# Patient Record
Sex: FEMALE | Race: WHITE | NOT HISPANIC OR LATINO | Employment: OTHER | URBAN - METROPOLITAN AREA
[De-identification: names, ages, dates, MRNs, and addresses within clinical notes are randomized per-mention and may not be internally consistent; named-entity substitution may affect disease eponyms.]

---

## 2017-01-30 ENCOUNTER — ALLSCRIPTS OFFICE VISIT (OUTPATIENT)
Dept: OTHER | Facility: OTHER | Age: 77
End: 2017-01-30

## 2017-01-30 DIAGNOSIS — E78.5 HYPERLIPIDEMIA: ICD-10-CM

## 2017-01-30 DIAGNOSIS — E03.9 HYPOTHYROIDISM: ICD-10-CM

## 2017-01-31 ENCOUNTER — GENERIC CONVERSION - ENCOUNTER (OUTPATIENT)
Dept: OTHER | Facility: OTHER | Age: 77
End: 2017-01-31

## 2017-05-04 ENCOUNTER — APPOINTMENT (OUTPATIENT)
Dept: LAB | Facility: OTHER | Age: 77
End: 2017-05-04
Payer: MEDICARE

## 2017-05-04 DIAGNOSIS — E03.9 HYPOTHYROIDISM: ICD-10-CM

## 2017-05-04 DIAGNOSIS — E78.5 HYPERLIPIDEMIA: ICD-10-CM

## 2017-05-04 LAB
ALBUMIN SERPL BCP-MCNC: 3.3 G/DL (ref 3.5–5)
ALP SERPL-CCNC: 85 U/L (ref 46–116)
ALT SERPL W P-5'-P-CCNC: 23 U/L (ref 12–78)
ANION GAP SERPL CALCULATED.3IONS-SCNC: 9 MMOL/L (ref 4–13)
AST SERPL W P-5'-P-CCNC: 16 U/L (ref 5–45)
BILIRUB SERPL-MCNC: 0.52 MG/DL (ref 0.2–1)
BUN SERPL-MCNC: 19 MG/DL (ref 5–25)
CALCIUM SERPL-MCNC: 9.3 MG/DL (ref 8.3–10.1)
CHLORIDE SERPL-SCNC: 103 MMOL/L (ref 100–108)
CHOLEST SERPL-MCNC: 233 MG/DL (ref 50–200)
CO2 SERPL-SCNC: 28 MMOL/L (ref 21–32)
CREAT SERPL-MCNC: 0.96 MG/DL (ref 0.6–1.3)
GFR SERPL CREATININE-BSD FRML MDRD: 56.8 ML/MIN/1.73SQ M
GLUCOSE P FAST SERPL-MCNC: 77 MG/DL (ref 65–99)
HDLC SERPL-MCNC: 52 MG/DL (ref 40–60)
LDLC SERPL CALC-MCNC: 157 MG/DL (ref 0–100)
POTASSIUM SERPL-SCNC: 4 MMOL/L (ref 3.5–5.3)
PROT SERPL-MCNC: 8.1 G/DL (ref 6.4–8.2)
SODIUM SERPL-SCNC: 140 MMOL/L (ref 136–145)
TRIGL SERPL-MCNC: 119 MG/DL
TSH SERPL DL<=0.05 MIU/L-ACNC: 2.15 UIU/ML (ref 0.36–3.74)

## 2017-05-04 PROCEDURE — 36415 COLL VENOUS BLD VENIPUNCTURE: CPT

## 2017-05-04 PROCEDURE — 84443 ASSAY THYROID STIM HORMONE: CPT

## 2017-05-04 PROCEDURE — 80061 LIPID PANEL: CPT

## 2017-05-04 PROCEDURE — 80053 COMPREHEN METABOLIC PANEL: CPT

## 2017-07-18 ENCOUNTER — ALLSCRIPTS OFFICE VISIT (OUTPATIENT)
Dept: OTHER | Facility: OTHER | Age: 77
End: 2017-07-18

## 2017-07-18 DIAGNOSIS — E78.5 HYPERLIPIDEMIA: ICD-10-CM

## 2017-09-14 ENCOUNTER — TRANSCRIBE ORDERS (OUTPATIENT)
Dept: LAB | Facility: OTHER | Age: 77
End: 2017-09-14

## 2017-09-14 ENCOUNTER — APPOINTMENT (OUTPATIENT)
Dept: LAB | Facility: OTHER | Age: 77
End: 2017-09-14
Payer: MEDICARE

## 2017-09-14 DIAGNOSIS — E78.5 HYPERLIPIDEMIA: ICD-10-CM

## 2017-09-14 LAB
ALBUMIN SERPL BCP-MCNC: 3.3 G/DL (ref 3.5–5)
ALP SERPL-CCNC: 247 U/L (ref 46–116)
ALT SERPL W P-5'-P-CCNC: 91 U/L (ref 12–78)
ANION GAP SERPL CALCULATED.3IONS-SCNC: 6 MMOL/L (ref 4–13)
AST SERPL W P-5'-P-CCNC: 65 U/L (ref 5–45)
BILIRUB SERPL-MCNC: 0.5 MG/DL (ref 0.2–1)
BUN SERPL-MCNC: 16 MG/DL (ref 5–25)
CALCIUM SERPL-MCNC: 9.2 MG/DL (ref 8.3–10.1)
CHLORIDE SERPL-SCNC: 102 MMOL/L (ref 100–108)
CHOLEST SERPL-MCNC: 203 MG/DL (ref 50–200)
CO2 SERPL-SCNC: 28 MMOL/L (ref 21–32)
CREAT SERPL-MCNC: 1.03 MG/DL (ref 0.6–1.3)
GFR SERPL CREATININE-BSD FRML MDRD: 53 ML/MIN/1.73SQ M
GLUCOSE P FAST SERPL-MCNC: 87 MG/DL (ref 65–99)
HDLC SERPL-MCNC: 63 MG/DL (ref 40–60)
LDLC SERPL CALC-MCNC: 119 MG/DL (ref 0–100)
POTASSIUM SERPL-SCNC: 4.2 MMOL/L (ref 3.5–5.3)
PROT SERPL-MCNC: 8.2 G/DL (ref 6.4–8.2)
SODIUM SERPL-SCNC: 136 MMOL/L (ref 136–145)
TRIGL SERPL-MCNC: 107 MG/DL

## 2017-09-14 PROCEDURE — 80061 LIPID PANEL: CPT

## 2017-09-14 PROCEDURE — 80053 COMPREHEN METABOLIC PANEL: CPT

## 2017-09-14 PROCEDURE — 36415 COLL VENOUS BLD VENIPUNCTURE: CPT

## 2017-09-15 ENCOUNTER — GENERIC CONVERSION - ENCOUNTER (OUTPATIENT)
Dept: OTHER | Facility: OTHER | Age: 77
End: 2017-09-15

## 2017-09-15 ENCOUNTER — ALLSCRIPTS OFFICE VISIT (OUTPATIENT)
Dept: OTHER | Facility: OTHER | Age: 77
End: 2017-09-15

## 2017-09-15 DIAGNOSIS — R79.89 OTHER SPECIFIED ABNORMAL FINDINGS OF BLOOD CHEMISTRY: ICD-10-CM

## 2017-09-15 DIAGNOSIS — N39.0 URINARY TRACT INFECTION: ICD-10-CM

## 2017-09-15 DIAGNOSIS — K62.5 HEMORRHAGE OF ANUS AND RECTUM: ICD-10-CM

## 2017-09-19 ENCOUNTER — HOSPITAL ENCOUNTER (OUTPATIENT)
Dept: RADIOLOGY | Age: 77
Discharge: HOME | End: 2017-09-19
Payer: MEDICARE

## 2017-09-19 DIAGNOSIS — R79.89 OTHER SPECIFIED ABNORMAL FINDINGS OF BLOOD CHEMISTRY: ICD-10-CM

## 2017-09-19 PROCEDURE — 76705 ECHO EXAM OF ABDOMEN: CPT

## 2017-09-22 ENCOUNTER — GENERIC CONVERSION - ENCOUNTER (OUTPATIENT)
Dept: OTHER | Facility: OTHER | Age: 77
End: 2017-09-22

## 2017-09-25 ENCOUNTER — APPOINTMENT (OUTPATIENT)
Dept: LAB | Facility: OTHER | Age: 77
End: 2017-09-25
Payer: MEDICARE

## 2017-09-25 ENCOUNTER — TRANSCRIBE ORDERS (OUTPATIENT)
Dept: LAB | Facility: OTHER | Age: 77
End: 2017-09-25

## 2017-09-25 DIAGNOSIS — R79.89 OTHER SPECIFIED ABNORMAL FINDINGS OF BLOOD CHEMISTRY: ICD-10-CM

## 2017-09-25 LAB
ALP SERPL-CCNC: 184 U/L (ref 46–116)
ALT SERPL W P-5'-P-CCNC: 47 U/L (ref 12–78)
AST SERPL W P-5'-P-CCNC: 36 U/L (ref 5–45)
GGT SERPL-CCNC: 391 U/L (ref 5–85)

## 2017-09-25 PROCEDURE — 87340 HEPATITIS B SURFACE AG IA: CPT

## 2017-09-25 PROCEDURE — 82977 ASSAY OF GGT: CPT

## 2017-09-25 PROCEDURE — 84075 ASSAY ALKALINE PHOSPHATASE: CPT

## 2017-09-25 PROCEDURE — 86803 HEPATITIS C AB TEST: CPT

## 2017-09-25 PROCEDURE — 84460 ALANINE AMINO (ALT) (SGPT): CPT

## 2017-09-25 PROCEDURE — 84450 TRANSFERASE (AST) (SGOT): CPT

## 2017-09-25 PROCEDURE — 86705 HEP B CORE ANTIBODY IGM: CPT

## 2017-09-25 PROCEDURE — 86704 HEP B CORE ANTIBODY TOTAL: CPT

## 2017-09-25 PROCEDURE — 36415 COLL VENOUS BLD VENIPUNCTURE: CPT

## 2017-09-25 PROCEDURE — 84080 ASSAY ALKALINE PHOSPHATASES: CPT

## 2017-09-26 ENCOUNTER — GENERIC CONVERSION - ENCOUNTER (OUTPATIENT)
Dept: OTHER | Facility: OTHER | Age: 77
End: 2017-09-26

## 2017-09-26 LAB
HBV CORE AB SER QL: NORMAL
HBV CORE IGM SER QL: NORMAL
HBV SURFACE AG SER QL: NORMAL
HCV AB SER QL: NORMAL

## 2017-09-28 LAB — ALP BONE SERPL-MCNC: 22.5 UG/L

## 2017-10-06 ENCOUNTER — APPOINTMENT (OUTPATIENT)
Dept: LAB | Facility: MEDICAL CENTER | Age: 77
End: 2017-10-06
Payer: MEDICARE

## 2017-10-06 ENCOUNTER — OFFICE VISIT (OUTPATIENT)
Dept: URGENT CARE | Facility: MEDICAL CENTER | Age: 77
End: 2017-10-06
Payer: MEDICARE

## 2017-10-06 DIAGNOSIS — N39.0 URINARY TRACT INFECTION: ICD-10-CM

## 2017-10-06 PROCEDURE — 99203 OFFICE O/P NEW LOW 30 MIN: CPT

## 2017-10-06 PROCEDURE — 87086 URINE CULTURE/COLONY COUNT: CPT

## 2017-10-06 PROCEDURE — G0463 HOSPITAL OUTPT CLINIC VISIT: HCPCS

## 2017-10-06 PROCEDURE — 81002 URINALYSIS NONAUTO W/O SCOPE: CPT

## 2017-10-07 LAB — BACTERIA UR CULT: NORMAL

## 2017-10-13 ENCOUNTER — ALLSCRIPTS OFFICE VISIT (OUTPATIENT)
Dept: OTHER | Facility: OTHER | Age: 77
End: 2017-10-13

## 2017-10-13 NOTE — PROGRESS NOTES
Assessment  1  Urinary tract infection (599 0) (N39 0)   2  Low back pain (724 2) (M54 5)    Plan  Urinary tract infection    · Ciprofloxacin HCl - 500 MG Oral Tablet   · Sulfamethoxazole-Trimethoprim 800-160 MG Oral Tablet (Bactrim DS); TAKE 1  TABLET TWICE DAILY UNTIL FINISHED   · (1) URINE CULTURE; Source:Urine, Clean Catch; Status:Resulted - Requires  Verification;   Done: 79VLB7130 03:07PM   · Urine Dip Non-Automated- POC; Status:Resulted - Requires Verification,Retrospective  By Protocol Authorization;   Done: 49HQT4109 02:58PM    Discussion/Summary  Discussion Summary:   Today you were found to have signs of a UTI today  Urine culture is pending  If I need to change your antibiotic, I will call you  Your back pain appears to be muscular in etiology  the antibiotic as directedfluidsheat to your backwith PCP within 2-3 days  to the ER with worsening symptoms, high fever, worsening pain, vomiting or any new concerning symptoms  Medication Side Effects Reviewed: Possible side effects of new medications were reviewed with the patient/guardian today  Understands and agrees with treatment plan: The treatment plan was reviewed with the patient/guardian  The patient/guardian understands and agrees with the treatment plan      Chief Complaint  1  Pain  Chief Complaint Free Text Note Form: House cleaning earlier in the week  ANd does have hx of shingles without Rash  has left pain in buttocks  had low grade fevers  100/100 9 Tylenol   having yellow vaginal drainage  Just not feeling right  History of Present Illness  HPI: The patient presents today for pain to her left buttock that started on Monday and has been worse the past 2 days  The patient states that she has been doing housework and moving furniture all week  The patient rates pain as a 4/10  She states that it is worse with movement and with touching her left lower back  She has taken any medications all week   The patient states that she has been having chills  The patient's daughter states that she had a low grade fever today  The patient took 650 mg of tylenol PTA  The patient's daughter called her PCP who recommended she go to urgent care  The patient also admits to having some vaginal discharge  Hospital Based Practices Required Assessment:   Pain Assessment   the patient states they have pain  (on a scale of 0 to 10, the patient rates the pain at 4 )   Abuse And Domestic Violence Screen   Domestic violence screen not done today  Reason DV Screen not done: not alone     Depression And Suicide Screen  Suicide screen not done today  -Reason suicide screen not done: not alone   Primary Language is  English  Readiness To Learn: Receptive-and-no interest    Barriers To Learning: none  Preferred Learning: verbal   Education Completed: disease/condition-and-treatment/procedure   Teaching Method: verbal   Person Taught: patient   Evaluation Of Learning: verbalized/demonstrated understanding      Review of Systems  Focused-Female:   Constitutional: fever-and-chills  ENT: no earache,-no sore throat-and-no nasal discharge  Cardiovascular: no chest pain  Respiratory: no shortness of breath  Gastrointestinal: no abdominal pain,-no nausea,-no vomiting-and-no diarrhea  Genitourinary: vaginal discharge, but-no dysuria  Musculoskeletal: arthralgias  Integumentary: no rashes  Neurological: no headache,-no numbness-and-no tingling  ROS Reviewed:   ROS reviewed  Active Problems  1  Edema (782 3) (R60 9)   2  Elevated LFTs (790 6) (R79 89)   3  Encounter for screening mammogram for malignant neoplasm of breast (V76 12)   (Z12 31)   4  Fatigue (780 79) (R53 83)   5  Ganglion cyst of flexor tendon sheath of finger, left (727 42) (M67 442)   6  Hyperlipidemia (272 4) (E78 5)   7  Hypothyroidism (244 9) (E03 9)   8  Need for influenza vaccination (V04 81) (Z23)   9  Need for pneumococcal vaccination (V03 82) (Z23)   10   Need for shingles vaccine (V04 89) (Z23)   11  Right ear impacted cerumen (380 4) (H61 21)   12  Screening for osteoporosis (V82 81) (Z13 820)   13  Venous stasis of lower extremity (459 81) (I87 8)    Past Medical History  1  History of sinusitis (V12 69) (Z87 09)   2  Need for Tdap vaccination (V06 1) (Z23)   3  History of Right middle ear infection (382 9) (H66 91)   4  Screening for osteoporosis (V82 81) (Z13 820)  Active Problems And Past Medical History Reviewed: The active problems and past medical history were reviewed and updated today  Family History  Sister    1  Family history of Heart disease  Family History    2  Family history of Hyperlipidemia  Family History Reviewed: The family history was reviewed and updated today  Social History   · Never a smoker  Social History Reviewed: The social history was reviewed and updated today  The social history was reviewed and is unchanged  Surgical History  1  History of Gastric Surgery  Surgical History Reviewed: The surgical history was reviewed and updated today  Current Meds   1  Atorvastatin Calcium 40 MG Oral Tablet; take 1 tablet every day; Therapy: 98Zsb9601 to (Evaluate:38Cst5994)  Requested for: 57Jzs9207; Last   Rx:00Prq6770 Ordered   2  Boostrix 5-2 5-18 5 Intramuscular Suspension; INJECT 0 5  ML Intramuscular; Therapy: 99BCD0635 to (Last Rx:18Mar2016) Ordered   3  Levothyroxine Sodium 88 MCG Oral Tablet; take 1 tablet by mouth once daily; Therapy: 84PLT6129 to (Ml Arreola)  Requested for: 94XXE1728; Last   Rx:81Yxl1047 Ordered   4  Omega-3-6-9 Oral Capsule Recorded   5  Zostavax 49899 UNT/0 65ML Subcutaneous Solution Reconstituted; as directed; Therapy: 55BUV5453 to (Last Rx:61Str3464) Ordered  Medication List Reviewed: The medication list was reviewed and updated today  Allergies  1  Percocet TABS   2   Vicodin TABS    Vitals  Signs   Recorded: 82UHW8289 01:56PM   Temperature: 98 7 F  Heart Rate: 92  Respiration: 20  Systolic: 253  Diastolic: 70  Height: 5 ft 2 in  Weight: 166 lb   BMI Calculated: 30 36  BSA Calculated: 1 77  Pain Scale: 5    Physical Exam    Constitutional   General appearance: No acute distress, well appearing and well nourished  Ears, Nose, Mouth, and Throat   Oropharynx: Normal with no erythema, edema, exudate or lesions  Pulmonary   Respiratory effort: No increased work of breathing or signs of respiratory distress  Auscultation of lungs: Clear to auscultation  Cardiovascular   Auscultation of heart: Normal rate and rhythm, normal S1 and S2, without murmurs  Abdomen   Abdomen: Non-tender, no masses  -no CVA tenderness B/L  Musculoskeletal   Gait and station: Normal     Inspection/palpation of joints, bones, and muscles: Abnormal  -No midline tenderness of the lumbar spine  There is pinpoint tenderness upon palpation of the left side paraspinal muscle/SI joint  Skin   Skin and subcutaneous tissue: Normal without rashes or lesions  -No rash  Neurologic   Reflexes: 2+ and symmetric  Sensation: No sensory loss      Psychiatric   Orientation to person, place, and time: Normal        Results/Data  (1) URINE CULTURE 06Oct2017 03:07PM [facility] Order Number: [id number]     Test Name Result Flag Reference   CLINICAL REPORT (Report)     Test:        Urine culture  Specimen Type:   Urine  Specimen Date:   10/6/2017 3:07 PM  Result Date:    10/7/2017 1:36 PM  Result Status:   Final result  Resulting Lab:   [address]            Tel: [phone]      CULTURE                                       ------------------                                   No Growth <1000 cfu/mL     Urine Dip Non-Automated- POC 05XJJ2624 02:58PM Otilio Abreu     Test Name Result Flag Reference   Color Yellow     Clarity 0 5M     Leukocytes small     Nitrite neg     Blood small     Bilirubin neg     Urobilinogen 0 02 Protein neg     Ph 5 0     Specific Gravity 1 010     Ketone neg     Glucose neg     Color Yellow     Clarity 0 5M     Leukocytes small     Nitrite neg     Blood small     Bilirubin neg     Urobilinogen 0 02     Protein neg     Ph 5 0     Specific Gravity 1 010     Ketone neg     Glucose neg               Future Appointments    Date/Time Provider Specialty Site   10/13/2017 10:15 AM HUGO Nobles   Gastroenterology Adult [facility] GASTROENTEROLOGY [facility]     Signatures   Electronically signed by : Fadi Ramos, [address]; Oct  9 2017  7:43AM EST                       (Author)    Electronically signed by : HUGO Valdez ; Oct 12 2017 11:57AM EST                       (Co-author)

## 2017-11-07 ENCOUNTER — TRANSCRIBE ORDERS (OUTPATIENT)
Dept: LAB | Facility: OTHER | Age: 77
End: 2017-11-07

## 2017-11-07 ENCOUNTER — APPOINTMENT (OUTPATIENT)
Dept: LAB | Facility: OTHER | Age: 77
End: 2017-11-07
Payer: MEDICARE

## 2017-11-07 DIAGNOSIS — K62.5 HEMORRHAGE OF ANUS AND RECTUM: ICD-10-CM

## 2017-11-07 LAB
ALBUMIN SERPL BCP-MCNC: 3.4 G/DL (ref 3.5–5)
ALP SERPL-CCNC: 204 U/L (ref 46–116)
ALT SERPL W P-5'-P-CCNC: 104 U/L (ref 12–78)
AST SERPL W P-5'-P-CCNC: 69 U/L (ref 5–45)
BILIRUB DIRECT SERPL-MCNC: 0.12 MG/DL (ref 0–0.2)
BILIRUB SERPL-MCNC: 0.48 MG/DL (ref 0.2–1)
PROT SERPL-MCNC: 8.8 G/DL (ref 6.4–8.2)

## 2017-11-07 PROCEDURE — 86256 FLUORESCENT ANTIBODY TITER: CPT

## 2017-11-07 PROCEDURE — 80076 HEPATIC FUNCTION PANEL: CPT

## 2017-11-07 PROCEDURE — 36415 COLL VENOUS BLD VENIPUNCTURE: CPT

## 2017-11-08 LAB — MITOCHONDRIA M2 IGG SER-ACNC: 3.1 UNITS (ref 0–20)

## 2017-11-27 ENCOUNTER — GENERIC CONVERSION - ENCOUNTER (OUTPATIENT)
Dept: OTHER | Facility: OTHER | Age: 77
End: 2017-11-27

## 2017-12-04 ENCOUNTER — TRANSCRIBE ORDERS (OUTPATIENT)
Dept: LAB | Facility: OTHER | Age: 77
End: 2017-12-04

## 2017-12-04 ENCOUNTER — APPOINTMENT (OUTPATIENT)
Dept: LAB | Facility: OTHER | Age: 77
End: 2017-12-04
Payer: MEDICARE

## 2017-12-04 DIAGNOSIS — R79.89 OTHER SPECIFIED ABNORMAL FINDINGS OF BLOOD CHEMISTRY: ICD-10-CM

## 2017-12-04 LAB
ALBUMIN SERPL BCP-MCNC: 3.3 G/DL (ref 3.5–5)
ALP SERPL-CCNC: 93 U/L (ref 46–116)
ALT SERPL W P-5'-P-CCNC: 33 U/L (ref 12–78)
AST SERPL W P-5'-P-CCNC: 24 U/L (ref 5–45)
BILIRUB DIRECT SERPL-MCNC: 0.07 MG/DL (ref 0–0.2)
BILIRUB SERPL-MCNC: 0.32 MG/DL (ref 0.2–1)
PROT SERPL-MCNC: 8.1 G/DL (ref 6.4–8.2)

## 2017-12-04 PROCEDURE — 80076 HEPATIC FUNCTION PANEL: CPT

## 2017-12-04 PROCEDURE — 36415 COLL VENOUS BLD VENIPUNCTURE: CPT

## 2017-12-12 ENCOUNTER — GENERIC CONVERSION - ENCOUNTER (OUTPATIENT)
Dept: OTHER | Facility: OTHER | Age: 77
End: 2017-12-12

## 2017-12-15 ENCOUNTER — TRANSCRIBE ORDERS (OUTPATIENT)
Dept: LAB | Facility: OTHER | Age: 77
End: 2017-12-15

## 2017-12-15 ENCOUNTER — APPOINTMENT (OUTPATIENT)
Dept: LAB | Facility: OTHER | Age: 77
End: 2017-12-15
Payer: MEDICARE

## 2017-12-15 DIAGNOSIS — R79.89 OTHER SPECIFIED ABNORMAL FINDINGS OF BLOOD CHEMISTRY: ICD-10-CM

## 2017-12-15 DIAGNOSIS — E78.5 HYPERLIPIDEMIA: ICD-10-CM

## 2017-12-15 LAB
ALBUMIN SERPL BCP-MCNC: 3.3 G/DL (ref 3.5–5)
ALP SERPL-CCNC: 88 U/L (ref 46–116)
ALT SERPL W P-5'-P-CCNC: 29 U/L (ref 12–78)
ANION GAP SERPL CALCULATED.3IONS-SCNC: 4 MMOL/L (ref 4–13)
AST SERPL W P-5'-P-CCNC: 22 U/L (ref 5–45)
BILIRUB SERPL-MCNC: 0.59 MG/DL (ref 0.2–1)
BUN SERPL-MCNC: 18 MG/DL (ref 5–25)
CALCIUM SERPL-MCNC: 9 MG/DL (ref 8.3–10.1)
CHLORIDE SERPL-SCNC: 102 MMOL/L (ref 100–108)
CHOLEST SERPL-MCNC: 366 MG/DL (ref 50–200)
CO2 SERPL-SCNC: 30 MMOL/L (ref 21–32)
CREAT SERPL-MCNC: 0.94 MG/DL (ref 0.6–1.3)
GFR SERPL CREATININE-BSD FRML MDRD: 60 ML/MIN/1.73SQ M
GLUCOSE P FAST SERPL-MCNC: 85 MG/DL (ref 65–99)
HDLC SERPL-MCNC: 52 MG/DL (ref 40–60)
LDLC SERPL CALC-MCNC: 289 MG/DL (ref 0–100)
POTASSIUM SERPL-SCNC: 4.5 MMOL/L (ref 3.5–5.3)
PROT SERPL-MCNC: 7.9 G/DL (ref 6.4–8.2)
SODIUM SERPL-SCNC: 136 MMOL/L (ref 136–145)
TRIGL SERPL-MCNC: 124 MG/DL

## 2017-12-15 PROCEDURE — 80053 COMPREHEN METABOLIC PANEL: CPT

## 2017-12-15 PROCEDURE — 36415 COLL VENOUS BLD VENIPUNCTURE: CPT

## 2017-12-15 PROCEDURE — 80061 LIPID PANEL: CPT

## 2017-12-18 ENCOUNTER — GENERIC CONVERSION - ENCOUNTER (OUTPATIENT)
Dept: OTHER | Facility: OTHER | Age: 77
End: 2017-12-18

## 2017-12-29 ENCOUNTER — GENERIC CONVERSION - ENCOUNTER (OUTPATIENT)
Dept: OTHER | Facility: OTHER | Age: 77
End: 2017-12-29

## 2018-01-11 NOTE — RESULT NOTES
Verified Results  (1) AST (SGOT) 25Sep2017 09:15AM [facility] Order Number: [id number]     Test Name Result Flag Reference   AST(SGOT) 36 U/L  5-45   Specimen collection should occur prior to Sulfasalazine and/or Sulfapyridine administration due to the potential for falsely depressed results       (1) ALT (SGPT) 25Sep2017 09:15AM [facility] Order Number: [id number]     Test Name Result Flag Reference   ALT (SGPT) 47 U/L  12-78     (1) GGT 25Sep2017 09:15AM [facility] Order Number: [id number]     Test Name Result Flag Reference    U/L H 5-85     (1) ALKALINE PHOSPHATASE 16TUQ9808 09:15AM [facility] Order Number: [id number]     Test Name Result Flag Reference   ALK PHOSPHATAS 184 U/L H      (1) CHRONIC HEPATITIS PANEL 50QSQ7777 09:15AM [facility] Order Number: [id number]     Test Name Result Flag Reference   HEPATITIS B SURFACE ANTIGEN Non-reactive  Non-reactive, NonReactive - Confirmed   HEPATITIS C ANTIBODY Non-reactive  Non-reactive   HEPATITIS B CORE IGM ANTIBODY Non-reactive  Non-reactive   HEPATITIS B CORE TOTAL ANTIBODY Non-reactive  Non-reactive

## 2018-01-11 NOTE — CONSULTS
I had the pleasure of evaluating your patient, Fatmata Holcomb  My full evaluation follows:      Chief Complaint  Abnormal liver functions, rectal bleeding      History of Present Illness  43-year-old female with several GI issues  The first is abnormal liver functions  According to the patient she had no history of any liver issues  Sometime ago her cholesterol was elevated  She had been on atorvastatin 40 mg  This was doubled to 80 mg  Following this she had elevation of the alkaline phosphatase  It was 247 on September 14  The dose was then cut back to the original dose  Repeat alkaline phosphatase on September 25 was 184  GGT was 391  AST and ALT were normal  Chronic hepatitis panel was sent which was normal  Hepatic ultrasound and ultrasound of the gallbladder were unremarkable  Patient denies any symptomatology referable to the liver  No change in the color of urine or stool  No jaundice  No abdominal pain  No itching  No other associated GI symptomatology  Second problem is intermittent bright red blood per rectum  Her last colonoscopy was more than 10 years ago  She understands she is overdue and the rectal bleeding makes the issue even more urgent  She has no abdominal pain, change in bowel habits or stool caliber  No rectal pain  No tenesmus  No recent changes in diet or medication other than as mentioned before  We discussed the necessity for colonoscopy and she and her daughter agree  Third issue is a past history of GI bleeding secondary to gastric ulcer  This is many years ago  Was treated surgically  She has no melanotic stool  No significant upper GI symptomatology  Of note is that the patient does not take nonsteroidals  However she takes high-dose Tylenol  We spoke about stopping this because of the current liver issues  No weight loss  No alcohol use  No supplement use  Review of Systems    Constitutional: No fever, no chills, feels well, no tiredness, no recent weight gain or weight loss  Eyes: No complaints of eye pain, no red eyes, no eyesight problems, no discharge, no dry eyes, no itching of eyes  ENT: hearing loss  Cardiovascular: No complaints of slow heart rate, no fast heart rate, no chest pain, no palpitations, no leg claudication, no lower extremity edema  Respiratory: No complaints of shortness of breath, no wheezing, no cough, no SOB on exertion, no orthopnea, no PND  Gastrointestinal: as noted in HPI  Genitourinary: No complaints of dysuria, no incontinence, no pelvic pain, no dysmenorrhea, no vaginal discharge or bleeding  Musculoskeletal: arthralgias  Integumentary: No complaints of skin rash or lesions, no itching, no skin wounds, no breast pain or lump  Neurological: No complaints of headache, no confusion, no convulsions, no numbness, no dizziness or fainting, no tingling, no limb weakness, no difficulty walking  Psychiatric: Not suicidal, no sleep disturbance, no anxiety or depression, no change in personality, no emotional problems  Endocrine: No complaints of proptosis, no hot flashes, no muscle weakness, no deepening of the voice, no feelings of weakness  Hematologic/Lymphatic: No complaints of swollen glands, no swollen glands in the neck, does not bleed easily, does not bruise easily  Active Problems    1  Edema (782 3) (R60 9)   2  Elevated LFTs (790 6) (R79 89)   3  Encounter for screening mammogram for malignant neoplasm of breast (V76 12)   (Z12 31)   4  Fatigue (780 79) (R53 83)   5  Ganglion cyst of flexor tendon sheath of finger, left (727 42) (M67 442)   6  Hyperlipidemia (272 4) (E78 5)   7  Hypothyroidism (244 9) (E03 9)   8  Low back pain (724 2) (M54 5)   9  Need for influenza vaccination (V04 81) (Z23)   10  Need for pneumococcal vaccination (V03 82) (Z23)   11  Need for shingles vaccine (V04 89) (Z23)   12  Right ear impacted cerumen (380 4) (H61 21)   13  Screening for osteoporosis (V82 81) (Z13 820)   14   Urinary tract infection (599 0) (N39 0)   15  Venous stasis of lower extremity (459 81) (I87 8)    Past Medical History    · History of sinusitis (V12 69) (Z87 09)   · Need for Tdap vaccination (V06 1) (Z23)   · History of Right middle ear infection (382 9) (H66 91)   · Screening for osteoporosis (V82 81) (K52 207)    The active problems and past medical history were reviewed and updated today  Surgical History    · History of Gastric Surgery    The surgical history was reviewed and updated today  Family History    · Family history of Heart disease    · Family history of Hyperlipidemia    The family history was reviewed and updated today  Social History    · Never a smoker  The social history was reviewed and updated today  The social history was reviewed and is unchanged  Current Meds   1  Atorvastatin Calcium 40 MG Oral Tablet; take 1 tablet every day; Therapy: 93Bgv5516 to (Evaluate:85Lsz3103)  Requested for: 27Ojp0213; Last   Rx:32Hlr8050 Ordered   2  Boostrix 5-2 5-18 5 Intramuscular Suspension; INJECT 0 5  ML Intramuscular; Therapy: 06YBO4743 to (Last Rx:18Mar2016) Ordered   3  Levothyroxine Sodium 88 MCG Oral Tablet; take 1 tablet by mouth once daily; Therapy: 41NZC4863 to (Kymberly Dailey)  Requested for: 96LQA7310; Last   Rx:05Jun2017 Ordered   4  Omega-3-6-9 Oral Capsule Recorded   5  Zostavax 84179 UNT/0 65ML Subcutaneous Solution Reconstituted; as directed; Therapy: 77USS3001 to (Last Rx:75Idd0857) Ordered    The medication list was reviewed and updated today  Allergies    1  Percocet TABS   2  Vicodin TABS    Vitals   Recorded: 45MFW8850 38:24VJ   Systolic 358   Diastolic 82   Height 5 ft 2 in   Weight 165 lb 6 oz   BMI Calculated 30 25   BSA Calculated 1 76     Physical Exam    Constitutional   General appearance: No acute distress, well appearing and well nourished  Eyes   Conjunctiva and lids: No swelling, erythema or discharge      Pupils and irises: Equal, round and reactive to light     Ears, Nose, Mouth, and Throat   External inspection of ears and nose: Abnormal   Hearing aids  Nasal mucosa, septum, and turbinates: Normal without edema or erythema  Oropharynx: Normal with no erythema, edema, exudate or lesions  Pulmonary   Respiratory effort: No increased work of breathing or signs of respiratory distress  Auscultation of lungs: Clear to auscultation  Cardiovascular   Auscultation of heart: Normal rate and rhythm, normal S1 and S2, without murmurs  Examination of extremities for edema and/or varicosities: Normal     Carotid pulses: Normal     Abdomen   Abdomen: Abnormal   Large midline scar  Liver and spleen: No hepatomegaly or splenomegaly  Lymphatic   Palpation of lymph nodes in neck: No lymphadenopathy  Musculoskeletal   Digits and nails: Normal without clubbing or cyanosis  Inspection/palpation of joints, bones, and muscles: Normal     Skin   Skin and subcutaneous tissue: Normal without rashes or lesions  Neurologic No nystagmus or asterixis  Psychiatric   Orientation to person, place, and time: Normal     Mood and affect: Normal          Results/Data    Lab Review  Liver functions as outlined in history of present illness  Radiology Review  Ultrasound as outlined in history of present illness  Assessment    1  Elevated LFTs (790 6) (R79 89)   2  Rectal bleeding (569 3) (K62 5)    Plan  Rectal bleeding    · (1) HEPATIC FUNCTION PANEL; Status:Active; Requested MPE:[id number];    Perform:[facility]; Due:2018; Ordered; For:Rectal bleeding; Ordered By:Jeff Mendez;   · (1) MITOCHONDRIAL ANTIBODY; Status:Active; Requested [id number];    Perform:[facility]; Due:2018; Ordered; For:Rectal bleeding; Ordered By:Sunitha Mendez;   · COLONOSCOPY; Status:Active; Requested WEU:[id number];    Perform:[facility]; Due:2018; Ordered;   For:Rectal bleeding; Ordered By:Sunitha Mendez;   · ENDOSCOPY - PROCEDURE, RISKS, AND BENEFITS; Status:Complete;   Done:  42LHE2529   Ordered; For:Rectal bleeding; Ordered By:Jeff Mendez;    Discussion/Summary    Problem #1, elevated alkaline phosphatase and GGT  This began following doubling of her cholesterol medication  It is since been decreased  Plan, repeat LFTs in 2 weeks  Mitochondrial antibody at that time  Problem #2, rectal bleeding  Plan, colonoscopy  Last colonoscopy more than 10 years ago  Problem #3, history of bleeding gastric ulcer status post surgery  No evidence of upper GI bleed at this time  Plan, continue current medical management  Thank you very much for allowing me to participate in the care of this patient  If you have any questions, please do not hesitate to contact me        Signatures   Electronically signed by : HUGO Antonio ; Oct 13 2017 10:34AM EST                       (Author)

## 2018-01-12 VITALS
DIASTOLIC BLOOD PRESSURE: 76 MMHG | WEIGHT: 167.5 LBS | BODY MASS INDEX: 30.82 KG/M2 | HEIGHT: 62 IN | HEART RATE: 78 BPM | SYSTOLIC BLOOD PRESSURE: 118 MMHG | TEMPERATURE: 98.3 F | OXYGEN SATURATION: 97 %

## 2018-01-12 NOTE — RESULT NOTES
Verified Results  (1) LIPID PANEL FASTING W DIRECT LDL REFLEX 38Wwn3687 08:45AM [facility] Order Number: [id number]     Test Name Result Flag Reference   CHOLESTEROL 203 mg/dL H    LDL CHOLESTEROL CALCULATED 119 mg/dL H 0-100   Triglyceride:        Normal <150 mg/dl   Borderline High 150-199 mg/dl   High 200-499 mg/dl   Very High >499 mg/dl      Cholesterol:       Desirable <200 mg/dl    Borderline High 200-239 mg/dl    High >239 mg/dl      HDL Cholesterol:       High>59 mg/dL    Low <41 mg/dL      HDL Cholesterol:       High>59 mg/dL    Low <41 mg/dL      This screening LDL is a calculated result  It does not have the accuracy of the Direct Measured LDL in the monitoring of patients with hyperlipidemia and/or statin therapy  Direct Measure LDL (WHU007) must be ordered separately in these patients  TRIGLYCERIDES 107 mg/dL  <=150   Specimen collection should occur prior to N-Acetylcysteine or Metamizole administration due to the potential for falsely depressed results  HDL,DIRECT 63 mg/dL H 40-60   Specimen collection should occur prior to Metamizole administration due to the potential for falsley depressed results  (1) COMPREHENSIVE METABOLIC PANEL 92JIT5710 75:47BI [facility] Order Number: [id number]     Test Name Result Flag Reference   SODIUM 136 mmol/L  136-145   POTASSIUM 4 2 mmol/L  3 5-5 3   CHLORIDE 102 mmol/L  100-108   CARBON DIOXIDE 28 mmol/L  21-32   ANION GAP (CALC) 6 mmol/L  4-13   BLOOD UREA NITROGEN 16 mg/dL  5-25   CREATININE 1 03 mg/dL  0 60-1 30   Standardized to IDMS reference method   CALCIUM 9 2 mg/dL  8 3-10 1   BILI, TOTAL 0 50 mg/dL  0 20-1 00   ALK PHOSPHATAS 247 U/L H    ALT (SGPT) 91 U/L H 12-78   Specimen collection should occur prior to Sulfasalazine and/or Sulfapyridine administration due to the potential for falsely depressed results     AST(SGOT) 65 U/L H 5-45   Specimen collection should occur prior to Sulfasalazine administration due to the potential for falsely depressed results  ALBUMIN 3 3 g/dL L 3 5-5 0   TOTAL PROTEIN 8 2 g/dL  6 4-8 2   eGFR 53 ml/min/1 73sq m     [facility] Disease Education Program recommendations are as follows:  GFR calculation is accurate only with a steady state creatinine  Chronic Kidney disease less than 60 ml/min/1 73 sq  meters  Kidney failure less than 15 ml/min/1 73 sq  meters  GLUCOSE FASTING 87 mg/dL  65-99   Specimen collection should occur prior to Sulfasalazine administration due to the potential for falsely depressed results  Specimen collection should occur prior to Sulfapyridine administration due to the potential for falsely elevated results

## 2018-01-13 VITALS
SYSTOLIC BLOOD PRESSURE: 130 MMHG | HEIGHT: 62 IN | DIASTOLIC BLOOD PRESSURE: 80 MMHG | OXYGEN SATURATION: 95 % | HEART RATE: 73 BPM | WEIGHT: 171 LBS | BODY MASS INDEX: 31.47 KG/M2 | TEMPERATURE: 97.5 F

## 2018-01-13 VITALS
BODY MASS INDEX: 30.82 KG/M2 | TEMPERATURE: 98.8 F | OXYGEN SATURATION: 96 % | DIASTOLIC BLOOD PRESSURE: 82 MMHG | HEIGHT: 62 IN | WEIGHT: 167.5 LBS | SYSTOLIC BLOOD PRESSURE: 126 MMHG | HEART RATE: 76 BPM

## 2018-01-13 VITALS
DIASTOLIC BLOOD PRESSURE: 82 MMHG | WEIGHT: 165.38 LBS | BODY MASS INDEX: 30.44 KG/M2 | SYSTOLIC BLOOD PRESSURE: 132 MMHG | HEIGHT: 62 IN

## 2018-01-14 NOTE — RESULT NOTES
Verified Results  (1) CBC/PLT/DIFF [id number] 07:43AM Carry Busing     Test Name Result Flag Reference   WBC COUNT 6 67 Thousand/uL  4 31-10 16   RBC COUNT 4 17 Million/uL  3 81-5 12   HEMOGLOBIN 13 0 g/dL  11 5-15 4   HEMATOCRIT 41 1 %  34 8-46  1   MCV 99 fL H 82-98   MCH 31 2 pg  26 8-34 3   MCHC 31 6 g/dL  31 4-37 4   RDW 14 1 %  11 6-15 1   MPV 11 9 fL  8 9-12 7   PLATELET COUNT 615 Thousands/uL  149-390   nRBC AUTOMATED 0 /100 WBCs     NEUTROPHILS RELATIVE PERCENT 52 %  43-75   LYMPHOCYTES RELATIVE PERCENT 37 %  14-44   MONOCYTES RELATIVE PERCENT 8 %  4-12   EOSINOPHILS RELATIVE PERCENT 3 %  0-6   BASOPHILS RELATIVE PERCENT 0 %  0-1   NEUTROPHILS ABSOLUTE COUNT 3 46 Thousands/µL  1 85-7 62   LYMPHOCYTES ABSOLUTE COUNT 2 44 Thousands/µL  0 60-4 47   MONOCYTES ABSOLUTE COUNT 0 54 Thousand/µL  0 17-1 22   EOSINOPHILS ABSOLUTE COUNT 0 19 Thousand/µL  0 00-0 61   BASOPHILS ABSOLUTE COUNT 0 03 Thousands/µL  0 00-0 10     (1) LIPID PANEL FASTING W DIRECT LDL REFLEX [id number] 07:43AM Kika Sheridan   Triglyceride:         Normal              <150 mg/dl       Borderline High    150-199 mg/dl       High               200-499 mg/dl       Very High          >499 mg/dl  Cholesterol:         Desirable        <200 mg/dl      Borderline High  200-239 mg/dl      High             >239 mg/dl  HDL Cholesterol:        High    >59 mg/dL      Low     <41 mg/dL  LDL Cholesterol:        Optimal          <100 mg/dl         Near Optimal     100-129 mg/dl        Above Optimal          Borderline High   130-159 mg/dl          High              160-189 mg/dl          Very High        >189 mg/dl  LDL CALCULATED:    This screening LDL is a calculated result  It does not have the accuracy of the Direct Measured LDL in the monitoring of patients with hyperlipidemia and/or statin therapy  Direct Measure LDL (MWJ897) must be ordered separately in these patients       Test Name Result Flag Reference   CHOLESTEROL 393 mg/dL H    LDL CHOLESTEROL CALCULATED 303 mg/dL H 0-100   TRIGLYCERIDES 148 mg/dL  <=150   HDL,DIRECT 60 mg/dL  40-60     (1) COMPREHENSIVE METABOLIC PANEL [id number] 26:11XB Gardenia Sheridan Kidney Disease Education Program recommendations are as follows:  GFR calculation is accurate only with a steady state creatinine  Chronic Kidney disease less than 60 ml/min/1 73 sq  meters  Kidney failure less than 15 ml/min/1 73 sq  meters  Test Name Result Flag Reference   GLUCOSE,RANDM 92 mg/dL     If the patient is fasting, the ADA then defines impaired fasting glucose as > 100 mg/dL and diabetes as > or equal to 123 mg/dL  SODIUM 141 mmol/L  136-145   POTASSIUM 4 2 mmol/L  3 5-5 3   CHLORIDE 106 mmol/L  100-108   CARBON DIOXIDE 28 mmol/L  21-32   ANION GAP (CALC) 7 mmol/L  4-13   BLOOD UREA NITROGEN 17 mg/dL  5-25   CREATININE 0 92 mg/dL  0 60-1 30   Standardized to IDMS reference method   CALCIUM 8 2 mg/dL L 8 3-10 1   BILI, TOTAL 0 59 mg/dL  0 20-1 00   ALK PHOSPHATAS 86 U/L     ALT (SGPT) 28 U/L  12-78   AST(SGOT) 15 U/L  5-45   ALBUMIN 3 3 g/dL L 3 5-5 0   TOTAL PROTEIN 7 5 g/dL  6 4-8 2   eGFR Non-African American 59 8 ml/min/1 73sq m       (1) TSH [id number] 07:43AM Kika Sheridan   Patients undergoing fluorescein dye angiography may retain small amounts of fluorescein in the body for 48-72 hours post procedure  Samples containing fluorescein can produce falsely depressed TSH values  If the patient had this procedure,a specimen should be resubmitted post fluorescein clearance          The recommended reference ranges for TSH during pregnancy are as follows:  First trimester 0 1 to 2 5 uIU/mL  Second trimester  0 2 to 3 0 uIU/mL  Third trimester 0 3 to 3 0 uIU/m     Test Name Result Flag Reference   TSH 3 290 uIU/mL  0 358-3 740

## 2018-01-16 NOTE — RESULT NOTES
Verified Results  (1) COMPREHENSIVE METABOLIC PANEL [id number] 76:70AJ Guanaco Anderson Order Number: [id number]     Test Name Result Flag Reference   SODIUM 140 mmol/L  136-145   POTASSIUM 4 0 mmol/L  3 5-5 3   CHLORIDE 103 mmol/L  100-108   CARBON DIOXIDE 28 mmol/L  21-32   ANION GAP (CALC) 9 mmol/L  4-13   BLOOD UREA NITROGEN 19 mg/dL  5-25   CREATININE 0 96 mg/dL  0 60-1 30   Standardized to IDMS reference method   CALCIUM 9 3 mg/dL  8 3-10 1   BILI, TOTAL 0 52 mg/dL  0 20-1 00   ALK PHOSPHATAS 85 U/L     ALT (SGPT) 23 U/L  12-78   AST(SGOT) 16 U/L  5-45   ALBUMIN 3 3 g/dL L 3 5-5 0   TOTAL PROTEIN 8 1 g/dL  6 4-8 2   eGFR Non-African American 56 8 ml/min/1 73sq [facility] Disease Education Program recommendations are as follows:  GFR calculation is accurate only with a steady state creatinine  Chronic Kidney disease less than 60 ml/min/1 73 sq  meters  Kidney failure less than 15 ml/min/1 73 sq  meters  GLUCOSE FASTING 77 mg/dL  65-99     (1) LIPID PANEL FASTING W DIRECT LDL REFLEX [id number] 07:28AM Guanaco Anderson Order Number: [id number]     Test Name Result Flag Reference   CHOLESTEROL 233 mg/dL H    LDL CHOLESTEROL CALCULATED 157 mg/dL H 0-100   - Patient Instructions:  This is a fasting blood test  Water, black tea or black coffee only after 9:00pm the night before test   Drink 2 glasses of water the morning of test       Triglyceride:         Normal              <150 mg/dl       Borderline High    150-199 mg/dl       High               200-499 mg/dl       Very High          >499 mg/dl  Cholesterol:         Desirable        <200 mg/dl      Borderline High  200-239 mg/dl      High             >239 mg/dl  HDL Cholesterol:        High    >59 mg/dL      Low     <41 mg/dL  LDL Cholesterol:        Optimal          <100 mg/dl        Near Optimal     100-129 mg/dl        Above Optimal          Borderline High   130-159 mg/dl          High              160-189 mg/dl          Very High        >189 mg/dl  LDL CALCULATED:    This screening LDL is a calculated result  It does not have the accuracy of the Direct Measured LDL in the monitoring of patients with hyperlipidemia and/or statin therapy  Direct Measure LDL (SCN573) must be ordered separately in these patients  TRIGLYCERIDES 119 mg/dL  <=150   Specimen collection should occur prior to N-Acetylcysteine or Metamizole administration due to the potential for falsely depressed results  HDL,DIRECT 52 mg/dL  40-60   Specimen collection should occur prior to Metamizole administration due to the potential for falsely depressed results  (1) TSH [id number] 07:28AM Ellie Quiet Order Number: [id number]     Test Name Result Flag Reference   TSH 2 150 uIU/mL  0 358-3 740   - Patient Instructions: This bloodwork is non-fasting  Please drink two glasses of water morning of bloodwork  Patients undergoing fluorescein dye angiography may retain small amounts of fluorescein in the body for 48-72 hours post procedure  Samples containing fluorescein can produce falsely depressed TSH values  If the patient had this procedure,a specimen should be resubmitted post fluorescein clearance            The recommended reference ranges for TSH during pregnancy are as follows:  First trimester 0 1 to 2 5 uIU/mL  Second trimester  0 2 to 3 0 uIU/mL  Third trimester 0 3 to 3 0 uIU/m

## 2018-01-17 NOTE — RESULT NOTES
Verified Results  US ABDOMEN LIMITED [id number] 08:49AM [facility] Order Number: [id number]    - Patient Instructions: To schedule this appointment, please contact Central Scheduling at [phone]  Test Name Result Flag Reference   US ABDOMEN LIMITED (Report)     RIGHT UPPER QUADRANT ULTRASOUND     INDICATION: Elevated LFTs     COMPARISON: CT 5/2/2015  TECHNIQUE:  Real-time ultrasound of the right upper quadrant was performed with a curvilinear transducer with both volumetric sweeps and still imaging techniques  FINDINGS:     PANCREAS: Visualized portions of the pancreas are within normal limits  AORTA AND IVC: Visualized portions are normal for patient age  LIVER:   Size: Within normal range  The liver measures 15 6 cm in the midclavicular line  Contour: Surface contour is smooth  Parenchyma: Echogenicity and echotexture are within normal limits  No evidence of suspicious mass  Limited imaging of the main portal vein shows it to be patent and hepatopetal       BILIARY:   The gallbladder is normal in caliber  No wall thickening or pericholecystic fluid  No stones or sludge identified  No sonographic Galeas's sign  No intrahepatic biliary dilatation  CBD measures 5 mm  No choledocholithiasis  KIDNEY:    Right kidney measures 10 x 5 4 cm     5 mm nonobstructing calculus is again seen in the lower pole  1 4 cm upper pole parapelvic cyst is present  There is also a 0 8 x 0 6 x 0 8 cm cyst midpole  ASCITES:  None  IMPRESSION:       1  5 mm nonobstructing calculus lower pole the right kidney again seen     2  Upper pole parapelvic and midpole cortical cyst        Workstation performed: [id number]     Signed by:   Elsie Molina MD   9/22/17

## 2018-01-23 NOTE — RESULT NOTES
Verified Results  (1) COMPREHENSIVE METABOLIC PANEL [id number] 59:97NE Elli Watson Order Number: [id number]     Test Name Result Flag Reference   SODIUM 136 mmol/L  136-145   POTASSIUM 4 5 mmol/L  3 5-5 3   CHLORIDE 102 mmol/L  100-108   CARBON DIOXIDE 30 mmol/L  21-32   ANION GAP (CALC) 4 mmol/L  4-13   BLOOD UREA NITROGEN 18 mg/dL  5-25   CREATININE 0 94 mg/dL  0 60-1 30   Standardized to IDMS reference method   CALCIUM 9 0 mg/dL  8 3-10 1   BILI, TOTAL 0 59 mg/dL  0 20-1 00   ALK PHOSPHATAS 88 U/L     ALT (SGPT) 29 U/L  12-78   Specimen collection should occur prior to Sulfasalazine and/or Sulfapyridine administration due to the potential for falsely depressed results  AST(SGOT) 22 U/L  5-45   Specimen collection should occur prior to Sulfasalazine administration due to the potential for falsely depressed results  ALBUMIN 3 3 g/dL L 3 5-5 0   TOTAL PROTEIN 7 9 g/dL  6 4-8 2   eGFR 60 ml/min/1 73sq [facility] Disease Education Program recommendations are as follows:  GFR calculation is accurate only with a steady state creatinine  Chronic Kidney disease less than 60 ml/min/1 73 sq  meters  Kidney failure less than 15 ml/min/1 73 sq  meters  GLUCOSE FASTING 85 mg/dL  65-99   Specimen collection should occur prior to Sulfasalazine administration due to the potential for falsely depressed results  Specimen collection should occur prior to Sulfapyridine administration due to the potential for falsely elevated results       (1) LIPID PANEL FASTING W DIRECT LDL REFLEX [id number] 07:19AM Elli Watson Order Number: [id number]     Test Name Result Flag Reference   CHOLESTEROL 366 mg/dL H    LDL CHOLESTEROL CALCULATED 289 mg/dL H 0-100   Triglyceride:        Normal <150 mg/dl   Borderline High 150-199 mg/dl   High 200-499 mg/dl   Very High >499 mg/dl      Cholesterol:       Desirable <200 mg/dl    Borderline High 200-239 mg/dl    High >239 mg/dl      HDL Cholesterol:       High>59 mg/dL    Low <41 mg/dL      HDL Cholesterol:       High>59 mg/dL    Low <41 mg/dL      This screening LDL is a calculated result  It does not have the accuracy of the Direct Measured LDL in the monitoring of patients with hyperlipidemia and/or statin therapy  Direct Measure LDL ([id number]) must be ordered separately in these patients  TRIGLYCERIDES 124 mg/dL  <=150   Specimen collection should occur prior to N-Acetylcysteine or Metamizole administration due to the potential for falsely depressed results  HDL,DIRECT 52 mg/dL  40-60   Specimen collection should occur prior to Metamizole administration due to the potential for falsley depressed results

## 2018-01-24 VITALS
OXYGEN SATURATION: 98 % | DIASTOLIC BLOOD PRESSURE: 68 MMHG | TEMPERATURE: 97.5 F | SYSTOLIC BLOOD PRESSURE: 116 MMHG | HEIGHT: 62 IN | WEIGHT: 164 LBS | BODY MASS INDEX: 30.18 KG/M2 | HEART RATE: 60 BPM

## 2018-01-24 VITALS
HEIGHT: 62 IN | WEIGHT: 163.38 LBS | DIASTOLIC BLOOD PRESSURE: 80 MMHG | SYSTOLIC BLOOD PRESSURE: 115 MMHG | HEART RATE: 68 BPM | BODY MASS INDEX: 30.07 KG/M2

## 2018-01-26 DIAGNOSIS — E78.5 HYPERLIPIDEMIA: ICD-10-CM

## 2018-01-26 DIAGNOSIS — E03.9 HYPOTHYROIDISM: ICD-10-CM

## 2018-01-27 ENCOUNTER — APPOINTMENT (OUTPATIENT)
Dept: LAB | Facility: OTHER | Age: 78
End: 2018-01-27
Payer: MEDICARE

## 2018-01-27 ENCOUNTER — TRANSCRIBE ORDERS (OUTPATIENT)
Dept: LAB | Facility: OTHER | Age: 78
End: 2018-01-27

## 2018-01-27 DIAGNOSIS — E78.5 HYPERLIPIDEMIA: ICD-10-CM

## 2018-01-27 DIAGNOSIS — E03.9 HYPOTHYROIDISM: ICD-10-CM

## 2018-01-27 LAB
ALT SERPL W P-5'-P-CCNC: 24 U/L (ref 12–78)
AST SERPL W P-5'-P-CCNC: 19 U/L (ref 5–45)
CHOLEST SERPL-MCNC: 329 MG/DL (ref 50–200)
HDLC SERPL-MCNC: 51 MG/DL (ref 40–60)
LDLC SERPL CALC-MCNC: 248 MG/DL (ref 0–100)
TRIGL SERPL-MCNC: 152 MG/DL
TSH SERPL DL<=0.05 MIU/L-ACNC: 1.91 UIU/ML (ref 0.36–3.74)

## 2018-01-27 PROCEDURE — 80061 LIPID PANEL: CPT

## 2018-01-27 PROCEDURE — 84450 TRANSFERASE (AST) (SGOT): CPT

## 2018-01-27 PROCEDURE — 84443 ASSAY THYROID STIM HORMONE: CPT

## 2018-01-27 PROCEDURE — 84460 ALANINE AMINO (ALT) (SGPT): CPT

## 2018-01-29 ENCOUNTER — TELEPHONE (OUTPATIENT)
Dept: FAMILY MEDICINE CLINIC | Facility: CLINIC | Age: 78
End: 2018-01-29

## 2018-01-29 DIAGNOSIS — E78.5 HYPERLIPIDEMIA, UNSPECIFIED HYPERLIPIDEMIA TYPE: ICD-10-CM

## 2018-01-29 DIAGNOSIS — R79.89 ELEVATED LFTS: Primary | ICD-10-CM

## 2018-01-29 DIAGNOSIS — E78.49 OTHER HYPERLIPIDEMIA: Primary | ICD-10-CM

## 2018-01-29 RX ORDER — PRAVASTATIN SODIUM 20 MG
20 TABLET ORAL DAILY
Qty: 30 TABLET | Refills: 5 | Status: SHIPPED | OUTPATIENT
Start: 2018-01-29 | End: 2018-05-29 | Stop reason: SDUPTHER

## 2018-01-29 NOTE — TELEPHONE ENCOUNTER
Patient notified - she is ok with increasing to 20 mg daily, also we can mail the lab slip to patient  I will send you the order for medication renewal      ----- Message from Freya Rowe MD sent at 1/29/2018  8:41 AM EST -----  Liver tests and thyroid normal   Cholesterol better but still elevated  Is she currently taking 10 mg Pravastatin daily? If so, would recommend trial of increasing to 20 mg per day and recheck LFTs, Lipids in 1 month  Make appt if wanting to discuss in person

## 2018-02-27 ENCOUNTER — APPOINTMENT (OUTPATIENT)
Dept: LAB | Facility: CLINIC | Age: 78
End: 2018-02-27
Payer: MEDICARE

## 2018-02-27 DIAGNOSIS — E78.5 HYPERLIPIDEMIA, UNSPECIFIED HYPERLIPIDEMIA TYPE: ICD-10-CM

## 2018-02-27 DIAGNOSIS — R79.89 ELEVATED LFTS: ICD-10-CM

## 2018-02-27 LAB
ALBUMIN SERPL BCP-MCNC: 3.5 G/DL (ref 3.5–5)
ALP SERPL-CCNC: 80 U/L (ref 46–116)
ALT SERPL W P-5'-P-CCNC: 23 U/L (ref 12–78)
AST SERPL W P-5'-P-CCNC: 20 U/L (ref 5–45)
BILIRUB DIRECT SERPL-MCNC: 0.13 MG/DL (ref 0–0.2)
BILIRUB SERPL-MCNC: 0.65 MG/DL (ref 0.2–1)
CHOLEST SERPL-MCNC: 294 MG/DL (ref 50–200)
HDLC SERPL-MCNC: 61 MG/DL (ref 40–60)
LDLC SERPL CALC-MCNC: 208 MG/DL (ref 0–100)
PROT SERPL-MCNC: 7.9 G/DL (ref 6.4–8.2)
TRIGL SERPL-MCNC: 123 MG/DL

## 2018-02-27 PROCEDURE — 80061 LIPID PANEL: CPT

## 2018-02-27 PROCEDURE — 36415 COLL VENOUS BLD VENIPUNCTURE: CPT

## 2018-02-27 PROCEDURE — 80076 HEPATIC FUNCTION PANEL: CPT

## 2018-03-09 ENCOUNTER — OFFICE VISIT (OUTPATIENT)
Dept: FAMILY MEDICINE CLINIC | Facility: CLINIC | Age: 78
End: 2018-03-09
Payer: MEDICARE

## 2018-03-09 VITALS
OXYGEN SATURATION: 97 % | HEIGHT: 62 IN | SYSTOLIC BLOOD PRESSURE: 122 MMHG | TEMPERATURE: 98.5 F | HEART RATE: 64 BPM | BODY MASS INDEX: 29.81 KG/M2 | DIASTOLIC BLOOD PRESSURE: 74 MMHG | WEIGHT: 162 LBS

## 2018-03-09 DIAGNOSIS — E78.2 MIXED HYPERLIPIDEMIA: ICD-10-CM

## 2018-03-09 DIAGNOSIS — Z01.818 PREOPERATIVE CLEARANCE: Primary | ICD-10-CM

## 2018-03-09 DIAGNOSIS — H25.9 AGE-RELATED CATARACT OF BOTH EYES, UNSPECIFIED AGE-RELATED CATARACT TYPE: ICD-10-CM

## 2018-03-09 PROBLEM — I87.8 VENOUS STASIS OF LOWER EXTREMITY: Status: ACTIVE | Noted: 2017-07-18

## 2018-03-09 PROCEDURE — 99214 OFFICE O/P EST MOD 30 MIN: CPT | Performed by: FAMILY MEDICINE

## 2018-03-09 RX ORDER — PRAVASTATIN SODIUM 10 MG
TABLET ORAL
Refills: 0 | COMMUNITY
Start: 2018-01-23 | End: 2018-11-08 | Stop reason: ALTCHOICE

## 2018-03-09 RX ORDER — LEVOTHYROXINE SODIUM 88 UG/1
88 TABLET ORAL DAILY
Refills: 0 | COMMUNITY
Start: 2018-03-02 | End: 2018-08-29 | Stop reason: SDUPTHER

## 2018-03-09 NOTE — PROGRESS NOTES
Subjective:     Dani Ruelas is a 78 y o  female who presents to the office today for a preoperative consultation at the request of surgeon Dr Ailin Pineda who plans on performing cataract surgery on March 21  Planned anesthesia: local  The patient has the following known anesthesia issues: past general anesthesia with complications (nausea)  Patients bleeding risk: no recent abnormal bleeding  Patient does not have objections to receiving blood products if needed  She had labs done here to review these  Her cholesterol is still elevated but improved on Pravastatin 20 mg per day currently  She had had elevated LFTs on statins so we are being cautious with increasing her dose  Current LFTs are normal  She had seen GI regarding her elevated LFTs in the past  She is hesitant to change any of her medications at this time because she is having cataract surgery  The following portions of the patient's history were reviewed and updated as appropriate:   She  has no past medical history on file  She   Patient Active Problem List    Diagnosis Date Noted    Age-related cataract of both eyes 03/09/2018    Venous stasis of lower extremity 07/18/2017    Hypothyroidism 10/01/2012    Hyperlipidemia 08/03/2012     She  has a past surgical history that includes Stomach surgery  Her family history includes Heart disease in her sister; Hyperlipidemia in her family  She  reports that she has never smoked  She has never used smokeless tobacco  Her alcohol and drug histories are not on file  Current Outpatient Prescriptions   Medication Sig Dispense Refill    levothyroxine 88 mcg tablet Take 88 mcg by mouth daily  0    pravastatin (PRAVACHOL) 20 mg tablet Take 1 tablet (20 mg total) by mouth daily 30 tablet 5    pravastatin (PRAVACHOL) 10 mg tablet   0     No current facility-administered medications for this visit        Current Outpatient Prescriptions on File Prior to Visit   Medication Sig    pravastatin (PRAVACHOL) 20 mg tablet Take 1 tablet (20 mg total) by mouth daily     No current facility-administered medications on file prior to visit  She is allergic to percocet  [oxycodone-acetaminophen]; statins; and vicodin  [hydrocodone-acetaminophen]       Review of Systems  Constitutional: negative  Eyes: positive for cataracts  Ears, nose, mouth, throat, and face: negative  Respiratory: negative for cough, dyspnea on exertion and wheezing  Cardiovascular: negative  Gastrointestinal: negative  Hematologic/lymphatic: negative  Musculoskeletal:negative  Neurological: negative  Behavioral/Psych: negative  Endocrine: negative     Objective:     /74   Pulse 64   Temp 98 5 °F (36 9 °C)   Ht 5' 2" (1 575 m)   Wt 73 5 kg (162 lb)   SpO2 97%   BMI 29 63 kg/m²   General appearance: alert and oriented, in no acute distress and alert  Eyes: negative findings: conjunctivae and sclerae normal, corneas clear and pupils equal, round, reactive to light and accomodation  Throat: lips, mucosa, and tongue normal; teeth and gums normal  Lungs: clear to auscultation bilaterally  Heart: regular rate and rhythm, S1, S2 normal, no murmur, click, rub or gallop  Extremities: extremities normal, warm and well-perfused; no cyanosis, clubbing, or edema  Skin: Skin color, texture, turgor normal  No rashes or lesions  Neurologic: Grossly normal      Lab Review   Appointment on 02/27/2018   Component Date Value    Cholesterol 02/27/2018 294*    Triglycerides 02/27/2018 123     HDL, Direct 02/27/2018 61*    LDL Calculated 02/27/2018 208*    Total Bilirubin 02/27/2018 0 65     Bilirubin, Direct 02/27/2018 0 13     Alkaline Phosphatase 02/27/2018 80     AST 02/27/2018 20     ALT 02/27/2018 23     Total Protein 02/27/2018 7 9     Albumin 02/27/2018 3 5    Transcribe Orders on 01/27/2018   Component Date Value    AST 01/27/2018 19     ALT 01/27/2018 24     Cholesterol 01/27/2018 329*    Triglycerides 01/27/2018 152*    HDL, Direct 01/27/2018 51     LDL Calculated 01/27/2018 248*    [facility] 1 910         Assessment:    Vaishnavi Jones was seen today for pre-op exam and hyperlipidemia  Diagnoses and all orders for this visit:    Preoperative clearance    Age-related cataract of both eyes, unspecified age-related cataract type    Mixed hyperlipidemia  -     AST; Future  -     ALT; Future  -     Lipid Panel with Direct LDL reflex; Future       78 y o  female with planned surgery as above  Known risk factors for perioperative complications: None      Cardiac Risk Estimation: Low CV risk  Plan:     1  Preoperative workup as follows none  2  Change in medication regimen before surgery: none, continue medication regimen including morning of surgery, with sip of water  3  Prophylaxis for cardiac events with perioperative beta-blockers: not indicated  4  Discussed her cholesterol - she is going to stay on Pravastatin 20 mg per day for now, after surgery she will increase it to 40 mg per day  Repeat labs in 1 month after increasing her dose

## 2018-05-29 DIAGNOSIS — E78.49 OTHER HYPERLIPIDEMIA: ICD-10-CM

## 2018-05-29 RX ORDER — PRAVASTATIN SODIUM 20 MG
TABLET ORAL
Qty: 90 TABLET | Refills: 1 | Status: SHIPPED | OUTPATIENT
Start: 2018-05-29 | End: 2019-02-27 | Stop reason: SDUPTHER

## 2018-08-18 ENCOUNTER — OFFICE VISIT (OUTPATIENT)
Dept: URGENT CARE | Facility: CLINIC | Age: 78
End: 2018-08-18
Payer: MEDICARE

## 2018-08-18 VITALS
RESPIRATION RATE: 18 BRPM | BODY MASS INDEX: 29.23 KG/M2 | HEIGHT: 63 IN | HEART RATE: 76 BPM | DIASTOLIC BLOOD PRESSURE: 72 MMHG | WEIGHT: 165 LBS | SYSTOLIC BLOOD PRESSURE: 118 MMHG | TEMPERATURE: 98.7 F | OXYGEN SATURATION: 98 %

## 2018-08-18 DIAGNOSIS — H65.01 RIGHT ACUTE SEROUS OTITIS MEDIA, RECURRENCE NOT SPECIFIED: ICD-10-CM

## 2018-08-18 DIAGNOSIS — H61.23 BILATERAL IMPACTED CERUMEN: ICD-10-CM

## 2018-08-18 DIAGNOSIS — H91.93 BILATERAL HEARING LOSS, UNSPECIFIED HEARING LOSS TYPE: Primary | ICD-10-CM

## 2018-08-18 PROCEDURE — G0463 HOSPITAL OUTPT CLINIC VISIT: HCPCS | Performed by: PHYSICIAN ASSISTANT

## 2018-08-18 PROCEDURE — 69209 REMOVE IMPACTED EAR WAX UNI: CPT | Performed by: PHYSICIAN ASSISTANT

## 2018-08-18 PROCEDURE — 99213 OFFICE O/P EST LOW 20 MIN: CPT | Performed by: PHYSICIAN ASSISTANT

## 2018-08-18 RX ORDER — PSEUDOEPHEDRINE HCL 60 MG/1
60 TABLET ORAL EVERY 6 HOURS PRN
Qty: 30 TABLET | Refills: 0 | Status: SHIPPED | OUTPATIENT
Start: 2018-08-18 | End: 2019-07-01

## 2018-08-18 NOTE — PATIENT INSTRUCTIONS
Bilateral ear irrigation performed in office  Prescription sent to pharmacy for pseudoephedrine for ear effusion-use as directed  Saline nasal spray  Follow up with PCP in 3-5 days  Proceed to  ER if symptoms worsen  Serous Otitis Media   WHAT YOU NEED TO KNOW:   Serous otitis media is fluid trapped behind your tympanic membrane (eardrum), without an ear infection  Your eardrum is in your middle ear  Serous otitis media is also called otitis media with effusion  You may have fluid in your ear for months, but it usually goes away on its own  The fluid may be in one or both ears  The fluid may cause muffled sounds, and you may feel like your ears are full  Serous otitis media may be caused by an upper respiratory infection or allergies  It is most common in the fall and early spring  DISCHARGE INSTRUCTIONS:   Return to the emergency department if:   · You have a fever  · You have a sudden loss of hearing in your affected ear  · You develop a severe headache and stiff neck  · You have a seizure  Contact your healthcare provider if:   · You have fluid draining from your ear  · You have new symptoms  · You have questions or concerns about your condition or care  Follow up with your healthcare provider as directed: Your ears will need to be checked regularly  You may need to see a specialist  Write down your questions so you remember to ask them during your visits  © 2017 [employer]  Information is for End User's use only and may not be sold, redistributed or otherwise used for commercial purposes  All illustrations and images included in CareNotes® are the copyrighted property of [employer]  or [employer]  The above information is an  only  It is not intended as medical advice for individual conditions or treatments  Talk to your doctor, nurse or pharmacist before following any medical regimen to see if it is safe and effective for you

## 2018-08-18 NOTE — PROGRESS NOTES
[address] [facility]        NAME: Nitish Rothman is a 70 y o  female  : 1940    MRN: [id number]  DATE: 2018  TIME: 1:41 PM    Assessment and Plan   Bilateral hearing loss, unspecified hearing loss type [H91 [id number]]  1  Bilateral hearing loss, unspecified hearing loss type     2  Bilateral impacted cerumen  Ear cerumen removal   3  Right acute serous otitis media, recurrence not specified  pseudoephedrine (SUDAFED) 60 mg tablet         Patient Instructions   Bilateral ear irrigation performed in office  Prescription sent to pharmacy for pseudoephedrine for ear effusion-use as directed  Saline nasal spray  Follow up with PCP in 3-5 days  Proceed to  ER if symptoms worsen  Chief Complaint     Chief Complaint   Patient presents with    Ear Problem     x24 hrs, bilat Pt staes she can not hear, ears are clogged  Pt states she wears her hearing aides and believes this is from wearing them constantly  History of Present Illness   The patient is a 80-year-old female who presents with bilateral hearing loss over the past few days  She states that she normally has very poor hearing and wears hearing aids, however, the past few days even with the hearing aids she cannot hear from either ear  Negative ear pain or pressure  She does admit to nasal and sinus congestion  Negative sinus point tenderness  Negative tinnitus  Negative dizziness  Negative facial pain or swelling  Negative facial drooping  Negative weakness  HPI    Review of Systems   Review of Systems   Constitutional: Negative for activity change, chills, fatigue and fever  HENT: Positive for congestion, hearing loss, postnasal drip and rhinorrhea  Negative for ear discharge, ear pain, facial swelling, mouth sores, sinus pain, sinus pressure, sneezing, sore throat, tinnitus and trouble swallowing  Eyes: Negative for pain, discharge, redness and itching     Respiratory: Negative for apnea, cough, chest tightness, shortness of breath, wheezing and stridor  Cardiovascular: Negative for chest pain  Gastrointestinal: Negative for abdominal distention, abdominal pain, diarrhea, nausea and vomiting  Genitourinary: Negative for difficulty urinating  Musculoskeletal: Negative for arthralgias and myalgias  Skin: Negative for pallor and rash  Allergic/Immunologic: Negative  Neurological: Negative for dizziness, light-headedness and headaches  Hematological: Negative  Psychiatric/Behavioral: Negative  All other systems reviewed and are negative  Current Medications       Current Outpatient Prescriptions:     levothyroxine 88 mcg tablet, Take 88 mcg by mouth daily, Disp: , Rfl: 0    pravastatin (PRAVACHOL) 10 mg tablet, , Disp: , Rfl: 0    pravastatin (PRAVACHOL) 20 mg tablet, take 1 tablet by mouth once daily, Disp: 90 tablet, Rfl: 1    pseudoephedrine (SUDAFED) 60 mg tablet, Take 1 tablet (60 mg total) by mouth every 6 (six) hours as needed for congestion, Disp: 30 tablet, Rfl: 0    Current Allergies     Allergies as of 08/18/2018 - Reviewed 08/18/2018   Allergen Reaction Noted    Percocet  [oxycodone-acetaminophen]  07/11/2012    Statins  12/18/2017    Vicodin  [hydrocodone-acetaminophen]  07/11/2012            The following portions of the patient's history were reviewed and updated as appropriate: allergies, current medications, past family history, past medical history, past social history, past surgical history and problem list      No past medical history on file  Past Surgical History:   Procedure Laterality Date    STOMACH SURGERY         Family History   Problem Relation Age of Onset    Heart disease Sister     Hyperlipidemia Family          Medications have been verified          Objective   /72   Pulse 76   Temp 98 7 °F (37 1 °C) (Temporal)   Resp 18   Ht 5' 3" (1 6 m)   Wt 74 8 kg (165 lb)   SpO2 98%   BMI 29 23 kg/m²          Physical Exam     Physical Exam   Constitutional: She is oriented to person, place, and time  She appears well-developed and well-nourished  No distress  HENT:   Head: Normocephalic and atraumatic  Right Ear: External ear normal  No lacerations  No drainage, swelling or tenderness  No foreign bodies  No mastoid tenderness  Tympanic membrane is not injected, not scarred, not perforated, not erythematous, not retracted and not bulging  A middle ear effusion is present  No hemotympanum  Decreased hearing is noted  Left Ear: External ear normal  No lacerations  No drainage, swelling or tenderness  No foreign bodies  No mastoid tenderness  Tympanic membrane is not injected, not scarred, not perforated, not erythematous, not retracted and not bulging  No middle ear effusion  No hemotympanum  Decreased hearing is noted  Nose: Nose normal  Right sinus exhibits no maxillary sinus tenderness and no frontal sinus tenderness  Left sinus exhibits no maxillary sinus tenderness and no frontal sinus tenderness  Mouth/Throat: Uvula is midline, oropharynx is clear and moist and mucous membranes are normal  No oropharyngeal exudate, posterior oropharyngeal edema, posterior oropharyngeal erythema or tonsillar abscesses  Positive cerumen bilateral ear canals left greater than right  There appears to be a small effusion of the right ear  The patient is unable to hear me talking at all  Eyes: Conjunctivae and EOM are normal  Pupils are equal, round, and reactive to light  Right eye exhibits no discharge  Left eye exhibits no discharge  No scleral icterus  Neck: Normal range of motion  Neck supple  Pulmonary/Chest: Effort normal  No respiratory distress  Neurological: She is alert and oriented to person, place, and time  Skin: She is not diaphoretic         Ear cerumen removal  Date/Time: 8/18/2018 11:13 AM  Performed by: Aston Camarena by: Vini Darling     Patient location:  Clinic  Consent:     Consent obtained:  Verbal    Consent given by: Patient    Risks discussed:  Bleeding, infection, pain, TM perforation, incomplete removal and dizziness  Procedure details:     Local anesthetic:  None    Location:  L ear and R ear    Procedure type: irrigation      Approach:  Natural orifice  Post-procedure details:     Complication:  None    Hearing quality:  Diminished    Patient tolerance of procedure: Tolerated well, no immediate complications  Comments:      Cerumen in ear canal bilaterally left>right  Bilateral ear lavage  Curette used on left ear to remove cerumen

## 2018-08-29 ENCOUNTER — OFFICE VISIT (OUTPATIENT)
Dept: FAMILY MEDICINE CLINIC | Facility: CLINIC | Age: 78
End: 2018-08-29
Payer: MEDICARE

## 2018-08-29 VITALS
BODY MASS INDEX: 29.77 KG/M2 | SYSTOLIC BLOOD PRESSURE: 128 MMHG | WEIGHT: 168 LBS | HEIGHT: 63 IN | HEART RATE: 76 BPM | OXYGEN SATURATION: 99 % | RESPIRATION RATE: 16 BRPM | TEMPERATURE: 97.9 F | DIASTOLIC BLOOD PRESSURE: 68 MMHG

## 2018-08-29 DIAGNOSIS — H93.8X1 EAR SWELLING, RIGHT: ICD-10-CM

## 2018-08-29 DIAGNOSIS — E03.9 HYPOTHYROIDISM, UNSPECIFIED TYPE: Primary | ICD-10-CM

## 2018-08-29 DIAGNOSIS — E03.9 HYPOTHYROIDISM, UNSPECIFIED TYPE: ICD-10-CM

## 2018-08-29 DIAGNOSIS — R09.82 POST-NASAL DRIP: ICD-10-CM

## 2018-08-29 DIAGNOSIS — H91.93 BILATERAL HEARING LOSS, UNSPECIFIED HEARING LOSS TYPE: Primary | ICD-10-CM

## 2018-08-29 PROCEDURE — 99214 OFFICE O/P EST MOD 30 MIN: CPT | Performed by: FAMILY MEDICINE

## 2018-08-29 RX ORDER — LEVOTHYROXINE SODIUM 88 UG/1
TABLET ORAL
Qty: 90 TABLET | Refills: 3 | Status: SHIPPED | OUTPATIENT
Start: 2018-08-29 | End: 2018-08-29 | Stop reason: SDUPTHER

## 2018-08-29 RX ORDER — LEVOTHYROXINE SODIUM 88 UG/1
88 TABLET ORAL DAILY
Qty: 90 TABLET | Refills: 0 | Status: SHIPPED | OUTPATIENT
Start: 2018-08-29 | End: 2019-08-23 | Stop reason: SDUPTHER

## 2018-08-29 RX ORDER — FLUTICASONE PROPIONATE 50 MCG
1 SPRAY, SUSPENSION (ML) NASAL DAILY
Qty: 16 G | Refills: 0 | Status: SHIPPED | OUTPATIENT
Start: 2018-08-29 | End: 2019-07-01

## 2018-08-29 NOTE — PROGRESS NOTES
Assessment/Plan:     Diagnoses and all orders for this visit:    Bilateral hearing loss, unspecified hearing loss type  -     fluticasone (FLONASE) 50 mcg/act nasal spray; 1 spray into each nostril daily    Post-nasal drip  -     fluticasone (FLONASE) 50 mcg/act nasal spray; 1 spray into each nostril daily    Ear swelling, right  -     neomycin-polymyxin-hydrocortisone (CORTISPORIN) otic solution; Administer 4 drops to the right ear every 6 (six) hours  -     fluticasone (FLONASE) 50 mcg/act nasal spray; 1 spray into each nostril daily        Start Flonase  She previously use Cortisporin drops with some relief so she can try this again  I advised her to follow up with her ENT as soon as possible  Subjective:      Patient ID: Franca Theodore is a 70 y o  female  She is here for recheck of her ears  She was at the urgent care on 8/18 and had her ears irrigated  She was prescribed sudafed  She was taking this but it made her HR go up  She does have some nasal congestion and PND  She complains of not being able to hear  She does wear hearing aids  She will be seeing her ENT in 2 weeks, Dr Lon Duvall  She was previously prescribed Cortisporin drops which she states helped when she had similar symptoms in the past  She feels that her R ear canal is swollen  She went to the hearing aid place yesterday and had to have her R hearing aid repaired  The following portions of the patient's history were reviewed and updated as appropriate:   She  has no past medical history on file  She   Patient Active Problem List    Diagnosis Date Noted    Bilateral hearing loss 08/29/2018    Age-related cataract of both eyes 03/09/2018    Venous stasis of lower extremity 07/18/2017    Hypothyroidism 10/01/2012    Hyperlipidemia 08/03/2012     She  has a past surgical history that includes Stomach surgery  Her family history includes Heart disease in her sister; Hyperlipidemia in her family    She  reports that she has never smoked  She has never used smokeless tobacco  Her alcohol and drug histories are not on file  Current Outpatient Prescriptions   Medication Sig Dispense Refill    levothyroxine 88 mcg tablet Take 88 mcg by mouth daily  0    pravastatin (PRAVACHOL) 10 mg tablet   0    pravastatin (PRAVACHOL) 20 mg tablet take 1 tablet by mouth once daily 90 tablet 1    pseudoephedrine (SUDAFED) 60 mg tablet Take 1 tablet (60 mg total) by mouth every 6 (six) hours as needed for congestion 30 tablet 0    fluticasone (FLONASE) 50 mcg/act nasal spray 1 spray into each nostril daily 16 g 0    neomycin-polymyxin-hydrocortisone (CORTISPORIN) otic solution Administer 4 drops to the right ear every 6 (six) hours 10 mL 0     No current facility-administered medications for this visit  Current Outpatient Prescriptions on File Prior to Visit   Medication Sig    levothyroxine 88 mcg tablet Take 88 mcg by mouth daily    pravastatin (PRAVACHOL) 10 mg tablet     pravastatin (PRAVACHOL) 20 mg tablet take 1 tablet by mouth once daily    pseudoephedrine (SUDAFED) 60 mg tablet Take 1 tablet (60 mg total) by mouth every 6 (six) hours as needed for congestion     No current facility-administered medications on file prior to visit  She is allergic to percocet  [oxycodone-acetaminophen]; statins; and vicodin  [hydrocodone-acetaminophen]       Review of Systems   Constitutional: Negative for activity change  HENT: Positive for congestion, hearing loss and postnasal drip  Negative for sore throat, tinnitus and trouble swallowing  Respiratory: Negative for chest tightness and shortness of breath  Cardiovascular: Negative for chest pain and leg swelling  Neurological: Negative for headaches  Psychiatric/Behavioral: Negative for dysphoric mood  The patient is not nervous/anxious            Objective:      /68   Pulse 76   Temp 97 9 °F (36 6 °C) (Tympanic)   Resp 16   Ht 5' 3" (1 6 m)   Wt 76 2 kg (168 lb)   SpO2 99%   BMI 29 76 kg/m²          Physical Exam   Constitutional: She appears well-developed and well-nourished  No distress  HENT:   Head: Normocephalic and atraumatic  Right Ear: There is swelling  No tenderness  Tympanic membrane is not injected, not scarred, not perforated, not erythematous, not retracted and not bulging  No middle ear effusion  Decreased hearing is noted  Left Ear: External ear normal  No swelling or tenderness  Tympanic membrane is not injected, not scarred, not perforated, not erythematous, not retracted and not bulging  No middle ear effusion  Decreased hearing is noted  Nose: Mucosal edema present  Mouth/Throat: Oropharynx is clear and moist    Eyes: Conjunctivae are normal  Pupils are equal, round, and reactive to light  Cardiovascular: Normal rate  Pulmonary/Chest: Effort normal and breath sounds normal    Lymphadenopathy:     She has no cervical adenopathy  Skin: She is not diaphoretic

## 2018-10-15 ENCOUNTER — IMMUNIZATION (OUTPATIENT)
Dept: FAMILY MEDICINE CLINIC | Facility: CLINIC | Age: 78
End: 2018-10-15
Payer: MEDICARE

## 2018-10-15 DIAGNOSIS — Z23 NEED FOR IMMUNIZATION AGAINST INFLUENZA: Primary | ICD-10-CM

## 2018-10-15 PROCEDURE — 90662 IIV NO PRSV INCREASED AG IM: CPT

## 2018-10-15 PROCEDURE — G0008 ADMIN INFLUENZA VIRUS VAC: HCPCS

## 2018-11-06 ENCOUNTER — APPOINTMENT (OUTPATIENT)
Dept: LAB | Facility: CLINIC | Age: 78
End: 2018-11-06
Payer: MEDICARE

## 2018-11-06 DIAGNOSIS — E78.2 MIXED HYPERLIPIDEMIA: ICD-10-CM

## 2018-11-06 LAB
ALT SERPL W P-5'-P-CCNC: 19 U/L (ref 12–78)
AST SERPL W P-5'-P-CCNC: 16 U/L (ref 5–45)
CHOLEST SERPL-MCNC: 332 MG/DL (ref 50–200)
HDLC SERPL-MCNC: 53 MG/DL (ref 40–60)
LDLC SERPL CALC-MCNC: 253 MG/DL (ref 0–100)
TRIGL SERPL-MCNC: 131 MG/DL

## 2018-11-06 PROCEDURE — 84450 TRANSFERASE (AST) (SGOT): CPT

## 2018-11-06 PROCEDURE — 84460 ALANINE AMINO (ALT) (SGPT): CPT

## 2018-11-06 PROCEDURE — 80061 LIPID PANEL: CPT

## 2018-11-06 PROCEDURE — 36415 COLL VENOUS BLD VENIPUNCTURE: CPT

## 2018-11-08 ENCOUNTER — OFFICE VISIT (OUTPATIENT)
Dept: FAMILY MEDICINE CLINIC | Facility: CLINIC | Age: 78
End: 2018-11-08
Payer: MEDICARE

## 2018-11-08 VITALS
OXYGEN SATURATION: 98 % | TEMPERATURE: 98.6 F | BODY MASS INDEX: 29.77 KG/M2 | HEART RATE: 76 BPM | SYSTOLIC BLOOD PRESSURE: 110 MMHG | RESPIRATION RATE: 18 BRPM | HEIGHT: 63 IN | WEIGHT: 168 LBS | DIASTOLIC BLOOD PRESSURE: 78 MMHG

## 2018-11-08 DIAGNOSIS — H91.93 BILATERAL HEARING LOSS, UNSPECIFIED HEARING LOSS TYPE: ICD-10-CM

## 2018-11-08 DIAGNOSIS — M17.12 ARTHRITIS OF LEFT KNEE: ICD-10-CM

## 2018-11-08 DIAGNOSIS — E78.2 MIXED HYPERLIPIDEMIA: Primary | ICD-10-CM

## 2018-11-08 DIAGNOSIS — E03.9 HYPOTHYROIDISM, UNSPECIFIED TYPE: ICD-10-CM

## 2018-11-08 DIAGNOSIS — E66.3 OVERWEIGHT (BMI 25.0-29.9): ICD-10-CM

## 2018-11-08 PROCEDURE — 99214 OFFICE O/P EST MOD 30 MIN: CPT | Performed by: FAMILY MEDICINE

## 2018-11-08 NOTE — PATIENT INSTRUCTIONS

## 2018-11-08 NOTE — PROGRESS NOTES
Assessment/Plan:     Diagnoses and all orders for this visit:    Mixed hyperlipidemia  -     Comprehensive metabolic panel; Future  -     Lipid Panel with Direct LDL reflex; Future    Bilateral hearing loss, unspecified hearing loss type  -     CBC and differential; Future    Arthritis of left knee    Hypothyroidism, unspecified type  -     TSH, 3rd generation; Future    Overweight (BMI 25 0-29 9)        1  Hyperlipidemia-patient is on pravastatin for 20 mg daily  I advised to increase to 40 mg daily  She is hesitant to do so because of her history of elevated LFTs  She would like to work on her diet for the next 3 months and recheck her labs  I ordered blood work for 3 months  Printed low cholesterol diet information  2  Hearing loss-per ENT  3  Arthritis of the knee-I advised she could consider a cortisone injection which I can do here  She could also follow up with Orthopedics  I also told her she can take Tylenol up to 1500 mg daily  Her liver tests are normal   At this point she does not want to do anything and will let me know if she changes her mind  4  Overweight-I discussed with her weight loss diet and exercise  Follow-up in 3 months with labs    Subjective:      Patient ID: Chelsea Salinas is a 70 y o  female  She is here with her daughter to review labs  Lipids are elevated  She is on pravastatin 20 mg per day  She had elevated LFTs in the past while she was on atorvastatin  She was taken off atorvastatin  Her LFTs normalized  She has been on pravastatin for about a year now and her LFTs have remained stable  She admits to not following a low cholesterol diet  At home her BP runs in 130s/80s  Today her blood pressure slightly on the low side  She does get dizzy sometimes when she stands up quickly  She notes that she lost her hearing in her right ear  This was her [employer]  She has a hearing aid  She has been seeing ENT    She had an MRI of her brain which showed chronic microvascular changes  She has pain in her left knee  She has a history of arthritis  She used to see Orthopedics at [facility] and had Synvisc injections  This was over 10 years ago  The injections did help  She states the pain has gotten worse lately which she attributes to a gaining weight having more pressure on her joint  She is afraid to take Tylenol because of her history of transaminitis  She also has a history of GI bleed and does not take NSAIDs  The following portions of the patient's history were reviewed and updated as appropriate:   She  has no past medical history on file  She   Patient Active Problem List    Diagnosis Date Noted    Arthritis of left knee 11/08/2018    Overweight (BMI 25 0-29 9) 11/08/2018    Bilateral hearing loss 08/29/2018    Age-related cataract of both eyes 03/09/2018    Venous stasis of lower extremity 07/18/2017    Hypothyroidism 10/01/2012    Hyperlipidemia 08/03/2012     She  has a past surgical history that includes Stomach surgery  Her family history includes Heart disease in her sister; Hyperlipidemia in her family  She  reports that she has never smoked  She has never used smokeless tobacco  Her alcohol and drug histories are not on file    Current Outpatient Prescriptions   Medication Sig Dispense Refill    levothyroxine 88 mcg tablet Take 1 tablet (88 mcg total) by mouth daily 90 tablet 0    pravastatin (PRAVACHOL) 20 mg tablet take 1 tablet by mouth once daily 90 tablet 1    fluticasone (FLONASE) 50 mcg/act nasal spray 1 spray into each nostril daily (Patient not taking: Reported on 11/8/2018 ) 16 g 0    neomycin-polymyxin-hydrocortisone (CORTISPORIN) otic solution Administer 4 drops to the right ear every 6 (six) hours (Patient not taking: Reported on 11/8/2018 ) 10 mL 0    pseudoephedrine (SUDAFED) 60 mg tablet Take 1 tablet (60 mg total) by mouth every 6 (six) hours as needed for congestion (Patient not taking: Reported on 11/8/2018 ) 30 tablet 0     No current facility-administered medications for this visit       Review of Systems   Constitutional: Negative for activity change  HENT: Positive for hearing loss  Respiratory: Negative for chest tightness and shortness of breath  Cardiovascular: Negative for chest pain and leg swelling  Musculoskeletal: Positive for arthralgias  Neurological: Negative for headaches  Psychiatric/Behavioral: Negative for dysphoric mood  The patient is not nervous/anxious  Objective:      /78   Pulse 76   Temp 98 6 °F (37 °C) (Tympanic)   Resp 18   Ht 5' 3" (1 6 m)   Wt 76 2 kg (168 lb)   SpO2 98%   BMI 29 76 kg/m²          Physical Exam   Constitutional: She is oriented to person, place, and time  She appears well-developed and well-nourished  No distress  HENT:   Head: Normocephalic and atraumatic  Right Ear: Decreased hearing is noted  Left Ear: Decreased hearing is noted  Eyes: Pupils are equal, round, and reactive to light  Conjunctivae are normal    Neck: Neck supple  Cardiovascular: Normal rate, regular rhythm and normal heart sounds  Exam reveals no gallop and no friction rub  No murmur heard  Pulmonary/Chest: Effort normal and breath sounds normal  No respiratory distress  She has no wheezes  She has no rales  She exhibits no tenderness  Musculoskeletal: Normal range of motion  She exhibits edema  Neurological: She is alert and oriented to person, place, and time  Skin: Skin is warm and dry  No rash noted  She is not diaphoretic  Psychiatric: She has a normal mood and affect  Her behavior is normal  Judgment and thought content normal    Nursing note and vitals reviewed

## 2019-01-08 ENCOUNTER — OFFICE VISIT (OUTPATIENT)
Dept: FAMILY MEDICINE CLINIC | Facility: CLINIC | Age: 79
End: 2019-01-08
Payer: MEDICARE

## 2019-01-08 VITALS
HEART RATE: 68 BPM | TEMPERATURE: 97.1 F | HEIGHT: 63 IN | RESPIRATION RATE: 16 BRPM | WEIGHT: 168.2 LBS | BODY MASS INDEX: 29.8 KG/M2 | DIASTOLIC BLOOD PRESSURE: 76 MMHG | SYSTOLIC BLOOD PRESSURE: 130 MMHG | OXYGEN SATURATION: 99 %

## 2019-01-08 DIAGNOSIS — Z13.820 SCREENING FOR OSTEOPOROSIS: ICD-10-CM

## 2019-01-08 DIAGNOSIS — Z12.11 SCREEN FOR COLON CANCER: ICD-10-CM

## 2019-01-08 DIAGNOSIS — N63.0 BREAST LUMP IN FEMALE: Primary | ICD-10-CM

## 2019-01-08 DIAGNOSIS — Z00.00 MEDICARE ANNUAL WELLNESS VISIT, SUBSEQUENT: ICD-10-CM

## 2019-01-08 DIAGNOSIS — Z23 NEED FOR TDAP VACCINATION: ICD-10-CM

## 2019-01-08 PROCEDURE — 99213 OFFICE O/P EST LOW 20 MIN: CPT | Performed by: NURSE PRACTITIONER

## 2019-01-08 PROCEDURE — G0439 PPPS, SUBSEQ VISIT: HCPCS | Performed by: NURSE PRACTITIONER

## 2019-01-08 NOTE — PROGRESS NOTES
Assessment and Plan:    Problem List Items Addressed This Visit     None        Health Maintenance Due   Topic Date Due    Medicare Annual Wellness Visit (AWV)  1940    CRC Screening: Colonoscopy  1940    DTaP,Tdap,and Td Vaccines (1 - Tdap) 07/03/1963    Pneumococcal PPSV23/PCV13 65+ Years / Low and Medium Risk (2 of 2 - PPSV23) 10/21/2016         HPI:  Ten Gomez is a 70 y o  female here for her Subsequent Wellness Visit  Patient Active Problem List   Diagnosis    Hyperlipidemia    Hypothyroidism    Venous stasis of lower extremity    Age-related cataract of both eyes    Bilateral hearing loss    Arthritis of left knee    Overweight (BMI 25 0-29  9)     No past medical history on file  Past Surgical History:   Procedure Laterality Date    STOMACH SURGERY       Family History   Problem Relation Age of Onset    Heart disease Sister     Hyperlipidemia Family      History   Smoking Status    Never Smoker   Smokeless Tobacco    Never Used     History   Alcohol use Not on file      History   Drug use: Unknown       Current Outpatient Prescriptions   Medication Sig Dispense Refill    levothyroxine 88 mcg tablet Take 1 tablet (88 mcg total) by mouth daily 90 tablet 0    pravastatin (PRAVACHOL) 20 mg tablet take 1 tablet by mouth once daily 90 tablet 1    fluticasone (FLONASE) 50 mcg/act nasal spray 1 spray into each nostril daily (Patient not taking: Reported on 11/8/2018 ) 16 g 0    neomycin-polymyxin-hydrocortisone (CORTISPORIN) otic solution Administer 4 drops to the right ear every 6 (six) hours (Patient not taking: Reported on 11/8/2018 ) 10 mL 0    pseudoephedrine (SUDAFED) 60 mg tablet Take 1 tablet (60 mg total) by mouth every 6 (six) hours as needed for congestion (Patient not taking: Reported on 11/8/2018 ) 30 tablet 0     No current facility-administered medications for this visit        Allergies   Allergen Reactions    Percocet  [Oxycodone-Acetaminophen]     Statins Other reaction(s): ELEVATED LFTS  Category: Adverse Reaction;     Vicodin  [Hydrocodone-Acetaminophen]      Immunization History   Administered Date(s) Administered    Influenza 10/01/2016    Influenza Quadrivalent Preservative Free 3 years and older IM 09/15/2017    Influenza Split High Dose Preservative Free IM 09/12/2013, 10/21/2015, 10/01/2016    Influenza, high dose seasonal 0 5 mL 10/15/2018    Pneumococcal Conjugate 13-Valent 10/21/2015       Patient Care Team:  Lana Echeverria MD as PCP - General    Medicare Screening Tests and Risk Assessments:  [place] is here for her Subsequent Wellness visit  Last Medicare Wellness visit information reviewed, patient interviewed and updates made to the record today  Health Risk Assessment:  Patient rates overall health as good  Patient feels that their physical health rating is Same  Eyesight was rated as Same  Hearing was rated as Slightly worse  Patient feels that their emotional and mental health rating is Same  Pain experienced by patient in the last 7 days has been Some  Patient's pain rating has been 1/10  Patient states that she has experienced no weight loss or gain in last 6 months  Emotional/Mental Health:  Patient has been feeling nervous/anxious  PHQ-9 Depression Screening:    Frequency of the following problems over the past two weeks:      1  Little interest or pleasure in doing things: 0 - not at all      2  Feeling down, depressed, or hopeless: 0 - not at all  PHQ-2 Score: 0          Broken Bones/Falls: Fall Risk Assessment:    In the past year, patient has experienced: No history of falling in past year          Bladder/Bowel:  Patient has leaked urine accidently in the last six months  Patient reports no loss of bowel control  Immunizations:  Patient has had a flu vaccination within the last year  Patient has received a pneumonia shot  Patient has not received a shingles shot  Patient has not received tetanus/diphtheria shot  Home Safety:  Patient has trouble with stairs inside or outside of their home  Patient currently reports that there are no safety hazards present in home, working smoke alarms, working carbon monoxide detectors  Preventative Screenings:   No breast cancer screening performed, no colon cancer screen completed, cholesterol screen completed, glaucoma eye exam completed,     Nutrition:  Current diet: Low Cholesterol with servings of the following:    Medications:  Patient is not currently taking any over-the-counter supplements  Patient is able to manage medications  Lifestyle Choices:  Patient reports no tobacco use  Patient has not smoked or used tobacco in the past   Patient reports no alcohol use  Patient drives a vehicle  Patient wears seat belt  Current level of exercise of physical activity described by patient as: clean the house   Activities of Daily Living:  Can get out of bed by his or her self, able to dress self, able to make own meals, able to do own shopping, able to bathe self, can do own laundry/housekeeping, can manage own money, pay bills and track expenses    Previous Hospitalizations:  No hospitalization or ED visit in past 12 months        Advanced Directives:  Patient has decided on a power of   Patient has spoken to designated power of   Patient has completed advanced directive          Preventative Screening/Counseling:      Cardiovascular:      General: Risks and Benefits Discussed and Screening Current          Diabetes:      General: Risks and Benefits Discussed and Screening Current          Colorectal Cancer:      General: Risks and Benefits Discussed      Due for studies: Fecal Occult Blood          Breast Cancer:      General: Risks and Benefits Discussed          Cervical Cancer:      General: Risks and Benefits Discussed and Screening Not Indicated          Osteoporosis:      General: Risks and Benefits Discussed      Due for studies: Bone Density Ultrasound          AAA:      General: Risks and Benefits Discussed and Screening Not Indicated          Glaucoma:      General: Risks and Benefits Discussed and Screening Current          HIV:      General: Risks and Benefits Discussed and Screening Not Indicated          Hepatitis C:      General: Risks and Benefits Discussed and Screening Not Indicated        Advanced Directives:   Patient has living will for healthcare, has durable POA for healthcare, patient has an advanced directive  Information on ACP and/or AD provided  5 wishes given  End of life assessment reviewed with patient  Provider agrees with end of life decisions        Immunizations:      Influenza: Risks & Benefits Discussed and Influenza UTD This Year      TDAP: Risks & Benefits Discussed and Tdap Vaccine Needed Today

## 2019-01-08 NOTE — PROGRESS NOTES
Assessment/Plan:    No problem-specific Assessment & Plan notes found for this encounter  Diagnoses and all orders for this visit:    Breast lump in female  Comments:  will order the US and mammogram   Orders:  -     Mammo diagnostic bilateral w cad; Future  -     US breast left limited (diagnostic); Future    Need for Tdap vaccination  -     tetanus-diphtheria-acellular pertussis (BOOSTRIX) injection; Inject 0 5 mL into a muscle once for 1 dose    Medicare annual wellness visit, subsequent    Screen for colon cancer  -     Cologuard; Future    Screening for osteoporosis  -     DXA bone density spine hip and pelvis; Future          Subjective:      Patient ID: Mariah Ritchie is a 70 y o  female  Patient here and reports that the week of Christmas was carrying a box and felt a sharp pain in the breast on the left and feeling like she may have a lump in the left breast  Patient has a history of breast cysts on her left breast in the past  Last mammogram was about 15 years ago  No breast cancer history no cancer history at all  Patient sister had breast cancer and another sister as well ovarian cancer  Patient is feeling well otherwise         The following portions of the patient's history were reviewed and updated as appropriate:   She  has no past medical history on file  She   Patient Active Problem List    Diagnosis Date Noted    Need for Tdap vaccination 01/08/2019    Screening for osteoporosis 01/08/2019    Screen for colon cancer 01/08/2019    Breast lump in female 01/08/2019    Arthritis of left knee 11/08/2018    Overweight (BMI 25 0-29 9) 11/08/2018    Bilateral hearing loss 08/29/2018    Age-related cataract of both eyes 03/09/2018    Venous stasis of lower extremity 07/18/2017    Hypothyroidism 10/01/2012    Hyperlipidemia 08/03/2012     She  has a past surgical history that includes Stomach surgery  Her family history includes Heart disease in her sister;  Hyperlipidemia in her family  She  reports that she has never smoked  She has never used smokeless tobacco  Her alcohol and drug histories are not on file  She is allergic to percocet  [oxycodone-acetaminophen]; statins; and vicodin  [hydrocodone-acetaminophen]       Review of Systems   Constitutional: Negative for activity change, appetite change, chills, diaphoresis, fatigue, fever and unexpected weight change  HENT: Negative for congestion, ear pain, hearing loss, postnasal drip, sinus pain, sinus pressure, sneezing and sore throat  Eyes: Negative for pain, redness and visual disturbance  Respiratory: Negative for cough and shortness of breath  Cardiovascular: Negative for chest pain and leg swelling  Gastrointestinal: Negative for abdominal pain, diarrhea, nausea and vomiting  Musculoskeletal: Negative for arthralgias  Neurological: Negative for dizziness and light-headedness  Psychiatric/Behavioral: Negative for behavioral problems and dysphoric mood  Objective:      /76 (BP Location: Left arm, Patient Position: Sitting, Cuff Size: Adult)   Pulse 68   Temp (!) 97 1 °F (36 2 °C) (Temporal)   Resp 16   Ht 5' 3" (1 6 m)   Wt 76 3 kg (168 lb 3 2 oz)   SpO2 99%   BMI 29 80 kg/m²          Physical Exam   Constitutional: She is oriented to person, place, and time  Vital signs are normal  She appears well-developed and well-nourished  No distress  HENT:   Head: Normocephalic and atraumatic  Eyes: Pupils are equal, round, and reactive to light  Neck: Normal range of motion  No thyromegaly present  Cardiovascular: Normal rate, regular rhythm, normal heart sounds and intact distal pulses  No murmur heard  Pulmonary/Chest: Effort normal and breath sounds normal  No respiratory distress  She has no wheezes  Right breast exhibits no inverted nipple, no mass, no nipple discharge, no skin change and no tenderness  Left breast exhibits mass and tenderness   Left breast exhibits no inverted nipple, no nipple discharge and no skin change  Abdominal: Soft  Bowel sounds are normal    Musculoskeletal: Normal range of motion  Neurological: She is alert and oriented to person, place, and time  Skin: Skin is warm and dry  Psychiatric: She has a normal mood and affect  Nursing note and vitals reviewed

## 2019-01-14 ENCOUNTER — HOSPITAL ENCOUNTER (OUTPATIENT)
Dept: ULTRASOUND IMAGING | Facility: CLINIC | Age: 79
Discharge: HOME | End: 2019-01-14
Payer: MEDICARE

## 2019-01-14 ENCOUNTER — HOSPITAL ENCOUNTER (OUTPATIENT)
Dept: MAMMOGRAPHY | Facility: CLINIC | Age: 79
Discharge: HOME | End: 2019-01-14
Payer: MEDICARE

## 2019-01-14 VITALS — BODY MASS INDEX: 29.77 KG/M2 | WEIGHT: 168 LBS | HEIGHT: 63 IN

## 2019-01-14 DIAGNOSIS — N63.0 BREAST LUMP IN FEMALE: ICD-10-CM

## 2019-01-14 PROCEDURE — G0279 TOMOSYNTHESIS, MAMMO: HCPCS

## 2019-01-14 PROCEDURE — 77066 DX MAMMO INCL CAD BI: CPT

## 2019-01-14 PROCEDURE — 76642 ULTRASOUND BREAST LIMITED: CPT

## 2019-01-17 ENCOUNTER — HOSPITAL ENCOUNTER (OUTPATIENT)
Dept: MAMMOGRAPHY | Facility: CLINIC | Age: 79
Discharge: HOME | End: 2019-01-17
Payer: MEDICARE

## 2019-01-17 DIAGNOSIS — Z13.820 SCREENING FOR OSTEOPOROSIS: ICD-10-CM

## 2019-01-17 PROCEDURE — 77080 DXA BONE DENSITY AXIAL: CPT

## 2019-02-27 DIAGNOSIS — E78.49 OTHER HYPERLIPIDEMIA: ICD-10-CM

## 2019-02-27 RX ORDER — PRAVASTATIN SODIUM 20 MG
TABLET ORAL
Qty: 90 TABLET | Refills: 1 | Status: SHIPPED | OUTPATIENT
Start: 2019-02-27 | End: 2019-08-19 | Stop reason: SDUPTHER

## 2019-03-01 ENCOUNTER — APPOINTMENT (OUTPATIENT)
Dept: LAB | Facility: CLINIC | Age: 79
End: 2019-03-01
Payer: MEDICARE

## 2019-03-01 ENCOUNTER — OFFICE VISIT (OUTPATIENT)
Dept: FAMILY MEDICINE CLINIC | Facility: CLINIC | Age: 79
End: 2019-03-01
Payer: MEDICARE

## 2019-03-01 VITALS
TEMPERATURE: 98.5 F | SYSTOLIC BLOOD PRESSURE: 130 MMHG | WEIGHT: 170 LBS | HEART RATE: 76 BPM | HEIGHT: 63 IN | DIASTOLIC BLOOD PRESSURE: 70 MMHG | OXYGEN SATURATION: 95 % | BODY MASS INDEX: 30.12 KG/M2

## 2019-03-01 DIAGNOSIS — E78.2 MIXED HYPERLIPIDEMIA: ICD-10-CM

## 2019-03-01 DIAGNOSIS — E03.9 HYPOTHYROIDISM, UNSPECIFIED TYPE: ICD-10-CM

## 2019-03-01 DIAGNOSIS — M17.12 ARTHRITIS OF LEFT KNEE: ICD-10-CM

## 2019-03-01 DIAGNOSIS — M25.562 BILATERAL CHRONIC KNEE PAIN: Primary | ICD-10-CM

## 2019-03-01 DIAGNOSIS — H91.93 BILATERAL HEARING LOSS, UNSPECIFIED HEARING LOSS TYPE: ICD-10-CM

## 2019-03-01 DIAGNOSIS — M25.561 BILATERAL CHRONIC KNEE PAIN: Primary | ICD-10-CM

## 2019-03-01 DIAGNOSIS — G89.29 BILATERAL CHRONIC KNEE PAIN: Primary | ICD-10-CM

## 2019-03-01 LAB
ALBUMIN SERPL BCP-MCNC: 3.4 G/DL (ref 3.5–5)
ALP SERPL-CCNC: 80 U/L (ref 46–116)
ALT SERPL W P-5'-P-CCNC: 24 U/L (ref 12–78)
ANION GAP SERPL CALCULATED.3IONS-SCNC: 5 MMOL/L (ref 4–13)
AST SERPL W P-5'-P-CCNC: 20 U/L (ref 5–45)
BASOPHILS # BLD AUTO: 0.04 THOUSANDS/ΜL (ref 0–0.1)
BASOPHILS NFR BLD AUTO: 1 % (ref 0–1)
BILIRUB SERPL-MCNC: 0.38 MG/DL (ref 0.2–1)
BUN SERPL-MCNC: 19 MG/DL (ref 5–25)
CALCIUM SERPL-MCNC: 8.6 MG/DL (ref 8.3–10.1)
CHLORIDE SERPL-SCNC: 106 MMOL/L (ref 100–108)
CHOLEST SERPL-MCNC: 377 MG/DL (ref 50–200)
CO2 SERPL-SCNC: 28 MMOL/L (ref 21–32)
CREAT SERPL-MCNC: 0.97 MG/DL (ref 0.6–1.3)
EOSINOPHIL # BLD AUTO: 0.11 THOUSAND/ΜL (ref 0–0.61)
EOSINOPHIL NFR BLD AUTO: 2 % (ref 0–6)
ERYTHROCYTE [DISTWIDTH] IN BLOOD BY AUTOMATED COUNT: 13.5 % (ref 11.6–15.1)
GFR SERPL CREATININE-BSD FRML MDRD: 57 ML/MIN/1.73SQ M
GLUCOSE P FAST SERPL-MCNC: 91 MG/DL (ref 65–99)
HCT VFR BLD AUTO: 41.6 % (ref 34.8–46.1)
HDLC SERPL-MCNC: 54 MG/DL (ref 40–60)
HGB BLD-MCNC: 13 G/DL (ref 11.5–15.4)
IMM GRANULOCYTES # BLD AUTO: 0.02 THOUSAND/UL (ref 0–0.2)
IMM GRANULOCYTES NFR BLD AUTO: 0 % (ref 0–2)
LDLC SERPL CALC-MCNC: 300 MG/DL (ref 0–100)
LYMPHOCYTES # BLD AUTO: 1.93 THOUSANDS/ΜL (ref 0.6–4.47)
LYMPHOCYTES NFR BLD AUTO: 31 % (ref 14–44)
MCH RBC QN AUTO: 31.5 PG (ref 26.8–34.3)
MCHC RBC AUTO-ENTMCNC: 31.3 G/DL (ref 31.4–37.4)
MCV RBC AUTO: 101 FL (ref 82–98)
MONOCYTES # BLD AUTO: 0.42 THOUSAND/ΜL (ref 0.17–1.22)
MONOCYTES NFR BLD AUTO: 7 % (ref 4–12)
NEUTROPHILS # BLD AUTO: 3.68 THOUSANDS/ΜL (ref 1.85–7.62)
NEUTS SEG NFR BLD AUTO: 59 % (ref 43–75)
NRBC BLD AUTO-RTO: 0 /100 WBCS
PLATELET # BLD AUTO: 279 THOUSANDS/UL (ref 149–390)
PMV BLD AUTO: 11.8 FL (ref 8.9–12.7)
POTASSIUM SERPL-SCNC: 4.2 MMOL/L (ref 3.5–5.3)
PROT SERPL-MCNC: 7.5 G/DL (ref 6.4–8.2)
RBC # BLD AUTO: 4.13 MILLION/UL (ref 3.81–5.12)
SODIUM SERPL-SCNC: 139 MMOL/L (ref 136–145)
TRIGL SERPL-MCNC: 114 MG/DL
TSH SERPL DL<=0.05 MIU/L-ACNC: 3.74 UIU/ML (ref 0.36–3.74)
WBC # BLD AUTO: 6.2 THOUSAND/UL (ref 4.31–10.16)

## 2019-03-01 PROCEDURE — 99214 OFFICE O/P EST MOD 30 MIN: CPT | Performed by: FAMILY MEDICINE

## 2019-03-01 PROCEDURE — 36415 COLL VENOUS BLD VENIPUNCTURE: CPT

## 2019-03-01 PROCEDURE — 20610 DRAIN/INJ JOINT/BURSA W/O US: CPT | Performed by: FAMILY MEDICINE

## 2019-03-01 PROCEDURE — 84443 ASSAY THYROID STIM HORMONE: CPT

## 2019-03-01 PROCEDURE — 85025 COMPLETE CBC W/AUTO DIFF WBC: CPT

## 2019-03-01 PROCEDURE — 80061 LIPID PANEL: CPT

## 2019-03-01 PROCEDURE — 80053 COMPREHEN METABOLIC PANEL: CPT

## 2019-03-01 RX ORDER — METHYLPREDNISOLONE ACETATE 40 MG/ML
2 INJECTION, SUSPENSION INTRA-ARTICULAR; INTRALESIONAL; INTRAMUSCULAR; SOFT TISSUE
Status: COMPLETED | OUTPATIENT
Start: 2019-03-01 | End: 2019-03-01

## 2019-03-01 RX ORDER — LIDOCAINE HYDROCHLORIDE 10 MG/ML
2 INJECTION, SOLUTION INFILTRATION; PERINEURAL
Status: COMPLETED | OUTPATIENT
Start: 2019-03-01 | End: 2019-03-01

## 2019-03-01 RX ADMIN — METHYLPREDNISOLONE ACETATE 2 ML: 40 INJECTION, SUSPENSION INTRA-ARTICULAR; INTRALESIONAL; INTRAMUSCULAR; SOFT TISSUE at 12:27

## 2019-03-01 RX ADMIN — LIDOCAINE HYDROCHLORIDE 2 ML: 10 INJECTION, SOLUTION INFILTRATION; PERINEURAL at 12:27

## 2019-03-01 NOTE — PROGRESS NOTES
Assessment/Plan:    No problem-specific Assessment & Plan notes found for this encounter  Diagnoses and all orders for this visit:    Bilateral chronic knee pain  -     Large joint arthrocentesis: R knee  -     Large joint arthrocentesis: L knee    Arthritis of left knee  -     Large joint arthrocentesis: L knee    Mixed hyperlipidemia        BMI Counseling: Body mass index is 30 6 kg/m²  Discussed the patient's BMI with her  The BMI is above average  BMI counseling and education was provided to the patient  Nutrition recommendations include moderation in carbohydrate intake  Exercise recommendations include exercising 3-5 times per week  Subjective:      Patient ID: Margi Prieto is a 70 y o  female  She is here with her daughter  She had labs done this morning so the results are pending  She has been forgetting to take her cholesterol medication  Out of 7 days she is forgetting it 4 days  She falls asleep before she remembers to take her medication  She has pain in her knees  Bilateral  Has a history of arthritis  Saw ortho in the past and states she had the gel injections before  She occasionally takes pain relievers but is "afraid" to take it  History of GI bleed - concern about NSAIDs  History of LFTs elevated - wants to avoid Tylenol  The following portions of the patient's history were reviewed and updated as appropriate:   She  has no past medical history on file    She   Patient Active Problem List    Diagnosis Date Noted    Need for Tdap vaccination 01/08/2019    Screening for osteoporosis 01/08/2019    Screen for colon cancer 01/08/2019    Breast lump in female 01/08/2019    Arthritis of left knee 11/08/2018    Overweight (BMI 25 0-29 9) 11/08/2018    Bilateral hearing loss 08/29/2018    Age-related cataract of both eyes 03/09/2018    Venous stasis of lower extremity 07/18/2017    Hypothyroidism 10/01/2012    Hyperlipidemia 08/03/2012     She  has a past surgical history that includes Stomach surgery; Hysterectomy; Oophorectomy (Bilateral); and Breast cyst excision  Her family history includes Breast cancer in her cousin and cousin; Breast cancer (age of onset: 68) in her sister; Cervical cancer in her paternal aunt; Heart disease in her sister; Hyperlipidemia in her family; Ovarian cancer (age of onset: 77) in her sister  She  reports that she has never smoked  She has never used smokeless tobacco  Her alcohol and drug histories are not on file  Current Outpatient Medications   Medication Sig Dispense Refill    fluticasone (FLONASE) 50 mcg/act nasal spray 1 spray into each nostril daily (Patient not taking: Reported on 11/8/2018 ) 16 g 0    levothyroxine 88 mcg tablet Take 1 tablet (88 mcg total) by mouth daily 90 tablet 0    neomycin-polymyxin-hydrocortisone (CORTISPORIN) otic solution Administer 4 drops to the right ear every 6 (six) hours (Patient not taking: Reported on 11/8/2018 ) 10 mL 0    pravastatin (PRAVACHOL) 20 mg tablet take 1 tablet by mouth once daily 90 tablet 1    pseudoephedrine (SUDAFED) 60 mg tablet Take 1 tablet (60 mg total) by mouth every 6 (six) hours as needed for congestion (Patient not taking: Reported on 11/8/2018 ) 30 tablet 0     No current facility-administered medications for this visit        Current Outpatient Medications on File Prior to Visit   Medication Sig    fluticasone (FLONASE) 50 mcg/act nasal spray 1 spray into each nostril daily (Patient not taking: Reported on 11/8/2018 )    levothyroxine 88 mcg tablet Take 1 tablet (88 mcg total) by mouth daily    neomycin-polymyxin-hydrocortisone (CORTISPORIN) otic solution Administer 4 drops to the right ear every 6 (six) hours (Patient not taking: Reported on 11/8/2018 )    pravastatin (PRAVACHOL) 20 mg tablet take 1 tablet by mouth once daily    pseudoephedrine (SUDAFED) 60 mg tablet Take 1 tablet (60 mg total) by mouth every 6 (six) hours as needed for congestion (Patient not taking: Reported on 11/8/2018 )     No current facility-administered medications on file prior to visit  She is allergic to percocet  [oxycodone-acetaminophen]; statins; and vicodin  [hydrocodone-acetaminophen]       Review of Systems   Constitutional: Negative  HENT: Negative  Eyes: Negative  Respiratory: Negative  Cardiovascular: Negative  Gastrointestinal: Negative  Musculoskeletal: Positive for arthralgias  Objective:      /70   Pulse 76   Temp 98 5 °F (36 9 °C)   Ht 5' 2 5" (1 588 m)   Wt 77 1 kg (170 lb)   SpO2 95%   BMI 30 60 kg/m²            Physical Exam   Constitutional: She is oriented to person, place, and time  She appears well-developed and well-nourished  No distress  HENT:   Head: Normocephalic and atraumatic  Cardiovascular: Exam reveals no gallop and no friction rub  No murmur heard  Pulmonary/Chest: Effort normal and breath sounds normal  No respiratory distress  She has no wheezes  She has no rales  She exhibits no tenderness  Musculoskeletal: Normal range of motion  She exhibits no edema  Right knee: She exhibits swelling  Tenderness found  Lateral joint line tenderness noted  Left knee: She exhibits swelling  Tenderness found  Lateral joint line tenderness noted  Neurological: She is alert and oriented to person, place, and time  Skin: Skin is warm and dry  No rash noted  She is not diaphoretic  Psychiatric: She has a normal mood and affect  Her behavior is normal  Judgment and thought content normal    Nursing note and vitals reviewed        Large joint arthrocentesis: R knee  Date/Time: 3/1/2019 12:27 PM  Consent given by: patient  Supporting Documentation  Indications: pain   Procedure Details  Location: knee - R knee  Needle size: 25 G  Ultrasound guidance: no  Approach: medial  Medications administered: 2 mL lidocaine 1 %; 2 mL methylPREDNISolone acetate 40 mg/mL    Patient tolerance: patient tolerated the procedure well with no immediate complications  Dressing:  Sterile dressing applied    Large joint arthrocentesis: L knee  Date/Time: 3/1/2019 12:27 PM  Consent given by: patient  Supporting Documentation  Indications: pain   Procedure Details  Location: knee - L knee  Needle size: 25 G  Ultrasound guidance: no  Approach: medial  Medications administered: 2 mL lidocaine 1 %; 2 mL methylPREDNISolone acetate 40 mg/mL    Patient tolerance: patient tolerated the procedure well with no immediate complications  Dressing:  Sterile dressing applied

## 2019-07-01 ENCOUNTER — OFFICE VISIT (OUTPATIENT)
Dept: FAMILY MEDICINE CLINIC | Facility: CLINIC | Age: 79
End: 2019-07-01
Payer: MEDICARE

## 2019-07-01 VITALS
BODY MASS INDEX: 30.83 KG/M2 | TEMPERATURE: 99 F | OXYGEN SATURATION: 95 % | WEIGHT: 174 LBS | SYSTOLIC BLOOD PRESSURE: 132 MMHG | DIASTOLIC BLOOD PRESSURE: 86 MMHG | HEART RATE: 80 BPM | HEIGHT: 63 IN

## 2019-07-01 DIAGNOSIS — M17.12 ARTHRITIS OF LEFT KNEE: Primary | ICD-10-CM

## 2019-07-01 DIAGNOSIS — M17.11 ARTHRITIS OF RIGHT KNEE: ICD-10-CM

## 2019-07-01 PROCEDURE — 20610 DRAIN/INJ JOINT/BURSA W/O US: CPT | Performed by: FAMILY MEDICINE

## 2019-07-01 PROCEDURE — 99213 OFFICE O/P EST LOW 20 MIN: CPT | Performed by: FAMILY MEDICINE

## 2019-07-01 RX ORDER — METHYLPREDNISOLONE ACETATE 40 MG/ML
2 INJECTION, SUSPENSION INTRA-ARTICULAR; INTRALESIONAL; INTRAMUSCULAR; SOFT TISSUE
Status: COMPLETED | OUTPATIENT
Start: 2019-07-01 | End: 2019-07-01

## 2019-07-01 RX ORDER — LIDOCAINE HYDROCHLORIDE 10 MG/ML
3 INJECTION, SOLUTION INFILTRATION; PERINEURAL
Status: COMPLETED | OUTPATIENT
Start: 2019-07-01 | End: 2019-07-01

## 2019-07-01 RX ADMIN — LIDOCAINE HYDROCHLORIDE 3 ML: 10 INJECTION, SOLUTION INFILTRATION; PERINEURAL at 11:33

## 2019-07-01 RX ADMIN — LIDOCAINE HYDROCHLORIDE 3 ML: 10 INJECTION, SOLUTION INFILTRATION; PERINEURAL at 11:34

## 2019-07-01 RX ADMIN — METHYLPREDNISOLONE ACETATE 2 ML: 40 INJECTION, SUSPENSION INTRA-ARTICULAR; INTRALESIONAL; INTRAMUSCULAR; SOFT TISSUE at 11:34

## 2019-07-01 RX ADMIN — METHYLPREDNISOLONE ACETATE 2 ML: 40 INJECTION, SUSPENSION INTRA-ARTICULAR; INTRALESIONAL; INTRAMUSCULAR; SOFT TISSUE at 11:33

## 2019-07-01 NOTE — PROGRESS NOTES
Assessment/Plan:    No problem-specific Assessment & Plan notes found for this encounter  Diagnoses and all orders for this visit:    Arthritis of left knee  -     Large joint arthrocentesis: L knee    Arthritis of right knee  -     Large joint arthrocentesis: R knee          Subjective:      Patient ID: Jessica Vargas is a 70 y o  female  She has b/l OA in the knees  She had an injection in March which did help  Her pain returned about a month ago  She had Xrays "a long time ago"  She states she is considering possible knee surgery, however she wants to get through some health issues at her  is currently having  She takes aspirin on occasion    Knee Pain          The following portions of the patient's history were reviewed and updated as appropriate:   She  has no past medical history on file  She   Patient Active Problem List    Diagnosis Date Noted    Arthritis of right knee 07/01/2019    Need for Tdap vaccination 01/08/2019    Screening for osteoporosis 01/08/2019    Screen for colon cancer 01/08/2019    Breast lump in female 01/08/2019    Arthritis of left knee 11/08/2018    Overweight (BMI 25 0-29 9) 11/08/2018    Bilateral hearing loss 08/29/2018    Age-related cataract of both eyes 03/09/2018    Venous stasis of lower extremity 07/18/2017    Hypothyroidism 10/01/2012    Hyperlipidemia 08/03/2012     She  has a past surgical history that includes Stomach surgery; Hysterectomy; Oophorectomy (Bilateral); and Breast cyst excision  Her family history includes Breast cancer in her cousin and cousin; Breast cancer (age of onset: 68) in her sister; Cervical cancer in her paternal aunt; Heart disease in her sister; Hyperlipidemia in her family; Ovarian cancer (age of onset: 77) in her sister  She  reports that she has never smoked  She has never used smokeless tobacco  Her alcohol and drug histories are not on file    Current Outpatient Medications   Medication Sig Dispense Refill    levothyroxine 88 mcg tablet Take 1 tablet (88 mcg total) by mouth daily 90 tablet 0    pravastatin (PRAVACHOL) 20 mg tablet take 1 tablet by mouth once daily 90 tablet 1     No current facility-administered medications for this visit  Current Outpatient Medications on File Prior to Visit   Medication Sig    levothyroxine 88 mcg tablet Take 1 tablet (88 mcg total) by mouth daily    pravastatin (PRAVACHOL) 20 mg tablet take 1 tablet by mouth once daily    [DISCONTINUED] fluticasone (FLONASE) 50 mcg/act nasal spray 1 spray into each nostril daily (Patient not taking: Reported on 11/8/2018 )    [DISCONTINUED] neomycin-polymyxin-hydrocortisone (CORTISPORIN) otic solution Administer 4 drops to the right ear every 6 (six) hours (Patient not taking: Reported on 11/8/2018 )    [DISCONTINUED] pseudoephedrine (SUDAFED) 60 mg tablet Take 1 tablet (60 mg total) by mouth every 6 (six) hours as needed for congestion (Patient not taking: Reported on 11/8/2018 )     No current facility-administered medications on file prior to visit  She is allergic to percocet  [oxycodone-acetaminophen]; statins; and vicodin  [hydrocodone-acetaminophen]       Review of Systems   Musculoskeletal: Positive for arthralgias  Objective:      /86   Pulse 80   Temp 99 °F (37 2 °C)   Ht 5' 2 5" (1 588 m)   Wt 78 9 kg (174 lb)   SpO2 95%   BMI 31 32 kg/m²          Physical Exam   Constitutional: She appears well-developed and well-nourished  Musculoskeletal:        Right knee: She exhibits normal range of motion and no swelling  Tenderness found  Lateral joint line tenderness noted  Left knee: She exhibits normal range of motion and no swelling  Tenderness found  Lateral joint line tenderness noted     Bilateral knee crepitus       Large joint arthrocentesis: R knee  Date/Time: 7/1/2019 11:33 AM  Consent given by: patient  Supporting Documentation  Indications: pain   Procedure Details  Location: knee - R knee  Needle size: 25 G  Ultrasound guidance: no  Approach: medial  Medications administered: 3 mL lidocaine 1 %; 2 mL methylPREDNISolone acetate 40 mg/mL    Patient tolerance: patient tolerated the procedure well with no immediate complications  Dressing:  Sterile dressing applied    Large joint arthrocentesis: L knee  Date/Time: 7/1/2019 11:34 AM  Consent given by: patient  Supporting Documentation  Indications: pain   Procedure Details  Location: knee - L knee  Needle size: 25 G  Ultrasound guidance: no  Approach: medial  Medications administered: 3 mL lidocaine 1 %; 2 mL methylPREDNISolone acetate 40 mg/mL    Patient tolerance: patient tolerated the procedure well with no immediate complications  Dressing:  Sterile dressing applied

## 2019-08-19 DIAGNOSIS — E78.49 OTHER HYPERLIPIDEMIA: ICD-10-CM

## 2019-08-19 RX ORDER — PRAVASTATIN SODIUM 20 MG
TABLET ORAL
Qty: 90 TABLET | Refills: 1 | Status: SHIPPED | OUTPATIENT
Start: 2019-08-19 | End: 2019-10-25 | Stop reason: DRUGHIGH

## 2019-08-23 DIAGNOSIS — E03.9 HYPOTHYROIDISM, UNSPECIFIED TYPE: ICD-10-CM

## 2019-08-24 RX ORDER — LEVOTHYROXINE SODIUM 88 UG/1
88 TABLET ORAL DAILY
Qty: 90 TABLET | Refills: 0 | Status: SHIPPED | OUTPATIENT
Start: 2019-08-24 | End: 2019-11-12 | Stop reason: SDUPTHER

## 2019-10-10 ENCOUNTER — OFFICE VISIT (OUTPATIENT)
Dept: FAMILY MEDICINE CLINIC | Facility: CLINIC | Age: 79
End: 2019-10-10
Payer: MEDICARE

## 2019-10-10 VITALS
TEMPERATURE: 99.6 F | DIASTOLIC BLOOD PRESSURE: 76 MMHG | WEIGHT: 177 LBS | HEART RATE: 69 BPM | HEIGHT: 63 IN | SYSTOLIC BLOOD PRESSURE: 124 MMHG | OXYGEN SATURATION: 97 % | BODY MASS INDEX: 31.36 KG/M2

## 2019-10-10 DIAGNOSIS — E78.2 MIXED HYPERLIPIDEMIA: ICD-10-CM

## 2019-10-10 DIAGNOSIS — E66.09 CLASS 1 OBESITY DUE TO EXCESS CALORIES WITH SERIOUS COMORBIDITY AND BODY MASS INDEX (BMI) OF 31.0 TO 31.9 IN ADULT: ICD-10-CM

## 2019-10-10 DIAGNOSIS — M67.431 GANGLION CYST OF WRIST, RIGHT: ICD-10-CM

## 2019-10-10 DIAGNOSIS — Z13.1 SCREENING FOR DIABETES MELLITUS: ICD-10-CM

## 2019-10-10 DIAGNOSIS — E03.9 HYPOTHYROIDISM, UNSPECIFIED TYPE: Primary | ICD-10-CM

## 2019-10-10 PROBLEM — E66.811 CLASS 1 OBESITY DUE TO EXCESS CALORIES WITH SERIOUS COMORBIDITY AND BODY MASS INDEX (BMI) OF 31.0 TO 31.9 IN ADULT: Status: ACTIVE | Noted: 2019-10-10

## 2019-10-10 PROBLEM — E66.3 OVERWEIGHT (BMI 25.0-29.9): Status: RESOLVED | Noted: 2018-11-08 | Resolved: 2019-10-10

## 2019-10-10 PROBLEM — Z23 NEED FOR TDAP VACCINATION: Status: RESOLVED | Noted: 2019-01-08 | Resolved: 2019-10-10

## 2019-10-10 PROCEDURE — 99214 OFFICE O/P EST MOD 30 MIN: CPT | Performed by: FAMILY MEDICINE

## 2019-10-10 NOTE — PROGRESS NOTES
Assessment/Plan:       Problem List Items Addressed This Visit        Endocrine    Hypothyroidism - Primary     Update labs         Relevant Orders    TSH, 3rd generation       Other    Hyperlipidemia     Update labs         Relevant Orders    Lipid Panel with Direct LDL reflex    Class 1 obesity due to excess calories with serious comorbidity and body mass index (BMI) of 31 0 to 31 9 in adult     BMI Counseling: Body mass index is 31 86 kg/m²  The BMI is above normal  Nutrition recommendations include decreasing overall calorie intake  Exercise recommendations include exercising 3-5 times per week  Ganglion cyst of wrist, right     Patient is comfortable monitoring for now           Other Visit Diagnoses     Screening for diabetes mellitus        Relevant Orders    Comprehensive metabolic panel            Subjective:      Patient ID: Fabrice Duran is a 70 y o  female  She has been losing hair, gaining weight and feels tired so she wants her thyroid checked  She is on Levothyroxine 88 mcg  Last TSH in March was WNL  She has a h/o Hyperlipidemia - she is on Pravastatin  She has a history of elevated LFTs on statin therapy so she gets very concerned about the medications  She says she basically takes Pravastatin every day  Obesity - she is gaining unknown why  She has been less active due to knee pain  Has a lump on her R wrist noticed a week ago  Non tender  The following portions of the patient's history were reviewed and updated as appropriate:   She  has no past medical history on file    She   Patient Active Problem List    Diagnosis Date Noted    Class 1 obesity due to excess calories with serious comorbidity and body mass index (BMI) of 31 0 to 31 9 in adult 10/10/2019    Ganglion cyst of wrist, right 10/10/2019    Arthritis of right knee 07/01/2019    Screening for osteoporosis 01/08/2019    Screen for colon cancer 01/08/2019    Breast lump in female 01/08/2019    Arthritis of left knee 11/08/2018    Bilateral hearing loss 08/29/2018    Age-related cataract of both eyes 03/09/2018    Venous stasis of lower extremity 07/18/2017    Hypothyroidism 10/01/2012    Hyperlipidemia 08/03/2012     She  has a past surgical history that includes Stomach surgery; Hysterectomy; Oophorectomy (Bilateral); and Breast cyst excision  Her family history includes Breast cancer in her cousin and cousin; Breast cancer (age of onset: 68) in her sister; Cervical cancer in her paternal aunt; Heart disease in her sister; Hyperlipidemia in her family; Ovarian cancer (age of onset: 77) in her sister  She  reports that she has never smoked  She has never used smokeless tobacco  Her alcohol and drug histories are not on file  Current Outpatient Medications   Medication Sig Dispense Refill    levothyroxine 88 mcg tablet Take 1 tablet (88 mcg total) by mouth daily 90 tablet 0    pravastatin (PRAVACHOL) 20 mg tablet take 1 tablet by mouth once daily 90 tablet 1     No current facility-administered medications for this visit  Current Outpatient Medications on File Prior to Visit   Medication Sig    levothyroxine 88 mcg tablet Take 1 tablet (88 mcg total) by mouth daily    pravastatin (PRAVACHOL) 20 mg tablet take 1 tablet by mouth once daily     No current facility-administered medications on file prior to visit  She is allergic to percocet  [oxycodone-acetaminophen]; statins; and vicodin  [hydrocodone-acetaminophen]       Review of Systems   Constitutional: Positive for fatigue and unexpected weight change  Negative for activity change  Respiratory: Negative for chest tightness and shortness of breath  Cardiovascular: Negative for chest pain and leg swelling  Musculoskeletal: Positive for arthralgias  Skin:        Hair thinning   Neurological: Negative for headaches  Psychiatric/Behavioral: Negative for dysphoric mood  The patient is not nervous/anxious            Objective:      /76 Pulse 69   Temp 99 6 °F (37 6 °C)   Ht 5' 2 5" (1 588 m)   Wt 80 3 kg (177 lb)   SpO2 97%   BMI 31 86 kg/m²          Physical Exam   Constitutional: She is oriented to person, place, and time  She appears well-developed and well-nourished  No distress  HENT:   Head: Normocephalic and atraumatic  Right Ear: Decreased hearing is noted  Left Ear: Decreased hearing is noted  Eyes: Pupils are equal, round, and reactive to light  Conjunctivae are normal    Neck: Neck supple  Cardiovascular: Normal rate, regular rhythm and normal heart sounds  Exam reveals no gallop and no friction rub  No murmur heard  Pulmonary/Chest: Effort normal and breath sounds normal  No respiratory distress  She has no wheezes  She has no rales  She exhibits no tenderness  Musculoskeletal: Normal range of motion  She exhibits edema  Right wrist: She exhibits normal range of motion and no tenderness  Arms:  Neurological: She is alert and oriented to person, place, and time  Skin: Skin is warm and dry  No rash noted  She is not diaphoretic  Psychiatric: She has a normal mood and affect  Her behavior is normal  Judgment and thought content normal    Nursing note and vitals reviewed

## 2019-10-10 NOTE — ASSESSMENT & PLAN NOTE
BMI Counseling: Body mass index is 31 86 kg/m²  The BMI is above normal  Nutrition recommendations include decreasing overall calorie intake  Exercise recommendations include exercising 3-5 times per week

## 2019-10-17 ENCOUNTER — APPOINTMENT (OUTPATIENT)
Dept: LAB | Facility: CLINIC | Age: 79
End: 2019-10-17
Payer: MEDICARE

## 2019-10-17 DIAGNOSIS — E03.9 HYPOTHYROIDISM, UNSPECIFIED TYPE: ICD-10-CM

## 2019-10-17 DIAGNOSIS — E78.2 MIXED HYPERLIPIDEMIA: ICD-10-CM

## 2019-10-17 DIAGNOSIS — Z13.1 SCREENING FOR DIABETES MELLITUS: ICD-10-CM

## 2019-10-17 LAB
ALBUMIN SERPL BCP-MCNC: 3.7 G/DL (ref 3.5–5)
ALP SERPL-CCNC: 74 U/L (ref 46–116)
ALT SERPL W P-5'-P-CCNC: 23 U/L (ref 12–78)
ANION GAP SERPL CALCULATED.3IONS-SCNC: 5 MMOL/L (ref 4–13)
AST SERPL W P-5'-P-CCNC: 16 U/L (ref 5–45)
BILIRUB SERPL-MCNC: 0.42 MG/DL (ref 0.2–1)
BUN SERPL-MCNC: 17 MG/DL (ref 5–25)
CALCIUM SERPL-MCNC: 9.2 MG/DL (ref 8.3–10.1)
CHLORIDE SERPL-SCNC: 106 MMOL/L (ref 100–108)
CHOLEST SERPL-MCNC: 337 MG/DL (ref 50–200)
CO2 SERPL-SCNC: 28 MMOL/L (ref 21–32)
CREAT SERPL-MCNC: 0.9 MG/DL (ref 0.6–1.3)
GFR SERPL CREATININE-BSD FRML MDRD: 62 ML/MIN/1.73SQ M
GLUCOSE P FAST SERPL-MCNC: 77 MG/DL (ref 65–99)
HDLC SERPL-MCNC: 50 MG/DL (ref 40–60)
LDLC SERPL CALC-MCNC: 256 MG/DL (ref 0–100)
POTASSIUM SERPL-SCNC: 3.8 MMOL/L (ref 3.5–5.3)
PROT SERPL-MCNC: 7.2 G/DL (ref 6.4–8.2)
SODIUM SERPL-SCNC: 139 MMOL/L (ref 136–145)
TRIGL SERPL-MCNC: 153 MG/DL
TSH SERPL DL<=0.05 MIU/L-ACNC: 2.65 UIU/ML (ref 0.36–3.74)

## 2019-10-17 PROCEDURE — 84443 ASSAY THYROID STIM HORMONE: CPT

## 2019-10-17 PROCEDURE — 80053 COMPREHEN METABOLIC PANEL: CPT

## 2019-10-17 PROCEDURE — 36415 COLL VENOUS BLD VENIPUNCTURE: CPT

## 2019-10-17 PROCEDURE — 80061 LIPID PANEL: CPT

## 2019-10-25 ENCOUNTER — TELEPHONE (OUTPATIENT)
Dept: FAMILY MEDICINE CLINIC | Facility: CLINIC | Age: 79
End: 2019-10-25

## 2019-10-25 DIAGNOSIS — E78.49 OTHER HYPERLIPIDEMIA: ICD-10-CM

## 2019-10-25 RX ORDER — PRAVASTATIN SODIUM 40 MG
40 TABLET ORAL
Qty: 90 TABLET | Refills: 3 | Status: SHIPPED | OUTPATIENT
Start: 2019-10-25 | End: 2020-07-17 | Stop reason: SINTOL

## 2019-10-25 NOTE — TELEPHONE ENCOUNTER
Patient decided she will increase her cholesterol medication  And she is also going to start a weight watchers diet     Can send this to [facility]  She has a lot of 20mg that she will take 2 to make 40  If you have any issues please call

## 2019-11-12 DIAGNOSIS — E03.9 HYPOTHYROIDISM, UNSPECIFIED TYPE: ICD-10-CM

## 2019-11-12 RX ORDER — LEVOTHYROXINE SODIUM 88 UG/1
TABLET ORAL
Qty: 90 TABLET | Refills: 0 | Status: SHIPPED | OUTPATIENT
Start: 2019-11-12 | End: 2020-02-10

## 2020-02-10 DIAGNOSIS — E03.9 HYPOTHYROIDISM, UNSPECIFIED TYPE: ICD-10-CM

## 2020-02-10 RX ORDER — LEVOTHYROXINE SODIUM 88 UG/1
TABLET ORAL
Qty: 90 TABLET | Refills: 0 | Status: SHIPPED | OUTPATIENT
Start: 2020-02-10 | End: 2020-05-11 | Stop reason: SDUPTHER

## 2020-05-11 DIAGNOSIS — E03.9 HYPOTHYROIDISM, UNSPECIFIED TYPE: ICD-10-CM

## 2020-05-11 RX ORDER — LEVOTHYROXINE SODIUM 88 UG/1
88 TABLET ORAL DAILY
Qty: 90 TABLET | Refills: 0 | Status: SHIPPED | OUTPATIENT
Start: 2020-05-11 | End: 2020-08-10

## 2020-07-17 ENCOUNTER — OFFICE VISIT (OUTPATIENT)
Dept: FAMILY MEDICINE CLINIC | Facility: CLINIC | Age: 80
End: 2020-07-17
Payer: MEDICARE

## 2020-07-17 VITALS
TEMPERATURE: 98.5 F | BODY MASS INDEX: 30.48 KG/M2 | HEART RATE: 70 BPM | HEIGHT: 63 IN | OXYGEN SATURATION: 97 % | WEIGHT: 172 LBS | DIASTOLIC BLOOD PRESSURE: 74 MMHG | SYSTOLIC BLOOD PRESSURE: 122 MMHG

## 2020-07-17 DIAGNOSIS — M25.562 CHRONIC PAIN OF BOTH KNEES: ICD-10-CM

## 2020-07-17 DIAGNOSIS — Z00.00 MEDICARE ANNUAL WELLNESS VISIT, SUBSEQUENT: ICD-10-CM

## 2020-07-17 DIAGNOSIS — M25.561 CHRONIC PAIN OF BOTH KNEES: ICD-10-CM

## 2020-07-17 DIAGNOSIS — Z12.31 VISIT FOR SCREENING MAMMOGRAM: ICD-10-CM

## 2020-07-17 DIAGNOSIS — K21.9 GASTROESOPHAGEAL REFLUX DISEASE, ESOPHAGITIS PRESENCE NOT SPECIFIED: ICD-10-CM

## 2020-07-17 DIAGNOSIS — I87.8 VENOUS STASIS OF LOWER EXTREMITY: Primary | ICD-10-CM

## 2020-07-17 DIAGNOSIS — G89.29 CHRONIC PAIN OF BOTH KNEES: ICD-10-CM

## 2020-07-17 PROCEDURE — 3008F BODY MASS INDEX DOCD: CPT | Performed by: FAMILY MEDICINE

## 2020-07-17 PROCEDURE — 1125F AMNT PAIN NOTED PAIN PRSNT: CPT | Performed by: FAMILY MEDICINE

## 2020-07-17 PROCEDURE — 1123F ACP DISCUSS/DSCN MKR DOCD: CPT | Performed by: FAMILY MEDICINE

## 2020-07-17 PROCEDURE — 4040F PNEUMOC VAC/ADMIN/RCVD: CPT | Performed by: FAMILY MEDICINE

## 2020-07-17 PROCEDURE — 99214 OFFICE O/P EST MOD 30 MIN: CPT | Performed by: FAMILY MEDICINE

## 2020-07-17 PROCEDURE — G0439 PPPS, SUBSEQ VISIT: HCPCS | Performed by: FAMILY MEDICINE

## 2020-07-17 PROCEDURE — 1160F RVW MEDS BY RX/DR IN RCRD: CPT | Performed by: FAMILY MEDICINE

## 2020-07-17 PROCEDURE — 1036F TOBACCO NON-USER: CPT | Performed by: FAMILY MEDICINE

## 2020-07-17 PROCEDURE — 1170F FXNL STATUS ASSESSED: CPT | Performed by: FAMILY MEDICINE

## 2020-07-17 NOTE — PROGRESS NOTES
Assessment and Plan:     Problem List Items Addressed This Visit        Digestive    Gastroesophageal reflux disease       Cardiovascular and Mediastinum    Venous stasis of lower extremity - Primary    Relevant Orders    Compression Stocking       Other    Chronic pain of both knees    Relevant Orders    Ambulatory referral to Orthopedic Surgery      Other Visit Diagnoses     Medicare annual wellness visit, subsequent        Visit for screening mammogram        Relevant Orders    Mammo screening bilateral w 3d & cad           Preventive health issues were discussed with patient, and age appropriate screening tests were ordered as noted in patient's After Visit Summary  Personalized health advice and appropriate referrals for health education or preventive services given if needed, as noted in patient's After Visit Summary  History of Present Illness:     Patient presents for Medicare Annual Wellness visit    Patient Care Team:  Mainor Keenan MD as PCP - General     Problem List:     Patient Active Problem List   Diagnosis    Hyperlipidemia    Hypothyroidism    Venous stasis of lower extremity    Age-related cataract of both eyes    Bilateral hearing loss    Arthritis of left knee    Screening for osteoporosis    Screen for colon cancer    Breast lump in female    Arthritis of right knee    Class 1 obesity due to excess calories with serious comorbidity and body mass index (BMI) of 31 0 to 31 9 in adult    Ganglion cyst of wrist, right    Chronic pain of both knees    Gastroesophageal reflux disease      Past Medical and Surgical History:     History reviewed  No pertinent past medical history    Past Surgical History:   Procedure Laterality Date    BREAST CYST EXCISION      cyst removals and aspirations-benign    HYSTERECTOMY      at age 60    OOPHORECTOMY Bilateral     at age 60    STOMACH SURGERY        Family History:     Family History   Problem Relation Age of Onset    Heart disease Sister     Breast cancer Sister 68    Hyperlipidemia Family     Ovarian cancer Sister 77    Breast cancer Cousin     Breast cancer Cousin     Cervical cancer Paternal Aunt       Social History:        Social History     Socioeconomic History    Marital status: /Civil Union     Spouse name: None    Number of children: None    Years of education: None    Highest education level: None   Occupational History    None   Social Needs    Financial resource strain: None    Food insecurity:     Worry: None     Inability: None    Transportation needs:     Medical: None     Non-medical: None   Tobacco Use    Smoking status: Never Smoker    Smokeless tobacco: Never Used   Substance and Sexual Activity    Alcohol use: None    Drug use: None    Sexual activity: None   Lifestyle    Physical activity:     Days per week: None     Minutes per session: None    Stress: None   Relationships    Social connections:     Talks on phone: None     Gets together: None     Attends Orthodoxy service: None     Active member of club or organization: None     Attends meetings of clubs or organizations: None     Relationship status: None    Intimate partner violence:     Fear of current or ex partner: None     Emotionally abused: None     Physically abused: None     Forced sexual activity: None   Other Topics Concern    None   Social History Narrative    None      Medications and Allergies:     Current Outpatient Medications   Medication Sig Dispense Refill    levothyroxine 88 mcg tablet Take 1 tablet (88 mcg total) by mouth daily 90 tablet 0     No current facility-administered medications for this visit  Allergies   Allergen Reactions    Percocet  [Oxycodone-Acetaminophen]     Statins      Other reaction(s): ELEVATED LFTS  Category:  Adverse Reaction;     Vicodin  [Hydrocodone-Acetaminophen]       Immunizations:     Immunization History   Administered Date(s) Administered    INFLUENZA 10/01/2016    Influenza Quadrivalent Preservative Free 3 years and older IM 09/15/2017    Influenza Split High Dose Preservative Free IM 09/12/2013, 10/21/2015, 10/01/2016    Influenza, high dose seasonal 0 5 mL 10/15/2018    Pneumococcal Conjugate 13-Valent 10/21/2015    Pneumococcal Polysaccharide PPV23 07/23/2013    influenza, trivalent, adjuvanted 10/11/2019      Health Maintenance: There are no preventive care reminders to display for this patient  Topic Date Due    DTaP,Tdap,and Td Vaccines (1 - Tdap) 07/03/1951      Medicare Health Risk Assessment:     /74   Pulse 70   Temp 98 5 °F (36 9 °C)   Ht 5' 2 5" (1 588 m)   Wt 78 kg (172 lb)   SpO2 97%   BMI 30 96 kg/m²      Si Kelley is here for her Subsequent Wellness visit  Last Medicare Wellness visit information reviewed, patient interviewed, no change since last AWV  Health Risk Assessment:   Patient rates overall health as fair  Patient feels that their physical health rating is same  Hearing was rated as same  Patient feels that their emotional and mental health rating is same  Pain experienced in the last 7 days has been a lot  Patient states that she has experienced no weight loss or gain in last 6 months  Depression Screening:   PHQ-2 Score: 2      Fall Risk Screening: In the past year, patient has experienced: no history of falling in past year      Urinary Incontinence Screening:   Patient has leaked urine accidently in the last six months  Home Safety:  Patient has trouble with stairs inside or outside of their home  Patient has working smoke alarms and has working carbon monoxide detector  Home safety hazards include: none  Nutrition:   Current diet is Regular  Medications:   Patient is not currently taking any over-the-counter supplements  Patient is able to manage medications       Activities of Daily Living (ADLs)/Instrumental Activities of Daily Living (IADLs):   Walk and transfer into and out of bed and chair?: Yes  Dress and groom yourself?: Yes    Bathe or shower yourself?: Yes    Feed yourself?  Yes  Do your laundry/housekeeping?: Yes  Manage your money, pay your bills and track your expenses?: Yes  Make your own meals?: Yes    Do your own shopping?: Yes    Previous Hospitalizations:   Any hospitalizations or ED visits within the last 12 months?: No      Advance Care Planning:     Durable POA for healthcare: Yes      Cognitive Screening:   Provider or family/friend/caregiver concerned regarding cognition?: No    PREVENTIVE SCREENINGS      Cardiovascular Screening:    General: Screening Not Indicated, History Lipid Disorder and Risks and Benefits Discussed    Due for: Lipid Panel      Diabetes Screening:     General: Screening Current and Risks and Benefits Discussed    Due for: Blood Glucose      Colorectal Cancer Screening:     General: Screening Not Indicated      Breast Cancer Screening:     General: Screening Current and Risks and Benefits Discussed      Cervical Cancer Screening:    General: Screening Not Indicated      Osteoporosis Screening:    General: Risks and Benefits Discussed and Screening Current      Abdominal Aortic Aneurysm (AAA) Screening:        General: Screening Not Indicated      Lung Cancer Screening:     General: Screening Not Indicated      Hepatitis C Screening:    General: Screening Not Indicated      Karen Hoover MD

## 2020-07-17 NOTE — PATIENT INSTRUCTIONS
Medicare Preventive Visit Patient Instructions  Thank you for completing your Welcome to Medicare Visit or Medicare Annual Wellness Visit today  Your next wellness visit will be due in one year (7/17/2021)  The screening/preventive services that you may require over the next 5-10 years are detailed below  Some tests may not apply to you based off risk factors and/or age  Screening tests ordered at today's visit but not completed yet may show as past due  Also, please note that scanned in results may not display below  Preventive Screenings:  Service Recommendations Previous Testing/Comments   Colorectal Cancer Screening  * Colonoscopy    * Fecal Occult Blood Test (FOBT)/Fecal Immunochemical Test (FIT)  * Fecal DNA/Cologuard Test  * Flexible Sigmoidoscopy Age: 54-65 years old   Colonoscopy: every 10 years (may be performed more frequently if at higher risk)  OR  FOBT/FIT: every 1 year  OR  Cologuard: every 3 years  OR  Sigmoidoscopy: every 5 years  Screening may be recommended earlier than age 48 if at higher risk for colorectal cancer  Also, an individualized decision between you and your healthcare provider will decide whether screening between the ages of 74-80 would be appropriate  Colonoscopy: Not on file  FOBT/FIT: Not on file  Cologuard: 01/28/2019  Sigmoidoscopy: Not on file         Breast Cancer Screening Age: 36 years old  Frequency: every 1-2 years  Not required if history of left and right mastectomy Mammogram: 01/14/2019    Screening Current   Cervical Cancer Screening Between the ages of 21-29, pap smear recommended once every 3 years  Between the ages of 33-67, can perform pap smear with HPV co-testing every 5 years     Recommendations may differ for women with a history of total hysterectomy, cervical cancer, or abnormal pap smears in past  Pap Smear: Not on file    Screening Not Indicated   Hepatitis C Screening Once for adults born between 1945 and 1965  More frequently in patients at high risk for Hepatitis C Hep C Antibody: Not on file       Diabetes Screening 1-2 times per year if you're at risk for diabetes or have pre-diabetes Fasting glucose: 77 mg/dL   A1C: No results in last 5 years    Screening Current   Cholesterol Screening Once every 5 years if you don't have a lipid disorder  May order more often based on risk factors  Lipid panel: 10/17/2019    Screening Not Indicated  History Lipid Disorder     Other Preventive Screenings Covered by Medicare:  1  Abdominal Aortic Aneurysm (AAA) Screening: covered once if your at risk  You're considered to be at risk if you have a family history of AAA  2  Lung Cancer Screening: covers low dose CT scan once per year if you meet all of the following conditions: (1) Age 50-69; (2) No signs or symptoms of lung cancer; (3) Current smoker or have quit smoking within the last 15 years; (4) You have a tobacco smoking history of at least 30 pack years (packs per day multiplied by number of years you smoked); (5) You get a written order from a healthcare provider  3  Glaucoma Screening: covered annually if you're considered high risk: (1) You have diabetes OR (2) Family history of glaucoma OR (3)  aged 48 and older OR (3)  American aged 72 and older  3  Osteoporosis Screening: covered every 2 years if you meet one of the following conditions: (1) You're estrogen deficient and at risk for osteoporosis based off medical history and other findings; (2) Have a vertebral abnormality; (3) On glucocorticoid therapy for more than 3 months; (4) Have primary hyperparathyroidism; (5) On osteoporosis medications and need to assess response to drug therapy  · Last bone density test (DXA Scan): 01/17/2019   5  HIV Screening: covered annually if you're between the age of 15-65  Also covered annually if you are younger than 13 and older than 72 with risk factors for HIV infection   For pregnant patients, it is covered up to 3 times per pregnancy  Immunizations:  Immunization Recommendations   Influenza Vaccine Annual influenza vaccination during flu season is recommended for all persons aged >= 6 months who do not have contraindications   Pneumococcal Vaccine (Prevnar and Pneumovax)  * Prevnar = PCV13  * Pneumovax = PPSV23   Adults 25-60 years old: 1-3 doses may be recommended based on certain risk factors  Adults 72 years old: Prevnar (PCV13) vaccine recommended followed by Pneumovax (PPSV23) vaccine  If already received PPSV23 since turning 65, then PCV13 recommended at least one year after PPSV23 dose  Hepatitis B Vaccine 3 dose series if at intermediate or high risk (ex: diabetes, end stage renal disease, liver disease)   Tetanus (Td) Vaccine - COST NOT COVERED BY MEDICARE PART B Following completion of primary series, a booster dose should be given every 10 years to maintain immunity against tetanus  Td may also be given as tetanus wound prophylaxis  Tdap Vaccine - COST NOT COVERED BY MEDICARE PART B Recommended at least once for all adults  For pregnant patients, recommended with each pregnancy  Shingles Vaccine (Shingrix) - COST NOT COVERED BY MEDICARE PART B  2 shot series recommended in those aged 48 and above     Health Maintenance Due:  There are no preventive care reminders to display for this patient  Immunizations Due:      Topic Date Due    DTaP,Tdap,and Td Vaccines (1 - Tdap) 07/03/1951     Advance Directives   What are advance directives? Advance directives are legal documents that state your wishes and plans for medical care  These plans are made ahead of time in case you lose your ability to make decisions for yourself  Advance directives can apply to any medical decision, such as the treatments you want, and if you want to donate organs  What are the types of advance directives? There are many types of advance directives, and each state has rules about how to use them   You may choose a combination of any of the following:  · Living will: This is a written record of the treatment you want  You can also choose which treatments you do not want, which to limit, and which to stop at a certain time  This includes surgery, medicine, IV fluid, and tube feedings  · Durable power of  for healthcare [facility]): This is a written record that states who you want to make healthcare choices for you when you are unable to make them for yourself  This person, called a proxy, is usually a family member or a friend  You may choose more than 1 proxy  · Do not resuscitate (DNR) order:  A DNR order is used in case your heart stops beating or you stop breathing  It is a request not to have certain forms of treatment, such as CPR  A DNR order may be included in other types of advance directives  · Medical directive: This covers the care that you want if you are in a coma, near death, or unable to make decisions for yourself  You can list the treatments you want for each condition  Treatment may include pain medicine, surgery, blood transfusions, dialysis, IV or tube feedings, and a ventilator (breathing machine)  · Values history: This document has questions about your views, beliefs, and how you feel and think about life  This information can help others choose the care that you would choose  Why are advance directives important? An advance directive helps you control your care  Although spoken wishes may be used, it is better to have your wishes written down  Spoken wishes can be misunderstood, or not followed  Treatments may be given even if you do not want them  An advance directive may make it easier for your family to make difficult choices about your care  Urinary Incontinence   Urinary incontinence (UI)  is when you lose control of your bladder  UI develops because your bladder cannot store or empty urine properly  The 3 most common types of UI are stress incontinence, urge incontinence, or both    Medicines:   · May be given to help strengthen your bladder control  Report any side effects of medication to your healthcare provider  Do pelvic muscle exercises often:  Your pelvic muscles help you stop urinating  Squeeze these muscles tight for 5 seconds, then relax for 5 seconds  Gradually work up to squeezing for 10 seconds  Do 3 sets of 15 repetitions a day, or as directed  This will help strengthen your pelvic muscles and improve bladder control  Train your bladder:  Go to the bathroom at set times, such as every 2 hours, even if you do not feel the urge to go  You can also try to hold your urine when you feel the urge to go  For example, hold your urine for 5 minutes when you feel the urge to go  As that becomes easier, hold your urine for 10 minutes  Self-care:   · Keep a UI record  Write down how often you leak urine and how much you leak  Make a note of what you were doing when you leaked urine  · Drink liquids as directed  You may need to limit the amount of liquid you drink to help control your urine leakage  Do not drink any liquid right before you go to bed  Limit or do not have drinks that contain caffeine or alcohol  · Prevent constipation  Eat a variety of high-fiber foods  Good examples are high-fiber cereals, beans, vegetables, and whole-grain breads  Walking is the best way to trigger your intestines to have a bowel movement  · Exercise regularly and maintain a healthy weight  Weight loss and exercise will decrease pressure on your bladder and help you control your leakage  · Use a catheter as directed  to help empty your bladder  A catheter is a tiny, plastic tube that is put into your bladder to drain your urine  · Go to behavior therapy as directed  Behavior therapy may be used to help you learn to control your urge to urinate      Weight Management   Why it is important to manage your weight:  Being overweight increases your risk of health conditions such as heart disease, high blood pressure, type 2 diabetes, and certain types of cancer  It can also increase your risk for osteoarthritis, sleep apnea, and other respiratory problems  Aim for a slow, steady weight loss  Even a small amount of weight loss can lower your risk of health problems  How to lose weight safely:  A safe and healthy way to lose weight is to eat fewer calories and get regular exercise  You can lose up about 1 pound a week by decreasing the number of calories you eat by 500 calories each day  Healthy meal plan for weight management:  A healthy meal plan includes a variety of foods, contains fewer calories, and helps you stay healthy  A healthy meal plan includes the following:  · Eat whole-grain foods more often  A healthy meal plan should contain fiber  Fiber is the part of grains, fruits, and vegetables that is not broken down by your body  Whole-grain foods are healthy and provide extra fiber in your diet  Some examples of whole-grain foods are whole-wheat breads and pastas, oatmeal, brown rice, and bulgur  · Eat a variety of vegetables every day  Include dark, leafy greens such as spinach, kale, delmi greens, and mustard greens  Eat yellow and orange vegetables such as carrots, sweet potatoes, and winter squash  · Eat a variety of fruits every day  Choose fresh or canned fruit (canned in its own juice or light syrup) instead of juice  Fruit juice has very little or no fiber  · Eat low-fat dairy foods  Drink fat-free (skim) milk or 1% milk  Eat fat-free yogurt and low-fat cottage cheese  Try low-fat cheeses such as mozzarella and other reduced-fat cheeses  · Choose meat and other protein foods that are low in fat  Choose beans or other legumes such as split peas or lentils  Choose fish, skinless poultry (chicken or turkey), or lean cuts of red meat (beef or pork)  Before you cook meat or poultry, cut off any visible fat  · Use less fat and oil  Try baking foods instead of frying them   Add less fat, such as margarine, sour cream, regular salad dressing and mayonnaise to foods  Eat fewer high-fat foods  Some examples of high-fat foods include french fries, doughnuts, ice cream, and cakes  · Eat fewer sweets  Limit foods and drinks that are high in sugar  This includes candy, cookies, regular soda, and sweetened drinks  Exercise:  Exercise at least 30 minutes per day on most days of the week  Some examples of exercise include walking, biking, dancing, and swimming  You can also fit in more physical activity by taking the stairs instead of the elevator or parking farther away from stores  Ask your healthcare provider about the best exercise plan for you  © Copyright [employer] 2018 Information is for End User's use only and may not be sold, redistributed or otherwise used for commercial purposes  All illustrations and images included in CareNotes® are the copyrighted property of [employer] M [employer]  or [employer]      Venous Insufficiency   AMBULATORY CARE:   Venous insufficiency  is a condition that prevents blood from flowing out of your legs and back to your heart  Veins contain valves that help blood flow in one direction  Venous insufficiency means the valves do not close correctly or fully  Blood flows back and pools in your leg  This can cause problems such as varicose veins  Venous insufficiency may also be called chronic venous insufficiency or venous stasis    Common signs and symptoms:   · Visible veins on your legs that may be small and red or large, thick, and blue    · Swelling in your ankles or calves    · Changes in skin color, such as dark or purple skin    · An ulcer (open sore) on your leg    · Leg pain that is worse when you are menstruating (women) or when you stand, and better when you elevate your legs    · Burning or itching    · Cramps that happen at night    · Thick, hard skin on your legs and ankles    · Feeling of heaviness in your legs  Seek care immediately if:   · You have a wound that does not heal or is infected  · You have an injury that has broken your skin and caused your varicose veins to bleed  · Your leg is swollen and hard  · You have pain in your leg that does not go away or gets worse  · Your legs or feet are turning blue or black  · Your leg feels warm, tender, and painful  It may look swollen and red  Contact your healthcare provider if:   · You have a fever  · You have varicose veins and they are painful  · You have new or worsening leg pain, swelling, or redness  · You have new or worsening ulcers or other sores on your leg  · You have questions or concerns about your condition or care  Treatment  may include any of the following:  · Medicine  may be given to improve blood flow  The medicines may thin your blood or reduce swelling to help blood flow  You may also need medicine to treat a bacterial infection  · Ablation  is a procedure used to close varicose veins  A catheter is guided until it is near the vein  A device will then be guided to the area  The device may produce energy through radiofrequency or a laser  The energy creates heat that will close the blood vessel  · Sclerotherapy  is a procedure used to fade visible veins  Your healthcare provider will inject a liquid into a spider vein or varicose vein  The liquid causes irritation in the vein  The vein swells and sticks together  Your body will then absorb the vein  · Surgery  may be needed if other treatments do not work  Surgery may be used to repair a leg vein valve or to clip or tie off a vein so blood cannot flow through it  You may need to have a veins removed during surgery called stripping  Surgery may be used to bypass (go around) the damaged vein  Blood will flow through a vein transplanted from another part of your body  Manage your symptoms:   · Wear pressure stockings as directed  Pressure stockings help keep blood from pooling in your leg veins   Your healthcare provider can prescribe stockings that are right for you  Do not buy over-the-counter pressure stockings unless your healthcare provider says it is okay  They may not fit correctly or may have elastic that cuts off your circulation  Ask your healthcare provider when to start wearing pressure stockings and how long to wear them each day  · Do not sit or stand for long periods of time  If you have to sit for a long time, flex and extend your legs, feet, and ankles  Do this about 10 times every 30 minutes to help keep blood flowing  If you have to stand for a long time, take breaks and sit with your legs elevated  · Elevate your legs  Elevate your legs above the level of your heart to reduce swelling  Your healthcare provider may recommend that you keep your legs elevated for 30 minutes at a time  You may need to do this 3 to 4 times per day, or more if your healthcare provider recommends  · Do not smoke  Nicotine and other chemicals in cigarettes and cigars can cause blood vessel damage  Ask your healthcare provider for information if you currently smoke and need help to quit  E-cigarettes or smokeless tobacco still contain nicotine  Talk to your healthcare provider before you use these products  · Reach or maintain a healthy weight  Extra weight can make venous insufficiency worse  Ask your healthcare provider how much you should weigh  He can help you create a weight loss plan if you need to lose weight  · Exercise as directed  Walking can help increase blood flow in your calves  Ask your healthcare provider how much exercise you need each day and which exercises are best for you  · Care for your skin  Keep your skin clean  Do not use any soaps or lotions that may dry your skin  For example, do not use products that contain fragrance or alcohol  If you have a skin ulcer, your healthcare provider may recommend a wet-to-dry bandage   To do this, apply a wet bandage to your wound and allow it to dry  This will help remove drainage from your wound each time you change the bandage  Your healthcare provider will tell you how often to change your bandage and which kind of bandage to use  Check your wound for signs of infection, such as swelling or pus  · Go to physical therapy (PT) as directed  A physical therapist can help you increase movement and range of motion in your legs  Follow up with your healthcare provider as directed:  Write down your questions so you remember to ask them during your visits  © 2017 [address] [employer] Information is for End User's use only and may not be sold, redistributed or otherwise used for commercial purposes  All illustrations and images included in CareNotes® are the copyrighted property of [employer] M [employer] Inc  or [employer]  The above information is an  only  It is not intended as medical advice for individual conditions or treatments  Talk to your doctor, nurse or pharmacist before following any medical regimen to see if it is safe and effective for you

## 2020-07-17 NOTE — PROGRESS NOTES
Assessment/Plan:       Problem List Items Addressed This Visit        Digestive    Gastroesophageal reflux disease     Advised OTC pepcid, avoid triggers  If continues will refer to gastro            Cardiovascular and Mediastinum    Venous stasis of lower extremity - Primary     Elevation and compression recommended          Relevant Orders    Compression Stocking       Other    Chronic pain of both knees    Relevant Orders    Ambulatory referral to Orthopedic Surgery      Other Visit Diagnoses     Medicare annual wellness visit, subsequent        Visit for screening mammogram        Relevant Orders    Mammo screening bilateral w 3d & cad          BMI Counseling: Body mass index is 30 96 kg/m²  The BMI is above normal  Exercise recommendations include exercising 3-5 times per week  Subjective:      Patient ID: Chasidy Elizalde is a 68 y o  female  68 year here with her daughter  She reports leg swelling R> L which has been coming on gradually  Has blue discoloration in the feet  She says she has been somewhat short of breath when going up steps  She had a cardiac work up years ago was normal per daughter  She does wear compression stockings which helps  She has a history of high cholesterol and does not tolerate statins  She just recently had been on Pravastatin but it gave her knee pain and headaches so she stopped it  She has also been on Lipitor in the past  She developed elevated LFTs  She has heartburn and acid reflux symptoms for the past several weeks  She notices burping, and feels "acid" in her throat  She noticed it after drinking Coke and eating a large meal  She tried Tums in the past  She has symptoms on and off, it is worse when she eats later at night and lays down  The following portions of the patient's history were reviewed and updated as appropriate:   She  has no past medical history on file    She   Patient Active Problem List    Diagnosis Date Noted    Chronic pain of both knees 07/17/2020    Gastroesophageal reflux disease 07/17/2020    Class 1 obesity due to excess calories with serious comorbidity and body mass index (BMI) of 31 0 to 31 9 in adult 10/10/2019    Ganglion cyst of wrist, right 10/10/2019    Arthritis of right knee 07/01/2019    Screening for osteoporosis 01/08/2019    Screen for colon cancer 01/08/2019    Breast lump in female 01/08/2019    Arthritis of left knee 11/08/2018    Bilateral hearing loss 08/29/2018    Age-related cataract of both eyes 03/09/2018    Venous stasis of lower extremity 07/18/2017    Hypothyroidism 10/01/2012    Hyperlipidemia 08/03/2012     She  has a past surgical history that includes Stomach surgery; Hysterectomy; Oophorectomy (Bilateral); and Breast cyst excision  Her family history includes Breast cancer in her cousin and cousin; Breast cancer (age of onset: 68) in her sister; Cervical cancer in her paternal aunt; Heart disease in her sister; Hyperlipidemia in her family; Ovarian cancer (age of onset: 77) in her sister  She  reports that she has never smoked  She has never used smokeless tobacco  Her alcohol and drug histories are not on file  Current Outpatient Medications   Medication Sig Dispense Refill    levothyroxine 88 mcg tablet Take 1 tablet (88 mcg total) by mouth daily 90 tablet 0     No current facility-administered medications for this visit  Current Outpatient Medications on File Prior to Visit   Medication Sig    levothyroxine 88 mcg tablet Take 1 tablet (88 mcg total) by mouth daily    [DISCONTINUED] pravastatin (PRAVACHOL) 40 mg tablet Take 1 tablet (40 mg total) by mouth daily at bedtime     No current facility-administered medications on file prior to visit  She is allergic to percocet  [oxycodone-acetaminophen]; statins; and vicodin  [hydrocodone-acetaminophen]       Review of Systems   Constitutional: Negative for activity change     Respiratory: Negative for chest tightness and shortness of breath  Cardiovascular: Positive for leg swelling  Negative for chest pain  Gastrointestinal: Negative for vomiting  GERD  No dysphagia  Musculoskeletal: Positive for arthralgias  Neurological: Negative for headaches  Psychiatric/Behavioral: Negative for dysphoric mood  The patient is not nervous/anxious  Objective:      /74   Pulse 70   Temp 98 5 °F (36 9 °C)   Ht 5' 2 5" (1 588 m)   Wt 78 kg (172 lb)   SpO2 97%   BMI 30 96 kg/m²          Physical Exam   Constitutional: She is oriented to person, place, and time  She appears well-developed and well-nourished  No distress  HENT:   Head: Normocephalic and atraumatic  Eyes: Pupils are equal, round, and reactive to light  Conjunctivae are normal    Neck: Neck supple  Cardiovascular: Normal rate, regular rhythm and normal heart sounds  Exam reveals no gallop and no friction rub  No murmur heard  Pulses:       Dorsalis pedis pulses are 2+ on the right side, and 2+ on the left side  Posterior tibial pulses are 2+ on the right side, and 2+ on the left side  DP and PT pulses detected with doppler    Pulmonary/Chest: Effort normal and breath sounds normal  No respiratory distress  She has no wheezes  She has no rales  She exhibits no tenderness  Musculoskeletal: Normal range of motion  She exhibits edema  Right knee: She exhibits swelling  Left knee: She exhibits swelling  Crepitus b/l knees   Neurological: She is alert and oriented to person, place, and time  Skin: Skin is warm and dry  No rash noted  She is not diaphoretic  Psychiatric: She has a normal mood and affect  Her behavior is normal  Judgment and thought content normal    Nursing note and vitals reviewed

## 2020-07-24 ENCOUNTER — HOSPITAL ENCOUNTER (OUTPATIENT)
Dept: MAMMOGRAPHY | Facility: CLINIC | Age: 80
Discharge: HOME | End: 2020-07-24
Payer: MEDICARE

## 2020-07-24 VITALS — WEIGHT: 172 LBS | HEIGHT: 63 IN | BODY MASS INDEX: 30.48 KG/M2

## 2020-07-24 DIAGNOSIS — Z12.31 VISIT FOR SCREENING MAMMOGRAM: ICD-10-CM

## 2020-07-24 PROCEDURE — 77067 SCR MAMMO BI INCL CAD: CPT

## 2020-07-24 PROCEDURE — 77063 BREAST TOMOSYNTHESIS BI: CPT

## 2020-08-10 DIAGNOSIS — E03.9 HYPOTHYROIDISM, UNSPECIFIED TYPE: ICD-10-CM

## 2020-08-10 RX ORDER — LEVOTHYROXINE SODIUM 88 UG/1
TABLET ORAL
Qty: 90 TABLET | Refills: 0 | Status: SHIPPED | OUTPATIENT
Start: 2020-08-10 | End: 2020-10-12

## 2020-08-26 ENCOUNTER — APPOINTMENT (OUTPATIENT)
Dept: RADIOLOGY | Facility: MEDICAL CENTER | Age: 80
End: 2020-08-26
Payer: MEDICARE

## 2020-08-26 ENCOUNTER — OFFICE VISIT (OUTPATIENT)
Dept: OBGYN CLINIC | Facility: MEDICAL CENTER | Age: 80
End: 2020-08-26
Payer: MEDICARE

## 2020-08-26 VITALS
SYSTOLIC BLOOD PRESSURE: 127 MMHG | HEIGHT: 63 IN | HEART RATE: 60 BPM | BODY MASS INDEX: 30.12 KG/M2 | WEIGHT: 170 LBS | DIASTOLIC BLOOD PRESSURE: 77 MMHG

## 2020-08-26 DIAGNOSIS — M25.561 ACUTE PAIN OF BOTH KNEES: ICD-10-CM

## 2020-08-26 DIAGNOSIS — M17.11 PRIMARY OSTEOARTHRITIS OF RIGHT KNEE: ICD-10-CM

## 2020-08-26 DIAGNOSIS — M17.12 PRIMARY OSTEOARTHRITIS OF LEFT KNEE: Primary | ICD-10-CM

## 2020-08-26 DIAGNOSIS — M25.562 ACUTE PAIN OF BOTH KNEES: ICD-10-CM

## 2020-08-26 PROCEDURE — 1036F TOBACCO NON-USER: CPT | Performed by: ORTHOPAEDIC SURGERY

## 2020-08-26 PROCEDURE — 1160F RVW MEDS BY RX/DR IN RCRD: CPT | Performed by: ORTHOPAEDIC SURGERY

## 2020-08-26 PROCEDURE — 3008F BODY MASS INDEX DOCD: CPT | Performed by: ORTHOPAEDIC SURGERY

## 2020-08-26 PROCEDURE — 73562 X-RAY EXAM OF KNEE 3: CPT

## 2020-08-26 PROCEDURE — 4040F PNEUMOC VAC/ADMIN/RCVD: CPT | Performed by: ORTHOPAEDIC SURGERY

## 2020-08-26 PROCEDURE — 99203 OFFICE O/P NEW LOW 30 MIN: CPT | Performed by: ORTHOPAEDIC SURGERY

## 2020-08-26 NOTE — PROGRESS NOTES
Assessment/Plan:  Assessment/Plan      Bilateral knee DJD with varus deformity    Discussed with patient, and her accompanying daughter, that today's physical exam is consistent with advanced primary osteoarthritis of the bilateral knees, left more significant than right  Because she did not have long-lasting relief with previous cortisone injections, we have input for prior authorization for repeat viscosupplementation injections  Patient will be contacted if/when gel injections have been approved by her insurance and are received in the office  She has also been provided a prescription for Voltaren gel for relief of pain  Lastly, she is being referred for formal physical therapy to address range of motion deficits and quad strengthening  Subjective:   Patient ID: Josh Perez is a 68 y o  female  HPI  Patient presents today with chief complaint of bilateral knee pain, left more significant than right of greater than 10 years  Patient denies any specific incident of injury  She notes that approximately 10 years ago she was diagnosed with osteoarthritis of the bilateral knees, at that time she was treated fairly affectively with a series of viscosupplementation injections  She reports that her symptoms began to return over the last 2 years  She received bilateral cortisone injections performed by her PCP, the most recent of these injections was performed in June of 2019 which provided only a month is of relief  On today's presentation, patient complains of anterior and medial achy pain and stiffness  Her pain is exacerbated by prolonged weight-bearing activity, or knee flexion activities such as kneeling or going up and down stairs  She also reports occasional locking of the left knee  She denies any bruising, numbness, or tingling  She does not currently take Tylenol because in the past it has resulted in elevation of her liver enzymes    She also avoids NSAID medications due to previous medical history of GI ulcer  The following portions of the patient's history were reviewed and updated as appropriate: allergies, current medications, past family history, past medical history, past social history, past surgical history and problem list     History reviewed  No pertinent past medical history      Past Surgical History:   Procedure Laterality Date    BREAST CYST EXCISION      cyst removals and aspirations-benign    HYSTERECTOMY      at age 60    OOPHORECTOMY Bilateral     at age 60    STOMACH SURGERY       Family History   Problem Relation Age of Onset    Heart disease Sister     Breast cancer Sister 68    Hyperlipidemia Family     Ovarian cancer Sister 77    Breast cancer Cousin     Breast cancer Cousin     Cervical cancer Paternal Aunt      Social History     Socioeconomic History    Marital status: /Civil Union     Spouse name: None    Number of children: None    Years of education: None    Highest education level: None   Occupational History    None   Social Needs    Financial resource strain: None    Food insecurity     Worry: None     Inability: None    Transportation needs     Medical: None     Non-medical: None   Tobacco Use    Smoking status: Never Smoker    Smokeless tobacco: Never Used   Substance and Sexual Activity    Alcohol use: None    Drug use: None    Sexual activity: None   Lifestyle    Physical activity     Days per week: None     Minutes per session: None    Stress: None   Relationships    Social connections     Talks on phone: None     Gets together: None     Attends Orthodox service: None     Active member of club or organization: None     Attends meetings of clubs or organizations: None     Relationship status: None    Intimate partner violence     Fear of current or ex partner: None     Emotionally abused: None     Physically abused: None     Forced sexual activity: None   Other Topics Concern    None   Social History Narrative    None Current Outpatient Medications:     levothyroxine 88 mcg tablet, take 1 tablet by mouth once daily, Disp: 90 tablet, Rfl: 0    Allergies   Allergen Reactions    Percocet  [Oxycodone-Acetaminophen]     Statins      Other reaction(s): ELEVATED LFTS  Category: Adverse Reaction;     Vicodin  [Hydrocodone-Acetaminophen]        Review of Systems   Constitutional: Negative for chills, fever and unexpected weight change  HENT: Positive for hearing loss (Bilateral - uses hearing aids)  Negative for nosebleeds and sore throat  Eyes: Negative for pain, redness and visual disturbance  Respiratory: Negative for cough, shortness of breath and wheezing  Cardiovascular: Negative for chest pain, palpitations and leg swelling  Gastrointestinal: Negative for abdominal pain, nausea and vomiting  Endocrine: Negative for polydipsia and polyuria  Genitourinary: Negative for dysuria and hematuria  Musculoskeletal:        As noted in HPI   Skin: Negative for rash and wound  Neurological: Negative for dizziness, numbness and headaches  Psychiatric/Behavioral: Negative for decreased concentration and suicidal ideas  The patient is not nervous/anxious  Objective:  /77   Pulse 60   Ht 5' 2 5" (1 588 m)   Wt 77 1 kg (170 lb)   BMI 30 60 kg/m²     Right Knee Exam     Tenderness   The patient is experiencing tenderness in the pes anserinus and medial joint line  Range of Motion   Extension: 0   Flexion: 110     Tests   Nicolas:  Medial - negative   Varus: negative Valgus: negative  Lachman:  Anterior - negative      Drawer:  Anterior - negative    Posterior - negative  Patellar apprehension: negative    Other   Erythema: absent  Scars: absent  Sensation: normal  Pulse: present  Swelling: none  Effusion: no effusion present      Left Knee Exam     Tenderness   The patient is experiencing tenderness in the pes anserinus, medial joint line and lateral joint line      Range of Motion   Extension: 10 Flexion: 100     Tests   Nicolas:  Medial - negative   Varus: negative Valgus: negative  Lachman:  Anterior - negative      Drawer:  Anterior - negative     Posterior - negative  Patellar apprehension: negative    Other   Erythema: absent  Scars: absent  Sensation: normal  Pulse: present  Swelling: none  Effusion: no effusion present          Observations   Left Knee   Negative for effusion  Right Knee   Negative for effusion  Physical Exam  Constitutional:       Appearance: She is well-developed  HENT:      Right Ear: External ear normal       Left Ear: External ear normal       Nose: Nose normal    Eyes:      Conjunctiva/sclera: Conjunctivae normal       Pupils: Pupils are equal, round, and reactive to light  Neck:      Musculoskeletal: Normal range of motion  Pulmonary:      Effort: Pulmonary effort is normal    Musculoskeletal: Normal range of motion  Right knee: She exhibits no effusion  Left knee: She exhibits no effusion  Skin:     General: Skin is warm and dry  Neurological:      Mental Status: She is alert and oriented to person, place, and time  Psychiatric:         Behavior: Behavior normal          Thought Content: Thought content normal          Judgment: Judgment normal        Imaging:     Attending Physician has personally reviewed pertinent imaging and/or reports in PACS, impression is as follows:     Review of radiographic series taken 8/26/2020 of the bilateral knees is significant for end-stage primary osteoarthritis of the bilateral knees, with near complete joint space narrowing in the medial compartment of the right knee, incomplete bone on bone in the medial compartment of the left knee      Scribe Attestation    I,:   Ry Tee am acting as a scribe while in the presence of the attending physician :        I,:   Dixon Espinosa MD personally performed the services described in this documentation    as scribed in my presence :

## 2020-09-04 ENCOUNTER — EVALUATION (OUTPATIENT)
Dept: PHYSICAL THERAPY | Age: 80
End: 2020-09-04
Payer: MEDICARE

## 2020-09-04 DIAGNOSIS — G89.29 CHRONIC PAIN OF BOTH KNEES: Primary | ICD-10-CM

## 2020-09-04 DIAGNOSIS — M17.12 PRIMARY OSTEOARTHRITIS OF LEFT KNEE: ICD-10-CM

## 2020-09-04 DIAGNOSIS — M25.562 CHRONIC PAIN OF BOTH KNEES: Primary | ICD-10-CM

## 2020-09-04 DIAGNOSIS — M25.561 CHRONIC PAIN OF BOTH KNEES: Primary | ICD-10-CM

## 2020-09-04 PROCEDURE — 97110 THERAPEUTIC EXERCISES: CPT | Performed by: PHYSICAL THERAPIST

## 2020-09-04 PROCEDURE — 97162 PT EVAL MOD COMPLEX 30 MIN: CPT | Performed by: PHYSICAL THERAPIST

## 2020-09-04 NOTE — PROGRESS NOTES
PT Evaluation     Today's date: 2020  Patient name: Dani Ruelas  : 1940  MRN: [id number]  Referring provider: Juan Giraldo MD  Dx:   Encounter Diagnosis     ICD-10-CM    1  Chronic pain of both knees  M25 561     M25 562     G89 29    2  Primary osteoarthritis of left knee  M17 12 Ambulatory referral to Physical Therapy       Start Time: 1400  Stop Time: 1500  Total time in clinic (min): 60 minutes    Assessment  Assessment details: Dani Ruelas is a 68 y o  female who presents with pain, decreased strength, decreased ROM and ambulatory dysfunction  Due to these impairments, Patient has difficulty performing a/iadls  Patient's clinical presentation is consistent with their referring diagnosis of bilateral knee pain/OA  Patient is [place] and seen with her daughter today  Patient given HEP with copy  Patient would benefit from skilled physical therapy to address their aforementioned impairments, improve their level of function and to improve their overall quality of life  Impairments: abnormal gait, abnormal or restricted ROM, activity intolerance, impaired physical strength, lacks appropriate home exercise program, pain with function and weight-bearing intolerance  Barriers to therapy: [place]  Understanding of Dx/Px/POC: good   Prognosis: good    Goals  ST-3 WEEKS  1  Decrease knee pain < 5/10 on VAS at its worst   2   Improve balance/ambulation on level surfaces with least AD for safe community distances  3   Increase UE by 1/2 MMT grade in all deficient planes  LT-6 WEEKS  1  Patient to be independent with a/iadls  2  Increase functional activities for leisure and home activities to previous LOF  3  Independent with HEP and/or fitness program   4  Able to safely perform one flight of stairs to access her basement for laundry       Plan  Patient would benefit from: skilled physical therapy  Planned modality interventions: cryotherapy, thermotherapy: hydrocollator packs and unattended electrical stimulation  Planned therapy interventions: activity modification, body mechanics training, aquatic therapy, flexibility, functional ROM exercises, home exercise program, IADL retraining, joint mobilization, manual therapy, neuromuscular re-education, patient education, postural training, strengthening, stretching, therapeutic activities, therapeutic exercise, balance/weight bearing training and gait training  Frequency: 2-3x week  Duration in weeks: 12  Plan of Care beginning date: 2020  Plan of Care expiration date: 2020  Treatment plan discussed with: patient and family        Subjective Evaluation    History of Present Illness  Mechanism of injury: Patient has long history of OA in both knees  Saw MD and referred to PT in preparation for surgery  Recurrent probem    Quality of life: excellent    Pain  Current pain ratin  At worst pain ratin  Location: L>R knee   Quality: discomfort, dull ache and sharp  Relieving factors: change in position and rest  Aggravating factors: stair climbing and walking  Progression: no change    Social Support  Steps to enter house: yes  2  Stairs in house: yes (basement)   L[facility] in: [facility]  Lives with: spouse and adult children    Employment status: not working    Diagnostic Tests  X-ray: abnormal    FCE comments: OATreatments  Previous treatment: injection treatment  Patient Goals  Patient goals for therapy: decreased pain, increased motion, increased strength and independence with ADLs/IADLs  Patient goal: get up from chair better, prepare for surgery        Objective     Static Posture     Knee   Genu varus  Observations     Additional Observation Details  Small amount of fluid in BLE's    Tenderness   Left Knee   Tenderness in the medial joint line  Right Knee   Tenderness in the medial joint line       Additional Tenderness Details  Medial femoral condyle bilateral knee tenderness    Active Range of Motion   Left Knee Flexion: 132 degrees   Extension: -2 degrees     Right Knee   Flexion: 128 degrees   Extension: -2 degrees     Additional Active Range of Motion Details  WFL Bilateral hip ROM    Strength/Myotome Testing     Left Hip   Planes of Motion   Flexion: 3+  Extension: 4-  Abduction: 4-  Adduction: 4+    Right Hip   Planes of Motion   Flexion: 3+  Extension: 4-  Abduction: 4-  Adduction: 4+    Left Knee   Flexion: 4  Extension: 4+    Right Knee   Flexion: 4  Extension: 4+    Swelling     Left Knee Girth Measurement (cm)   Joint line: 47 cm    Right Knee Girth Measurement (cm)   Joint line: 48 cm    Ambulation     Ambulation: Level Surfaces   Ambulation without assistive device: independent    Ambulation: Stairs   Pattern: non-reciprocal  Railings: one rail  Pattern: non-reciprocal  Railings: one rail    Observational Gait   Gait: antalgic   Decreased walking speed and stride length  Additional Observational Gait Details  Weight bearing depends on pain which changes in both knees  Quality of Movement During Gait     Knee    Knee (Left): Positive varus  Knee (Right): Positive varus       Functional Assessment        Single Leg Stance   Left: 3 seconds  Right: 3 seconds      Flowsheet Rows      Most Recent Value   PT/OT G-Codes   Current Score  40 [knees]   Projected Score  55   Assessment Type  Evaluation             Precautions: Hypothroidism, GI bleed in past       Manuals 9/4            BLE's 10                                                   Neuro Re-Ed             SLS nv                                                                                          Ther Ex             HEP 10            bridges 10x             B SLR 10x            B SAQ 10x            B LAQ 10x            Hip abd-band 10x            Hip add-ball 10x                         Standing hip flex,abd,ext B 10x ea                         Slant board nt            nustep nt                         Ther Activity Gait Training                                       Modalities

## 2020-09-08 ENCOUNTER — OFFICE VISIT (OUTPATIENT)
Dept: PHYSICAL THERAPY | Age: 80
End: 2020-09-08
Payer: MEDICARE

## 2020-09-08 DIAGNOSIS — M25.562 CHRONIC PAIN OF BOTH KNEES: ICD-10-CM

## 2020-09-08 DIAGNOSIS — M25.561 CHRONIC PAIN OF BOTH KNEES: ICD-10-CM

## 2020-09-08 DIAGNOSIS — M17.12 PRIMARY OSTEOARTHRITIS OF LEFT KNEE: Primary | ICD-10-CM

## 2020-09-08 DIAGNOSIS — G89.29 CHRONIC PAIN OF BOTH KNEES: ICD-10-CM

## 2020-09-08 PROCEDURE — 97110 THERAPEUTIC EXERCISES: CPT

## 2020-09-08 NOTE — PROGRESS NOTES
Daily Note     Today's date: 2020  Patient name: Lesley Alaniz  : 1940  MRN: [id number]  Referring provider: Erwin Montalvo MD  Dx:   Encounter Diagnosis     ICD-10-CM    1  Primary osteoarthritis of left knee  M17 12    2  Chronic pain of both knees  M25 561     M25 562     G89 29        Start Time: 1600  Stop Time: 1640  Total time in clinic (min): 40 minutes    Subjective: Reports 4/10 pain in B knees, L > R       Objective: See treatment diary below      Assessment: Tolerated treatment well  Fatigues easily, progress slowly secondary to low tolerance to exercises  Cues for carryover of exercises and for proper technique  Added SLS with HHA to increase standing tolerance on B LE and to help with balance  Cues for heel strike and to extend knees in stance phase in order to normalize gait and for safety  Tight hamstrings bilaterally  Patient would benefit from continued PT      Plan: Continue per plan of care        Precautions: Hypothroidism, GI bleed in past       Manuals            BLE's 10 10'                                                  Neuro Re-Ed             SLS nv 10''x3 ea HHA                                                                                         Ther Ex             HEP 10            bridges 10x x10            B SLR 10x x10           B SAQ 10x x10           B LAQ 10x x10           Hip abd-band 10x x10           Hip add-ball 10x x10                        Standing hip flex,abd,ext B 10x ea 10x ea                         Slant board nt 30''x4 L2           nustep nt 5' L1                         Ther Activity                                       Gait Training                                       Modalities

## 2020-09-09 ENCOUNTER — TELEPHONE (OUTPATIENT)
Dept: OBGYN CLINIC | Facility: HOSPITAL | Age: 80
End: 2020-09-09

## 2020-09-09 NOTE — TELEPHONE ENCOUNTER
TO BE COMPLETED BY :     Office location appointment scheduled: [place]    Date of First Appointment scheduled: 10/09/20    Additional Comments:

## 2020-09-10 ENCOUNTER — IMMUNIZATIONS (OUTPATIENT)
Dept: FAMILY MEDICINE CLINIC | Facility: CLINIC | Age: 80
End: 2020-09-10
Payer: MEDICARE

## 2020-09-10 DIAGNOSIS — Z23 NEEDS FLU SHOT: Primary | ICD-10-CM

## 2020-09-10 PROCEDURE — 90662 IIV NO PRSV INCREASED AG IM: CPT

## 2020-09-10 PROCEDURE — G0008 ADMIN INFLUENZA VIRUS VAC: HCPCS

## 2020-09-11 ENCOUNTER — OFFICE VISIT (OUTPATIENT)
Dept: PHYSICAL THERAPY | Age: 80
End: 2020-09-11
Payer: MEDICARE

## 2020-09-11 DIAGNOSIS — M25.562 CHRONIC PAIN OF BOTH KNEES: ICD-10-CM

## 2020-09-11 DIAGNOSIS — M17.12 PRIMARY OSTEOARTHRITIS OF LEFT KNEE: Primary | ICD-10-CM

## 2020-09-11 DIAGNOSIS — M25.561 CHRONIC PAIN OF BOTH KNEES: ICD-10-CM

## 2020-09-11 DIAGNOSIS — G89.29 CHRONIC PAIN OF BOTH KNEES: ICD-10-CM

## 2020-09-11 PROCEDURE — 97110 THERAPEUTIC EXERCISES: CPT

## 2020-09-11 PROCEDURE — 97140 MANUAL THERAPY 1/> REGIONS: CPT

## 2020-09-11 NOTE — PROGRESS NOTES
Daily Note     Today's date: 2020  Patient name: Toy Lombardi  : 1940  MRN: [id number]  Referring provider: Enedina Palacio MD  Dx:   Encounter Diagnosis     ICD-10-CM    1  Primary osteoarthritis of left knee  M17 12    2  Chronic pain of both knees  M25 561     M25 562     G89 29        Start Time: 1400  Stop Time: 1455  Total time in clinic (min): 55 minutes    Subjective: patient reports getting better, her knees are getting a little better, most of her pain is in her left knee  Objective: See treatment diary below      Assessment: Tolerated treatment well with increased reps today without additional pain  Some crepitus in the bl knees L>R  Cues for proper tech  Patient demonstrated fatigue post treatment, exhibited good technique with therapeutic exercises and would benefit from continued PT      Plan: Continue per plan of care        Precautions: Hypothroidism, GI bleed in past       Manuals           BLE's 10 10' 10                                                 Neuro Re-Ed             SLS nv 10''x3 ea HHA 10"x3                                                                                        Ther Ex             Ball slides   20x ea          HEP 10            bridges 10x x10 15x           B SLR 10x x10 15x          B SAQ 10x x10 20x          B LAQ 10x x10 20x          Hip abd-band 10x x10 20x          Hip add-ball 10x x10 20x                       Standing hip flex,abd,ext B 10x ea 10x ea  15x ea                       Slant board nt 30''x4 L2 30"4          nustep nt 5' L1  6' L2          Heel raises   20x          Ther Activity                                       Gait Training                                       Modalities

## 2020-09-15 ENCOUNTER — OFFICE VISIT (OUTPATIENT)
Dept: PHYSICAL THERAPY | Age: 80
End: 2020-09-15
Payer: MEDICARE

## 2020-09-15 DIAGNOSIS — M25.562 CHRONIC PAIN OF BOTH KNEES: ICD-10-CM

## 2020-09-15 DIAGNOSIS — M25.561 CHRONIC PAIN OF BOTH KNEES: ICD-10-CM

## 2020-09-15 DIAGNOSIS — G89.29 CHRONIC PAIN OF BOTH KNEES: ICD-10-CM

## 2020-09-15 DIAGNOSIS — M17.12 PRIMARY OSTEOARTHRITIS OF LEFT KNEE: Primary | ICD-10-CM

## 2020-09-15 PROCEDURE — 97110 THERAPEUTIC EXERCISES: CPT | Performed by: PHYSICAL THERAPIST

## 2020-09-15 PROCEDURE — 97140 MANUAL THERAPY 1/> REGIONS: CPT | Performed by: PHYSICAL THERAPIST

## 2020-09-15 NOTE — PROGRESS NOTES
Daily Note     Today's date: 9/15/2020  Patient name: Josh Perez  : 1940  MRN: [id number]  Referring provider: Karon Ponce MD  Dx:   Encounter Diagnosis     ICD-10-CM    1  Primary osteoarthritis of left knee  M17 12    2  Chronic pain of both knees  M25 561     M25 562     G89 29        Start Time: 9700  Stop Time: 1093  Total time in clinic (min): 60 minutes    Subjective: Patient reports she didn't sleep well last night but doing good with exercises and ice helps if she is sore  Objective: See treatment diary below      Assessment: Tolerated treatment well  Patient would benefit from continued PT, less fatigue  Patient compliant with HEP  Plan: Continue per plan of care        Precautions: Hypothroidism, GI bleed in past       Manuals 9/4 9/8 9/11 9/15         BLE's 10 10' 10 8                                                Neuro Re-Ed             SLS nv 10''x3 ea HHA 10"x3 10' 3x                                                                                       Ther Ex             Ball slides   20x ea 30x         HEP 10            bridges 10x x10 15x 30x          B SLR 10x x10 15x 30x         B SAQ 10x x10 20x 30x         B LAQ 10x x10 20x 30x         Hip abd-band 10x x10 20x 30x         Hip add-ball 10x x10 20x 30x                      Standing hip flex,abd,ext B 10x ea 10x ea  15x ea 20xea                      Slant board nt 30''x4 L2 30"4 30' 4x         nustep nt 5' L1  6' L2 6'         Heel raises   20x 30x         Ther Activity                                       Gait Training                                       Modalities

## 2020-09-18 ENCOUNTER — OFFICE VISIT (OUTPATIENT)
Dept: PHYSICAL THERAPY | Age: 80
End: 2020-09-18
Payer: MEDICARE

## 2020-09-18 DIAGNOSIS — G89.29 CHRONIC PAIN OF BOTH KNEES: ICD-10-CM

## 2020-09-18 DIAGNOSIS — M17.12 PRIMARY OSTEOARTHRITIS OF LEFT KNEE: Primary | ICD-10-CM

## 2020-09-18 DIAGNOSIS — M25.562 CHRONIC PAIN OF BOTH KNEES: ICD-10-CM

## 2020-09-18 DIAGNOSIS — M25.561 CHRONIC PAIN OF BOTH KNEES: ICD-10-CM

## 2020-09-18 PROCEDURE — 97112 NEUROMUSCULAR REEDUCATION: CPT | Performed by: PHYSICAL THERAPIST

## 2020-09-18 PROCEDURE — 97110 THERAPEUTIC EXERCISES: CPT | Performed by: PHYSICAL THERAPIST

## 2020-09-18 NOTE — PROGRESS NOTES
Daily Note     Today's date: 2020  Patient name: Brett Murphy  : 1940  MRN: [id number]  Referring provider: Bentley Hernandez MD  Dx:   Encounter Diagnosis     ICD-10-CM    1  Primary osteoarthritis of left knee  M17 12    2  Chronic pain of both knees  M25 561     M25 562     G89 29        Start Time: 1000  Stop Time: 1100  Total time in clinic (min): 60 minutes    Subjective: Patient reports her knees are sore but likely from the weather  Increased reps with HEP  Objective: See treatment diary below      Assessment: Tolerated treatment well  Patient would benefit from continued PT Patient able to tolerate exercises better  Occasional buckling left knee  Advised to  her feet when walking  Use AD as needed for safety  Plan: Continue per plan of care        Precautions: Hypothroidism, GI bleed in past       Manuals 9/4 9/8 9/11 9/15 9/18        BLE's 10 10' 10 8 8                                               Neuro Re-Ed             SLS nv 10''x3 ea HHA 10"x3 10' 3x 10" 3x                                                                                      Ther Ex             Ball slides   20x ea 30x 30x        HEP 10            bridges 10x x10 15x 30x 30x         B SLR 10x x10 15x 30x 30x        B SAQ 10x x10 20x 30x 1# 30x        B LAQ 10x x10 20x 30x 1# 30x        Hip abduction 10x x10 20x 30x 20# 10x        Hip add-ball 10x x10 20x 30x 30x                     Standing hip flex,abd,ext B 10x ea 10x ea  15x ea 20xea 30x                     Slant board nt 30''x4 L2 30"4 30' 4x 30" 4x        nustep nt 5' L1  6' L2 6' 7'        Heel raises   20x 30x 30x        Ther Activity             Taps on step     4" 10x B                     Gait Training                                       Modalities

## 2020-09-22 ENCOUNTER — OFFICE VISIT (OUTPATIENT)
Dept: PHYSICAL THERAPY | Age: 80
End: 2020-09-22
Payer: MEDICARE

## 2020-09-22 DIAGNOSIS — M17.12 PRIMARY OSTEOARTHRITIS OF LEFT KNEE: Primary | ICD-10-CM

## 2020-09-22 DIAGNOSIS — G89.29 CHRONIC PAIN OF BOTH KNEES: ICD-10-CM

## 2020-09-22 DIAGNOSIS — M25.561 CHRONIC PAIN OF BOTH KNEES: ICD-10-CM

## 2020-09-22 DIAGNOSIS — M25.562 CHRONIC PAIN OF BOTH KNEES: ICD-10-CM

## 2020-09-22 PROCEDURE — 97530 THERAPEUTIC ACTIVITIES: CPT | Performed by: PHYSICAL THERAPIST

## 2020-09-22 PROCEDURE — 97110 THERAPEUTIC EXERCISES: CPT | Performed by: PHYSICAL THERAPIST

## 2020-09-22 NOTE — PROGRESS NOTES
Daily Note     Today's date: 2020  Patient name: Jacky Aggarwal  : 1940  MRN: [id number]  Referring provider: Dianne Bradley MD  Dx:   Encounter Diagnosis     ICD-10-CM    1  Primary osteoarthritis of left knee  M17 12    2  Chronic pain of both knees  M25 561     M25 562     G89 29        Start Time: 1500  Stop Time: 1600  Total time in clinic (min): 60 minutes    Subjective: Patient c/o achy knees but not sleeping  Objective: See treatment diary below      Assessment: Tolerated treatment well  Patient would benefit from continued PT left knee weakness and instability noted during step ups  Plan: Continue per plan of care        Precautions: Hypothroidism, GI bleed in past       Manuals 9/4 9/8 9/11 9/15 9/18 9/22       BLE's 10 10' 10 8 8 8'                                              Neuro Re-Ed             SLS nv 10''x3 ea HHA 10"x3 10' 3x 10" 3x nt                                                                                     Ther Ex             Ball slides   20x ea 30x 30x 30x       HEP 10            bridges 10x x10 15x 30x 30x 30x        B SLR 10x x10 15x 30x 30x 30x       B SAQ 10x x10 20x 30x 1# 30x 1# 30x       B LAQ 10x x10 20x 30x 1# 30x 1# 30x       Hip abduction 10x x10 20x 30x 20# 10x 20# 30x       Hip add-ball 10x x10 20x 30x 30x 30x                    Standing hip flex,abd,ext B 10x ea 10x ea  15x ea 20xea 30x 30x                    Slant board nt 30''x4 L2 30"4 30' 4x 30" 4x 30" 4x       nustep nt 5' L1  6' L2 6' 7' 8'       Heel raises   20x 30x 30x 30x       Ther Activity             Taps on step     4" 10x B 30x ea       Step ups 4"      5x ea       Gait Training                                       Modalities

## 2020-09-25 ENCOUNTER — OFFICE VISIT (OUTPATIENT)
Dept: PHYSICAL THERAPY | Age: 80
End: 2020-09-25
Payer: MEDICARE

## 2020-09-25 DIAGNOSIS — M25.562 CHRONIC PAIN OF BOTH KNEES: ICD-10-CM

## 2020-09-25 DIAGNOSIS — M17.12 PRIMARY OSTEOARTHRITIS OF LEFT KNEE: Primary | ICD-10-CM

## 2020-09-25 DIAGNOSIS — M25.561 CHRONIC PAIN OF BOTH KNEES: ICD-10-CM

## 2020-09-25 DIAGNOSIS — G89.29 CHRONIC PAIN OF BOTH KNEES: ICD-10-CM

## 2020-09-25 PROCEDURE — 97110 THERAPEUTIC EXERCISES: CPT

## 2020-09-25 PROCEDURE — 97112 NEUROMUSCULAR REEDUCATION: CPT

## 2020-09-25 PROCEDURE — 97140 MANUAL THERAPY 1/> REGIONS: CPT

## 2020-09-25 NOTE — PROGRESS NOTES
Daily Note     Today's date: 2020  Patient name: Eusebia Burns  : 1940  MRN: [id number]  Referring provider: Kika Sánchez MD  Dx:   Encounter Diagnosis     ICD-10-CM    1  Primary osteoarthritis of left knee  M17 12    2  Chronic pain of both knees  M25 561     M25 562     G89 29        Start Time: 1445  Stop Time: 1539  Total time in clinic (min): 54 minutes    Subjective: Patient reports getting better, less pain but continues to have most pain in her left knee  Objective: See treatment diary below      Assessment: Tolerated treatment well, with increased weights on mat program   Patient exhibited good technique with therapeutic exercises and would benefit from continued PT      Plan: Continue per plan of care        Precautions: Hypothroidism, GI bleed in past       Manuals 9/4 9/8 9/11 9/15 9/18 9/22 9/25      BLE's 10 10' 10 8 8 8' 8'                                             Neuro Re-Ed             SLS nv 10''x3 ea HHA 10"x3 10' 3x 10" 3x nt 10"x3                                                                                    Ther Ex             Ball slides   20x ea 30x 30x 30x 30x      HEP 10            bridges 10x x10 15x 30x 30x 30x 30x       B SLR 10x x10 15x 30x 30x 30x 30x      B SAQ 10x x10 20x 30x 1# 30x 1# 30x 2#/30      B LAQ 10x x10 20x 30x 1# 30x 1# 30x 2#/30      Hip abduction 10x x10 20x 30x 20# 10x 20# 30x 20#/30      Hip add-ball 10x x10 20x 30x 30x 30x 30x                   Standing hip flex,abd,ext B 10x ea 10x ea  15x ea 20xea 30x 30x 30x ea                   Slant board nt 30''x4 L2 30"4 30' 4x 30" 4x 30" 4x 30"x4      nustep nt 5' L1  6' L2 6' 7' 8' 8'      Heel raises   20x 30x 30x 30x 30x      Ther Activity             Taps on step     4" 10x B 30x ea 30x      Step ups 4"      5x ea 10x ea      Gait Training                                       Modalities

## 2020-09-29 ENCOUNTER — OFFICE VISIT (OUTPATIENT)
Dept: PHYSICAL THERAPY | Age: 80
End: 2020-09-29
Payer: MEDICARE

## 2020-09-29 DIAGNOSIS — M17.12 PRIMARY OSTEOARTHRITIS OF LEFT KNEE: Primary | ICD-10-CM

## 2020-09-29 DIAGNOSIS — M25.561 CHRONIC PAIN OF BOTH KNEES: ICD-10-CM

## 2020-09-29 DIAGNOSIS — M25.562 CHRONIC PAIN OF BOTH KNEES: ICD-10-CM

## 2020-09-29 DIAGNOSIS — G89.29 CHRONIC PAIN OF BOTH KNEES: ICD-10-CM

## 2020-09-29 PROCEDURE — 97110 THERAPEUTIC EXERCISES: CPT

## 2020-09-29 NOTE — PROGRESS NOTES
Daily Note     Today's date: 2020  Patient name: Karin Pickett  : 1940  MRN: [id number]  Referring provider: Arcadio Barreto MD  Dx:   Encounter Diagnosis     ICD-10-CM    1  Primary osteoarthritis of left knee  M17 12    2  Chronic pain of both knees  M25 561     M25 562     G89 29        Start Time: 1500  Stop Time: 1550  Total time in clinic (min): 50 minutes    Subjective: Reports mild discomfort at her L knee  Objective: See treatment diary below      Assessment: Tolerated treatment well  Some discomfort with PROM on LLE, especially with hamstring stretch  Patient requires HHA for toe taps to step for safety  Fatigue noted during session, especially with standing activities  Some discomfort in L knee with RLE SLRx3 in standing, patient does better when alternating sets  Patient would benefit from continued PT  Plan: Continue per plan of care        Precautions: Hypothroidism, GI bleed in past       Manuals 9/4 9/8 9/11 9/15 9/18 9/22 9/25 9/29     BLE's 10 10' 10 8 8 8' 8' 8'                                            Neuro Re-Ed             SLS nv 10''x3 ea HHA 10"x3 10' 3x 10" 3x nt 10"x3 10''x3                                                                                   Ther Ex             Ball slides   20x ea 30x 30x 30x 30x 30x     HEP 10            bridges 10x x10 15x 30x 30x 30x 30x 30x      B SLR 10x x10 15x 30x 30x 30x 30x 30x     B SAQ 10x x10 20x 30x 1# 30x 1# 30x 2#/30 2# 30x     B LAQ 10x x10 20x 30x 1# 30x 1# 30x 2#/30 2# 30x     Hip abduction 10x x10 20x 30x 20# 10x 20# 30x 20#/30 25# 30x     Hip add-ball 10x x10 20x 30x 30x 30x 30x 30x                  Standing hip flex,abd,ext B 10x ea 10x ea  15x ea 20xea 30x 30x 30x ea 30x ea                   Slant board nt 30''x4 L2 30"4 30' 4x 30" 4x 30" 4x 30"x4 30''x4     nustep nt 5' L1  6' L2 6' 7' 8' 8' 8'     Heel raises   20x 30x 30x 30x 30x 30x     Ther Activity             Taps on step     4" 10x B 30x ea 30x 30x Step ups 4"      5x ea 10x ea 10x ea     Gait Training                                       Modalities

## 2020-10-02 ENCOUNTER — OFFICE VISIT (OUTPATIENT)
Dept: PHYSICAL THERAPY | Age: 80
End: 2020-10-02
Payer: MEDICARE

## 2020-10-02 DIAGNOSIS — M17.12 PRIMARY OSTEOARTHRITIS OF LEFT KNEE: Primary | ICD-10-CM

## 2020-10-02 DIAGNOSIS — M25.561 CHRONIC PAIN OF BOTH KNEES: ICD-10-CM

## 2020-10-02 DIAGNOSIS — M25.562 CHRONIC PAIN OF BOTH KNEES: ICD-10-CM

## 2020-10-02 DIAGNOSIS — G89.29 CHRONIC PAIN OF BOTH KNEES: ICD-10-CM

## 2020-10-02 PROCEDURE — 97110 THERAPEUTIC EXERCISES: CPT

## 2020-10-06 ENCOUNTER — OFFICE VISIT (OUTPATIENT)
Dept: PHYSICAL THERAPY | Age: 80
End: 2020-10-06
Payer: MEDICARE

## 2020-10-06 DIAGNOSIS — M17.12 PRIMARY OSTEOARTHRITIS OF LEFT KNEE: Primary | ICD-10-CM

## 2020-10-06 DIAGNOSIS — M25.561 CHRONIC PAIN OF BOTH KNEES: ICD-10-CM

## 2020-10-06 DIAGNOSIS — M25.562 CHRONIC PAIN OF BOTH KNEES: ICD-10-CM

## 2020-10-06 DIAGNOSIS — G89.29 CHRONIC PAIN OF BOTH KNEES: ICD-10-CM

## 2020-10-06 PROCEDURE — 97110 THERAPEUTIC EXERCISES: CPT | Performed by: PHYSICAL THERAPIST

## 2020-10-07 ENCOUNTER — OFFICE VISIT (OUTPATIENT)
Dept: OBGYN CLINIC | Facility: MEDICAL CENTER | Age: 80
End: 2020-10-07
Payer: MEDICARE

## 2020-10-07 VITALS
HEIGHT: 63 IN | HEART RATE: 66 BPM | SYSTOLIC BLOOD PRESSURE: 127 MMHG | WEIGHT: 169 LBS | BODY MASS INDEX: 29.95 KG/M2 | DIASTOLIC BLOOD PRESSURE: 73 MMHG

## 2020-10-07 DIAGNOSIS — M17.12 PRIMARY OSTEOARTHRITIS OF LEFT KNEE: ICD-10-CM

## 2020-10-07 DIAGNOSIS — M17.11 PRIMARY OSTEOARTHRITIS OF RIGHT KNEE: Primary | ICD-10-CM

## 2020-10-07 PROCEDURE — 20610 DRAIN/INJ JOINT/BURSA W/O US: CPT | Performed by: ORTHOPAEDIC SURGERY

## 2020-10-07 RX ORDER — LIDOCAINE HYDROCHLORIDE 10 MG/ML
2 INJECTION, SOLUTION INFILTRATION; PERINEURAL
Status: COMPLETED | OUTPATIENT
Start: 2020-10-07 | End: 2020-10-07

## 2020-10-07 RX ADMIN — LIDOCAINE HYDROCHLORIDE 2 ML: 10 INJECTION, SOLUTION INFILTRATION; PERINEURAL at 14:55

## 2020-10-09 ENCOUNTER — APPOINTMENT (OUTPATIENT)
Dept: PHYSICAL THERAPY | Age: 80
End: 2020-10-09
Payer: MEDICARE

## 2020-10-12 DIAGNOSIS — E03.9 HYPOTHYROIDISM, UNSPECIFIED TYPE: ICD-10-CM

## 2020-10-12 RX ORDER — LEVOTHYROXINE SODIUM 88 UG/1
TABLET ORAL
Qty: 90 TABLET | Refills: 0 | Status: SHIPPED | OUTPATIENT
Start: 2020-10-12 | End: 2020-11-16

## 2020-11-14 DIAGNOSIS — E03.9 HYPOTHYROIDISM, UNSPECIFIED TYPE: ICD-10-CM

## 2020-11-16 RX ORDER — LEVOTHYROXINE SODIUM 88 UG/1
TABLET ORAL
Qty: 90 TABLET | Refills: 0 | Status: SHIPPED | OUTPATIENT
Start: 2020-11-16 | End: 2021-07-06

## 2021-01-13 ENCOUNTER — OFFICE VISIT (OUTPATIENT)
Dept: OBGYN CLINIC | Facility: MEDICAL CENTER | Age: 81
End: 2021-01-13
Payer: MEDICARE

## 2021-01-13 VITALS — WEIGHT: 169 LBS | BODY MASS INDEX: 29.95 KG/M2 | HEIGHT: 63 IN

## 2021-01-13 DIAGNOSIS — M17.11 ARTHRITIS OF RIGHT KNEE: Primary | ICD-10-CM

## 2021-01-13 DIAGNOSIS — M17.12 ARTHRITIS OF LEFT KNEE: ICD-10-CM

## 2021-01-13 PROCEDURE — 99213 OFFICE O/P EST LOW 20 MIN: CPT | Performed by: ORTHOPAEDIC SURGERY

## 2021-01-13 NOTE — PROGRESS NOTES
Orthopedics          Roxanne Spain 68 y o  female MRN: [id number]      Chief Complaint:   bilateral knee pain    HPI:   68 y  o female complaining of bilateral knee pain  Patient presents office today regarding bilateral knee pain  Patient has been diagnosed with bilateral knee pain due to osteoarthritis  Patient did receive intra-articular injection with significant pain relief  Patient does state however increasing pain is more in her left knee than right  Patient does not wish to have injections at this time  She states the pain is aching nature localized her bilateral knees limits her ambulatory status  Patient states taking Tylenol once weekly which significantly alleviates her pain  She denies any changes numbness tingling lower extremity  Review Of Systems:   · Skin: Normal  · Neuro: See HPI  · Musculoskeletal: See HPI  · All other systems reviewed and are negative    Past Medical History:   History reviewed  No pertinent past medical history      Past Surgical History:   Past Surgical History:   Procedure Laterality Date    BREAST CYST EXCISION      cyst removals and aspirations-benign    HYSTERECTOMY      at age 60    OOPHORECTOMY Bilateral     at age 60    STOMACH SURGERY         Family History:  Family history reviewed and non-contributory  Family History   Problem Relation Age of Onset    Heart disease Sister     Breast cancer Sister 68    Hyperlipidemia Family     Ovarian cancer Sister 77    Breast cancer Cousin     Breast cancer Cousin     Cervical cancer Paternal Aunt          Social History:  Social History     Socioeconomic History    Marital status: /Civil Union     Spouse name: None    Number of children: None    Years of education: None    Highest education level: None   Occupational History    None   Social Needs    Financial resource strain: None    Food insecurity     Worry: None     Inability: None    Transportation needs     Medical: None Non-medical: None   Tobacco Use    Smoking status: Never Smoker    Smokeless tobacco: Never Used   Substance and Sexual Activity    Alcohol use: None    Drug use: None    Sexual activity: None   Lifestyle    Physical activity     Days per week: None     Minutes per session: None    Stress: None   Relationships    Social connections     Talks on phone: None     Gets together: None     Attends Tenriism service: None     Active member of club or organization: None     Attends meetings of clubs or organizations: None     Relationship status: None    Intimate partner violence     Fear of current or ex partner: None     Emotionally abused: None     Physically abused: None     Forced sexual activity: None   Other Topics Concern    None   Social History Narrative    None       Allergies: Allergies   Allergen Reactions    Percocet  [Oxycodone-Acetaminophen]     Statins      Other reaction(s): ELEVATED LFTS  Category:  Adverse Reaction;     Vicodin  [Hydrocodone-Acetaminophen]        Labs:  0   Lab Value Date/Time    HCT 41 6 03/01/2019 0759    HCT 41 1 03/19/2016 0743    HCT 37 9 05/03/2015 0630    HCT 38 8 05/01/2015 0619    HCT 40 1 04/30/2015 2355    HGB 13 0 03/01/2019 0759    HGB 13 0 03/19/2016 0743    HGB 12 1 05/03/2015 0630    HGB 12 4 05/01/2015 0619    HGB 13 0 04/30/2015 2355    WBC 6 20 03/01/2019 0759    WBC 6 67 03/19/2016 0743    WBC 6 75 05/03/2015 0630    WBC 8 98 05/01/2015 0619    WBC 10 44 (H) 04/30/2015 2355       Meds:    Current Outpatient Medications:     diclofenac sodium (VOLTAREN) 1 %, Apply 2 g topically 4 (four) times a day, Disp: 1 Tube, Rfl: 2    levothyroxine 88 mcg tablet, take 1 tablet by mouth once daily, Disp: 90 tablet, Rfl: 0      Physical Exam:     General Appearance:    Alert, cooperative, no distress, appears stated age   Head:    Normocephalic, without obvious abnormality, atraumatic   Eyes:    conjunctiva/corneas clear, both eyes        Nose:   Nares normal, septum midline, no drainage    Throat:   Lips normal; teeth and gums normal   Neck:    symmetrical, trachea midline, ;     thyroid:  no enlargement/   Back:     Symmetric, no curvature, ROM normal   Lungs:   No audible wheezing or labored breathing   Chest Wall:    No tenderness or deformity    Heart:    Regular rate and rhythm               Pulses:   2+ and symmetric all extremities   Skin:   Skin color, texture, turgor normal, no rashes or lesions   Neurologic:   normal strength, sensation and reflexes     throughout       Musculoskeletal: bilateral lower extremity  ·   On examination of the right knee there is no effusion, no erythema  Range of motion to full active extension and flexion to greater than 120°  Pain on palpation medial and lateral joint lines  There is crepitus with range of motion, no warmth to palpation, bony enlargement noted  No pain on palpation pes anserine bursa region or distal iliotibial band  Stable to varus and valgus stress without pain or gapping  Negative anterior and posterior drawer testing  Sensation intact distal pulses present  ·   On examination of the left knee there is no effusion, no erythema  Range of motion to full active extension and flexion to greater than 120°  Pain on palpation medial and lateral joint lines  There is crepitus with range of motion, no warmth to palpation, bony enlargement noted  No pain on palpation pes anserine bursa region or distal iliotibial band  Stable to varus and valgus stress without pain or gapping  Negative anterior and posterior drawer testing  Sensation intact distal pulses present         _*_*_*_*_*_*_*_*_*_*_*_*_*_*_*_*_*_*_*_*_*_*_*_*_*_*_*_*_*_*_*_*_*_*_*_*_*_*_*_*_*    Assessment:  68 y  o female with bilateral knee   Chronic pain due to osteoarthritis    Plan:   · Weight bearing as tolerated  bilateral lower extremity  ·   Case discussed with Dr Stephani Palmer  ·  home range of motion stretching strengthening exercises  · patient wished to defer injections today  ·  follow-up in 2 months time repeat evaluation possible injections at her next office visit in her bilateral knees   · patient is aware she is a candidate for total knee arthroplasty in the near future    Barbra Carlson PA-C

## 2021-01-20 DIAGNOSIS — Z23 ENCOUNTER FOR IMMUNIZATION: ICD-10-CM

## 2021-01-27 ENCOUNTER — IMMUNIZATIONS (OUTPATIENT)
Dept: FAMILY MEDICINE CLINIC | Facility: HOSPITAL | Age: 81
End: 2021-01-27

## 2021-01-27 DIAGNOSIS — Z23 ENCOUNTER FOR IMMUNIZATION: Primary | ICD-10-CM

## 2021-01-27 PROCEDURE — 0011A SARS-COV-2 / COVID-19 MRNA VACCINE (MODERNA) 100 MCG: CPT

## 2021-01-27 PROCEDURE — 91301 SARS-COV-2 / COVID-19 MRNA VACCINE (MODERNA) 100 MCG: CPT

## 2021-02-24 ENCOUNTER — IMMUNIZATIONS (OUTPATIENT)
Dept: FAMILY MEDICINE CLINIC | Facility: HOSPITAL | Age: 81
End: 2021-02-24

## 2021-02-24 DIAGNOSIS — Z23 ENCOUNTER FOR IMMUNIZATION: Primary | ICD-10-CM

## 2021-02-24 PROCEDURE — 91301 SARS-COV-2 / COVID-19 MRNA VACCINE (MODERNA) 100 MCG: CPT

## 2021-02-24 PROCEDURE — 0012A SARS-COV-2 / COVID-19 MRNA VACCINE (MODERNA) 100 MCG: CPT

## 2021-03-17 ENCOUNTER — OFFICE VISIT (OUTPATIENT)
Dept: OBGYN CLINIC | Facility: MEDICAL CENTER | Age: 81
End: 2021-03-17
Payer: MEDICARE

## 2021-03-17 VITALS
SYSTOLIC BLOOD PRESSURE: 161 MMHG | HEIGHT: 63 IN | BODY MASS INDEX: 29.95 KG/M2 | WEIGHT: 169 LBS | DIASTOLIC BLOOD PRESSURE: 80 MMHG | HEART RATE: 64 BPM

## 2021-03-17 DIAGNOSIS — M17.12 ARTHRITIS OF LEFT KNEE: ICD-10-CM

## 2021-03-17 DIAGNOSIS — M17.11 ARTHRITIS OF RIGHT KNEE: Primary | ICD-10-CM

## 2021-03-17 PROCEDURE — 99214 OFFICE O/P EST MOD 30 MIN: CPT | Performed by: PHYSICIAN ASSISTANT

## 2021-03-17 PROCEDURE — 20610 DRAIN/INJ JOINT/BURSA W/O US: CPT | Performed by: PHYSICIAN ASSISTANT

## 2021-03-17 RX ORDER — METHYLPREDNISOLONE ACETATE 40 MG/ML
2 INJECTION, SUSPENSION INTRA-ARTICULAR; INTRALESIONAL; INTRAMUSCULAR; SOFT TISSUE
Status: COMPLETED | OUTPATIENT
Start: 2021-03-17 | End: 2021-03-17

## 2021-03-17 RX ORDER — BUPIVACAINE HYDROCHLORIDE 2.5 MG/ML
2 INJECTION, SOLUTION INFILTRATION; PERINEURAL
Status: COMPLETED | OUTPATIENT
Start: 2021-03-17 | End: 2021-03-17

## 2021-03-17 RX ADMIN — METHYLPREDNISOLONE ACETATE 2 ML: 40 INJECTION, SUSPENSION INTRA-ARTICULAR; INTRALESIONAL; INTRAMUSCULAR; SOFT TISSUE at 13:54

## 2021-03-17 RX ADMIN — BUPIVACAINE HYDROCHLORIDE 2 ML: 2.5 INJECTION, SOLUTION INFILTRATION; PERINEURAL at 13:54

## 2021-03-17 NOTE — PROGRESS NOTES
Orthopedics          Kallie Bang 68 y o  female MRN: [id number]      Chief Complaint:   bilateral knee pain    HPI:   68 y  o female complaining of bilateral knee pain  Patient presents office today regarding bilateral knee pain due to osteoarthritis  Patient states her right knee pain is aching nature worse weight-bearing localized her right knee denies instability clicking popping catching  Patient states the pain is aching nature worse after standing for long periods time  She denies any radiation of pain  She also complained of increasing left knee pain pain is aching nature localized her left knee without radiation denies instability clicking popping catching right lower extremity  Denies any changes numbness tingling bilateral lower extremities  Patient has had some Voltaren gel with significant pain relief however this is suing worn off several areas after use lysine full tear gel  Patient has had intra-articular viscosupplementation injections several months ago with mild pain relief  Review Of Systems:   · Skin: Normal  · Neuro: See HPI  · Musculoskeletal: See HPI  · All other systems reviewed and are negative    Past Medical History:   History reviewed  No pertinent past medical history      Past Surgical History:   Past Surgical History:   Procedure Laterality Date    BREAST CYST EXCISION      cyst removals and aspirations-benign    HYSTERECTOMY      at age 60    OOPHORECTOMY Bilateral     at age 60    STOMACH SURGERY         Family History:  Family history reviewed and non-contributory  Family History   Problem Relation Age of Onset    Heart disease Sister     Breast cancer Sister 68    Hyperlipidemia Family     Ovarian cancer Sister 77    Breast cancer Cousin     Breast cancer Cousin     Cervical cancer Paternal Aunt          Social History:  Social History     Socioeconomic History    Marital status: /Civil Union     Spouse name: None    Number of children: None  Years of education: None    Highest education level: None   Occupational History    None   Social Needs    Financial resource strain: None    Food insecurity     Worry: None     Inability: None    Transportation needs     Medical: None     Non-medical: None   Tobacco Use    Smoking status: Never Smoker    Smokeless tobacco: Never Used   Substance and Sexual Activity    Alcohol use: None    Drug use: None    Sexual activity: None   Lifestyle    Physical activity     Days per week: None     Minutes per session: None    Stress: None   Relationships    Social connections     Talks on phone: None     Gets together: None     Attends Hinduism service: None     Active member of club or organization: None     Attends meetings of clubs or organizations: None     Relationship status: None    Intimate partner violence     Fear of current or ex partner: None     Emotionally abused: None     Physically abused: None     Forced sexual activity: None   Other Topics Concern    None   Social History Narrative    None       Allergies: Allergies   Allergen Reactions    Percocet  [Oxycodone-Acetaminophen]     Statins      Other reaction(s): ELEVATED LFTS  Category:  Adverse Reaction;     Vicodin  [Hydrocodone-Acetaminophen]        Labs:  0   Lab Value Date/Time    HCT 41 6 03/01/2019 0759    HCT 41 1 03/19/2016 0743    HCT 37 9 05/03/2015 0630    HCT 38 8 05/01/2015 0619    HCT 40 1 04/30/2015 2355    HGB 13 0 03/01/2019 0759    HGB 13 0 03/19/2016 0743    HGB 12 1 05/03/2015 0630    HGB 12 4 05/01/2015 0619    HGB 13 0 04/30/2015 2355    WBC 6 20 03/01/2019 0759    WBC 6 67 03/19/2016 0743    WBC 6 75 05/03/2015 0630    WBC 8 98 05/01/2015 0619    WBC 10 44 (H) 04/30/2015 2355       Meds:    Current Outpatient Medications:     diclofenac sodium (VOLTAREN) 1 %, Apply 2 g topically 4 (four) times a day, Disp: 1 Tube, Rfl: 2    levothyroxine 88 mcg tablet, take 1 tablet by mouth once daily, Disp: 90 tablet, Rfl: 0      Physical Exam:     General Appearance:    Alert, cooperative, no distress, appears stated age   Head:    Normocephalic, without obvious abnormality, atraumatic   Eyes:    conjunctiva/corneas clear, both eyes        Nose:   Nares normal, septum midline, no drainage    Throat:   Lips normal; teeth and gums normal   Neck:    symmetrical, trachea midline, ;     thyroid:  no enlargement/   Back:     Symmetric, no curvature, ROM normal   Lungs:   No audible wheezing or labored breathing   Chest Wall:    No tenderness or deformity    Heart:    Regular rate and rhythm               Pulses:   2+ and symmetric all extremities   Skin:   Skin color, texture, turgor normal, no rashes or lesions   Neurologic:   normal strength, sensation and reflexes     throughout       Musculoskeletal: bilateral lower extremity  ·   On examination of the right knee there is no effusion, no erythema  Range of motion to full active extension and flexion to greater than 120°  Pain on palpation medial and lateral joint lines  There is crepitus with range of motion, no warmth to palpation, bony enlargement noted  No pain on palpation pes anserine bursa region or distal iliotibial band  Stable to varus and valgus stress without pain or gapping  Negative anterior and posterior drawer testing  Sensation intact distal pulses present  ·   On examination of the left knee there is no effusion, no erythema  Range of motion to full active extension and flexion to greater than 120°  Pain on palpation medial and lateral joint lines  There is crepitus with range of motion, no warmth to palpation, bony enlargement noted  No pain on palpation pes anserine bursa region or distal iliotibial band  Stable to varus and valgus stress without pain or gapping  Negative anterior and posterior drawer testing  Sensation intact distal pulses present  Radiology:   I personally reviewed the films    X-rays bilateral knee shows  Significant medial joint space and subchondral sclerosis osteophyte formation revealing severe osteoarthritis bilateral knees  Large joint arthrocentesis: L knee  Universal Protocol:  Consent given by: patient  Patient understanding: patient states understanding of the procedure being performed  Site marked: the operative site was marked  Patient identity confirmed: verbally with patient    Supporting Documentation  Indications: pain   Procedure Details  Location: knee - L knee  Needle size: 22 G  Approach: anterolateral  Medications administered: 2 mL bupivacaine 0 25 %; 2 mL methylPREDNISolone acetate 40 mg/mL    Patient tolerance: patient tolerated the procedure well with no immediate complications    Large joint arthrocentesis: R knee  Universal Protocol:  Consent given by: patient  Patient understanding: patient states understanding of the procedure being performed  Site marked: the operative site was marked  Patient identity confirmed: verbally with patient    Supporting Documentation  Indications: pain   Procedure Details  Location: knee - R knee  Needle size: 22 G  Approach: anterolateral  Medications administered: 2 mL bupivacaine 0 25 %; 2 mL methylPREDNISolone acetate 40 mg/mL    Patient tolerance: patient tolerated the procedure well with no immediate complications            _*_*_*_*_*_*_*_*_*_*_*_*_*_*_*_*_*_*_*_*_*_*_*_*_*_*_*_*_*_*_*_*_*_*_*_*_*_*_*_*_*    Assessment:  68 y  o female with bilateral knee   Chronic pain due to osteoarthritis    Plan:   · Weight bearing as tolerated  bilateral lower extremity    ·  treatment options reviewed with patient great length treatments include total knee arthroplasty in conservative treatments including intra-articular injections patient wished to pursue non operative intervention at this time  ·  bilateral knee intra-articular steroid injections given as noted above  ·  Patient advised should they develop any increasing pain, redness, drainage, numbness, tingling or swelling surrounding the injection sight, they are to contact our office or present to the emergency department    ·  patient is aware she is a candidate for total knee arthroplasty in the near future should she fail conservative management  ·  prior authorization for viscosupplementation injections initiated today  ·  patient may continue Voltaren gel for pain control and may call our office for refills as needed     · Follow up in 3 months or sooner if needed      Watson Avila PA-C

## 2021-05-05 DIAGNOSIS — E03.9 HYPOTHYROIDISM, UNSPECIFIED TYPE: ICD-10-CM

## 2021-05-05 RX ORDER — LEVOTHYROXINE SODIUM 88 UG/1
TABLET ORAL
Qty: 90 TABLET | Refills: 0 | OUTPATIENT
Start: 2021-05-05

## 2021-07-03 DIAGNOSIS — E03.9 HYPOTHYROIDISM, UNSPECIFIED TYPE: ICD-10-CM

## 2021-07-06 RX ORDER — LEVOTHYROXINE SODIUM 88 UG/1
TABLET ORAL
Qty: 90 TABLET | Refills: 0 | Status: SHIPPED | OUTPATIENT
Start: 2021-07-06 | End: 2021-08-03

## 2021-07-12 ENCOUNTER — TELEPHONE (OUTPATIENT)
Dept: FAMILY MEDICINE CLINIC | Facility: CLINIC | Age: 81
End: 2021-07-12

## 2021-07-12 NOTE — TELEPHONE ENCOUNTER
Patient is due for her annual mammo screening  Can you please place an order and we can call her daughter back and she will get it scheduled    Britney's number is [phone]

## 2021-08-03 DIAGNOSIS — E03.9 HYPOTHYROIDISM, UNSPECIFIED TYPE: ICD-10-CM

## 2021-08-03 RX ORDER — LEVOTHYROXINE SODIUM 88 UG/1
TABLET ORAL
Qty: 90 TABLET | Refills: 0 | Status: SHIPPED | OUTPATIENT
Start: 2021-08-03 | End: 2021-11-01

## 2021-08-13 ENCOUNTER — OFFICE VISIT (OUTPATIENT)
Dept: URGENT CARE | Facility: MEDICAL CENTER | Age: 81
End: 2021-08-13
Payer: MEDICARE

## 2021-08-13 VITALS
BODY MASS INDEX: 30.12 KG/M2 | HEART RATE: 102 BPM | HEIGHT: 63 IN | RESPIRATION RATE: 23 BRPM | WEIGHT: 170 LBS | OXYGEN SATURATION: 91 % | TEMPERATURE: 100.1 F

## 2021-08-13 DIAGNOSIS — R50.9 FEVER, UNSPECIFIED FEVER CAUSE: Primary | ICD-10-CM

## 2021-08-13 PROCEDURE — 99213 OFFICE O/P EST LOW 20 MIN: CPT | Performed by: PHYSICIAN ASSISTANT

## 2021-08-13 PROCEDURE — 87635 SARS-COV-2 COVID-19 AMP PRB: CPT | Performed by: PHYSICIAN ASSISTANT

## 2021-08-13 PROCEDURE — G0463 HOSPITAL OUTPT CLINIC VISIT: HCPCS | Performed by: PHYSICIAN ASSISTANT

## 2021-08-13 RX ORDER — AZITHROMYCIN 250 MG/1
TABLET, FILM COATED ORAL
Qty: 6 TABLET | Refills: 0 | Status: SHIPPED | OUTPATIENT
Start: 2021-08-13 | End: 2021-08-17

## 2021-08-14 NOTE — PATIENT INSTRUCTIONS
Fever/ shortness of breath  zithromax as directed  Steam from shower  If symptoms worsen go to ER  Follow up with PCP in 3-5 days  Proceed to  ER if symptoms worsen  Pharyngitis   WHAT YOU NEED TO KNOW:   Pharyngitis, or sore throat, is inflammation of the tissues and structures in your pharynx (throat)  Pharyngitis is most often caused by bacteria  It may also be caused by a cold or flu virus  Other causes include smoking, allergies, or acid reflux  DISCHARGE INSTRUCTIONS:   Call 911 for any of the following:   · You have trouble breathing or swallowing because your throat is swollen or sore  Return to the emergency department if:   · You are drooling because it hurts too much to swallow  · Your fever is higher than 102? F (39?C) or lasts longer than 3 days  · You are confused  · You taste blood in your throat  Contact your healthcare provider if:   · Your throat pain gets worse  · You have a painful lump in your throat that does not go away after 5 days  · Your symptoms do not improve after 5 days  · You have questions or concerns about your condition or care  Medicines:  Viral pharyngitis will go away on its own without treatment  Your sore throat should start to feel better in 3 to 5 days for both viral and bacterial infections  You may need any of the following:  · Antibiotics  treat a bacterial infection  · NSAIDs , such as ibuprofen, help decrease swelling, pain, and fever  NSAIDs can cause stomach bleeding or kidney problems in certain people  If you take blood thinner medicine, always ask your healthcare provider if NSAIDs are safe for you  Always read the medicine label and follow directions  · Acetaminophen  decreases pain and fever  It is available without a doctor's order  Ask how much to take and how often to take it  Follow directions  Acetaminophen can cause liver damage if not taken correctly  · Take your medicine as directed    Contact your healthcare provider if you think your medicine is not helping or if you have side effects  Tell him or her if you are allergic to any medicine  Keep a list of the medicines, vitamins, and herbs you take  Include the amounts, and when and why you take them  Bring the list or the pill bottles to follow-up visits  Carry your medicine list with you in case of an emergency  Manage your symptoms:   · Gargle salt water  Mix ¼ teaspoon salt in an 8 ounce glass of warm water and gargle  This may help decrease swelling in your throat  · Drink liquids as directed  You may need to drink more liquids than usual  Liquids may help soothe your throat and prevent dehydration  Ask how much liquid to drink each day and which liquids are best for you  · Use a cool-steam humidifier  to help moisten the air in your room and calm your cough  · Soothe your throat  with cough drops, ice, soft foods, or popsicles  Prevent the spread of pharyngitis:  Cover your mouth and nose when you cough or sneeze  Do not share food or drinks  Wash your hands often  Use soap and water  If soap and water are unavailable, use an alcohol based hand   Follow up with your healthcare provider as directed:  Write down your questions so you remember to ask them during your visits  © Copyright [employer] 2021 Information is for End User's use only and may not be sold, redistributed or otherwise used for commercial purposes  All illustrations and images included in CareNotes® are the copyrighted property of [employer]  or [employer]  The above information is an  only  It is not intended as medical advice for individual conditions or treatments  Talk to your doctor, nurse or pharmacist before following any medical regimen to see if it is safe and effective for you

## 2021-08-14 NOTE — PROGRESS NOTES
[address]        NAME: Prince Dexter is a 80 y o  female  : 1940    MRN: [id number]  DATE: 2021  TIME: 8:03 PM    Assessment and Plan   Fever, unspecified fever cause [R50 9]  1  Fever, unspecified fever cause  azithromycin (ZITHROMAX) 250 mg tablet    Novel Coronavirus (Covid-19),PCR [facility] - Office Collection         Patient Instructions     Fever/ shortness of breath  zithromax as directed  Steam from shower  If symptoms worsen go to ER  Follow up with PCP in 3-5 days  Proceed to  ER if symptoms worsen  Chief Complaint     Chief Complaint   Patient presents with    Fever     vaccinated, symptoms since yesterday and low oxygen levels lowest was down to 88 at one point     Fatigue    Cough         History of Present Illness       79 y/o female presents with daughter c/o SOB, congestion and fever  Daughter states she noticed low oxygen levels 88 at one point but bought a new pulse ox and it is reading 91-95  Patient states she only feels short of breath when going up the stairs  Patient fully vaccinated      Review of Systems   Review of Systems   Constitutional: Positive for fever  Negative for activity change, appetite change, chills, diaphoresis and fatigue  HENT: Positive for congestion and rhinorrhea  Negative for ear discharge, ear pain, facial swelling, hearing loss, mouth sores, nosebleeds, postnasal drip, sinus pressure, sinus pain, sore throat and voice change  Respiratory: Negative for apnea, cough, choking, chest tightness, shortness of breath, wheezing and stridor  Cardiovascular: Negative            Current Medications       Current Outpatient Medications:     levothyroxine 88 mcg tablet, take 1 tablet by mouth once daily, Disp: 90 tablet, Rfl: 0    azithromycin (ZITHROMAX) 250 mg tablet, Take 2 tablets today then 1 tablet daily x 4 days, Disp: 6 tablet, Rfl: 0    diclofenac sodium (VOLTAREN) 1 %, Apply 2 g topically 4 (four) times a day (Patient not taking: Reported on 8/13/2021), Disp: 1 Tube, Rfl: 2    Current Allergies     Allergies as of 08/13/2021 - Reviewed 08/13/2021   Allergen Reaction Noted    Percocet  [oxycodone-acetaminophen]  07/11/2012    Statins  12/18/2017    Vicodin  [hydrocodone-acetaminophen]  07/11/2012            The following portions of the patient's history were reviewed and updated as appropriate: allergies, current medications, past family history, past medical history, past social history, past surgical history and problem list      History reviewed  No pertinent past medical history  Past Surgical History:   Procedure Laterality Date    BREAST CYST EXCISION      cyst removals and aspirations-benign    HYSTERECTOMY      at age 60    OOPHORECTOMY Bilateral     at age 60    STOMACH SURGERY         Family History   Problem Relation Age of Onset    Heart disease Sister     Breast cancer Sister 68    Hyperlipidemia Family     Ovarian cancer Sister 77    Breast cancer Cousin     Breast cancer Cousin     Cervical cancer Paternal Aunt          Medications have been verified  Objective   Pulse 102   Temp 100 1 °F (37 8 °C)   Resp (!) 23   Ht 5' 2 5" (1 588 m)   Wt 77 1 kg (170 lb)   SpO2 91%   BMI 30 60 kg/m²        Physical Exam     Physical Exam  Constitutional:       General: She is not in acute distress  Appearance: Normal appearance  She is well-developed and normal weight  She is not diaphoretic  HENT:      Head: Normocephalic and atraumatic  Right Ear: Hearing, tympanic membrane, ear canal and external ear normal       Left Ear: Hearing, tympanic membrane, ear canal and external ear normal       Nose: Rhinorrhea present  Mouth/Throat:      Pharynx: Uvula midline  Cardiovascular:      Rate and Rhythm: Normal rate and regular rhythm  Heart sounds: Normal heart sounds  Pulmonary:      Effort: Pulmonary effort is normal  No respiratory distress  Breath sounds: Normal breath sounds   No stridor  No wheezing, rhonchi or rales  Chest:      Chest wall: No tenderness  Musculoskeletal:      Cervical back: Normal range of motion and neck supple  Lymphadenopathy:      Cervical: Cervical adenopathy present  Neurological:      Mental Status: She is alert

## 2021-08-15 LAB — SARS-COV-2 RNA RESP QL NAA+PROBE: NEGATIVE

## 2021-08-17 ENCOUNTER — APPOINTMENT (OUTPATIENT)
Dept: RADIOLOGY | Facility: CLINIC | Age: 81
End: 2021-08-17
Payer: MEDICARE

## 2021-08-17 ENCOUNTER — HOSPITAL ENCOUNTER (OUTPATIENT)
Dept: MAMMOGRAPHY | Facility: CLINIC | Age: 81
Discharge: HOME | End: 2021-08-17
Payer: MEDICARE

## 2021-08-17 ENCOUNTER — OFFICE VISIT (OUTPATIENT)
Dept: FAMILY MEDICINE CLINIC | Facility: CLINIC | Age: 81
End: 2021-08-17
Payer: MEDICARE

## 2021-08-17 VITALS
WEIGHT: 175 LBS | DIASTOLIC BLOOD PRESSURE: 82 MMHG | SYSTOLIC BLOOD PRESSURE: 128 MMHG | OXYGEN SATURATION: 94 % | TEMPERATURE: 98.9 F | BODY MASS INDEX: 31.01 KG/M2 | HEIGHT: 63 IN | HEART RATE: 81 BPM

## 2021-08-17 DIAGNOSIS — Z12.31 OTHER SCREENING MAMMOGRAM: ICD-10-CM

## 2021-08-17 DIAGNOSIS — R09.89 CHEST CONGESTION: ICD-10-CM

## 2021-08-17 DIAGNOSIS — R09.89 CHEST CONGESTION: Primary | ICD-10-CM

## 2021-08-17 PROCEDURE — U0005 INFEC AGEN DETEC AMPLI PROBE: HCPCS | Performed by: PHYSICIAN ASSISTANT

## 2021-08-17 PROCEDURE — 99214 OFFICE O/P EST MOD 30 MIN: CPT | Performed by: PHYSICIAN ASSISTANT

## 2021-08-17 PROCEDURE — U0003 INFECTIOUS AGENT DETECTION BY NUCLEIC ACID (DNA OR RNA); SEVERE ACUTE RESPIRATORY SYNDROME CORONAVIRUS 2 (SARS-COV-2) (CORONAVIRUS DISEASE [COVID-19]), AMPLIFIED PROBE TECHNIQUE, MAKING USE OF HIGH THROUGHPUT TECHNOLOGIES AS DESCRIBED BY CMS-2020-01-R: HCPCS | Performed by: PHYSICIAN ASSISTANT

## 2021-08-17 PROCEDURE — 71046 X-RAY EXAM CHEST 2 VIEWS: CPT

## 2021-08-17 NOTE — PROGRESS NOTES
Assessment/Plan:    No problem-specific Assessment & Plan notes found for this encounter  Problem List Items Addressed This Visit     None      Visit Diagnoses     Chest congestion    -  Primary    Relevant Orders    XR chest pa & lateral (Completed)    Novel Coronavirus (COVID-19), PCR [facility] Collected in Office (Completed)          Within sx less than 24 hours for first swab recommend obtaining another swab today  Pt is vaccinated  CXR ordered today  Finish course of antibiotic   F/u 1 week if no improvement of symptoms  If worsening SOB or change in pulse ox notify the office    Subjective:      Patient ID: Cm Avalos is a 80 y o  female  70-year-old female presents for follow-up on cough and fever  Patient was seen on 08/13/2021 for similar symptoms  She was tested for COVID a negative  At this time she had 1 day of symptoms  She had low-grade fever as well as lower oxygen levels around 90% upon her urgent care visit  At home her daughter also noted that she had lower oxygen levels which prompted her visit to the urgent care initially  Patient overall is doing well she does continue to have some shortness of breath as well as a continued cough and congestion  She is on Z-Chencho and finishing this course  She does not appear in distress  Patient is fully vaccinated for COVID  The following portions of the patient's history were reviewed and updated as appropriate: allergies, current medications, past family history, past medical history, past social history and problem list     Review of Systems   Constitutional: Positive for fever  Negative for chills and fatigue  HENT: Negative for congestion, ear pain, sinus pain, sore throat and trouble swallowing  Eyes: Negative for pain, discharge and redness  Respiratory: Positive for cough and shortness of breath  Negative for chest tightness and wheezing  Cardiovascular: Negative for chest pain, palpitations and leg swelling  Gastrointestinal: Negative for abdominal pain, diarrhea, nausea and vomiting  Musculoskeletal: Negative for arthralgias, joint swelling and myalgias  Skin: Negative for rash  Neurological: Negative for dizziness, weakness, numbness and headaches  Objective:      /82   Pulse 81   Temp 98 9 °F (37 2 °C)   Ht 5' 2 5" (1 588 m)   Wt 79 4 kg (175 lb)   SpO2 94%   BMI 31 50 kg/m²          Physical Exam  Vitals and nursing note reviewed  Constitutional:       Appearance: She is well-developed  HENT:      Head: Normocephalic  Right Ear: Hearing and tympanic membrane normal       Left Ear: Hearing and tympanic membrane normal       Nose: No mucosal edema  Mouth/Throat:      Pharynx: Uvula midline  No posterior oropharyngeal erythema  Cardiovascular:      Rate and Rhythm: Normal rate and regular rhythm  Pulmonary:      Effort: Pulmonary effort is normal  No respiratory distress  Breath sounds: Normal breath sounds  No decreased breath sounds, wheezing, rhonchi or rales

## 2021-08-18 LAB — SARS-COV-2 RNA RESP QL NAA+PROBE: NEGATIVE

## 2021-08-23 ENCOUNTER — TELEPHONE (OUTPATIENT)
Dept: FAMILY MEDICINE CLINIC | Facility: CLINIC | Age: 81
End: 2021-08-23

## 2021-08-23 NOTE — TELEPHONE ENCOUNTER
Pt has an appointment with you 9/2 she hasnt had labs drawn since 2019  Can you please order a complete set of labs for her  She still has some  shortness of breath with exhertion  Pulse ox is better

## 2021-08-25 ENCOUNTER — APPOINTMENT (OUTPATIENT)
Dept: LAB | Facility: CLINIC | Age: 81
End: 2021-08-25
Payer: MEDICARE

## 2021-08-25 DIAGNOSIS — E78.2 MIXED HYPERLIPIDEMIA: ICD-10-CM

## 2021-08-25 DIAGNOSIS — E03.9 HYPOTHYROIDISM, UNSPECIFIED TYPE: ICD-10-CM

## 2021-08-25 DIAGNOSIS — R06.00 DYSPNEA ON EXERTION: ICD-10-CM

## 2021-08-25 LAB
ALBUMIN SERPL BCP-MCNC: 3.2 G/DL (ref 3.5–5)
ALP SERPL-CCNC: 91 U/L (ref 46–116)
ALT SERPL W P-5'-P-CCNC: 15 U/L (ref 12–78)
ANION GAP SERPL CALCULATED.3IONS-SCNC: 5 MMOL/L (ref 4–13)
AST SERPL W P-5'-P-CCNC: 13 U/L (ref 5–45)
BASOPHILS # BLD AUTO: 0.06 THOUSANDS/ΜL (ref 0–0.1)
BASOPHILS NFR BLD AUTO: 1 % (ref 0–1)
BILIRUB SERPL-MCNC: 0.42 MG/DL (ref 0.2–1)
BUN SERPL-MCNC: 17 MG/DL (ref 5–25)
CALCIUM ALBUM COR SERPL-MCNC: 9.6 MG/DL (ref 8.3–10.1)
CALCIUM SERPL-MCNC: 9 MG/DL (ref 8.3–10.1)
CHLORIDE SERPL-SCNC: 109 MMOL/L (ref 100–108)
CHOLEST SERPL-MCNC: 410 MG/DL (ref 50–200)
CO2 SERPL-SCNC: 26 MMOL/L (ref 21–32)
CREAT SERPL-MCNC: 1.08 MG/DL (ref 0.6–1.3)
EOSINOPHIL # BLD AUTO: 0.21 THOUSAND/ΜL (ref 0–0.61)
EOSINOPHIL NFR BLD AUTO: 3 % (ref 0–6)
ERYTHROCYTE [DISTWIDTH] IN BLOOD BY AUTOMATED COUNT: 13.2 % (ref 11.6–15.1)
GFR SERPL CREATININE-BSD FRML MDRD: 49 ML/MIN/1.73SQ M
GLUCOSE P FAST SERPL-MCNC: 101 MG/DL (ref 65–99)
HCT VFR BLD AUTO: 39.5 % (ref 34.8–46.1)
HDLC SERPL-MCNC: 44 MG/DL
HGB BLD-MCNC: 12.7 G/DL (ref 11.5–15.4)
IMM GRANULOCYTES # BLD AUTO: 0.03 THOUSAND/UL (ref 0–0.2)
IMM GRANULOCYTES NFR BLD AUTO: 0 % (ref 0–2)
LDLC SERPL CALC-MCNC: 331 MG/DL (ref 0–100)
LYMPHOCYTES # BLD AUTO: 2.42 THOUSANDS/ΜL (ref 0.6–4.47)
LYMPHOCYTES NFR BLD AUTO: 30 % (ref 14–44)
MCH RBC QN AUTO: 31.9 PG (ref 26.8–34.3)
MCHC RBC AUTO-ENTMCNC: 32.2 G/DL (ref 31.4–37.4)
MCV RBC AUTO: 99 FL (ref 82–98)
MONOCYTES # BLD AUTO: 0.47 THOUSAND/ΜL (ref 0.17–1.22)
MONOCYTES NFR BLD AUTO: 6 % (ref 4–12)
NEUTROPHILS # BLD AUTO: 4.83 THOUSANDS/ΜL (ref 1.85–7.62)
NEUTS SEG NFR BLD AUTO: 60 % (ref 43–75)
NRBC BLD AUTO-RTO: 0 /100 WBCS
PLATELET # BLD AUTO: 378 THOUSANDS/UL (ref 149–390)
PMV BLD AUTO: 11.4 FL (ref 8.9–12.7)
POTASSIUM SERPL-SCNC: 4 MMOL/L (ref 3.5–5.3)
PROT SERPL-MCNC: 7.8 G/DL (ref 6.4–8.2)
RBC # BLD AUTO: 3.98 MILLION/UL (ref 3.81–5.12)
SODIUM SERPL-SCNC: 140 MMOL/L (ref 136–145)
TRIGL SERPL-MCNC: 177 MG/DL
TSH SERPL DL<=0.05 MIU/L-ACNC: 2.63 UIU/ML (ref 0.36–3.74)
WBC # BLD AUTO: 8.02 THOUSAND/UL (ref 4.31–10.16)

## 2021-08-25 PROCEDURE — 36415 COLL VENOUS BLD VENIPUNCTURE: CPT

## 2021-08-25 PROCEDURE — 80061 LIPID PANEL: CPT

## 2021-08-25 PROCEDURE — 85025 COMPLETE CBC W/AUTO DIFF WBC: CPT

## 2021-08-25 PROCEDURE — 84443 ASSAY THYROID STIM HORMONE: CPT

## 2021-08-25 PROCEDURE — 80053 COMPREHEN METABOLIC PANEL: CPT

## 2021-09-02 ENCOUNTER — OFFICE VISIT (OUTPATIENT)
Dept: FAMILY MEDICINE CLINIC | Facility: CLINIC | Age: 81
End: 2021-09-02
Payer: MEDICARE

## 2021-09-02 ENCOUNTER — HOSPITAL ENCOUNTER (OUTPATIENT)
Dept: MAMMOGRAPHY | Facility: HOSPITAL | Age: 81
Discharge: HOME | End: 2021-09-02
Attending: FAMILY MEDICINE
Payer: MEDICARE

## 2021-09-02 VITALS
WEIGHT: 173 LBS | SYSTOLIC BLOOD PRESSURE: 110 MMHG | OXYGEN SATURATION: 96 % | DIASTOLIC BLOOD PRESSURE: 74 MMHG | BODY MASS INDEX: 30.65 KG/M2 | HEIGHT: 63 IN | HEART RATE: 70 BPM

## 2021-09-02 VITALS — BODY MASS INDEX: 30.65 KG/M2 | HEIGHT: 63 IN | WEIGHT: 173 LBS

## 2021-09-02 DIAGNOSIS — Z23 NEEDS FLU SHOT: ICD-10-CM

## 2021-09-02 DIAGNOSIS — E78.2 MIXED HYPERLIPIDEMIA: ICD-10-CM

## 2021-09-02 DIAGNOSIS — E03.9 HYPOTHYROIDISM, UNSPECIFIED TYPE: Primary | ICD-10-CM

## 2021-09-02 DIAGNOSIS — R06.00 DYSPNEA ON EXERTION: ICD-10-CM

## 2021-09-02 DIAGNOSIS — E66.09 CLASS 1 OBESITY DUE TO EXCESS CALORIES WITH SERIOUS COMORBIDITY AND BODY MASS INDEX (BMI) OF 31.0 TO 31.9 IN ADULT: ICD-10-CM

## 2021-09-02 DIAGNOSIS — E78.2 MIXED HYPERLIPIDEMIA: Primary | ICD-10-CM

## 2021-09-02 DIAGNOSIS — Z00.00 MEDICARE ANNUAL WELLNESS VISIT, SUBSEQUENT: ICD-10-CM

## 2021-09-02 PROCEDURE — 1123F ACP DISCUSS/DSCN MKR DOCD: CPT | Performed by: FAMILY MEDICINE

## 2021-09-02 PROCEDURE — 77063 BREAST TOMOSYNTHESIS BI: CPT

## 2021-09-02 PROCEDURE — G0008 ADMIN INFLUENZA VIRUS VAC: HCPCS

## 2021-09-02 PROCEDURE — 77067 SCR MAMMO BI INCL CAD: CPT

## 2021-09-02 PROCEDURE — G0439 PPPS, SUBSEQ VISIT: HCPCS | Performed by: FAMILY MEDICINE

## 2021-09-02 PROCEDURE — 99214 OFFICE O/P EST MOD 30 MIN: CPT | Performed by: FAMILY MEDICINE

## 2021-09-02 PROCEDURE — 90662 IIV NO PRSV INCREASED AG IM: CPT

## 2021-09-02 RX ORDER — PRAVASTATIN SODIUM 40 MG
40 TABLET ORAL DAILY
Qty: 90 TABLET | Refills: 2 | Status: SHIPPED | OUTPATIENT
Start: 2021-09-02 | End: 2022-07-26

## 2021-09-02 RX ORDER — PRAVASTATIN SODIUM 40 MG
40 TABLET ORAL DAILY
COMMUNITY
End: 2021-09-02 | Stop reason: SDUPTHER

## 2021-09-02 NOTE — PROGRESS NOTES
Assessment/Plan:         Problem List Items Addressed This Visit        Endocrine    Hypothyroidism - Primary       Other    Hyperlipidemia    Relevant Medications    pravastatin (PRAVACHOL) 40 mg tablet    Other Relevant Orders    Lipid Panel with Direct LDL reflex    Comprehensive metabolic panel    Class 1 obesity due to excess calories with serious comorbidity and body mass index (BMI) of 31 0 to 31 9 in adult     BMI Counseling: Body mass index is 31 14 kg/m²  The BMI is above normal  Nutrition recommendations include reducing intake of cholesterol  Other Visit Diagnoses     Medicare annual wellness visit, subsequent        Dyspnea on exertion        Relevant Orders    Ambulatory referral to Cardiology    Complete PFT with post bronchodilator            Subjective:      Patient ID: Yoon Brumfield is a 80 y o  female  80year old here with her daughter  She had been seen at urgent care and in our office for cough, congestion, SOB, low grade fever last month  COVID negative, CXR negative  She was treated with Z pack which improved her congestion  However, she is still feeling SOB with exertion  She says her pulse ox at home is good with exertion, sometimes dips to 90% when she rests  Today her pulse ox is 96%  Her cough is intermittent, she does bring up some phlegm  Denies wheezing  No chest pain or leg swelling  Daughter says she has had a work up for this in the distant past including cardiology evaluation and PFT  No results in chart  Recently had labs done  CBC normal, CMP normal, TSH is normal  Lipids - cholesterol 410,   She previously took Lipitor and her LFTs went up  Discontinued this and liver function improved  She thinks she tried Crestor in the past but unsure why she couldn't take it  She was on Pravastatin but was forgetting to take it before bed  Just restarted it when she saw her lab results        The following portions of the patient's history were reviewed and updated as appropriate:   She  has no past medical history on file  She   Patient Active Problem List    Diagnosis Date Noted    Chronic pain of both knees 07/17/2020    Gastroesophageal reflux disease 07/17/2020    Class 1 obesity due to excess calories with serious comorbidity and body mass index (BMI) of 31 0 to 31 9 in adult 10/10/2019    Ganglion cyst of wrist, right 10/10/2019    Arthritis of right knee 07/01/2019    Screening for osteoporosis 01/08/2019    Screen for colon cancer 01/08/2019    Breast lump in female 01/08/2019    Arthritis of left knee 11/08/2018    Bilateral hearing loss 08/29/2018    Age-related cataract of both eyes 03/09/2018    Venous stasis of lower extremity 07/18/2017    Hypothyroidism 10/01/2012    Hyperlipidemia 08/03/2012     She  has a past surgical history that includes Stomach surgery; Hysterectomy; Oophorectomy (Bilateral); and Breast cyst excision  Her family history includes Breast cancer in her cousin and cousin; Breast cancer (age of onset: 68) in her sister; Cervical cancer in her paternal aunt; Heart disease in her sister; Hyperlipidemia in her family; Ovarian cancer (age of onset: 77) in her sister  She  reports that she has never smoked  She has never used smokeless tobacco  No history on file for alcohol use and drug use  Current Outpatient Medications   Medication Sig Dispense Refill    levothyroxine 88 mcg tablet take 1 tablet by mouth once daily 90 tablet 0    pravastatin (PRAVACHOL) 40 mg tablet Take 40 mg by mouth daily       No current facility-administered medications for this visit       Current Outpatient Medications on File Prior to Visit   Medication Sig    levothyroxine 88 mcg tablet take 1 tablet by mouth once daily    pravastatin (PRAVACHOL) 40 mg tablet Take 40 mg by mouth daily    [DISCONTINUED] diclofenac sodium (VOLTAREN) 1 % Apply 2 g topically 4 (four) times a day (Patient not taking: Reported on 8/13/2021)     No current facility-administered medications on file prior to visit  She is allergic to percocet  [oxycodone-acetaminophen], statins, and vicodin  [hydrocodone-acetaminophen]       Review of Systems   Constitutional: Negative for activity change, appetite change, chills, fatigue, fever and unexpected weight change  HENT: Negative for congestion, ear discharge, ear pain, postnasal drip, sinus pressure and sore throat  Eyes: Negative for discharge and visual disturbance  Respiratory: Positive for cough and shortness of breath  Negative for chest tightness and wheezing  Cardiovascular: Negative for chest pain, palpitations and leg swelling  Gastrointestinal: Negative for abdominal pain, constipation, diarrhea, nausea and vomiting  Endocrine: Negative for cold intolerance, heat intolerance, polydipsia and polyuria  Genitourinary: Negative for difficulty urinating and frequency  Musculoskeletal: Negative for arthralgias, back pain, joint swelling and myalgias  Skin: Negative for rash  Neurological: Negative for dizziness, weakness, light-headedness, numbness and headaches  Hematological: Negative for adenopathy  Psychiatric/Behavioral: Negative for behavioral problems, confusion, dysphoric mood, sleep disturbance and suicidal ideas  The patient is not nervous/anxious  Objective:      /74 (BP Location: Left arm, Patient Position: Sitting, Cuff Size: Large)   Pulse 70   Ht 5' 2 5" (1 588 m)   Wt 78 5 kg (173 lb)   SpO2 96%   BMI 31 14 kg/m²          Physical Exam  Vitals and nursing note reviewed  Constitutional:       General: She is not in acute distress  Appearance: Normal appearance  She is obese  She is not ill-appearing, toxic-appearing or diaphoretic  HENT:      Head: Normocephalic and atraumatic  Right Ear: External ear normal       Left Ear: External ear normal    Cardiovascular:      Rate and Rhythm: Normal rate and regular rhythm        Heart sounds: No murmur heard    No friction rub  Pulmonary:      Effort: Pulmonary effort is normal  No respiratory distress  Breath sounds: Normal breath sounds  No stridor  No wheezing, rhonchi or rales  Musculoskeletal:         General: No swelling  Right lower leg: Edema (trace) present  Left lower leg: Edema (trace) present  Neurological:      General: No focal deficit present  Mental Status: She is alert  Mental status is at baseline  Psychiatric:         Attention and Perception: Attention normal          Mood and Affect: Mood normal          Speech: Speech normal          Behavior: Behavior normal          Thought Content:  Thought content normal          Judgment: Judgment normal

## 2021-09-02 NOTE — PROGRESS NOTES
Assessment and Plan:     Problem List Items Addressed This Visit        Endocrine    Hypothyroidism - Primary       Other    Hyperlipidemia    Relevant Medications    pravastatin (PRAVACHOL) 40 mg tablet    Other Relevant Orders    Lipid Panel with Direct LDL reflex    Comprehensive metabolic panel    Class 1 obesity due to excess calories with serious comorbidity and body mass index (BMI) of 31 0 to 31 9 in adult     BMI Counseling: Body mass index is 31 14 kg/m²  The BMI is above normal  Nutrition recommendations include reducing intake of cholesterol  Other Visit Diagnoses     Medicare annual wellness visit, subsequent        Dyspnea on exertion        Relevant Orders    Ambulatory referral to Cardiology    Complete PFT with post bronchodilator           Preventive health issues were discussed with patient, and age appropriate screening tests were ordered as noted in patient's After Visit Summary  Personalized health advice and appropriate referrals for health education or preventive services given if needed, as noted in patient's After Visit Summary  History of Present Illness:     Patient presents for Medicare Annual Wellness visit    Patient Care Team:  Erin Heath MD as PCP - General     Problem List:     Patient Active Problem List   Diagnosis    Hyperlipidemia    Hypothyroidism    Venous stasis of lower extremity    Age-related cataract of both eyes    Bilateral hearing loss    Arthritis of left knee    Screening for osteoporosis    Screen for colon cancer    Breast lump in female    Arthritis of right knee    Class 1 obesity due to excess calories with serious comorbidity and body mass index (BMI) of 31 0 to 31 9 in adult    Ganglion cyst of wrist, right    Chronic pain of both knees    Gastroesophageal reflux disease      Past Medical and Surgical History:     No past medical history on file    Past Surgical History:   Procedure Laterality Date    BREAST CYST EXCISION cyst removals and aspirations-benign    HYSTERECTOMY      at age 60    OOPHORECTOMY Bilateral     at age 60    STOMACH SURGERY        Family History:     Family History   Problem Relation Age of Onset    Heart disease Sister     Breast cancer Sister 68    Hyperlipidemia Family     Ovarian cancer Sister 77    Breast cancer Cousin     Breast cancer Cousin     Cervical cancer Paternal Aunt       Social History:     Social History     Socioeconomic History    Marital status: /Civil Union     Spouse name: None    Number of children: None    Years of education: None    Highest education level: None   Occupational History    None   Tobacco Use    Smoking status: Never Smoker    Smokeless tobacco: Never Used   Substance and Sexual Activity    Alcohol use: None    Drug use: None    Sexual activity: None   Other Topics Concern    None   Social History Narrative    None     Social Determinants of Health     Financial Resource Strain:     Difficulty of Paying Living Expenses:    Food Insecurity:     Worried About Running Out of Food in the Last Year:     Ran Out of Food in the Last Year:    Transportation Needs:     Lack of Transportation (Medical):      Lack of Transportation (Non-Medical):    Physical Activity:     Days of Exercise per Week:     Minutes of Exercise per Session:    Stress:     Feeling of Stress :    Social Connections:     Frequency of Communication with Friends and Family:     Frequency of Social Gatherings with Friends and Family:     Attends Caodaism Services:     Active Member of Clubs or Organizations:     Attends Club or Organization Meetings:     Marital Status:    Intimate Partner Violence:     Fear of Current or Ex-Partner:     Emotionally Abused:     Physically Abused:     Sexually Abused:       Medications and Allergies:     Current Outpatient Medications   Medication Sig Dispense Refill    levothyroxine 88 mcg tablet take 1 tablet by mouth once daily 90 tablet 0    pravastatin (PRAVACHOL) 40 mg tablet Take 40 mg by mouth daily       No current facility-administered medications for this visit  Allergies   Allergen Reactions    Percocet  [Oxycodone-Acetaminophen]     Statins      Other reaction(s): ELEVATED LFTS  Category: Adverse Reaction;     Vicodin  [Hydrocodone-Acetaminophen]       Immunizations:     Immunization History   Administered Date(s) Administered    INFLUENZA 10/01/2016    Influenza Quadrivalent Preservative Free 3 years and older IM 09/15/2017    Influenza Split High Dose Preservative Free IM 09/12/2013, 10/21/2015, 10/01/2016    Influenza, high dose seasonal 0 7 mL 10/15/2018, 09/10/2020    Pneumococcal Conjugate 13-Valent 10/21/2015    Pneumococcal Polysaccharide PPV23 07/23/2013    SARS-CoV-2 / COVID-19 mRNA IM (Shanell Craft) 01/27/2021, 02/24/2021    influenza, trivalent, adjuvanted 10/11/2019      Health Maintenance:         Topic Date Due    Hepatitis C Screening  Completed         Topic Date Due    DTaP,Tdap,and Td Vaccines (1 - Tdap) Never done    Influenza Vaccine (1) 09/01/2021      Medicare Health Risk Assessment:     /74 (BP Location: Left arm, Patient Position: Sitting, Cuff Size: Large)   Pulse 70   Ht 5' 2 5" (1 588 m)   Wt 78 5 kg (173 lb)   SpO2 96%   BMI 31 14 kg/m²      Le Luciano is here for her Subsequent Wellness visit  Last Medicare Wellness visit information reviewed, patient interviewed, no change since last AWV  Health Risk Assessment:   Patient rates overall health as fair  Patient feels that their physical health rating is same  Patient is very satisfied with their life  Eyesight was rated as same  Hearing was rated as slightly worse  Patient feels that their emotional and mental health rating is same  Patients states they are never, rarely angry  Patient states they are never, rarely unusually tired/fatigued  Pain experienced in the last 7 days has been some   Patient's pain rating has been 4/10  Patient states that she has experienced no weight loss or gain in last 6 months  Knee Pain    Depression Screening:   PHQ-2 Score: 0      Fall Risk Screening: In the past year, patient has experienced: no history of falling in past year      Urinary Incontinence Screening:   Patient has leaked urine accidently in the last six months  Home Safety:  Patient has trouble with stairs inside or outside of their home  Patient has working smoke alarms and has working carbon monoxide detector  Home safety hazards include: none  Nutrition:   Current diet is Regular  Medications:   Patient is not currently taking any over-the-counter supplements  Patient is able to manage medications  Activities of Daily Living (ADLs)/Instrumental Activities of Daily Living (IADLs):   Walk and transfer into and out of bed and chair?: Yes  Dress and groom yourself?: Yes    Bathe or shower yourself?: Yes    Feed yourself?  Yes  Do your laundry/housekeeping?: Yes  Manage your money, pay your bills and track your expenses?: Yes  Make your own meals?: Yes    Do your own shopping?: Yes    Previous Hospitalizations:   Any hospitalizations or ED visits within the last 12 months?: No      Advance Care Planning:     Durable POA for healthcare: Yes      Cognitive Screening:   Provider or family/friend/caregiver concerned regarding cognition?: No    PREVENTIVE SCREENINGS      Cardiovascular Screening:    General: Screening Not Indicated, History Lipid Disorder, Risks and Benefits Discussed and Screening Current    Due for: Lipid Panel      Diabetes Screening:     General: Screening Current and Risks and Benefits Discussed    Due for: Blood Glucose      Colorectal Cancer Screening:     General: Screening Not Indicated      Breast Cancer Screening:     General: Screening Current      Cervical Cancer Screening:    General: Screening Not Indicated      Osteoporosis Screening:    General: Risks and Benefits Discussed and Screening Current      Abdominal Aortic Aneurysm (AAA) Screening:        General: Screening Not Indicated      Lung Cancer Screening:     General: Screening Not Indicated      Hepatitis C Screening:    General: Screening Current    Screening, Brief Intervention, and Referral to Treatment (SBIRT)    Screening  Typical number of drinks in a day: 0  Typical number of drinks in a week: 0  Interpretation: Low risk drinking behavior  Single Item Drug Screening:  How often have you used an illegal drug (including marijuana) or a prescription medication for non-medical reasons in the past year? never    Single Item Drug Screen Score: 0  Interpretation: Negative screen for possible drug use disorder    Brief Intervention  Alcohol & drug use screenings were reviewed  No concerns regarding substance use disorder identified  Other Counseling Topics:   Regular weightbearing exercise         Dottie Armas MD

## 2021-09-02 NOTE — ASSESSMENT & PLAN NOTE
BMI Counseling: Body mass index is 31 14 kg/m²  The BMI is above normal  Nutrition recommendations include reducing intake of cholesterol

## 2021-09-14 ENCOUNTER — CONSULT (OUTPATIENT)
Dept: CARDIOLOGY CLINIC | Facility: CLINIC | Age: 81
End: 2021-09-14
Payer: MEDICARE

## 2021-09-14 VITALS
HEIGHT: 63 IN | DIASTOLIC BLOOD PRESSURE: 64 MMHG | WEIGHT: 173 LBS | BODY MASS INDEX: 30.65 KG/M2 | HEART RATE: 64 BPM | OXYGEN SATURATION: 98 % | SYSTOLIC BLOOD PRESSURE: 118 MMHG

## 2021-09-14 DIAGNOSIS — R06.00 DYSPNEA ON EXERTION: Primary | ICD-10-CM

## 2021-09-14 PROCEDURE — 99204 OFFICE O/P NEW MOD 45 MIN: CPT | Performed by: INTERNAL MEDICINE

## 2021-09-14 PROCEDURE — 93000 ELECTROCARDIOGRAM COMPLETE: CPT | Performed by: INTERNAL MEDICINE

## 2021-09-14 NOTE — PROGRESS NOTES
Cardiology Consultation     Prateek Anna  [id number]  1940  [facility] [address] [place] [address] 10  [phone] [place] [address] [facility] [place] [id number]    HPI:   44-year-old lady who recently had a respiratory infection characterized by productive cough, fever, and a drop in oxygen saturation documented by oximetry  This led to treatment with a Zithromax and the symptoms have resolved  She still gets shortness of breath with exertion but she had this prior to this described event  She had a workup a few years ago including a cardiac catheterization, echocardiogram, and stress test   The echo and stress test were reportedly negative had a cardiac catheterization  which showed nonobstructive plaque  She does not get exertionally related chest discomfort relieved by rest   She does have risk factors of positive family history and history of hyperlipidemia which has been treated with statins  1  Dyspnea on exertion  -     Ambulatory referral to Cardiology  -     POCT ECG      Patient Active Problem List   Diagnosis    Hyperlipidemia    Hypothyroidism    Venous stasis of lower extremity    Age-related cataract of both eyes    Bilateral hearing loss    Arthritis of left knee    Screening for osteoporosis    Screen for colon cancer    Breast lump in female    Arthritis of right knee    Class 1 obesity due to excess calories with serious comorbidity and body mass index (BMI) of 31 0 to 31 9 in adult    Ganglion cyst of wrist, right    Chronic pain of both knees    Gastroesophageal reflux disease     History reviewed  No pertinent past medical history    Social History     Socioeconomic History    Marital status: /Civil Union     Spouse name: Not on file    Number of children: Not on file    Years of education: Not on file    Highest education level: Not on file   Occupational History    Not on file   Tobacco Use    Smoking status: Never Smoker    Smokeless tobacco: Never Used   Substance and Sexual Activity    Alcohol use: Not on file    Drug use: Not on file    Sexual activity: Not on file   Other Topics Concern    Not on file   Social History Narrative    Not on file     Social Determinants of Health     Financial Resource Strain:     Difficulty of Paying Living Expenses:    Food Insecurity:     Worried About Running Out of Food in the Last Year:     [address] in the Last Year:    Transportation Needs:     Lack of Transportation (Medical):      Lack of Transportation (Non-Medical):    Physical Activity:     Days of Exercise per Week:     Minutes of Exercise per Session:    Stress:     Feeling of Stress :    Social Connections:     Frequency of Communication with Friends and Family:     Frequency of Social Gatherings with Friends and Family:     Attends Islam Services:     Active Member of Clubs or Organizations:     Attends Club or Organization Meetings:     Marital Status:    Intimate Partner Violence:     Fear of Current or Ex-Partner:     Emotionally Abused:     Physically Abused:     Sexually Abused:       Family History   Problem Relation Age of Onset    Heart disease Sister     Breast cancer Sister 68    Hyperlipidemia Family     Ovarian cancer Sister 77    Breast cancer Cousin     Breast cancer Cousin     Cervical cancer Paternal Aunt     Breast cancer Sister 80     Past Surgical History:   Procedure Laterality Date    BREAST CYST EXCISION  1982    cyst removals and aspirations-benign    HYSTERECTOMY      at age 60    OOPHORECTOMY Bilateral     at age 60    STOMACH SURGERY         Current Outpatient Medications:     levothyroxine 88 mcg tablet, take 1 tablet by mouth once daily, Disp: 90 tablet, Rfl: 0    pravastatin (PRAVACHOL) 40 mg tablet, Take 1 tablet (40 mg total) by mouth daily, Disp: 90 tablet, Rfl: 2  Allergies   Allergen Reactions    Percocet  [Oxycodone-Acetaminophen]  Statins      Other reaction(s): ELEVATED LFTS  Category:  Adverse Reaction;     Vicodin  [Hydrocodone-Acetaminophen]      Vitals:    09/14/21 1025   BP: 118/64   BP Location: Left arm   Patient Position: Sitting   Cuff Size: Standard   Pulse: 64   SpO2: 98%   Weight: 78 5 kg (173 lb)   Height: 5' 2 5" (1 588 m)       Labs:  Appointment on 08/25/2021   Component Date Value    TSH 3RD GENERATON 08/25/2021 2 630     WBC 08/25/2021 8 02     RBC 08/25/2021 3 98     Hemoglobin 08/25/2021 12 7     Hematocrit 08/25/2021 39 5     MCV 08/25/2021 99*    MCH 08/25/2021 31 9     MCHC 08/25/2021 32 2     RDW 08/25/2021 13 2     MPV 08/25/2021 11 4     Platelets 13/57/2225 378     nRBC 08/25/2021 0     Neutrophils Relative 08/25/2021 60     Immat GRANS % 08/25/2021 0     Lymphocytes Relative 08/25/2021 30     Monocytes Relative 08/25/2021 6     Eosinophils Relative 08/25/2021 3     Basophils Relative 08/25/2021 1     Neutrophils Absolute 08/25/2021 4 83     Immature Grans Absolute 08/25/2021 0 03     Lymphocytes Absolute 08/25/2021 2 42     Monocytes Absolute 08/25/2021 0 47     Eosinophils Absolute 08/25/2021 0 21     Basophils Absolute 08/25/2021 0 06     Sodium 08/25/2021 140     Potassium 08/25/2021 4 0     Chloride 08/25/2021 109*    CO2 08/25/2021 26     ANION GAP 08/25/2021 5     BUN 08/25/2021 17     Creatinine 08/25/2021 1 08     Glucose, Fasting 08/25/2021 101*    Calcium 08/25/2021 9 0     Corrected Calcium 08/25/2021 9 6     AST 08/25/2021 13     ALT 08/25/2021 15     Alkaline Phosphatase 08/25/2021 91     Total Protein 08/25/2021 7 8     Albumin 08/25/2021 3 2*    Total Bilirubin 08/25/2021 0 42     eGFR 08/25/2021 49     Cholesterol 08/25/2021 410*    Triglycerides 08/25/2021 177*    HDL, Direct 08/25/2021 44     LDL Calculated 08/25/2021 331*   Office Visit on 08/17/2021   Component Date Value    SARS-CoV-2 08/17/2021 Negative    Office Visit on 08/13/2021 Component Date Value    SARS-CoV-2 08/13/2021 Negative      Imaging: XR chest pa & lateral    Result Date: 8/18/2021  Narrative: CHEST INDICATION:   R09 89: Other specified symptoms and signs involving the circulatory and respiratory systems  COMPARISON:  7/20/2013 EXAM PERFORMED/VIEWS:  XR CHEST PA & LATERAL FINDINGS: Cardiomediastinal silhouette appears unremarkable  The lungs are clear  No pneumothorax or pleural effusion  Osseous structures appear within normal limits for patient age  Gastroesophageal surgical clips as before  Impression: No acute cardiopulmonary disease  Workstation performed: [id number]     Mammo screening bilateral w 3d & cad    Result Date: 9/3/2021  Narrative: DIAGNOSIS:   TECHNIQUE: Digital screening mammography was performed  Computer Aided Detection (CAD) analyzed all applicable images  COMPARISONS: Prior breast imaging dated: 07/24/2020 and 01/14/2019 RELEVANT HISTORY: Family Breast Cancer History: History of breast cancer in Sister, Brittney Sheppard, Sister  Family Medical History: Family medical history includes breast cancer in 4 relatives (cousin, cousin, sister, sister) and ovarian cancer in sister  Personal History: No known relevant hormone history  Surgical history includes breast excisional biopsy, hysterectomy, and oophorectomy  No known relevant medical history  The patient is scheduled in a reminder system for screening mammography  8-10% of cancers will be missed on mammography  Management of a palpable abnormality must be based on clinical grounds  Patients will be notified of their results via letter from our facility  Accredited by [employer] of Radiology and FDA  RISK ASSESSMENT: 5 Year Tyrer-Cuzick: 4 88 % 10 Year Tyrer-Cuzick: No Score Lifetime Tyrer-Cuzick: 5 89 % TISSUE DENSITY: There are scattered areas of fibroglandular density  INDICATION: Jori Singh is a 80 y o  female presenting for screening mammography   FINDINGS: Bilateral There are no suspicious masses, grouped microcalcifications or areas of unexplained architectural distortion  Bilateral waxing and waning circumscribed masses are seen, most consistent with cysts  The skin and nipple areolar complex are unremarkable  Impression: No mammographic evidence of malignancy  ASSESSMENT/BI-RADS CATEGORY: Left: 2 - Benign Right: 2 - Benign Overall: 2 - Benign RECOMMENDATION:      - Routine screening mammogram in 1 year for both breasts  Workstation ID: [id number]       Review of Systems:  Review of Systems    Physical Exam:    118/64  Skin warm dry  Pupils equal   Carotids 2+ without bruits  Lungs clear  Rhythm regular  Grade 2 medium length systolic ejection murmur at the base  A 2 well preserved  Rhythm regular  Bowel sounds present  No abdominal masses  2+ femoral pulses  No edema  Good strength in all extremities  Discussion/Summary:    1  Known coronary artery disease but no symptoms suggestive of angina  2  Chronic shortness of breath most likely related primarily to obesity and knee discomfort -pulmonary function test pending  3  Mild aortic stenosis  Recommendations:    I had a discussion with the patient and at this time and do not recommend any further studies since she is feeling better since being treated for her respiratory infection  I did inform them that she has mild aortic stenosis and the nothing further need be done in this regard at this time              Griselda Handley MD

## 2021-09-15 ENCOUNTER — TELEPHONE (OUTPATIENT)
Dept: CARDIOLOGY CLINIC | Facility: CLINIC | Age: 81
End: 2021-09-15

## 2021-09-15 ENCOUNTER — OFFICE VISIT (OUTPATIENT)
Dept: OBGYN CLINIC | Facility: MEDICAL CENTER | Age: 81
End: 2021-09-15
Payer: MEDICARE

## 2021-09-15 VITALS
WEIGHT: 172.6 LBS | HEIGHT: 63 IN | HEART RATE: 65 BPM | BODY MASS INDEX: 30.58 KG/M2 | SYSTOLIC BLOOD PRESSURE: 132 MMHG | DIASTOLIC BLOOD PRESSURE: 79 MMHG

## 2021-09-15 DIAGNOSIS — M17.12 ARTHRITIS OF LEFT KNEE: Primary | ICD-10-CM

## 2021-09-15 DIAGNOSIS — M17.11 ARTHRITIS OF RIGHT KNEE: ICD-10-CM

## 2021-09-15 PROCEDURE — 99213 OFFICE O/P EST LOW 20 MIN: CPT | Performed by: PHYSICIAN ASSISTANT

## 2021-09-15 PROCEDURE — 20610 DRAIN/INJ JOINT/BURSA W/O US: CPT | Performed by: PHYSICIAN ASSISTANT

## 2021-09-15 RX ORDER — BUPIVACAINE HYDROCHLORIDE 2.5 MG/ML
2 INJECTION, SOLUTION INFILTRATION; PERINEURAL
Status: COMPLETED | OUTPATIENT
Start: 2021-09-15 | End: 2021-09-15

## 2021-09-15 RX ORDER — METHYLPREDNISOLONE ACETATE 40 MG/ML
2 INJECTION, SUSPENSION INTRA-ARTICULAR; INTRALESIONAL; INTRAMUSCULAR; SOFT TISSUE
Status: COMPLETED | OUTPATIENT
Start: 2021-09-15 | End: 2021-09-15

## 2021-09-15 RX ADMIN — BUPIVACAINE HYDROCHLORIDE 2 ML: 2.5 INJECTION, SOLUTION INFILTRATION; PERINEURAL at 16:35

## 2021-09-15 RX ADMIN — METHYLPREDNISOLONE ACETATE 2 ML: 40 INJECTION, SUSPENSION INTRA-ARTICULAR; INTRALESIONAL; INTRAMUSCULAR; SOFT TISSUE at 16:35

## 2021-09-15 NOTE — TELEPHONE ENCOUNTER
Name of Caller: Gilford Bitters - Daughter    Reason for call:Echo Patient wants to have echo done  She had seen Dr George Velasco yesterday  Also has question regarding EKG        Test results?    Name of test:        Call back [address]    Fax number:

## 2021-09-15 NOTE — PROGRESS NOTES
Orthopedics          Stefanie Colon 80 y o  female MRN: [id number]      Chief Complaint:   bilateral knee pain    HPI:   80 y  o female complaining of bilateral knee pain  Patient has been diagnosed bilateral knee pain due to osteoarthritis  Patient states the pain is worse left knee than right states the pain is aching nature worse weight-bearing mildly relieved with rest   Patient has had intra-articular injections in the form of corticosteroid as well as viscosupplementation injections with significant pain relief however pain since returned  Patient did receive injection to bilateral knee 6 months ago significant pain relief however pain since returned  Patient states she has difficulty going up the stairs going up 1 step at a time  She denies any changes in instability clicking popping catching bilateral knees denies any changes numbness tingling bilateral lower extremities  Review Of Systems:   · Skin: Normal  · Neuro: See HPI  · Musculoskeletal: See HPI  · All other systems reviewed and are negative    Past Medical History:   History reviewed  No pertinent past medical history      Past Surgical History:   Past Surgical History:   Procedure Laterality Date    BREAST CYST EXCISION  1982    cyst removals and aspirations-benign    HYSTERECTOMY      at age 60    OOPHORECTOMY Bilateral     at age 60    STOMACH SURGERY         Family History:  Family history reviewed and non-contributory  Family History   Problem Relation Age of Onset    Heart disease Sister     Breast cancer Sister 68    Hyperlipidemia Family     Ovarian cancer Sister 77    Breast cancer Cousin     Breast cancer Cousin     Cervical cancer Paternal Aunt     Breast cancer Sister 80         Social History:  Social History     Socioeconomic History    Marital status: /Civil Union     Spouse name: None    Number of children: None    Years of education: None    Highest education level: None   Occupational History  None   Tobacco Use    Smoking status: Never Smoker    Smokeless tobacco: Never Used   Substance and Sexual Activity    Alcohol use: None    Drug use: None    Sexual activity: None   Other Topics Concern    None   Social History Narrative    None     Social Determinants of Health     Financial Resource Strain:     Difficulty of Paying Living Expenses:    Food Insecurity:     Worried About Running Out of Food in the Last Year:     Ran Out of Food in the Last Year:    Transportation Needs:     Lack of Transportation (Medical):  Lack of Transportation (Non-Medical):    Physical Activity:     Days of Exercise per Week:     Minutes of Exercise per Session:    Stress:     Feeling of Stress :    Social Connections:     Frequency of Communication with Friends and Family:     Frequency of Social Gatherings with Friends and Family:     Attends Mormon Services:     Active Member of Clubs or Organizations:     Attends Club or Organization Meetings:     Marital Status:    Intimate Partner Violence:     Fear of Current or Ex-Partner:     Emotionally Abused:     Physically Abused:     Sexually Abused: Allergies: Allergies   Allergen Reactions    Percocet  [Oxycodone-Acetaminophen]     Statins      Other reaction(s): ELEVATED LFTS  Category:  Adverse Reaction;     Vicodin  [Hydrocodone-Acetaminophen]        Labs:  0   Lab Value Date/Time    HCT 39 5 08/25/2021 0749    HCT 41 6 03/01/2019 0759    HCT 41 1 03/19/2016 0743    HCT 37 9 05/03/2015 0630    HCT 38 8 05/01/2015 0619    HCT 40 1 04/30/2015 2355    HGB 12 7 08/25/2021 0749    HGB 13 0 03/01/2019 0759    HGB 13 0 03/19/2016 0743    HGB 12 1 05/03/2015 0630    HGB 12 4 05/01/2015 0619    HGB 13 0 04/30/2015 2355    WBC 8 02 08/25/2021 0749    WBC 6 20 03/01/2019 0759    WBC 6 67 03/19/2016 0743    WBC 6 75 05/03/2015 0630    WBC 8 98 05/01/2015 0619    WBC 10 44 (H) 04/30/2015 2355       Meds:    Current Outpatient Medications:    levothyroxine 88 mcg tablet, take 1 tablet by mouth once daily, Disp: 90 tablet, Rfl: 0    pravastatin (PRAVACHOL) 40 mg tablet, Take 1 tablet (40 mg total) by mouth daily, Disp: 90 tablet, Rfl: 2      Physical Exam:     General Appearance:    Alert, cooperative, no distress, appears stated age   Head:    Normocephalic, without obvious abnormality, atraumatic   Eyes:    conjunctiva/corneas clear, both eyes        Nose:   Nares normal, septum midline, no drainage    Throat:   Lips normal; teeth and gums normal   Neck:    symmetrical, trachea midline, ;     thyroid:  no enlargement/   Back:     Symmetric, no curvature, ROM normal   Lungs:   No audible wheezing or labored breathing   Chest Wall:    No tenderness or deformity    Heart:    Regular rate and rhythm               Pulses:   2+ and symmetric all extremities   Skin:   Skin color, texture, turgor normal, no rashes or lesions   Neurologic:   normal strength, sensation and reflexes     throughout       Musculoskeletal: bilateral lower extremity  ·  On examination of the right knee there is no effusion, no erythema  Range of motion to full active extension and flexion to greater than 120°  Pain on palpation medial and lateral joint lines  There is crepitus with range of motion, no warmth to palpation, bony enlargement noted  No pain on palpation pes anserine bursa region or distal iliotibial band  Stable to varus and valgus stress without pain or gapping  Negative anterior and posterior drawer testing  Sensation intact distal pulses present  ·   On examination of the left knee there is no effusion, no erythema  Range of motion to full active extension and flexion to greater than 120°  Pain on palpation medial and lateral joint lines  There is crepitus with range of motion, no warmth to palpation, bony enlargement noted  No pain on palpation pes anserine bursa region or distal iliotibial band  Stable to varus and valgus stress without pain or gapping  Negative anterior and posterior drawer testing  Sensation intact distal pulses present  Large joint arthrocentesis: R knee  Universal Protocol:  Consent given by: patient  Patient understanding: patient states understanding of the procedure being performed  Site marked: the operative site was marked  Patient identity confirmed: verbally with patient    Supporting Documentation  Indications: pain   Procedure Details  Location: knee - R knee  Needle size: 22 G  Approach: superior  Medications administered: 2 mL bupivacaine 0 25 %; 2 mL methylPREDNISolone acetate 40 mg/mL    Patient tolerance: patient tolerated the procedure well with no immediate complications    Large joint arthrocentesis: L knee  Universal Protocol:  Consent given by: patient  Patient understanding: patient states understanding of the procedure being performed  Radiology Images displayed and confirmed  If images not available, report reviewed: imaging studies available  Patient identity confirmed: verbally with patient    Supporting Documentation  Indications: pain   Procedure Details  Location: knee - L knee  Needle size: 22 G  Approach: superior  Medications administered: 2 mL bupivacaine 0 25 %; 2 mL methylPREDNISolone acetate 40 mg/mL    Patient tolerance: patient tolerated the procedure well with no immediate complications            _*_*_*_*_*_*_*_*_*_*_*_*_*_*_*_*_*_*_*_*_*_*_*_*_*_*_*_*_*_*_*_*_*_*_*_*_*_*_*_*_*    Assessment:  80 y  o female with bilateral knee   Chronic pain due to osteoarthritis    Plan:   · Weight bearing as tolerated  bilateral lower extremity  ·  bilateral knee intra-articular corticosteroid injections given as noted above   · Patient advised should they develop any increasing pain, redness, drainage, numbness, tingling or swelling surrounding the injection sight, they are to contact our office or present to the emergency department    ·  bilateral knee durolane injection authorization initiated today  ·  case discussed with Dr Alexa Rios  · Follow up in 3 months or sooner if needed      Tommy Marcos PA-C

## 2021-09-15 NOTE — TELEPHONE ENCOUNTER
----- Message from Justyn Miller sent at 9/14/2021 11:20 AM EDT -----  Regarding: Test Results Question  Contact: [phone]  Hi Dr Franco Pi,  Just a question regarding my mom's EKG result  What does slow R Wave progression mean? My mom and I just reviewed her EKG in my chart and saw the above and was curious?   Thank you again for your time today,  Starla Lindsay and Justyn Miller

## 2021-09-15 NOTE — TELEPHONE ENCOUNTER
Noted, pt made aware via  [place] with Dr Chapman Fairly response  She is also inquiring about an Echo order? Please advise   Thank you

## 2021-09-15 NOTE — TELEPHONE ENCOUNTER
Please tell her it's a technical term that is complex  Practically speaking, it has no negative implications in this patient's case

## 2021-09-15 NOTE — TELEPHONE ENCOUNTER
Hi Dr Cookie Ham, pt seen by you yesterday is requesting an order for an Echo and is inquiring about, 'What does slow R wave progression mean" from looking at the EKG  Please advise and send to clinical pool  Thank you!

## 2021-10-07 ENCOUNTER — HOSPITAL ENCOUNTER (OUTPATIENT)
Dept: PULMONOLOGY | Facility: HOSPITAL | Age: 81
Discharge: HOME | End: 2021-10-07
Attending: FAMILY MEDICINE
Payer: MEDICARE

## 2021-10-07 DIAGNOSIS — R06.00 DYSPNEA ON EXERTION: ICD-10-CM

## 2021-10-07 PROCEDURE — 94729 DIFFUSING CAPACITY: CPT

## 2021-10-07 PROCEDURE — 94726 PLETHYSMOGRAPHY LUNG VOLUMES: CPT

## 2021-10-07 PROCEDURE — 94060 EVALUATION OF WHEEZING: CPT | Performed by: INTERNAL MEDICINE

## 2021-10-07 PROCEDURE — 94729 DIFFUSING CAPACITY: CPT | Performed by: INTERNAL MEDICINE

## 2021-10-07 PROCEDURE — 94760 N-INVAS EAR/PLS OXIMETRY 1: CPT

## 2021-10-07 PROCEDURE — 94060 EVALUATION OF WHEEZING: CPT

## 2021-10-07 PROCEDURE — 94726 PLETHYSMOGRAPHY LUNG VOLUMES: CPT | Performed by: INTERNAL MEDICINE

## 2021-10-07 RX ORDER — ALBUTEROL SULFATE 2.5 MG/3ML
2.5 SOLUTION RESPIRATORY (INHALATION) ONCE
Status: COMPLETED | OUTPATIENT
Start: 2021-10-07 | End: 2021-10-07

## 2021-10-07 RX ADMIN — ALBUTEROL SULFATE 2.5 MG: 2.5 SOLUTION RESPIRATORY (INHALATION) at 13:01

## 2021-11-01 DIAGNOSIS — E03.9 HYPOTHYROIDISM, UNSPECIFIED TYPE: ICD-10-CM

## 2021-11-01 RX ORDER — LEVOTHYROXINE SODIUM 88 UG/1
TABLET ORAL
Qty: 90 TABLET | Refills: 0 | Status: SHIPPED | OUTPATIENT
Start: 2021-11-01 | End: 2022-01-17 | Stop reason: SDUPTHER

## 2021-12-07 ENCOUNTER — APPOINTMENT (OUTPATIENT)
Dept: LAB | Facility: CLINIC | Age: 81
End: 2021-12-07
Payer: MEDICARE

## 2021-12-07 DIAGNOSIS — E78.2 MIXED HYPERLIPIDEMIA: ICD-10-CM

## 2021-12-07 LAB
ALBUMIN SERPL BCP-MCNC: 3.6 G/DL (ref 3.5–5)
ALP SERPL-CCNC: 81 U/L (ref 46–116)
ALT SERPL W P-5'-P-CCNC: 17 U/L (ref 12–78)
ANION GAP SERPL CALCULATED.3IONS-SCNC: 5 MMOL/L (ref 4–13)
AST SERPL W P-5'-P-CCNC: 16 U/L (ref 5–45)
BILIRUB SERPL-MCNC: 0.68 MG/DL (ref 0.2–1)
BUN SERPL-MCNC: 17 MG/DL (ref 5–25)
CALCIUM SERPL-MCNC: 9.5 MG/DL (ref 8.3–10.1)
CHLORIDE SERPL-SCNC: 104 MMOL/L (ref 100–108)
CHOLEST SERPL-MCNC: 281 MG/DL
CO2 SERPL-SCNC: 28 MMOL/L (ref 21–32)
CREAT SERPL-MCNC: 1.11 MG/DL (ref 0.6–1.3)
GFR SERPL CREATININE-BSD FRML MDRD: 47 ML/MIN/1.73SQ M
GLUCOSE P FAST SERPL-MCNC: 88 MG/DL (ref 65–99)
HDLC SERPL-MCNC: 52 MG/DL
LDLC SERPL CALC-MCNC: 207 MG/DL (ref 0–100)
POTASSIUM SERPL-SCNC: 4.4 MMOL/L (ref 3.5–5.3)
PROT SERPL-MCNC: 8.1 G/DL (ref 6.4–8.2)
SODIUM SERPL-SCNC: 137 MMOL/L (ref 136–145)
TRIGL SERPL-MCNC: 111 MG/DL

## 2021-12-07 PROCEDURE — 36415 COLL VENOUS BLD VENIPUNCTURE: CPT

## 2021-12-07 PROCEDURE — 80061 LIPID PANEL: CPT

## 2021-12-07 PROCEDURE — 80053 COMPREHEN METABOLIC PANEL: CPT

## 2021-12-09 ENCOUNTER — OFFICE VISIT (OUTPATIENT)
Dept: FAMILY MEDICINE CLINIC | Facility: CLINIC | Age: 81
End: 2021-12-09
Payer: MEDICARE

## 2021-12-09 VITALS
SYSTOLIC BLOOD PRESSURE: 130 MMHG | HEART RATE: 64 BPM | TEMPERATURE: 97.6 F | DIASTOLIC BLOOD PRESSURE: 78 MMHG | WEIGHT: 172 LBS | OXYGEN SATURATION: 97 % | HEIGHT: 63 IN | BODY MASS INDEX: 30.48 KG/M2

## 2021-12-09 DIAGNOSIS — N18.31 STAGE 3A CHRONIC KIDNEY DISEASE (HCC): ICD-10-CM

## 2021-12-09 DIAGNOSIS — R68.89 COLD INTOLERANCE: ICD-10-CM

## 2021-12-09 DIAGNOSIS — E03.9 HYPOTHYROIDISM, UNSPECIFIED TYPE: ICD-10-CM

## 2021-12-09 DIAGNOSIS — E78.2 MIXED HYPERLIPIDEMIA: Primary | ICD-10-CM

## 2021-12-09 PROCEDURE — 99214 OFFICE O/P EST MOD 30 MIN: CPT | Performed by: FAMILY MEDICINE

## 2021-12-10 ENCOUNTER — OFFICE VISIT (OUTPATIENT)
Dept: OBGYN CLINIC | Facility: CLINIC | Age: 81
End: 2021-12-10
Payer: MEDICARE

## 2021-12-10 VITALS
SYSTOLIC BLOOD PRESSURE: 152 MMHG | HEART RATE: 79 BPM | HEIGHT: 63 IN | BODY MASS INDEX: 30.51 KG/M2 | WEIGHT: 172.2 LBS | DIASTOLIC BLOOD PRESSURE: 83 MMHG

## 2021-12-10 DIAGNOSIS — M17.12 PRIMARY OSTEOARTHRITIS OF LEFT KNEE: ICD-10-CM

## 2021-12-10 DIAGNOSIS — M25.761 OSTEOPHYTE OF RIGHT KNEE: Primary | ICD-10-CM

## 2021-12-10 PROCEDURE — 99213 OFFICE O/P EST LOW 20 MIN: CPT | Performed by: PHYSICIAN ASSISTANT

## 2021-12-10 PROCEDURE — 20610 DRAIN/INJ JOINT/BURSA W/O US: CPT | Performed by: PHYSICIAN ASSISTANT

## 2021-12-10 RX ORDER — METHYLPREDNISOLONE ACETATE 40 MG/ML
2 INJECTION, SUSPENSION INTRA-ARTICULAR; INTRALESIONAL; INTRAMUSCULAR; SOFT TISSUE
Status: COMPLETED | OUTPATIENT
Start: 2021-12-10 | End: 2021-12-10

## 2021-12-10 RX ORDER — BUPIVACAINE HYDROCHLORIDE 2.5 MG/ML
2 INJECTION, SOLUTION INFILTRATION; PERINEURAL
Status: COMPLETED | OUTPATIENT
Start: 2021-12-10 | End: 2021-12-10

## 2021-12-10 RX ADMIN — BUPIVACAINE HYDROCHLORIDE 2 ML: 2.5 INJECTION, SOLUTION INFILTRATION; PERINEURAL at 11:39

## 2021-12-10 RX ADMIN — METHYLPREDNISOLONE ACETATE 2 ML: 40 INJECTION, SUSPENSION INTRA-ARTICULAR; INTRALESIONAL; INTRAMUSCULAR; SOFT TISSUE at 11:39

## 2022-01-17 DIAGNOSIS — E03.9 HYPOTHYROIDISM, UNSPECIFIED TYPE: ICD-10-CM

## 2022-01-17 RX ORDER — LEVOTHYROXINE SODIUM 88 UG/1
88 TABLET ORAL DAILY
Qty: 90 TABLET | Refills: 0 | Status: SHIPPED | OUTPATIENT
Start: 2022-01-17 | End: 2022-01-31

## 2022-01-30 DIAGNOSIS — E03.9 HYPOTHYROIDISM, UNSPECIFIED TYPE: ICD-10-CM

## 2022-01-31 RX ORDER — LEVOTHYROXINE SODIUM 88 UG/1
TABLET ORAL
Qty: 90 TABLET | Refills: 0 | Status: SHIPPED | OUTPATIENT
Start: 2022-01-31 | End: 2022-03-09 | Stop reason: SDUPTHER

## 2022-03-02 ENCOUNTER — RA CDI HCC (OUTPATIENT)
Dept: OTHER | Facility: HOSPITAL | Age: 82
End: 2022-03-02

## 2022-03-02 NOTE — PROGRESS NOTES
[facility]  coding opportunities       Chart reviewed, no opportunity found: CHART REVIEWED, NO OPPORTUNITY FOUND                        Patients insurance company: [employer]

## 2022-03-07 ENCOUNTER — APPOINTMENT (OUTPATIENT)
Dept: LAB | Facility: CLINIC | Age: 82
End: 2022-03-07
Payer: MEDICARE

## 2022-03-07 DIAGNOSIS — E78.2 MIXED HYPERLIPIDEMIA: ICD-10-CM

## 2022-03-07 DIAGNOSIS — N18.31 STAGE 3A CHRONIC KIDNEY DISEASE (HCC): ICD-10-CM

## 2022-03-07 DIAGNOSIS — E03.9 HYPOTHYROIDISM, UNSPECIFIED TYPE: ICD-10-CM

## 2022-03-07 LAB
ALBUMIN SERPL BCP-MCNC: 3.5 G/DL (ref 3.5–5)
ALP SERPL-CCNC: 81 U/L (ref 46–116)
ALT SERPL W P-5'-P-CCNC: 16 U/L (ref 12–78)
ANION GAP SERPL CALCULATED.3IONS-SCNC: 4 MMOL/L (ref 4–13)
AST SERPL W P-5'-P-CCNC: 13 U/L (ref 5–45)
BASOPHILS # BLD AUTO: 0.05 THOUSANDS/ΜL (ref 0–0.1)
BASOPHILS NFR BLD AUTO: 1 % (ref 0–1)
BILIRUB SERPL-MCNC: 0.43 MG/DL (ref 0.2–1)
BUN SERPL-MCNC: 21 MG/DL (ref 5–25)
CALCIUM SERPL-MCNC: 9.6 MG/DL (ref 8.3–10.1)
CHLORIDE SERPL-SCNC: 108 MMOL/L (ref 100–108)
CHOLEST SERPL-MCNC: 308 MG/DL
CO2 SERPL-SCNC: 26 MMOL/L (ref 21–32)
CREAT SERPL-MCNC: 1.12 MG/DL (ref 0.6–1.3)
EOSINOPHIL # BLD AUTO: 0.19 THOUSAND/ΜL (ref 0–0.61)
EOSINOPHIL NFR BLD AUTO: 3 % (ref 0–6)
ERYTHROCYTE [DISTWIDTH] IN BLOOD BY AUTOMATED COUNT: 13.8 % (ref 11.6–15.1)
GFR SERPL CREATININE-BSD FRML MDRD: 46 ML/MIN/1.73SQ M
GLUCOSE P FAST SERPL-MCNC: 91 MG/DL (ref 65–99)
HCT VFR BLD AUTO: 39.9 % (ref 34.8–46.1)
HDLC SERPL-MCNC: 48 MG/DL
HGB BLD-MCNC: 13.3 G/DL (ref 11.5–15.4)
IMM GRANULOCYTES # BLD AUTO: 0.02 THOUSAND/UL (ref 0–0.2)
IMM GRANULOCYTES NFR BLD AUTO: 0 % (ref 0–2)
LDLC SERPL CALC-MCNC: 221 MG/DL (ref 0–100)
LYMPHOCYTES # BLD AUTO: 1.95 THOUSANDS/ΜL (ref 0.6–4.47)
LYMPHOCYTES NFR BLD AUTO: 28 % (ref 14–44)
MCH RBC QN AUTO: 31.4 PG (ref 26.8–34.3)
MCHC RBC AUTO-ENTMCNC: 33.3 G/DL (ref 31.4–37.4)
MCV RBC AUTO: 94 FL (ref 82–98)
MONOCYTES # BLD AUTO: 0.51 THOUSAND/ΜL (ref 0.17–1.22)
MONOCYTES NFR BLD AUTO: 7 % (ref 4–12)
NEUTROPHILS # BLD AUTO: 4.29 THOUSANDS/ΜL (ref 1.85–7.62)
NEUTS SEG NFR BLD AUTO: 61 % (ref 43–75)
NRBC BLD AUTO-RTO: 0 /100 WBCS
PLATELET # BLD AUTO: 253 THOUSANDS/UL (ref 149–390)
PMV BLD AUTO: 12 FL (ref 8.9–12.7)
POTASSIUM SERPL-SCNC: 4 MMOL/L (ref 3.5–5.3)
PROT SERPL-MCNC: 7.8 G/DL (ref 6.4–8.2)
RBC # BLD AUTO: 4.23 MILLION/UL (ref 3.81–5.12)
SODIUM SERPL-SCNC: 138 MMOL/L (ref 136–145)
TRIGL SERPL-MCNC: 193 MG/DL
TSH SERPL DL<=0.05 MIU/L-ACNC: 5.15 UIU/ML (ref 0.36–3.74)
WBC # BLD AUTO: 7.01 THOUSAND/UL (ref 4.31–10.16)

## 2022-03-07 PROCEDURE — 85025 COMPLETE CBC W/AUTO DIFF WBC: CPT

## 2022-03-07 PROCEDURE — 36415 COLL VENOUS BLD VENIPUNCTURE: CPT

## 2022-03-07 PROCEDURE — 80053 COMPREHEN METABOLIC PANEL: CPT

## 2022-03-07 PROCEDURE — 80061 LIPID PANEL: CPT

## 2022-03-07 PROCEDURE — 84443 ASSAY THYROID STIM HORMONE: CPT

## 2022-03-09 ENCOUNTER — OFFICE VISIT (OUTPATIENT)
Dept: FAMILY MEDICINE CLINIC | Facility: CLINIC | Age: 82
End: 2022-03-09
Payer: MEDICARE

## 2022-03-09 VITALS
DIASTOLIC BLOOD PRESSURE: 82 MMHG | TEMPERATURE: 98.1 F | HEART RATE: 92 BPM | SYSTOLIC BLOOD PRESSURE: 142 MMHG | OXYGEN SATURATION: 95 % | BODY MASS INDEX: 30.3 KG/M2 | WEIGHT: 171 LBS | HEIGHT: 63 IN

## 2022-03-09 DIAGNOSIS — H61.21 CERUMEN DEBRIS ON TYMPANIC MEMBRANE OF RIGHT EAR: ICD-10-CM

## 2022-03-09 DIAGNOSIS — E78.2 MIXED HYPERLIPIDEMIA: Primary | ICD-10-CM

## 2022-03-09 DIAGNOSIS — E03.9 HYPOTHYROIDISM, UNSPECIFIED TYPE: ICD-10-CM

## 2022-03-09 DIAGNOSIS — N18.31 STAGE 3A CHRONIC KIDNEY DISEASE (HCC): ICD-10-CM

## 2022-03-09 PROCEDURE — 69210 REMOVE IMPACTED EAR WAX UNI: CPT | Performed by: FAMILY MEDICINE

## 2022-03-09 PROCEDURE — 99214 OFFICE O/P EST MOD 30 MIN: CPT | Performed by: FAMILY MEDICINE

## 2022-03-09 RX ORDER — PRAVASTATIN SODIUM 20 MG
TABLET ORAL
Qty: 90 TABLET | Refills: 1 | Status: SHIPPED | OUTPATIENT
Start: 2022-03-09

## 2022-03-09 RX ORDER — LEVOTHYROXINE SODIUM 0.1 MG/1
100 TABLET ORAL DAILY
Qty: 90 TABLET | Refills: 1 | Status: SHIPPED | OUTPATIENT
Start: 2022-03-09

## 2022-03-09 NOTE — PROGRESS NOTES
Assessment/Plan:         Problem List Items Addressed This Visit        Endocrine    Hypothyroidism     Increase levothyroxine to 100 mcg  Repeat TSH in 4-6 wks         Relevant Medications    levothyroxine 100 mcg tablet    Other Relevant Orders    TSH, 3rd generation    TSH, 3rd generation       Nervous and Auditory    Cerumen debris on tympanic membrane of right ear     Removed today with elvis            Genitourinary    Stage 3a chronic kidney disease ([facility] [address] )     Lab Results   Component Value Date    EGFR 46 03/07/2022    EGFR 47 12/07/2021    EGFR 49 08/25/2021    CREATININE 1 12 03/07/2022    CREATININE 1 11 12/07/2021    CREATININE 1 08 08/25/2021   labs are stable         Relevant Orders    Comprehensive metabolic panel       Other    Hyperlipidemia - Primary     Patient agrees to increasing pravastatin to 60 mg daily  Repeat labs in 6 months  Call sooner if any problems         Relevant Medications    pravastatin (PRAVACHOL) 20 mg tablet    Other Relevant Orders    Lipid Panel with Direct LDL reflex            Subjective:      Patient ID: Marissa Hernandez is a 80 y o  female  80 yr old here for lab review  Here w/ her daughter  She has high cholesterol for years  Has had problems tolerating statins  Currently she is on Pravastatin 40 mg  She takes it regularly  TSH is elevated - she is on 88 mcg Levothyroxine  Has hearing aids  Her audiologist told her she has wax in the R ear  The following portions of the patient's history were reviewed and updated as appropriate:   Past Medical History:  She has no past medical history on file ,  _______________________________________________________________________  Medical Problems:  does not have any pertinent problems on file ,  _______________________________________________________________________  Past Surgical History:   has a past surgical history that includes Stomach surgery; Oophorectomy (Bilateral);  Breast cyst excision (1982); and Hysterectomy  ,  _______________________________________________________________________  Family History:  family history includes Breast cancer in her cousin and cousin; Breast cancer (age of onset: 68) in her sister; Breast cancer (age of onset: 80) in her sister; Cervical cancer in her paternal aunt; Heart disease in her sister; Hyperlipidemia in her family; Ovarian cancer (age of onset: 77) in her sister  ,  _______________________________________________________________________  Social History:   reports that she has never smoked  She has never used smokeless tobacco  No history on file for alcohol use and drug use ,  _______________________________________________________________________  Allergies:  is allergic to percocet  [oxycodone-acetaminophen], statins, and vicodin  [hydrocodone-acetaminophen]     _______________________________________________________________________  Current Outpatient Medications   Medication Sig Dispense Refill    levothyroxine 100 mcg tablet Take 1 tablet (100 mcg total) by mouth daily 90 tablet 1    pravastatin (PRAVACHOL) 20 mg tablet Take 20 mg plus 40 mg total 60 mg daily 90 tablet 1    pravastatin (PRAVACHOL) 40 mg tablet Take 1 tablet (40 mg total) by mouth daily 90 tablet 2     No current facility-administered medications for this visit      _______________________________________________________________________  Review of Systems   Constitutional: Negative for activity change  HENT: Positive for hearing loss  Respiratory: Negative for chest tightness and shortness of breath  Cardiovascular: Negative for chest pain and leg swelling  Neurological: Negative for headaches  Psychiatric/Behavioral: Negative for dysphoric mood  The patient is not nervous/anxious  Objective:  Vitals:    03/09/22 0945   BP: 142/82   Pulse: 92   Temp: 98 1 °F (36 7 °C)   SpO2: 95%   Weight: 77 6 kg (171 lb)   Height: 5' 2 5" (1 588 m)     Body mass index is 30 78 kg/m²  Physical Exam  Vitals and nursing note reviewed  Constitutional:       General: She is not in acute distress  Appearance: Normal appearance  She is obese  She is not ill-appearing, toxic-appearing or diaphoretic  HENT:      Head: Normocephalic and atraumatic  Right Ear: External ear normal  There is impacted cerumen  Left Ear: External ear normal    Cardiovascular:      Rate and Rhythm: Normal rate and regular rhythm  Heart sounds: No murmur heard  No friction rub  Pulmonary:      Effort: Pulmonary effort is normal  No respiratory distress  Breath sounds: Normal breath sounds  No stridor  No wheezing, rhonchi or rales  Musculoskeletal:         General: No swelling  Right lower leg: No edema  Left lower leg: No edema  Neurological:      General: No focal deficit present  Mental Status: She is alert  Mental status is at baseline  Psychiatric:         Attention and Perception: Attention normal          Mood and Affect: Mood normal          Speech: Speech normal          Behavior: Behavior normal          Thought Content: Thought content normal          Judgment: Judgment normal          Ear cerumen removal    Date/Time: 3/9/2022 10:33 AM  Performed by: Rambo Henderson MD  Authorized by: Rambo Henderson MD   Universal Protocol:  Consent: Verbal consent obtained  Written consent not obtained  Risks and benefits: risks, benefits and alternatives were discussed  Consent given by: patient  Timeout called at: 3/9/2022 9:30 AM   Patient understanding: patient states understanding of the procedure being performed      Patient location:  Clinic  Procedure details:     Location:  R ear    Procedure type: curette      Approach:  External  Post-procedure details:     Complication:  None    Hearing quality:  Improved    Patient tolerance of procedure:   Tolerated well, no immediate complications  Comments:      Used curette and forceps to remove thin film of ear wax R ear

## 2022-03-09 NOTE — ASSESSMENT & PLAN NOTE
Patient agrees to increasing pravastatin to 60 mg daily  Repeat labs in 6 months  Call sooner if any problems

## 2022-03-09 NOTE — ASSESSMENT & PLAN NOTE
Lab Results   Component Value Date    EGFR 46 03/07/2022    EGFR 47 12/07/2021    EGFR 49 08/25/2021    CREATININE 1 12 03/07/2022    CREATININE 1 11 12/07/2021    CREATININE 1 08 08/25/2021   labs are stable

## 2022-03-11 ENCOUNTER — OFFICE VISIT (OUTPATIENT)
Dept: OBGYN CLINIC | Facility: CLINIC | Age: 82
End: 2022-03-11
Payer: MEDICARE

## 2022-03-11 VITALS
HEIGHT: 63 IN | SYSTOLIC BLOOD PRESSURE: 152 MMHG | HEART RATE: 66 BPM | BODY MASS INDEX: 30.65 KG/M2 | WEIGHT: 173 LBS | DIASTOLIC BLOOD PRESSURE: 73 MMHG

## 2022-03-11 DIAGNOSIS — M17.11 PRIMARY OSTEOARTHRITIS OF RIGHT KNEE: ICD-10-CM

## 2022-03-11 DIAGNOSIS — M17.12 PRIMARY OSTEOARTHRITIS OF LEFT KNEE: Primary | ICD-10-CM

## 2022-03-11 PROCEDURE — 99213 OFFICE O/P EST LOW 20 MIN: CPT | Performed by: ORTHOPAEDIC SURGERY

## 2022-03-11 NOTE — PROGRESS NOTES
Assessment:   Diagnosis ICD-10-CM Associated Orders   1  Primary osteoarthritis of left knee  M17 12    2  Primary osteoarthritis of right knee  M17 11        Plan:  Weightbearing as tolerated bilateral lower extremities  Bilateral intra-articular corticosteroid injections administered as noted above  Patient advised should they develop any increasing pain, redness, drainage, numbness, tingling or swelling surrounding the injection sight, they are to contact our office or present to the emergency department  Patient is considering total knee arthroplasty in the near future starting with the left  Patient will continue follow-up with her medical doctor regarding her medical issues  Follow-up in 3 months time repeat evaluation bilateral knees    To do next visit:  No follow-ups on file  The above stated was discussed in layman's terms and the patient expressed understanding  All questions were answered to the patient's satisfaction  Scribe Attestation    I,:  Jovany Zeng am acting as a scribe while in the presence of the attending physician :       I,:  Gavi Donahue MD personally performed the services described in this documentation    as scribed in my presence :             Subjective:   Josse Leggett is a 80 y o  female who presents today for a three-month follow-up for her bilateral knee pain and osteoarthritis  At her last visit on 12/10/2020 to she had bilateral knee cortisone injections  She has treated with viscosupplementation injections in the past   Patient denies any pain relief with her viscosupplementation injections is enquiring about steroid injection of bilateral knees at this time  States the pain is aching nature worse weight-bearing worse in her left knee and her right she denies any radiation pain denies instability clicking catching denies any changes numbness tingling        Review of systems negative unless otherwise specified in HPI  Review of Systems   Constitutional: Negative for chills, fever and unexpected weight change  HENT: Negative for hearing loss, nosebleeds and sore throat  Eyes: Negative for pain, redness and visual disturbance  Respiratory: Negative for cough, shortness of breath and wheezing  Cardiovascular: Negative for chest pain, palpitations and leg swelling  Gastrointestinal: Negative for abdominal pain and nausea  Genitourinary: Negative for dyspareunia, dysuria and frequency  Skin: Negative for rash and wound  Neurological: Negative for dizziness, numbness and headaches  Psychiatric/Behavioral: Negative for decreased concentration and suicidal ideas  The patient is not nervous/anxious  History reviewed  No pertinent past medical history      Past Surgical History:   Procedure Laterality Date    BREAST CYST EXCISION  1982    cyst removals and aspirations-benign    HYSTERECTOMY      at age 60    OOPHORECTOMY Bilateral     at age 60    STOMACH SURGERY         Family History   Problem Relation Age of Onset    Heart disease Sister     Breast cancer Sister 68    Hyperlipidemia Family     Ovarian cancer Sister 77    Breast cancer Cousin     Breast cancer Cousin     Cervical cancer Paternal [de-identified]     Breast cancer Sister 80       Social History     Occupational History    Not on file   Tobacco Use    Smoking status: Never Smoker    Smokeless tobacco: Never Used   Substance and Sexual Activity    Alcohol use: Not on file    Drug use: Not on file    Sexual activity: Not on file         Current Outpatient Medications:     levothyroxine 100 mcg tablet, Take 1 tablet (100 mcg total) by mouth daily, Disp: 90 tablet, Rfl: 1    pravastatin (PRAVACHOL) 20 mg tablet, Take 20 mg plus 40 mg total 60 mg daily, Disp: 90 tablet, Rfl: 1    pravastatin (PRAVACHOL) 40 mg tablet, Take 1 tablet (40 mg total) by mouth daily, Disp: 90 tablet, Rfl: 2    Allergies   Allergen Reactions    Percocet  [Oxycodone-Acetaminophen]     Statins Other reaction(s): ELEVATED LFTS  Category: Adverse Reaction;     Vicodin  [Hydrocodone-Acetaminophen]             Vitals:    03/11/22 1256   BP: 152/73   Pulse: 66       Objective:                    Ortho Exam  On examination of the right knee there is no effusion, no erythema  Range of motion to full active extension and flexion to greater than 120°  Pain on palpation medial and lateral joint lines  There is crepitus with range of motion, no warmth to palpation, bony enlargement noted  No pain on palpation pes anserine bursa region or distal iliotibial band  Stable to varus and valgus stress without pain or gapping  Negative anterior and posterior drawer testing  Sensation intact distal pulses present  On examination of the left knee there is no effusion, no erythema  Range of motion to full active extension and flexion to greater than 120°  Pain on palpation medial and lateral joint lines  There is crepitus with range of motion, no warmth to palpation, bony enlargement noted  No pain on palpation pes anserine bursa region or distal iliotibial band  Stable to varus and valgus stress without pain or gapping  Negative anterior and posterior drawer testing  Sensation intact distal pulses present  Diagnostics, reviewed and taken today if performed as documented:    None performed      The attending physician has personally reviewed the pertinent films in PACS and interpretation is as follows:      Procedures, if performed today:    Procedures    None performed      Portions of the record may have been created with voice recognition software  Occasional wrong word or "sound a like" substitutions may have occurred due to the inherent limitations of voice recognition software  Read the chart carefully and recognize, using context, where substitutions have occurred

## 2022-04-25 ENCOUNTER — APPOINTMENT (OUTPATIENT)
Dept: LAB | Facility: CLINIC | Age: 82
End: 2022-04-25
Payer: MEDICARE

## 2022-04-25 DIAGNOSIS — E03.9 HYPOTHYROIDISM, UNSPECIFIED TYPE: ICD-10-CM

## 2022-04-25 LAB — TSH SERPL DL<=0.05 MIU/L-ACNC: 1.46 UIU/ML (ref 0.45–4.5)

## 2022-04-25 PROCEDURE — 36415 COLL VENOUS BLD VENIPUNCTURE: CPT

## 2022-04-25 PROCEDURE — 84443 ASSAY THYROID STIM HORMONE: CPT

## 2022-04-30 DIAGNOSIS — E03.9 HYPOTHYROIDISM, UNSPECIFIED TYPE: ICD-10-CM

## 2022-05-02 RX ORDER — LEVOTHYROXINE SODIUM 88 UG/1
TABLET ORAL
Qty: 90 TABLET | Refills: 0 | Status: SHIPPED | OUTPATIENT
Start: 2022-05-02

## 2022-06-03 ENCOUNTER — OFFICE VISIT (OUTPATIENT)
Dept: OBGYN CLINIC | Facility: CLINIC | Age: 82
End: 2022-06-03
Payer: MEDICARE

## 2022-06-03 VITALS
HEIGHT: 63 IN | HEART RATE: 61 BPM | BODY MASS INDEX: 30.48 KG/M2 | SYSTOLIC BLOOD PRESSURE: 143 MMHG | WEIGHT: 172 LBS | DIASTOLIC BLOOD PRESSURE: 80 MMHG

## 2022-06-03 DIAGNOSIS — M17.11 PRIMARY OSTEOARTHRITIS OF RIGHT KNEE: ICD-10-CM

## 2022-06-03 DIAGNOSIS — M17.12 PRIMARY OSTEOARTHRITIS OF LEFT KNEE: Primary | ICD-10-CM

## 2022-06-03 PROCEDURE — 99213 OFFICE O/P EST LOW 20 MIN: CPT | Performed by: PHYSICIAN ASSISTANT

## 2022-06-03 PROCEDURE — 20610 DRAIN/INJ JOINT/BURSA W/O US: CPT | Performed by: PHYSICIAN ASSISTANT

## 2022-06-03 RX ORDER — BUPIVACAINE HYDROCHLORIDE 2.5 MG/ML
2 INJECTION, SOLUTION INFILTRATION; PERINEURAL
Status: COMPLETED | OUTPATIENT
Start: 2022-06-03 | End: 2022-06-03

## 2022-06-03 RX ORDER — KETOROLAC TROMETHAMINE 30 MG/ML
30 INJECTION, SOLUTION INTRAMUSCULAR; INTRAVENOUS
Status: COMPLETED | OUTPATIENT
Start: 2022-06-03 | End: 2022-06-03

## 2022-06-03 RX ORDER — METHYLPREDNISOLONE ACETATE 40 MG/ML
2 INJECTION, SUSPENSION INTRA-ARTICULAR; INTRALESIONAL; INTRAMUSCULAR; SOFT TISSUE
Status: COMPLETED | OUTPATIENT
Start: 2022-06-03 | End: 2022-06-03

## 2022-06-03 RX ADMIN — BUPIVACAINE HYDROCHLORIDE 2 ML: 2.5 INJECTION, SOLUTION INFILTRATION; PERINEURAL at 11:54

## 2022-06-03 RX ADMIN — METHYLPREDNISOLONE ACETATE 2 ML: 40 INJECTION, SUSPENSION INTRA-ARTICULAR; INTRALESIONAL; INTRAMUSCULAR; SOFT TISSUE at 11:54

## 2022-06-03 RX ADMIN — KETOROLAC TROMETHAMINE 30 MG: 30 INJECTION, SOLUTION INTRAMUSCULAR; INTRAVENOUS at 11:54

## 2022-06-03 NOTE — PROGRESS NOTES
Orthopedics          Magdalena Carrillo 80 y o  female MRN: [id number]      Chief Complaint:   bilateral knee pain    HPI:   80 y  o female complaining of bilateral knee pain  Patient presents office today regarding bilateral knee pain left worse than right  Patient has been diagnosed with osteoarthritis in her knees  Patient states the pain is aching nature worse weight-bearing mildly relieved with rest   Denies instability clicking or catching bilateral knees  She states having previous injection 3 months ago gave her significant pain relief however pain since returned  Patient states the pain does aggravating activities of daily living she denies any changes numbness tingling bilateral lower extremities  Review Of Systems:   · Skin: Normal  · Neuro: See HPI  · Musculoskeletal: See HPI  · All other systems reviewed and are negative    Past Medical History:   History reviewed  No pertinent past medical history      Past Surgical History:   Past Surgical History:   Procedure Laterality Date    BREAST CYST EXCISION  1982    cyst removals and aspirations-benign    HYSTERECTOMY      at age 60    OOPHORECTOMY Bilateral     at age 60    STOMACH SURGERY         Family History:  Family history reviewed and non-contributory  Family History   Problem Relation Age of Onset    Heart disease Sister     Breast cancer Sister 68    Hyperlipidemia Family     Ovarian cancer Sister 77    Breast cancer Cousin     Breast cancer Cousin     Cervical cancer Paternal Aunt     Breast cancer Sister 80         Social History:  Social History     Socioeconomic History    Marital status: /Civil Union     Spouse name: None    Number of children: None    Years of education: None    Highest education level: None   Occupational History    None   Tobacco Use    Smoking status: Never Smoker    Smokeless tobacco: Never Used   Substance and Sexual Activity    Alcohol use: None    Drug use: None    Sexual activity: None   Other Topics Concern    None   Social History Narrative    None     Social Determinants of Health     Financial Resource Strain: Not on file   Food Insecurity: Not on file   Transportation Needs: Not on file   Physical Activity: Not on file   Stress: Not on file   Social Connections: Not on file   Intimate Partner Violence: Not on file   Housing Stability: Not on file       Allergies: Allergies   Allergen Reactions    Percocet  [Oxycodone-Acetaminophen]     Statins      Other reaction(s): ELEVATED LFTS  Category:  Adverse Reaction;     Vicodin  [Hydrocodone-Acetaminophen]        Labs:  0   Lab Value Date/Time    HCT 39 9 03/07/2022 0729    HCT 39 5 08/25/2021 0749    HCT 41 6 03/01/2019 0759    HCT 37 9 05/03/2015 0630    HCT 38 8 05/01/2015 0619    HCT 40 1 04/30/2015 2355    HGB 13 3 03/07/2022 0729    HGB 12 7 08/25/2021 0749    HGB 13 0 03/01/2019 0759    HGB 12 1 05/03/2015 0630    HGB 12 4 05/01/2015 0619    HGB 13 0 04/30/2015 2355    WBC 7 01 03/07/2022 0729    WBC 8 02 08/25/2021 0749    WBC 6 20 03/01/2019 0759    WBC 6 75 05/03/2015 0630    WBC 8 98 05/01/2015 0619    WBC 10 44 (H) 04/30/2015 2355       Meds:    Current Outpatient Medications:     levothyroxine 100 mcg tablet, Take 1 tablet (100 mcg total) by mouth daily, Disp: 90 tablet, Rfl: 1    pravastatin (PRAVACHOL) 20 mg tablet, Take 20 mg plus 40 mg total 60 mg daily, Disp: 90 tablet, Rfl: 1    pravastatin (PRAVACHOL) 40 mg tablet, Take 1 tablet (40 mg total) by mouth daily, Disp: 90 tablet, Rfl: 2    levothyroxine 88 mcg tablet, take 1 tablet by mouth once daily (Patient not taking: Reported on 6/3/2022), Disp: 90 tablet, Rfl: 0      Physical Exam:   Vitals:    06/03/22 1124   BP: 143/80   Pulse: 61       General Appearance:    Alert, cooperative, no distress, appears stated age   Head:    Normocephalic, without obvious abnormality, atraumatic   Eyes:    conjunctiva/corneas clear, both eyes        Nose:   Nares normal, septum midline, no drainage    Throat:   Lips normal; teeth and gums normal   Neck:    symmetrical, trachea midline, ;     thyroid:  no enlargement/   Back:     Symmetric, no curvature, ROM normal   Lungs:   No audible wheezing or labored breathing   Chest Wall:    No tenderness or deformity    Heart:    Regular rate and rhythm               Pulses:   2+ and symmetric all extremities   Skin:   Skin color, texture, turgor normal, no rashes or lesions   Neurologic:   normal strength, sensation and reflexes     throughout       Musculoskeletal: bilateral lower extremity  · On examination of the right knee there is no effusion, no erythema  Range of motion to full active extension and flexion to greater than 120°  Pain on palpation medial and lateral joint lines  There is crepitus with range of motion, no warmth to palpation, bony enlargement noted  No pain on palpation pes anserine bursa region or distal iliotibial band  Stable to varus and valgus stress without pain or gapping  Negative anterior and posterior drawer testing  Sensation intact distal pulses present  · On examination of the left knee there is no effusion, no erythema  Range of motion to full active extension and flexion to greater than 120°  Pain on palpation medial and lateral joint lines  There is crepitus with range of motion, no warmth to palpation, bony enlargement noted  No pain on palpation pes anserine bursa region or distal iliotibial band  Stable to varus and valgus stress without pain or gapping  Negative anterior and posterior drawer testing  Sensation intact distal pulses present      Large joint arthrocentesis: R knee  Universal Protocol:  Consent given by: patient  Patient understanding: patient states understanding of the procedure being performed  Site marked: the operative site was marked  Patient identity confirmed: verbally with patient    Supporting Documentation  Indications: pain   Procedure Details  Location: knee - R knee  Needle size: 22 G  Approach: superior  Medications administered: 2 mL bupivacaine 0 25 %; 2 mL methylPREDNISolone acetate 40 mg/mL; 30 mg ketorolac 30 mg/mL    Patient tolerance: patient tolerated the procedure well with no immediate complications    Large joint arthrocentesis: L knee  Universal Protocol:  Consent given by: patient  Patient understanding: patient states understanding of the procedure being performed  Site marked: the operative site was marked  Patient identity confirmed: verbally with patient    Supporting Documentation  Indications: pain   Procedure Details  Location: knee - L knee  Needle size: 22 G  Approach: superior  Medications administered: 2 mL bupivacaine 0 25 %; 2 mL methylPREDNISolone acetate 40 mg/mL; 30 mg ketorolac 30 mg/mL    Patient tolerance: patient tolerated the procedure well with no immediate complications          _*_*_*_*_*_*_*_*_*_*_*_*_*_*_*_*_*_*_*_*_*_*_*_*_*_*_*_*_*_*_*_*_*_*_*_*_*_*_*_*_*    Assessment:  80 y  o female with bilateral knee chronic pain due to osteoarthritis    Plan:   · Weight bearing as tolerated  bilateral lower extremity  · Bilateral intra-articular corticosteroid and Toradol injections administered as noted above  · Patient advised should they develop any increasing pain, redness, drainage, numbness, tingling or swelling surrounding the injection sight, they are to contact our office or present to the emergency department    · Patient wished to pursue conservative management of her bilateral knees at this time  · Follow up in 3 months or sooner if needed      Marva Ferreira PA-C

## 2022-07-26 DIAGNOSIS — E78.2 MIXED HYPERLIPIDEMIA: ICD-10-CM

## 2022-07-26 RX ORDER — PRAVASTATIN SODIUM 40 MG
TABLET ORAL
Qty: 90 TABLET | Refills: 2 | Status: SHIPPED | OUTPATIENT
Start: 2022-07-26

## 2022-08-30 DIAGNOSIS — E03.9 HYPOTHYROIDISM, UNSPECIFIED TYPE: ICD-10-CM

## 2022-08-30 RX ORDER — LEVOTHYROXINE SODIUM 0.1 MG/1
TABLET ORAL
Qty: 90 TABLET | Refills: 1 | Status: SHIPPED | OUTPATIENT
Start: 2022-08-30

## 2022-09-06 ENCOUNTER — HOSPITAL ENCOUNTER (OUTPATIENT)
Dept: MAMMOGRAPHY | Facility: CLINIC | Age: 82
Discharge: HOME | End: 2022-09-06
Payer: MEDICARE

## 2022-09-06 VITALS — WEIGHT: 172 LBS | BODY MASS INDEX: 30.48 KG/M2 | HEIGHT: 63 IN

## 2022-09-06 DIAGNOSIS — Z12.31 ENCOUNTER FOR SCREENING MAMMOGRAM FOR MALIGNANT NEOPLASM OF BREAST: ICD-10-CM

## 2022-09-06 PROCEDURE — 77063 BREAST TOMOSYNTHESIS BI: CPT

## 2022-09-06 PROCEDURE — 77067 SCR MAMMO BI INCL CAD: CPT

## 2022-09-07 DIAGNOSIS — R92.8 ABNORMAL MAMMOGRAM OF BOTH BREASTS: ICD-10-CM

## 2022-09-08 ENCOUNTER — HOSPITAL ENCOUNTER (OUTPATIENT)
Dept: ULTRASOUND IMAGING | Facility: CLINIC | Age: 82
End: 2022-09-08
Payer: MEDICARE

## 2022-09-08 DIAGNOSIS — R92.8 ABNORMAL MAMMOGRAM OF BOTH BREASTS: ICD-10-CM

## 2022-09-08 PROCEDURE — 76642 ULTRASOUND BREAST LIMITED: CPT

## 2022-09-09 ENCOUNTER — OFFICE VISIT (OUTPATIENT)
Dept: OBGYN CLINIC | Facility: CLINIC | Age: 82
End: 2022-09-09
Payer: MEDICARE

## 2022-09-09 ENCOUNTER — APPOINTMENT (OUTPATIENT)
Dept: LAB | Facility: CLINIC | Age: 82
End: 2022-09-09
Payer: MEDICARE

## 2022-09-09 VITALS
HEART RATE: 66 BPM | SYSTOLIC BLOOD PRESSURE: 164 MMHG | DIASTOLIC BLOOD PRESSURE: 76 MMHG | WEIGHT: 175 LBS | HEIGHT: 63 IN | BODY MASS INDEX: 31.01 KG/M2

## 2022-09-09 DIAGNOSIS — N18.31 STAGE 3A CHRONIC KIDNEY DISEASE (HCC): ICD-10-CM

## 2022-09-09 DIAGNOSIS — M17.0 BILATERAL PRIMARY OSTEOARTHRITIS OF KNEE: Primary | ICD-10-CM

## 2022-09-09 DIAGNOSIS — E78.2 MIXED HYPERLIPIDEMIA: ICD-10-CM

## 2022-09-09 DIAGNOSIS — E03.9 HYPOTHYROIDISM, UNSPECIFIED TYPE: ICD-10-CM

## 2022-09-09 LAB
ALBUMIN SERPL BCP-MCNC: 3.2 G/DL (ref 3.5–5)
ALP SERPL-CCNC: 76 U/L (ref 46–116)
ALT SERPL W P-5'-P-CCNC: 20 U/L (ref 12–78)
ANION GAP SERPL CALCULATED.3IONS-SCNC: 1 MMOL/L (ref 4–13)
AST SERPL W P-5'-P-CCNC: 20 U/L (ref 5–45)
BILIRUB SERPL-MCNC: 0.5 MG/DL (ref 0.2–1)
BUN SERPL-MCNC: 19 MG/DL (ref 5–25)
CALCIUM ALBUM COR SERPL-MCNC: 9.4 MG/DL (ref 8.3–10.1)
CALCIUM SERPL-MCNC: 8.8 MG/DL (ref 8.3–10.1)
CHLORIDE SERPL-SCNC: 107 MMOL/L (ref 96–108)
CHOLEST SERPL-MCNC: 271 MG/DL
CO2 SERPL-SCNC: 30 MMOL/L (ref 21–32)
CREAT SERPL-MCNC: 1.12 MG/DL (ref 0.6–1.3)
GFR SERPL CREATININE-BSD FRML MDRD: 46 ML/MIN/1.73SQ M
GLUCOSE P FAST SERPL-MCNC: 91 MG/DL (ref 65–99)
HDLC SERPL-MCNC: 47 MG/DL
LDLC SERPL CALC-MCNC: 178 MG/DL (ref 0–100)
POTASSIUM SERPL-SCNC: 4.2 MMOL/L (ref 3.5–5.3)
PROT SERPL-MCNC: 7.6 G/DL (ref 6.4–8.4)
SODIUM SERPL-SCNC: 138 MMOL/L (ref 135–147)
TRIGL SERPL-MCNC: 231 MG/DL
TSH SERPL DL<=0.05 MIU/L-ACNC: 1.55 UIU/ML (ref 0.45–4.5)

## 2022-09-09 PROCEDURE — 20610 DRAIN/INJ JOINT/BURSA W/O US: CPT | Performed by: ORTHOPAEDIC SURGERY

## 2022-09-09 PROCEDURE — 80053 COMPREHEN METABOLIC PANEL: CPT

## 2022-09-09 PROCEDURE — 36415 COLL VENOUS BLD VENIPUNCTURE: CPT

## 2022-09-09 PROCEDURE — 80061 LIPID PANEL: CPT

## 2022-09-09 PROCEDURE — 84443 ASSAY THYROID STIM HORMONE: CPT

## 2022-09-09 PROCEDURE — 99214 OFFICE O/P EST MOD 30 MIN: CPT | Performed by: ORTHOPAEDIC SURGERY

## 2022-09-09 RX ORDER — METHYLPREDNISOLONE ACETATE 40 MG/ML
1 INJECTION, SUSPENSION INTRA-ARTICULAR; INTRALESIONAL; INTRAMUSCULAR; SOFT TISSUE
Status: COMPLETED | OUTPATIENT
Start: 2022-09-09 | End: 2022-09-09

## 2022-09-09 RX ORDER — KETOROLAC TROMETHAMINE 30 MG/ML
60 INJECTION, SOLUTION INTRAMUSCULAR; INTRAVENOUS
Status: COMPLETED | OUTPATIENT
Start: 2022-09-09 | End: 2022-09-09

## 2022-09-09 RX ORDER — BUPIVACAINE HYDROCHLORIDE 2.5 MG/ML
1 INJECTION, SOLUTION INFILTRATION; PERINEURAL
Status: COMPLETED | OUTPATIENT
Start: 2022-09-09 | End: 2022-09-09

## 2022-09-09 RX ADMIN — KETOROLAC TROMETHAMINE 60 MG: 30 INJECTION, SOLUTION INTRAMUSCULAR; INTRAVENOUS at 11:36

## 2022-09-09 RX ADMIN — METHYLPREDNISOLONE ACETATE 1 ML: 40 INJECTION, SUSPENSION INTRA-ARTICULAR; INTRALESIONAL; INTRAMUSCULAR; SOFT TISSUE at 11:36

## 2022-09-09 RX ADMIN — BUPIVACAINE HYDROCHLORIDE 1 ML: 2.5 INJECTION, SOLUTION INFILTRATION; PERINEURAL at 11:36

## 2022-09-09 NOTE — PROGRESS NOTES
Orthopedics          Danny Murray [de-identified] y o  female MRN: [id number]      Chief Complaint:   bilateral knee pain    HPI:   [de-identified] y  o female complaining of bilateral knee pain  Patient presents office today regarding bilateral knee pain left worse than right  Patient has been diagnosed with osteoarthritis in her knees  Patient states the pain is aching nature worse weight-bearing mildly relieved with rest   Denies instability clicking or catching bilateral knees  She states having previous injection 3 months ago gave her significant pain relief however pain since returned  Patient states the pain does aggravating activities of daily living she denies any changes numbness tingling bilateral lower extremities  Review Of Systems:   · Skin: Normal  · Neuro: See HPI  · Musculoskeletal: See HPI  · All other systems reviewed and are negative    Past Medical History:   History reviewed  No pertinent past medical history      Past Surgical History:   Past Surgical History:   Procedure Laterality Date    BREAST CYST EXCISION  1982    cyst removals and aspirations-benign    HYSTERECTOMY      at age 60    OOPHORECTOMY Bilateral     at age 60    STOMACH SURGERY         Family History:  Family history reviewed and non-contributory  Family History   Problem Relation Age of Onset    Heart disease Sister     Breast cancer Sister 68    Ovarian cancer Sister 77    Breast cancer Sister 80    Cervical cancer Paternal Aunt     Breast cancer Cousin     Breast cancer Cousin     Hyperlipidemia Family          Social History:  Social History     Socioeconomic History    Marital status: /Civil Union     Spouse name: None    Number of children: None    Years of education: None    Highest education level: None   Occupational History    None   Tobacco Use    Smoking status: Never Smoker    Smokeless tobacco: Never Used   Substance and Sexual Activity    Alcohol use: None    Drug use: None    Sexual activity: None   Other Topics Concern    None   Social History Narrative    None     Social Determinants of Health     Financial Resource Strain: Not on file   Food Insecurity: Not on file   Transportation Needs: Not on file   Physical Activity: Not on file   Stress: Not on file   Social Connections: Not on file   Intimate Partner Violence: Not on file   Housing Stability: Not on file       Allergies: Allergies   Allergen Reactions    Percocet  [Oxycodone-Acetaminophen]     Statins      Other reaction(s): ELEVATED LFTS  Category:  Adverse Reaction;     Vicodin  [Hydrocodone-Acetaminophen]        Labs:  0   Lab Value Date/Time    HCT 39 9 03/07/2022 0729    HCT 39 5 08/25/2021 0749    HCT 41 6 03/01/2019 0759    HCT 37 9 05/03/2015 0630    HCT 38 8 05/01/2015 0619    HCT 40 1 04/30/2015 2355    HGB 13 3 03/07/2022 0729    HGB 12 7 08/25/2021 0749    HGB 13 0 03/01/2019 0759    HGB 12 1 05/03/2015 0630    HGB 12 4 05/01/2015 0619    HGB 13 0 04/30/2015 2355    WBC 7 01 03/07/2022 0729    WBC 8 02 08/25/2021 0749    WBC 6 20 03/01/2019 0759    WBC 6 75 05/03/2015 0630    WBC 8 98 05/01/2015 0619    WBC 10 44 (H) 04/30/2015 2355       Meds:    Current Outpatient Medications:     levothyroxine 100 mcg tablet, take 1 tablet by mouth once daily, Disp: 90 tablet, Rfl: 1    levothyroxine 88 mcg tablet, take 1 tablet by mouth once daily (Patient not taking: Reported on 6/3/2022), Disp: 90 tablet, Rfl: 0    pravastatin (PRAVACHOL) 20 mg tablet, Take 20 mg plus 40 mg total 60 mg daily, Disp: 90 tablet, Rfl: 1    pravastatin (PRAVACHOL) 40 mg tablet, take 1 tablet by mouth once daily, Disp: 90 tablet, Rfl: 2      Physical Exam:   Vitals:    09/09/22 1109   BP: 164/76   Pulse: 66       General Appearance:    Alert, cooperative, no distress, appears stated age   Head:    Normocephalic, without obvious abnormality, atraumatic   Eyes:    conjunctiva/corneas clear, both eyes        Nose:   Nares normal, septum midline, no drainage Throat:   Lips normal; teeth and gums normal   Neck:    symmetrical, trachea midline, ;     thyroid:  no enlargement/   Back:     Symmetric, no curvature, ROM normal   Lungs:   No audible wheezing or labored breathing   Chest Wall:    No tenderness or deformity    Heart:    Regular rate and rhythm               Pulses:   2+ and symmetric all extremities   Skin:   Skin color, texture, turgor normal, no rashes or lesions   Neurologic:   normal strength, sensation and reflexes     throughout       Musculoskeletal: bilateral lower extremity  · On examination of the right knee there is no effusion, no erythema  Range of motion to full active extension and flexion to greater than 120°  Pain on palpation medial and lateral joint lines  There is crepitus with range of motion, no warmth to palpation, bony enlargement noted  No pain on palpation pes anserine bursa region or distal iliotibial band  Stable to varus and valgus stress without pain or gapping  Negative anterior and posterior drawer testing  Sensation intact distal pulses present  · On examination of the left knee there is no effusion, no erythema  Range of motion to full active extension and flexion to greater than 120°  Pain on palpation medial and lateral joint lines  There is crepitus with range of motion, no warmth to palpation, bony enlargement noted  No pain on palpation pes anserine bursa region or distal iliotibial band  Stable to varus and valgus stress without pain or gapping  Negative anterior and posterior drawer testing  Sensation intact distal pulses present      Large joint arthrocentesis: bilateral knee  Universal Protocol:  Consent given by: patient  Patient identity confirmed: verbally with patient    Supporting Documentation  Indications: pain   Procedure Details  Location: knee - bilateral knee  Needle size: 22 G  Approach: anterolateral    Medications (Right): 1 mL bupivacaine 0 25 %; 1 mL methylPREDNISolone acetate 40 mg/mL; 60 mg ketorolac 60 mg/2 mLMedications (Left): 1 mL bupivacaine 0 25 %; 1 mL methylPREDNISolone acetate 40 mg/mL; 60 mg ketorolac 60 mg/2 mL   Patient tolerance: patient tolerated the procedure well with no immediate complications  Dressing:  Sterile dressing applied          _*_*_*_*_*_*_*_*_*_*_*_*_*_*_*_*_*_*_*_*_*_*_*_*_*_*_*_*_*_*_*_*_*_*_*_*_*_*_*_*_*    Assessment:  [de-identified] y  o female with bilateral knee chronic pain due to osteoarthritis    Plan:   · Weight bearing as tolerated  bilateral lower extremity  · Bilateral intra-articular corticosteroid and Toradol injections administered as noted above  · Patient advised should they develop any increasing pain, redness, drainage, numbness, tingling or swelling surrounding the injection sight, they are to contact our office or present to the emergency department  · Patient wished to pursue conservative management of her bilateral knees at this time  · Follow up in 3 months or sooner if needed

## 2022-09-15 ENCOUNTER — OFFICE VISIT (OUTPATIENT)
Dept: FAMILY MEDICINE CLINIC | Facility: CLINIC | Age: 82
End: 2022-09-15
Payer: MEDICARE

## 2022-09-15 VITALS
SYSTOLIC BLOOD PRESSURE: 124 MMHG | WEIGHT: 172 LBS | DIASTOLIC BLOOD PRESSURE: 80 MMHG | TEMPERATURE: 99.1 F | HEART RATE: 76 BPM | BODY MASS INDEX: 30.48 KG/M2 | HEIGHT: 63 IN | OXYGEN SATURATION: 94 %

## 2022-09-15 DIAGNOSIS — Z23 ENCOUNTER FOR IMMUNIZATION: ICD-10-CM

## 2022-09-15 DIAGNOSIS — E78.2 MIXED HYPERLIPIDEMIA: Primary | ICD-10-CM

## 2022-09-15 DIAGNOSIS — Z13.31 ENCOUNTER FOR SCREENING FOR DEPRESSION: ICD-10-CM

## 2022-09-15 DIAGNOSIS — M17.10 ARTHRITIS OF KNEE: ICD-10-CM

## 2022-09-15 DIAGNOSIS — Z78.0 POSTMENOPAUSAL ESTROGEN DEFICIENCY: ICD-10-CM

## 2022-09-15 DIAGNOSIS — Z00.00 MEDICARE ANNUAL WELLNESS VISIT, SUBSEQUENT: ICD-10-CM

## 2022-09-15 DIAGNOSIS — Z13.820 SCREENING FOR OSTEOPOROSIS: ICD-10-CM

## 2022-09-15 DIAGNOSIS — E03.9 HYPOTHYROIDISM, UNSPECIFIED TYPE: ICD-10-CM

## 2022-09-15 PROBLEM — Z12.11 SCREEN FOR COLON CANCER: Status: RESOLVED | Noted: 2019-01-08 | Resolved: 2022-09-15

## 2022-09-15 PROBLEM — R68.89 COLD INTOLERANCE: Status: RESOLVED | Noted: 2021-12-09 | Resolved: 2022-09-15

## 2022-09-15 PROCEDURE — 99214 OFFICE O/P EST MOD 30 MIN: CPT | Performed by: FAMILY MEDICINE

## 2022-09-15 PROCEDURE — G0439 PPPS, SUBSEQ VISIT: HCPCS | Performed by: FAMILY MEDICINE

## 2022-09-15 PROCEDURE — G0444 DEPRESSION SCREEN ANNUAL: HCPCS | Performed by: FAMILY MEDICINE

## 2022-09-15 PROCEDURE — 90662 IIV NO PRSV INCREASED AG IM: CPT | Performed by: FAMILY MEDICINE

## 2022-09-15 PROCEDURE — G0008 ADMIN INFLUENZA VIRUS VAC: HCPCS | Performed by: FAMILY MEDICINE

## 2022-09-15 RX ORDER — PRAVASTATIN SODIUM 20 MG
TABLET ORAL
Qty: 90 TABLET | Refills: 1 | Status: SHIPPED | OUTPATIENT
Start: 2022-09-15

## 2022-09-15 NOTE — ASSESSMENT & PLAN NOTE
Cholesterol remains elevated but slightly improved  Continue pravastatin 60 mg daily  Patient has not tolerated higher doses of pravastatin or other statins  no

## 2022-09-15 NOTE — PROGRESS NOTES
Assessment and Plan:     Problem List Items Addressed This Visit        Endocrine    Hypothyroidism     TSH in range on current dose  Continue 100 mcg levothyroxine         Relevant Orders    TSH, 3rd generation       Musculoskeletal and Integument    Arthritis of knee     Patient is following with Orthopedics            Other    Hyperlipidemia - Primary     Cholesterol remains elevated but slightly improved  Continue pravastatin 60 mg daily  Patient has not tolerated higher doses of pravastatin or other statins  Relevant Medications    pravastatin (PRAVACHOL) 20 mg tablet    Other Relevant Orders    Comprehensive metabolic panel    Lipid Panel with Direct LDL reflex    Screening for osteoporosis     DEXA ordered         Relevant Orders    DXA bone density spine hip and pelvis      Other Visit Diagnoses     Encounter for immunization        Relevant Orders    FLUZONE HIGH-DOSE: influenza vaccine, high-dose, preservative-free 0 7 mL (Completed)    Medicare annual wellness visit, subsequent        Postmenopausal estrogen deficiency        Relevant Orders    DXA bone density spine hip and pelvis    Encounter for screening for depression            BMI Counseling: Body mass index is 30 96 kg/m²  The BMI is above normal  Nutrition recommendations include encouraging healthy choices of fruits and vegetables  Exercise recommendations include exercising 3-5 times per week  No pharmacotherapy was ordered  Rationale for BMI follow-up plan is due to patient being overweight or obese  Preventive health issues were discussed with patient, and age appropriate screening tests were ordered as noted in patient's After Visit Summary  Personalized health advice and appropriate referrals for health education or preventive services given if needed, as noted in patient's After Visit Summary  History of Present Illness:     Patient presents for a Medicare Wellness Visit    Here with daughter  Had labs done    Lipids -She is on Pravastatin 60 mg daily  Cholesterol has improved, but still high  She has had difficulty tolerating 80 mg of pravastatin-she becomes achy  She has also had trouble tolerating other statins  CKD - stable GFR 46  Thyroid-TSH is in range she is currently on levothyroxine 100 mcg daily  She has bilateral knee pain  She is seeing orthopedics and having injections done  She is considering surgery but has concerns about anesthesia and the recovery time as she has a special needs son  Patient Care Team:  Ermelinda Rosales MD as PCP - General     Review of Systems:     Review of Systems   Constitutional: Negative for activity change  HENT: Positive for hearing loss (Wearing hearing aids)  Respiratory: Negative for chest tightness and shortness of breath  Cardiovascular: Positive for leg swelling (Wearing compression stocking)  Negative for chest pain  Musculoskeletal: Positive for arthralgias  Neurological: Negative for headaches  Psychiatric/Behavioral: Negative for dysphoric mood  The patient is not nervous/anxious  Problem List:     Patient Active Problem List   Diagnosis    Hyperlipidemia    Hypothyroidism    Venous stasis of lower extremity    Age-related cataract of both eyes    Bilateral hearing loss    Arthritis of knee    Screening for osteoporosis    Breast lump in female    Arthritis of right knee    Class 1 obesity due to excess calories with serious comorbidity and body mass index (BMI) of 31 0 to 31 9 in adult    Ganglion cyst of wrist, right    Chronic pain of both knees    Gastroesophageal reflux disease    Stage 3a chronic kidney disease ([address] )    Cerumen debris on tympanic membrane of right ear      Past Medical and Surgical History:     No past medical history on file    Past Surgical History:   Procedure Laterality Date    BREAST CYST EXCISION  1982    cyst removals and aspirations-benign    HYSTERECTOMY      at age 60    OOPHORECTOMY Bilateral at age 60    STOMACH SURGERY        Family History:     Family History   Problem Relation Age of Onset    Heart disease Sister     Breast cancer Sister 68    Ovarian cancer Sister 77    Breast cancer Sister 80    Cervical cancer Paternal Aunt     Breast cancer Cousin     Breast cancer Cousin     Hyperlipidemia Family       Social History:     Social History     Socioeconomic History    Marital status: /Civil Union     Spouse name: Not on file    Number of children: Not on file    Years of education: Not on file    Highest education level: Not on file   Occupational History    Not on file   Tobacco Use    Smoking status: Never Smoker    Smokeless tobacco: Never Used   Substance and Sexual Activity    Alcohol use: Not on file    Drug use: Not on file    Sexual activity: Not on file   Other Topics Concern    Not on file   Social History Narrative    Not on file     Social Determinants of Health     Financial Resource Strain: Low Risk     Difficulty of Paying Living Expenses: Not very hard   Food Insecurity: Not on file   Transportation Needs: No Transportation Needs    Lack of Transportation (Medical): No    Lack of Transportation (Non-Medical): No   Physical Activity: Not on file   Stress: Not on file   Social Connections: Not on file   Intimate Partner Violence: Not on file   Housing Stability: Not on file      Medications and Allergies:     Current Outpatient Medications   Medication Sig Dispense Refill    levothyroxine 100 mcg tablet take 1 tablet by mouth once daily 90 tablet 1    pravastatin (PRAVACHOL) 20 mg tablet Take 20 mg plus 40 mg total 60 mg daily 90 tablet 1    pravastatin (PRAVACHOL) 40 mg tablet take 1 tablet by mouth once daily 90 tablet 2     No current facility-administered medications for this visit  Allergies   Allergen Reactions    Percocet  [Oxycodone-Acetaminophen]     Statins      Other reaction(s): ELEVATED LFTS  Category:  Adverse Reaction;     Vicodin [Hydrocodone-Acetaminophen]       Immunizations:     Immunization History   Administered Date(s) Administered    COVID-19 MODERNA VACC 0 5 ML IM 01/27/2021, 02/24/2021, 11/02/2021    INFLUENZA 10/01/2016    Influenza Quadrivalent Preservative Free 3 years and older IM 09/15/2017    Influenza Split High Dose Preservative Free IM 09/12/2013, 10/21/2015, 10/01/2016    Influenza, high dose seasonal 0 7 mL 10/15/2018, 09/10/2020, 09/02/2021, 09/15/2022    Pneumococcal Conjugate 13-Valent 10/21/2015    Pneumococcal Polysaccharide PPV23 07/23/2013    influenza, trivalent, adjuvanted 10/11/2019      Health Maintenance: There are no preventive care reminders to display for this patient  Topic Date Due    COVID-19 Vaccine (4 - Booster for Moderna series) 03/02/2022      Medicare Screening Tests and Risk Assessments:     Keara Smith is here for her Subsequent Wellness visit  Last Medicare Wellness visit information reviewed, patient interviewed and updates made to the record today  Health Risk Assessment:   Patient rates overall health as fair  Patient feels that their physical health rating is same  Patient is satisfied with their life  Eyesight was rated as slightly worse  Hearing was rated as same  Patient feels that their emotional and mental health rating is same  Patients states they are never, rarely angry  Patient states they are sometimes unusually tired/fatigued  Pain experienced in the last 7 days has been some  Patient's pain rating has been 7/10  Patient states that she has experienced no weight loss or gain in last 6 months  Fall Risk Screening: In the past year, patient has experienced: no history of falling in past year      Urinary Incontinence Screening:   Patient has not leaked urine accidently in the last six months  Home Safety:  Patient has trouble with stairs inside or outside of their home  Patient has working smoke alarms and has working carbon monoxide detector   Home safety hazards include: none  Nutrition:   Current diet is Regular  Medications:   Patient is not currently taking any over-the-counter supplements  Patient is able to manage medications  Activities of Daily Living (ADLs)/Instrumental Activities of Daily Living (IADLs):   Walk and transfer into and out of bed and chair?: Yes  Dress and groom yourself?: Yes    Bathe or shower yourself?: Yes    Feed yourself? Yes  Do your laundry/housekeeping?: Yes  Manage your money, pay your bills and track your expenses?: Yes  Make your own meals?: Yes    Do your own shopping?: Yes    Previous Hospitalizations:   Any hospitalizations or ED visits within the last 12 months?: No      Advance Care Planning:   Living will: No    Durable POA for healthcare: Yes    Advanced directive: No      Cognitive Screening:   Provider or family/friend/caregiver concerned regarding cognition?: No    PREVENTIVE SCREENINGS      Cardiovascular Screening:    General: Screening Not Indicated, History Lipid Disorder, Risks and Benefits Discussed and Screening Current      Diabetes Screening:     General: Screening Current and Risks and Benefits Discussed      Colorectal Cancer Screening:     General: Screening Not Indicated      Breast Cancer Screening:     General: Screening Current      Cervical Cancer Screening:    General: Screening Not Indicated      Osteoporosis Screening:    General: Risks and Benefits Discussed    Due for: DXA Axial      Abdominal Aortic Aneurysm (AAA) Screening:        General: Screening Not Indicated      Lung Cancer Screening:     General: Screening Not Indicated      Hepatitis C Screening:    General: Screening Not Indicated    Screening, Brief Intervention, and Referral to Treatment (SBIRT)    Screening  Typical number of drinks in a day: 0  Typical number of drinks in a week: 0  Interpretation: Low risk drinking behavior  Brief Intervention  Alcohol & drug use screenings were reviewed   No concerns regarding substance use disorder identified  No exam data present     Physical Exam:     /80 (BP Location: Left arm, Patient Position: Sitting, Cuff Size: Large)   Pulse 76   Temp 99 1 °F (37 3 °C)   Ht 5' 2 5" (1 588 m)   Wt 78 kg (172 lb)   SpO2 94%   BMI 30 96 kg/m²     Physical Exam  Vitals and nursing note reviewed  Constitutional:       General: She is not in acute distress  Appearance: She is well-developed  She is obese  She is not diaphoretic  HENT:      Head: Normocephalic and atraumatic  Right Ear: Tympanic membrane, ear canal and external ear normal       Left Ear: Tympanic membrane, ear canal and external ear normal       Ears:      Comments: Hearing aids     Mouth/Throat:      Mouth: Mucous membranes are moist       Pharynx: Oropharynx is clear  No oropharyngeal exudate  Eyes:      Conjunctiva/sclera: Conjunctivae normal       Pupils: Pupils are equal, round, and reactive to light  Cardiovascular:      Rate and Rhythm: Normal rate and regular rhythm  Heart sounds: Normal heart sounds  No murmur heard  No friction rub  No gallop  Pulmonary:      Effort: Pulmonary effort is normal  No respiratory distress  Breath sounds: Normal breath sounds  No stridor  No wheezing or rales  Chest:      Chest wall: No tenderness  Musculoskeletal:         General: Swelling present  Right knee: Swelling present  Decreased range of motion  Left knee: Swelling present  Decreased range of motion  Right lower leg: Edema present  Left lower leg: Edema present  Neurological:      General: No focal deficit present  Mental Status: She is alert  Mental status is at baseline  Cranial Nerves: No cranial nerve deficit  Psychiatric:         Mood and Affect: Mood normal          Behavior: Behavior normal          Thought Content:  Thought content normal          Judgment: Judgment normal           Hossein Mitchell MD

## 2022-09-15 NOTE — PATIENT INSTRUCTIONS
Medicare Preventive Visit Patient Instructions  Thank you for completing your Welcome to Medicare Visit or Medicare Annual Wellness Visit today  Your next wellness visit will be due in one year (9/16/2023)  The screening/preventive services that you may require over the next 5-10 years are detailed below  Some tests may not apply to you based off risk factors and/or age  Screening tests ordered at today's visit but not completed yet may show as past due  Also, please note that scanned in results may not display below  Preventive Screenings:  Service Recommendations Previous Testing/Comments   Colorectal Cancer Screening  * Colonoscopy    * Fecal Occult Blood Test (FOBT)/Fecal Immunochemical Test (FIT)  * Fecal DNA/Cologuard Test  * Flexible Sigmoidoscopy Age: 39-70 years old   Colonoscopy: every 10 years (may be performed more frequently if at higher risk)  OR  FOBT/FIT: every 1 year  OR  Cologuard: every 3 years  OR  Sigmoidoscopy: every 5 years  Screening may be recommended earlier than age 39 if at higher risk for colorectal cancer  Also, an individualized decision between you and your healthcare provider will decide whether screening between the ages of 74-80 would be appropriate  Colonoscopy: Not on file  FOBT/FIT: Not on file  Cologuard: Not on file  Sigmoidoscopy: Not on file          Breast Cancer Screening Age: 36 years old  Frequency: every 1-2 years  Not required if history of left and right mastectomy Mammogram: 09/06/2022    Screening Current   Cervical Cancer Screening Between the ages of 21-29, pap smear recommended once every 3 years  Between the ages of 33-67, can perform pap smear with HPV co-testing every 5 years     Recommendations may differ for women with a history of total hysterectomy, cervical cancer, or abnormal pap smears in past  Pap Smear: Not on file    Screening Not Indicated   Hepatitis C Screening Once for adults born between 1945 and 1965  More frequently in patients at high risk for Hepatitis C Hep C Antibody: Not on file        Diabetes Screening 1-2 times per year if you're at risk for diabetes or have pre-diabetes Fasting glucose: 91 mg/dL (9/9/2022)  A1C: No results in last 5 years (No results in last 5 years)  Screening Current   Cholesterol Screening Once every 5 years if you don't have a lipid disorder  May order more often based on risk factors  Lipid panel: 09/09/2022    Screening Not Indicated  History Lipid Disorder     Other Preventive Screenings Covered by Medicare:  1  Abdominal Aortic Aneurysm (AAA) Screening: covered once if your at risk  You're considered to be at risk if you have a family history of AAA  2  Lung Cancer Screening: covers low dose CT scan once per year if you meet all of the following conditions: (1) Age 50-69; (2) No signs or symptoms of lung cancer; (3) Current smoker or have quit smoking within the last 15 years; (4) You have a tobacco smoking history of at least 20 pack years (packs per day multiplied by number of years you smoked); (5) You get a written order from a healthcare provider  3  Glaucoma Screening: covered annually if you're considered high risk: (1) You have diabetes OR (2) Family history of glaucoma OR (3)  aged 48 and older OR (3)  American aged 72 and older  3  Osteoporosis Screening: covered every 2 years if you meet one of the following conditions: (1) You're estrogen deficient and at risk for osteoporosis based off medical history and other findings; (2) Have a vertebral abnormality; (3) On glucocorticoid therapy for more than 3 months; (4) Have primary hyperparathyroidism; (5) On osteoporosis medications and need to assess response to drug therapy  · Last bone density test (DXA Scan): 01/17/2019   5  HIV Screening: covered annually if you're between the age of 15-65  Also covered annually if you are younger than 13 and older than 72 with risk factors for HIV infection   For pregnant patients, it is covered up to 3 times per pregnancy  Immunizations:  Immunization Recommendations   Influenza Vaccine Annual influenza vaccination during flu season is recommended for all persons aged >= 6 months who do not have contraindications   Pneumococcal Vaccine   * Pneumococcal conjugate vaccine = PCV13 (Prevnar 13), PCV15 (Vaxneuvance), PCV20 (Prevnar 20)  * Pneumococcal polysaccharide vaccine = PPSV23 (Pneumovax) Adults 25-60 years old: 1-3 doses may be recommended based on certain risk factors  Adults 72 years old: 1-2 doses may be recommended based off what pneumonia vaccine you previously received   Hepatitis B Vaccine 3 dose series if at intermediate or high risk (ex: diabetes, end stage renal disease, liver disease)   Tetanus (Td) Vaccine - COST NOT COVERED BY MEDICARE PART B Following completion of primary series, a booster dose should be given every 10 years to maintain immunity against tetanus  Td may also be given as tetanus wound prophylaxis  Tdap Vaccine - COST NOT COVERED BY MEDICARE PART B Recommended at least once for all adults  For pregnant patients, recommended with each pregnancy  Shingles Vaccine (Shingrix) - COST NOT COVERED BY MEDICARE PART B  2 shot series recommended in those aged 48 and above     Health Maintenance Due:  There are no preventive care reminders to display for this patient  Immunizations Due:      Topic Date Due    COVID-19 Vaccine (4 - Booster for Moderna series) 03/02/2022    Influenza Vaccine (1) 09/01/2022     Advance Directives   What are advance directives? Advance directives are legal documents that state your wishes and plans for medical care  These plans are made ahead of time in case you lose your ability to make decisions for yourself  Advance directives can apply to any medical decision, such as the treatments you want, and if you want to donate organs  What are the types of advance directives?   There are many types of advance directives, and each state has rules about how to use them  You may choose a combination of any of the following:  · Living will: This is a written record of the treatment you want  You can also choose which treatments you do not want, which to limit, and which to stop at a certain time  This includes surgery, medicine, IV fluid, and tube feedings  · Durable power of  for healthcare [facility]): This is a written record that states who you want to make healthcare choices for you when you are unable to make them for yourself  This person, called a proxy, is usually a family member or a friend  You may choose more than 1 proxy  · Do not resuscitate (DNR) order:  A DNR order is used in case your heart stops beating or you stop breathing  It is a request not to have certain forms of treatment, such as CPR  A DNR order may be included in other types of advance directives  · Medical directive: This covers the care that you want if you are in a coma, near death, or unable to make decisions for yourself  You can list the treatments you want for each condition  Treatment may include pain medicine, surgery, blood transfusions, dialysis, IV or tube feedings, and a ventilator (breathing machine)  · Values history: This document has questions about your views, beliefs, and how you feel and think about life  This information can help others choose the care that you would choose  Why are advance directives important? An advance directive helps you control your care  Although spoken wishes may be used, it is better to have your wishes written down  Spoken wishes can be misunderstood, or not followed  Treatments may be given even if you do not want them  An advance directive may make it easier for your family to make difficult choices about your care     Weight Management   Why it is important to manage your weight:  Being overweight increases your risk of health conditions such as heart disease, high blood pressure, type 2 diabetes, and certain types of cancer  It can also increase your risk for osteoarthritis, sleep apnea, and other respiratory problems  Aim for a slow, steady weight loss  Even a small amount of weight loss can lower your risk of health problems  How to lose weight safely:  A safe and healthy way to lose weight is to eat fewer calories and get regular exercise  You can lose up about 1 pound a week by decreasing the number of calories you eat by 500 calories each day  Healthy meal plan for weight management:  A healthy meal plan includes a variety of foods, contains fewer calories, and helps you stay healthy  A healthy meal plan includes the following:  · Eat whole-grain foods more often  A healthy meal plan should contain fiber  Fiber is the part of grains, fruits, and vegetables that is not broken down by your body  Whole-grain foods are healthy and provide extra fiber in your diet  Some examples of whole-grain foods are whole-wheat breads and pastas, oatmeal, brown rice, and bulgur  · Eat a variety of vegetables every day  Include dark, leafy greens such as spinach, kale, delmi greens, and mustard greens  Eat yellow and orange vegetables such as carrots, sweet potatoes, and winter squash  · Eat a variety of fruits every day  Choose fresh or canned fruit (canned in its own juice or light syrup) instead of juice  Fruit juice has very little or no fiber  · Eat low-fat dairy foods  Drink fat-free (skim) milk or 1% milk  Eat fat-free yogurt and low-fat cottage cheese  Try low-fat cheeses such as mozzarella and other reduced-fat cheeses  · Choose meat and other protein foods that are low in fat  Choose beans or other legumes such as split peas or lentils  Choose fish, skinless poultry (chicken or turkey), or lean cuts of red meat (beef or pork)  Before you cook meat or poultry, cut off any visible fat  · Use less fat and oil  Try baking foods instead of frying them   Add less fat, such as margarine, sour cream, regular salad dressing and mayonnaise to foods  Eat fewer high-fat foods  Some examples of high-fat foods include french fries, doughnuts, ice cream, and cakes  · Eat fewer sweets  Limit foods and drinks that are high in sugar  This includes candy, cookies, regular soda, and sweetened drinks  Exercise:  Exercise at least 30 minutes per day on most days of the week  Some examples of exercise include walking, biking, dancing, and swimming  You can also fit in more physical activity by taking the stairs instead of the elevator or parking farther away from stores  Ask your healthcare provider about the best exercise plan for you  © Copyright [employer] 2018 Information is for End User's use only and may not be sold, redistributed or otherwise used for commercial purposes   All illustrations and images included in CareNotes® are the copyrighted property of A D A M , Inc  or [address] [place] [address] [facility]

## 2022-10-11 PROBLEM — H61.21 CERUMEN DEBRIS ON TYMPANIC MEMBRANE OF RIGHT EAR: Status: RESOLVED | Noted: 2022-03-09 | Resolved: 2022-10-11

## 2022-10-21 ENCOUNTER — HOSPITAL ENCOUNTER (OUTPATIENT)
Dept: BONE DENSITY | Facility: CLINIC | Age: 82
Discharge: HOME | End: 2022-10-21
Payer: MEDICARE

## 2022-10-21 DIAGNOSIS — Z13.820 SCREENING FOR OSTEOPOROSIS: ICD-10-CM

## 2022-10-21 DIAGNOSIS — Z78.0 POSTMENOPAUSAL ESTROGEN DEFICIENCY: ICD-10-CM

## 2022-10-21 PROCEDURE — 77080 DXA BONE DENSITY AXIAL: CPT

## 2022-12-09 ENCOUNTER — OFFICE VISIT (OUTPATIENT)
Dept: OBGYN CLINIC | Facility: CLINIC | Age: 82
End: 2022-12-09

## 2022-12-09 VITALS
DIASTOLIC BLOOD PRESSURE: 78 MMHG | WEIGHT: 173.2 LBS | SYSTOLIC BLOOD PRESSURE: 128 MMHG | BODY MASS INDEX: 31.87 KG/M2 | HEIGHT: 62 IN | HEART RATE: 61 BPM

## 2022-12-09 DIAGNOSIS — M17.0 BILATERAL PRIMARY OSTEOARTHRITIS OF KNEE: Primary | ICD-10-CM

## 2022-12-09 RX ORDER — KETOROLAC TROMETHAMINE 30 MG/ML
30 INJECTION, SOLUTION INTRAMUSCULAR; INTRAVENOUS
Status: COMPLETED | OUTPATIENT
Start: 2022-12-09 | End: 2022-12-09

## 2022-12-09 RX ORDER — BUPIVACAINE HYDROCHLORIDE 2.5 MG/ML
2 INJECTION, SOLUTION INFILTRATION; PERINEURAL
Status: COMPLETED | OUTPATIENT
Start: 2022-12-09 | End: 2022-12-09

## 2022-12-09 RX ORDER — METHYLPREDNISOLONE ACETATE 40 MG/ML
2 INJECTION, SUSPENSION INTRA-ARTICULAR; INTRALESIONAL; INTRAMUSCULAR; SOFT TISSUE
Status: COMPLETED | OUTPATIENT
Start: 2022-12-09 | End: 2022-12-09

## 2022-12-09 RX ADMIN — BUPIVACAINE HYDROCHLORIDE 2 ML: 2.5 INJECTION, SOLUTION INFILTRATION; PERINEURAL at 10:28

## 2022-12-09 RX ADMIN — KETOROLAC TROMETHAMINE 30 MG: 30 INJECTION, SOLUTION INTRAMUSCULAR; INTRAVENOUS at 10:28

## 2022-12-09 RX ADMIN — METHYLPREDNISOLONE ACETATE 2 ML: 40 INJECTION, SUSPENSION INTRA-ARTICULAR; INTRALESIONAL; INTRAMUSCULAR; SOFT TISSUE at 10:28

## 2022-12-09 NOTE — PROGRESS NOTES
Orthopedics          Minor Yoder [de-identified] y o  female MRN: [id number]      Chief Complaint:   bilateral knee pain    HPI:   [de-identified] y  o female complaining of bilateral knee pain  Patient presents office today regarding bilateral knee pain due to osteoarthritis  Patient states the pain is aching in nature worse with weightbearing mildly relieved with rest   Patient did have significant pain relief with the previous injections always wishing to pursue further injections of her bilateral knees                  Review Of Systems:   · Skin: Normal  · Neuro: See HPI  · Musculoskeletal: See HPI  · All other systems reviewed and are negative    Past Medical History:   Past Medical History:   Diagnosis Date   • High cholesterol    • Hypothyroidism        Past Surgical History:   Past Surgical History:   Procedure Laterality Date   • BREAST CYST EXCISION  1982    cyst removals and aspirations-benign   • HYSTERECTOMY      at age 60   • OOPHORECTOMY Bilateral     at age 60   • STOMACH SURGERY         Family History:  Family history reviewed and non-contributory  Family History   Problem Relation Age of Onset   • Heart disease Sister    • Breast cancer Sister 68   • Ovarian cancer Sister 77   • Breast cancer Sister 80   • Cervical cancer Paternal Aunt    • Breast cancer Cousin    • Breast cancer Cousin    • Hyperlipidemia Family          Social History:  Social History     Socioeconomic History   • Marital status: /Civil Union     Spouse name: None   • Number of children: None   • Years of education: None   • Highest education level: None   Occupational History   • None   Tobacco Use   • Smoking status: Never   • Smokeless tobacco: Never   Substance and Sexual Activity   • Alcohol use: Not Currently   • Drug use: Not Currently   • Sexual activity: None   Other Topics Concern   • None   Social History Narrative   • None     Social Determinants of Health     Financial Resource Strain: Low Risk    • Difficulty of Paying Living Expenses: Not very hard   Food Insecurity: Not on file   Transportation Needs: No Transportation Needs   • Lack of Transportation (Medical): No   • Lack of Transportation (Non-Medical): No   Physical Activity: Not on file   Stress: Not on file   Social Connections: Not on file   Intimate Partner Violence: Not on file   Housing Stability: Not on file       Allergies: Allergies   Allergen Reactions   • Percocet [Oxycodone-Acetaminophen] GI Intolerance   • Statins      Other reaction(s): ELEVATED LFTS  Category:  Adverse Reaction;    • Vicodin [Hydrocodone-Acetaminophen] GI Intolerance       Labs:  0   Lab Value Date/Time    HCT 39 9 03/07/2022 0729    HCT 39 5 08/25/2021 0749    HCT 41 6 03/01/2019 0759    HCT 37 9 05/03/2015 0630    HCT 38 8 05/01/2015 0619    HCT 40 1 04/30/2015 2355    HGB 13 3 03/07/2022 0729    HGB 12 7 08/25/2021 0749    HGB 13 0 03/01/2019 0759    HGB 12 1 05/03/2015 0630    HGB 12 4 05/01/2015 0619    HGB 13 0 04/30/2015 2355    WBC 7 01 03/07/2022 0729    WBC 8 02 08/25/2021 0749    WBC 6 20 03/01/2019 0759    WBC 6 75 05/03/2015 0630    WBC 8 98 05/01/2015 0619    WBC 10 44 (H) 04/30/2015 2355       Meds:    Current Outpatient Medications:   •  levothyroxine 100 mcg tablet, take 1 tablet by mouth once daily, Disp: 90 tablet, Rfl: 1  •  pravastatin (PRAVACHOL) 20 mg tablet, Take 20 mg plus 40 mg total 60 mg daily (Patient taking differently: Take 20 mg by mouth daily Takes 60 mg total  One tablet 20 mg and one tablet 40 mg), Disp: 90 tablet, Rfl: 1  •  pravastatin (PRAVACHOL) 40 mg tablet, take 1 tablet by mouth once daily (Patient taking differently: Take 40 mg by mouth daily Takes 60 mg total  One tablet 20 mg and one tablet 40 mg), Disp: 90 tablet, Rfl: 2      Physical Exam:   Vitals:    12/09/22 0953   BP: 128/78   Pulse: 61       General Appearance:    Alert, cooperative, no distress, appears stated age   Head:    Normocephalic, without obvious abnormality, atraumatic   Eyes: conjunctiva/corneas clear, both eyes        Nose:   Nares normal, septum midline, no drainage    Throat:   Lips normal; teeth and gums normal   Neck:    symmetrical, trachea midline, ;     thyroid:  no enlargement/   Back:     Symmetric, no curvature, ROM normal   Lungs:   No audible wheezing or labored breathing   Chest Wall:    No tenderness or deformity    Heart:    Regular rate and rhythm               Pulses:   2+ and symmetric all extremities   Skin:   Skin color, texture, turgor normal, no rashes or lesions   Neurologic:   normal strength, sensation and reflexes     throughout       Musculoskeletal: bilateral lower extremity  · On examination of the right knee there is no effusion, no erythema  Range of motion to full active extension and flexion to greater than 120°  Pain on palpation medial and lateral joint lines  There is crepitus with range of motion, no warmth to palpation, bony enlargement noted  No pain on palpation pes anserine bursa region or distal iliotibial band  Stable to varus and valgus stress without pain or gapping  Negative anterior and posterior drawer testing  Sensation intact distal pulses present  · On examination of the left knee there is no effusion, no erythema  Range of motion to full active extension and flexion to greater than 120°  Pain on palpation medial and lateral joint lines  There is crepitus with range of motion, no warmth to palpation, bony enlargement noted  No pain on palpation pes anserine bursa region or distal iliotibial band  Stable to varus and valgus stress without pain or gapping  Negative anterior and posterior drawer testing  Sensation intact distal pulses present      Large joint arthrocentesis: R knee  Universal Protocol:  Consent given by: patient  Patient understanding: patient states understanding of the procedure being performed  Site marked: the operative site was marked  Patient identity confirmed: verbally with patient    Supporting Documentation  Indications: pain   Procedure Details  Location: knee - R knee  Needle size: 22 G  Medications administered: 2 mL bupivacaine 0 25 %; 2 mL methylPREDNISolone acetate 40 mg/mL; 30 mg ketorolac 30 mg/mL    Patient tolerance: patient tolerated the procedure well with no immediate complications    Large joint arthrocentesis: L knee  Universal Protocol:  Consent given by: patient  Patient understanding: patient states understanding of the procedure being performed  Site marked: the operative site was marked  Patient identity confirmed: verbally with patient    Supporting Documentation  Indications: pain   Procedure Details  Location: knee - L knee  Needle size: 22 G  Approach: anterolateral  Medications administered: 2 mL bupivacaine 0 25 %; 2 mL methylPREDNISolone acetate 40 mg/mL; 30 mg ketorolac 30 mg/mL    Patient tolerance: patient tolerated the procedure well with no immediate complications          _*_*_*_*_*_*_*_*_*_*_*_*_*_*_*_*_*_*_*_*_*_*_*_*_*_*_*_*_*_*_*_*_*_*_*_*_*_*_*_*_*    Assessment:  [de-identified] y  o female with bilateral knee pain due to osteoarthritis    Plan:   · Weight bearing as tolerated  bilateral lower extremity  · Bilateral knee intra-articular corticosteroid and Toradol injection administered as noted above  · Patient advised should they develop any increasing pain, redness, drainage, numbness, tingling or swelling surrounding the injection sight, they are to contact our office or present to the emergency department    · Patient does not wish to pursue surgical intervention at this time  · Follow up in 3 months or sooner if needed      Ling Poole PA-C

## 2023-01-25 DIAGNOSIS — E03.9 HYPOTHYROIDISM, UNSPECIFIED TYPE: ICD-10-CM

## 2023-01-25 RX ORDER — LEVOTHYROXINE SODIUM 88 UG/1
TABLET ORAL
Qty: 90 TABLET | Refills: 0 | Status: SHIPPED | OUTPATIENT
Start: 2023-01-25 | End: 2023-01-25 | Stop reason: DRUGHIGH

## 2023-02-15 DIAGNOSIS — E03.9 HYPOTHYROIDISM, UNSPECIFIED TYPE: ICD-10-CM

## 2023-02-16 RX ORDER — LEVOTHYROXINE SODIUM 0.1 MG/1
TABLET ORAL
Qty: 90 TABLET | Refills: 1 | Status: SHIPPED | OUTPATIENT
Start: 2023-02-16

## 2023-03-08 ENCOUNTER — APPOINTMENT (OUTPATIENT)
Dept: LAB | Facility: CLINIC | Age: 83
End: 2023-03-08

## 2023-03-08 DIAGNOSIS — E03.9 HYPOTHYROIDISM, UNSPECIFIED TYPE: ICD-10-CM

## 2023-03-08 DIAGNOSIS — E78.2 MIXED HYPERLIPIDEMIA: ICD-10-CM

## 2023-03-08 LAB
ALBUMIN SERPL BCP-MCNC: 3.7 G/DL (ref 3.5–5)
ALP SERPL-CCNC: 70 U/L (ref 46–116)
ALT SERPL W P-5'-P-CCNC: 19 U/L (ref 12–78)
ANION GAP SERPL CALCULATED.3IONS-SCNC: 4 MMOL/L (ref 4–13)
AST SERPL W P-5'-P-CCNC: 14 U/L (ref 5–45)
BILIRUB SERPL-MCNC: 0.46 MG/DL (ref 0.2–1)
BUN SERPL-MCNC: 21 MG/DL (ref 5–25)
CALCIUM SERPL-MCNC: 9.4 MG/DL (ref 8.3–10.1)
CHLORIDE SERPL-SCNC: 106 MMOL/L (ref 96–108)
CHOLEST SERPL-MCNC: 302 MG/DL
CO2 SERPL-SCNC: 28 MMOL/L (ref 21–32)
CREAT SERPL-MCNC: 0.99 MG/DL (ref 0.6–1.3)
GFR SERPL CREATININE-BSD FRML MDRD: 53 ML/MIN/1.73SQ M
GLUCOSE P FAST SERPL-MCNC: 87 MG/DL (ref 65–99)
HDLC SERPL-MCNC: 55 MG/DL
LDLC SERPL CALC-MCNC: 222 MG/DL (ref 0–100)
POTASSIUM SERPL-SCNC: 4.5 MMOL/L (ref 3.5–5.3)
PROT SERPL-MCNC: 7.5 G/DL (ref 6.4–8.4)
SODIUM SERPL-SCNC: 138 MMOL/L (ref 135–147)
TRIGL SERPL-MCNC: 123 MG/DL
TSH SERPL DL<=0.05 MIU/L-ACNC: 1.87 UIU/ML (ref 0.45–4.5)

## 2023-03-09 ENCOUNTER — RA CDI HCC (OUTPATIENT)
Dept: OTHER | Facility: HOSPITAL | Age: 83
End: 2023-03-09

## 2023-03-09 NOTE — PROGRESS NOTES
[address]  coding opportunities       Chart reviewed, no opportunity found: CHART REVIEWED, NO OPPORTUNITY FOUND        Patients Insurance     Medicare Insurance: Medicare

## 2023-03-10 ENCOUNTER — OFFICE VISIT (OUTPATIENT)
Dept: OBGYN CLINIC | Facility: CLINIC | Age: 83
End: 2023-03-10

## 2023-03-10 VITALS
SYSTOLIC BLOOD PRESSURE: 133 MMHG | HEART RATE: 65 BPM | WEIGHT: 173 LBS | BODY MASS INDEX: 31.83 KG/M2 | DIASTOLIC BLOOD PRESSURE: 80 MMHG | HEIGHT: 62 IN

## 2023-03-10 DIAGNOSIS — M25.561 CHRONIC PAIN OF BOTH KNEES: Primary | ICD-10-CM

## 2023-03-10 DIAGNOSIS — G89.29 CHRONIC PAIN OF BOTH KNEES: Primary | ICD-10-CM

## 2023-03-10 DIAGNOSIS — M17.0 PRIMARY OSTEOARTHRITIS OF BOTH KNEES: ICD-10-CM

## 2023-03-10 DIAGNOSIS — M25.562 CHRONIC PAIN OF BOTH KNEES: Primary | ICD-10-CM

## 2023-03-10 RX ORDER — METHYLPREDNISOLONE ACETATE 40 MG/ML
2 INJECTION, SUSPENSION INTRA-ARTICULAR; INTRALESIONAL; INTRAMUSCULAR; SOFT TISSUE
Status: COMPLETED | OUTPATIENT
Start: 2023-03-10 | End: 2023-03-10

## 2023-03-10 RX ORDER — KETOROLAC TROMETHAMINE 30 MG/ML
30 INJECTION, SOLUTION INTRAMUSCULAR; INTRAVENOUS
Status: COMPLETED | OUTPATIENT
Start: 2023-03-10 | End: 2023-03-10

## 2023-03-10 RX ORDER — BUPIVACAINE HYDROCHLORIDE 2.5 MG/ML
2 INJECTION, SOLUTION INFILTRATION; PERINEURAL
Status: COMPLETED | OUTPATIENT
Start: 2023-03-10 | End: 2023-03-10

## 2023-03-10 RX ADMIN — BUPIVACAINE HYDROCHLORIDE 2 ML: 2.5 INJECTION, SOLUTION INFILTRATION; PERINEURAL at 11:20

## 2023-03-10 RX ADMIN — METHYLPREDNISOLONE ACETATE 2 ML: 40 INJECTION, SUSPENSION INTRA-ARTICULAR; INTRALESIONAL; INTRAMUSCULAR; SOFT TISSUE at 11:20

## 2023-03-10 RX ADMIN — KETOROLAC TROMETHAMINE 30 MG: 30 INJECTION, SOLUTION INTRAMUSCULAR; INTRAVENOUS at 11:20

## 2023-03-10 NOTE — PROGRESS NOTES
Assessment:   Diagnosis ICD-10-CM Associated Orders   1  Chronic pain of both knees  M25 561     M25 562     G89 29       2  Primary osteoarthritis of both knees  M17 0           Plan:  • Nonoperative treatments were discussed with the patient that include localized cortisone injections into bilateral knees today  Patient wished to proceed with the injections, which she tolerated well  • Updated x-rays may need to be performed at her next follow-up since last ones where performed in 2020  To do next visit:  Return in about 3 months (around 6/10/2023)  The above stated was discussed in layman's terms and the patient expressed understanding  All questions were answered to the patient's satisfaction  Scribe Attestation    I,:  Venkatesh Boogie am acting as a scribe while in the presence of the attending physician :       I,:  Skye Rivas MD personally performed the services described in this documentation    as scribed in my presence :             Subjective:   Sola Henderson is a [de-identified] y o  female who presents today for a 3-month follow-up for her bilateral knee pain due to osteoarthritis  Patient has been receiving periodic cortisone injections for her knees that continue to provide her significant relief of her pain and discomfort  Most of her pain is located on the medial and anterior aspect of the knee  Patient states that her symptoms were relieved for about 2 to 2-1/2 months since the last injection  Review of systems negative unless otherwise specified in HPI  Review of Systems   Constitutional: Negative for chills, fever and unexpected weight change  HENT: Negative for hearing loss, nosebleeds and sore throat  Eyes: Negative for pain, redness and visual disturbance  Respiratory: Negative for cough, shortness of breath and wheezing  Cardiovascular: Negative for chest pain, palpitations and leg swelling  Gastrointestinal: Negative for abdominal pain and nausea     Genitourinary: Negative for dyspareunia, dysuria and frequency  Musculoskeletal: Positive for arthralgias  Skin: Negative for rash and wound  Neurological: Negative for dizziness, numbness and headaches  Psychiatric/Behavioral: Negative for decreased concentration and suicidal ideas  The patient is not nervous/anxious  Past Medical History:   Diagnosis Date   • High cholesterol    • Hypothyroidism        Past Surgical History:   Procedure Laterality Date   • BREAST CYST EXCISION  1982    cyst removals and aspirations-benign   • HYSTERECTOMY      at age 60   • OOPHORECTOMY Bilateral     at age 60   • STOMACH SURGERY         Family History   Problem Relation Age of Onset   • Heart disease Sister    • Breast cancer Sister 68   • Ovarian cancer Sister 77   • Breast cancer Sister 80   • Cervical cancer Paternal Aunt    • Breast cancer Cousin    • Breast cancer Cousin    • Hyperlipidemia Family        Social History     Occupational History   • Not on file   Tobacco Use   • Smoking status: Never   • Smokeless tobacco: Never   Substance and Sexual Activity   • Alcohol use: Not Currently   • Drug use: Not Currently   • Sexual activity: Not on file         Current Outpatient Medications:   •  levothyroxine 100 mcg tablet, take 1 tablet by mouth once daily, Disp: 90 tablet, Rfl: 1  •  pravastatin (PRAVACHOL) 20 mg tablet, Take 20 mg plus 40 mg total 60 mg daily (Patient taking differently: Take 20 mg by mouth daily Takes 60 mg total  One tablet 20 mg and one tablet 40 mg), Disp: 90 tablet, Rfl: 1  •  pravastatin (PRAVACHOL) 40 mg tablet, take 1 tablet by mouth once daily (Patient taking differently: Take 40 mg by mouth daily Takes 60 mg total  One tablet 20 mg and one tablet 40 mg), Disp: 90 tablet, Rfl: 2    Allergies   Allergen Reactions   • Percocet [Oxycodone-Acetaminophen] GI Intolerance   • Statins      Other reaction(s): ELEVATED LFTS  Category:  Adverse Reaction;    • Vicodin [Hydrocodone-Acetaminophen] GI Intolerance Vitals:    03/10/23 1025   BP: 133/80   Pulse: 65       Objective:    General: No apparent distress  CV: S1-S2  Chest: No audible wheezing  Abdomen: Soft                    Right Knee Exam     Tenderness   The patient is experiencing tenderness in the medial joint line  Range of Motion   Extension: 0   Flexion: 120     Other   Erythema: absent  Sensation: normal  Pulse: present  Swelling: none  Effusion: no effusion present      Left Knee Exam     Tenderness   The patient is experiencing tenderness in the medial joint line  Range of Motion   Extension: 0   Flexion: 120     Other   Erythema: absent  Sensation: normal  Pulse: present  Swelling: none  Effusion: no effusion present            Diagnostics, reviewed and taken today if performed as documented:    None performed      The attending physician has personally reviewed the pertinent films in PACS and interpretation is as follows:      Procedures, if performed today:    Large joint arthrocentesis: R knee  Universal Protocol:  Consent: Verbal consent obtained  Risks and benefits: risks, benefits and alternatives were discussed  Consent given by: patient  Time out: Immediately prior to procedure a "time out" was called to verify the correct patient, procedure, equipment, support staff and site/side marked as required    Timeout called at: 3/10/2023 11:16 AM   Patient understanding: patient states understanding of the procedure being performed  Site marked: the operative site was marked  Patient identity confirmed: verbally with patient    Supporting Documentation  Indications: pain   Procedure Details  Location: knee - R knee  Preparation: Patient was prepped and draped in the usual sterile fashion  Needle size: 22 G  Ultrasound guidance: no  Approach: anterolateral  Medications administered: 2 mL bupivacaine 0 25 %; 2 mL methylPREDNISolone acetate 40 mg/mL; 30 mg ketorolac 30 mg/mL    Patient tolerance: patient tolerated the procedure well with no immediate complications  Dressing:  Sterile dressing applied    Large joint arthrocentesis: L knee  Universal Protocol:  Consent: Verbal consent obtained  Risks and benefits: risks, benefits and alternatives were discussed  Consent given by: patient  Time out: Immediately prior to procedure a "time out" was called to verify the correct patient, procedure, equipment, support staff and site/side marked as required  Timeout called at: 3/10/2023 11:17 AM   Patient understanding: patient states understanding of the procedure being performed  Site marked: the operative site was marked  Patient identity confirmed: verbally with patient    Supporting Documentation  Indications: pain   Procedure Details  Location: knee - L knee  Preparation: Patient was prepped and draped in the usual sterile fashion  Needle size: 22 G  Ultrasound guidance: no  Approach: anterolateral  Medications administered: 2 mL bupivacaine 0 25 %; 2 mL methylPREDNISolone acetate 40 mg/mL; 30 mg ketorolac 30 mg/mL    Patient tolerance: patient tolerated the procedure well with no immediate complications  Dressing:  Sterile dressing applied            Portions of the record may have been created with voice recognition software  Occasional wrong word or "sound a like" substitutions may have occurred due to the inherent limitations of voice recognition software  Read the chart carefully and recognize, using context, where substitutions have occurred

## 2023-03-16 ENCOUNTER — OFFICE VISIT (OUTPATIENT)
Dept: FAMILY MEDICINE CLINIC | Facility: CLINIC | Age: 83
End: 2023-03-16

## 2023-03-16 VITALS
WEIGHT: 176 LBS | TEMPERATURE: 96.7 F | BODY MASS INDEX: 32.39 KG/M2 | HEIGHT: 62 IN | SYSTOLIC BLOOD PRESSURE: 124 MMHG | HEART RATE: 53 BPM | DIASTOLIC BLOOD PRESSURE: 72 MMHG | OXYGEN SATURATION: 96 %

## 2023-03-16 DIAGNOSIS — M17.10 ARTHRITIS OF KNEE: ICD-10-CM

## 2023-03-16 DIAGNOSIS — E78.2 MIXED HYPERLIPIDEMIA: Primary | ICD-10-CM

## 2023-03-16 DIAGNOSIS — N18.31 STAGE 3A CHRONIC KIDNEY DISEASE (HCC): ICD-10-CM

## 2023-03-16 DIAGNOSIS — E03.9 HYPOTHYROIDISM, UNSPECIFIED TYPE: ICD-10-CM

## 2023-03-16 DIAGNOSIS — M85.80 OSTEOPENIA, UNSPECIFIED LOCATION: ICD-10-CM

## 2023-03-16 NOTE — ASSESSMENT & PLAN NOTE
Lab Results   Component Value Date    EGFR 53 03/08/2023    EGFR 46 09/09/2022    EGFR 46 03/07/2022    CREATININE 0 99 03/08/2023    CREATININE 1 12 09/09/2022    CREATININE 1 12 03/07/2022   Stable condition  Keeping well hydrated and will update labs to monitor

## 2023-03-16 NOTE — ASSESSMENT & PLAN NOTE
Following with ortho for b/l arthritic knee pain  Nonoperative treatments were discussed with the patient at her last ortho visit and injection of localized cortisone administered into bilateral knees 3/10/23

## 2023-03-16 NOTE — PROGRESS NOTES
Name: Jo Ann Barnes      : 1940      MRN: [id number]  Encounter Provider: Oly Bui MD  Encounter Date: 3/16/2023   Encounter department: [address] [place] [address]     1  Mixed hyperlipidemia  Assessment & Plan:  Reports she is taking the full 60 of pravastatin daily, where she has previously not been taking it every day  Will update labs to monitor levels  Discussed healthy eating and exercise  Orders:  -     Lipid Panel with Direct LDL reflex; Future; Expected date: 2023    2  Stage 3a chronic kidney disease [facility])  Assessment & Plan:  Lab Results   Component Value Date    EGFR 53 2023    EGFR 46 2022    EGFR 46 2022    CREATININE 0 99 2023    CREATININE 1 12 2022    CREATININE 1 12 2022   Stable condition  Keeping well hydrated and will update labs to monitor  Orders:  -     Basic metabolic panel; Future; Expected date: 2023    3  Arthritis of knee  Assessment & Plan:  Following with ortho for b/l arthritic knee pain  Nonoperative treatments were discussed with the patient at her last ortho visit and injection of localized cortisone administered into bilateral knees 3/10/23         4  Osteopenia, unspecified location  Assessment & Plan:  Discussed Vitamin D and calcium supplements- will check levels with 6m follow up blood work    Orders:  -     Vitamin D 25 hydroxy; Future; Expected date: 2023    5  Hypothyroidism, unspecified type  Assessment & Plan:  Stable TSH of 1 870 - labs ordered to update in 6m     Orders:  -     TSH, 3rd generation; Future; Expected date: 2023      BMI Counseling: Body mass index is 32 72 kg/m²   The BMI is above normal  Nutrition recommendations include decreasing portion sizes, encouraging healthy choices of fruits and vegetables, decreasing fast food intake, consuming healthier snacks, limiting drinks that contain sugar, moderation in carbohydrate intake, increasing intake of lean protein, reducing intake of saturated and trans fat and reducing intake of cholesterol  Exercise recommendations include exercising 3-5 times per week  Patient referred to PCP  Rationale for BMI follow-up plan is due to patient being overweight or obese  Subjective      Patient presents for 6m follow up  States she is feeling well other than her usual knee pain, she is seeing ortho and getting cortisone injections last injection was on 3/10/23 which she states the injections have been providing relief but wearing off more rapidly than in the past  Reports she is aware of her elevated cholesterol and has been working to try and eat healthy but sometimes struggles with dessert  Per her daughter she has been taking the full 60mg of pravastatin about 99% of the time and has been tolerating it well  Denies any other complaints during today's visit  Review of Systems   Constitutional: Negative for activity change, appetite change, chills, fatigue and fever  HENT: Positive for hearing loss  Negative for ear pain, sore throat and trouble swallowing  Chronic hearing loss with use of b/l hearing aids  Eyes: Negative for pain and visual disturbance  Respiratory: Negative for cough, chest tightness, shortness of breath, wheezing and stridor  Cardiovascular: Negative for chest pain, palpitations and leg swelling  Gastrointestinal: Negative for abdominal distention, abdominal pain, constipation, diarrhea, nausea and vomiting  Endocrine: Negative  Genitourinary: Negative for dysuria, frequency, hematuria and urgency  Musculoskeletal: Positive for arthralgias  Negative for back pain  Skin: Negative for color change and rash  Allergic/Immunologic: Negative  Neurological: Negative for dizziness, seizures, syncope, weakness, light-headedness and headaches  Hematological: Negative  Psychiatric/Behavioral: Negative  Negative for dysphoric mood     All other systems reviewed and are negative  Current Outpatient Medications on File Prior to Visit   Medication Sig   • levothyroxine 100 mcg tablet take 1 tablet by mouth once daily   • pravastatin (PRAVACHOL) 20 mg tablet Take 20 mg plus 40 mg total 60 mg daily (Patient taking differently: Take 20 mg by mouth daily Takes 60 mg total  One tablet 20 mg and one tablet 40 mg)   • pravastatin (PRAVACHOL) 40 mg tablet take 1 tablet by mouth once daily (Patient taking differently: Take 40 mg by mouth daily Takes 60 mg total  One tablet 20 mg and one tablet 40 mg)       Objective     /72 (BP Location: Left arm, Patient Position: Sitting, Cuff Size: Large)   Pulse (!) 53   Temp (!) 96 7 °F (35 9 °C)   Ht 5' 1 5" (1 562 m)   Wt 79 8 kg (176 lb)   SpO2 96%   BMI 32 72 kg/m²     Physical Exam  Vitals and nursing note reviewed  Constitutional:       General: She is not in acute distress  Appearance: Normal appearance  She is obese  She is not ill-appearing  HENT:      Head: Normocephalic and atraumatic  Right Ear: Tympanic membrane, ear canal and external ear normal       Left Ear: Tympanic membrane, ear canal and external ear normal       Nose: Nose normal       Mouth/Throat:      Mouth: Mucous membranes are moist    Eyes:      Pupils: Pupils are equal, round, and reactive to light  Cardiovascular:      Rate and Rhythm: Normal rate and regular rhythm  Pulses: Normal pulses  Heart sounds: Normal heart sounds  Pulmonary:      Effort: Pulmonary effort is normal       Breath sounds: Normal breath sounds  Abdominal:      General: Abdomen is flat  Bowel sounds are normal  There is no distension  Palpations: Abdomen is soft  Tenderness: There is no abdominal tenderness  Musculoskeletal:         General: Tenderness present  Normal range of motion  Cervical back: Normal range of motion  Right knee: Tenderness present  Left knee: Tenderness present        Comments: B/l chronic knee pain - follow with ortho for injections    Neurological:      General: No focal deficit present  Mental Status: She is alert and oriented to person, place, and time  Mental status is at baseline  Psychiatric:         Mood and Affect: Mood normal          Behavior: Behavior normal          Thought Content:  Thought content normal        Onofre Overton MD

## 2023-03-16 NOTE — ASSESSMENT & PLAN NOTE
Reports she is taking the full 60 of pravastatin daily, where she has previously not been taking it every day  Will update labs to monitor levels  Discussed healthy eating and exercise

## 2023-05-10 DIAGNOSIS — E78.2 MIXED HYPERLIPIDEMIA: ICD-10-CM

## 2023-05-10 RX ORDER — PRAVASTATIN SODIUM 20 MG
TABLET ORAL
Qty: 90 TABLET | Refills: 0 | Status: SHIPPED | OUTPATIENT
Start: 2023-05-10

## 2023-05-21 ENCOUNTER — HOSPITAL ENCOUNTER (EMERGENCY)
Facility: HOSPITAL | Age: 83
Discharge: HOME | End: 2023-05-21
Attending: EMERGENCY MEDICINE

## 2023-05-21 ENCOUNTER — APPOINTMENT (EMERGENCY)
Dept: CT IMAGING | Facility: HOSPITAL | Age: 83
End: 2023-05-21

## 2023-05-21 VITALS
SYSTOLIC BLOOD PRESSURE: 151 MMHG | TEMPERATURE: 97.8 F | HEART RATE: 65 BPM | RESPIRATION RATE: 18 BRPM | DIASTOLIC BLOOD PRESSURE: 68 MMHG | OXYGEN SATURATION: 98 %
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
## 2023-05-21 DIAGNOSIS — T14.8XXA HEMATOMA: Primary | ICD-10-CM

## 2023-05-21 LAB
ALBUMIN SERPL BCP-MCNC: 4.1 G/DL (ref 3.5–5)
ALP SERPL-CCNC: 64 U/L (ref 34–104)
ALT SERPL W P-5'-P-CCNC: 12 U/L (ref 7–52)
ANION GAP SERPL CALCULATED.3IONS-SCNC: 5 MMOL/L (ref 4–13)
APTT PPP: 27 SECONDS (ref 23–37)
AST SERPL W P-5'-P-CCNC: 15 U/L (ref 13–39)
BASOPHILS # BLD AUTO: 0.04 THOUSANDS/ÂΜL (ref 0–0.1)
BASOPHILS NFR BLD AUTO: 1 % (ref 0–1)
BILIRUB SERPL-MCNC: 0.4 MG/DL (ref 0.2–1)
BUN SERPL-MCNC: 15 MG/DL (ref 5–25)
CALCIUM SERPL-MCNC: 9.6 MG/DL (ref 8.4–10.2)
CHLORIDE SERPL-SCNC: 105 MMOL/L (ref 96–108)
CO2 SERPL-SCNC: 29 MMOL/L (ref 21–32)
CREAT SERPL-MCNC: 0.98 MG/DL (ref 0.6–1.3)
EOSINOPHIL # BLD AUTO: 0.12 THOUSAND/ÂΜL (ref 0–0.61)
EOSINOPHIL NFR BLD AUTO: 2 % (ref 0–6)
ERYTHROCYTE [DISTWIDTH] IN BLOOD BY AUTOMATED COUNT: 13.4 % (ref 11.6–15.1)
GFR SERPL CREATININE-BSD FRML MDRD: 54 ML/MIN/1.73SQ M
GLUCOSE SERPL-MCNC: 103 MG/DL (ref 65–140)
HCT VFR BLD AUTO: 39.4 % (ref 34.8–46.1)
HGB BLD-MCNC: 12.7 G/DL (ref 11.5–15.4)
IMM GRANULOCYTES # BLD AUTO: 0.03 THOUSAND/UL (ref 0–0.2)
IMM GRANULOCYTES NFR BLD AUTO: 0 % (ref 0–2)
INR PPP: 1.06 (ref 0.84–1.19)
LYMPHOCYTES # BLD AUTO: 2.01 THOUSANDS/ÂΜL (ref 0.6–4.47)
LYMPHOCYTES NFR BLD AUTO: 25 % (ref 14–44)
MCH RBC QN AUTO: 32.3 PG (ref 26.8–34.3)
MCHC RBC AUTO-ENTMCNC: 32.2 G/DL (ref 31.4–37.4)
MCV RBC AUTO: 100 FL (ref 82–98)
MONOCYTES # BLD AUTO: 0.45 THOUSAND/ÂΜL (ref 0.17–1.22)
MONOCYTES NFR BLD AUTO: 6 % (ref 4–12)
NEUTROPHILS # BLD AUTO: 5.5 THOUSANDS/ÂΜL (ref 1.85–7.62)
NEUTS SEG NFR BLD AUTO: 66 % (ref 43–75)
NRBC BLD AUTO-RTO: 0 /100 WBCS
PLATELET # BLD AUTO: 277 THOUSANDS/UL (ref 149–390)
PMV BLD AUTO: 11 FL (ref 8.9–12.7)
POTASSIUM SERPL-SCNC: 3.9 MMOL/L (ref 3.5–5.3)
PROT SERPL-MCNC: 7.1 G/DL (ref 6.4–8.4)
PROTHROMBIN TIME: 13.6 SECONDS (ref 11.6–14.5)
RBC # BLD AUTO: 3.93 MILLION/UL (ref 3.81–5.12)
SODIUM SERPL-SCNC: 139 MMOL/L (ref 135–147)
WBC # BLD AUTO: 8.15 THOUSAND/UL (ref 4.31–10.16)

## 2023-05-21 RX ORDER — ACETAMINOPHEN 325 MG/1
975 TABLET ORAL ONCE
Status: COMPLETED | OUTPATIENT
Start: 2023-05-21 | End: 2023-05-21

## 2023-05-21 RX ADMIN — ACETAMINOPHEN 975 MG: 325 TABLET ORAL at 13:41

## 2023-05-21 RX ADMIN — IOHEXOL 100 ML: 350 INJECTION, SOLUTION INTRAVENOUS at 15:11

## 2023-05-21 NOTE — ED PROVIDER NOTES
"History  Chief Complaint   Patient presents with   • Leg Pain     Patient c/o left leg pain, swelling, bruising, reports falling on Friday, denies LOC, -blood thinners\"     Patient is an 57-year-old female past medical history of CKD stage III, hyperlipidemia, hypothyroid, GERD presenting with left leg pain  Patient notes bruising and swelling to the anterior portion of her leg which she states started roughly 2 hours ago and began with a sharp pain that started spontaneously  States that she had a fall 2 days ago onto her right side did turn her head but did not lose consciousness and did not injure her leg at that time  She is unsure whether or not she may have twisted at the time but states she did not have any pain after the fall  Notes mild right-sided neck pain after falling and stiffness  She states that she does not use any blood thinners  Denies any fevers, vision changes, dizziness, shortness of breath, chest pain, numbness or tingling or weakness, abdominal pain, back pain  Does not use any aspirin  Prior to Admission Medications   Prescriptions Last Dose Informant Patient Reported?  Taking?   levothyroxine 100 mcg tablet   No No   Sig: take 1 tablet by mouth once daily   pravastatin (PRAVACHOL) 20 mg tablet   No No   Sig: Take 20 mg plus 40 mg total 60 mg daily   pravastatin (PRAVACHOL) 40 mg tablet   No No   Sig: take 1 tablet by mouth once daily   Patient taking differently: Take 40 mg by mouth daily Takes 60 mg total  One tablet 20 mg and one tablet 40 mg      Facility-Administered Medications: None       Past Medical History:   Diagnosis Date   • High cholesterol    • Hypothyroidism        Past Surgical History:   Procedure Laterality Date   • BREAST CYST EXCISION  1982    cyst removals and aspirations-benign   • HYSTERECTOMY      at age 60   • OOPHORECTOMY Bilateral     at age 60   • STOMACH SURGERY         Family History   Problem Relation Age of Onset   • Heart disease Sister    • " Breast cancer Sister 68   • Ovarian cancer Sister 77   • Breast cancer Sister 80   • Cervical cancer Paternal Aunt    • Breast cancer Cousin    • Breast cancer Cousin    • Hyperlipidemia Family      I have reviewed and agree with the history as documented  E-Cigarette/Vaping     E-Cigarette/Vaping Substances     Social History     Tobacco Use   • Smoking status: Never   • Smokeless tobacco: Never   Substance Use Topics   • Alcohol use: Not Currently   • Drug use: Not Currently       Review of Systems   All other systems reviewed and are negative  Physical Exam  Physical Exam  Vitals reviewed  Constitutional:       General: She is not in acute distress  Appearance: Normal appearance  She is not ill-appearing  HENT:      Head: Normocephalic and atraumatic  Mouth/Throat:      Mouth: Mucous membranes are moist    Eyes:      Extraocular Movements: Extraocular movements intact  Conjunctiva/sclera: Conjunctivae normal    Neck:      Comments: No midline tenderness  Cardiovascular:      Rate and Rhythm: Normal rate and regular rhythm  Heart sounds: Normal heart sounds  Pulmonary:      Effort: Pulmonary effort is normal       Breath sounds: Normal breath sounds  Abdominal:      General: Abdomen is flat  Palpations: Abdomen is soft  Tenderness: There is no abdominal tenderness  Musculoskeletal:         General: Tenderness present  No swelling  Normal range of motion  Cervical back: Normal range of motion and neck supple  No tenderness  Comments: Ecchymosis, tenderness and mild induration to the anterior portion of the shin roughly 4 cm, no posterior tenderness or edema   Skin:     General: Skin is warm and dry  Neurological:      General: No focal deficit present  Mental Status: She is alert  Sensory: No sensory deficit  Motor: No weakness        Coordination: Coordination normal    Psychiatric:         Mood and Affect: Mood normal          Vital Signs  ED Triage Vitals   Temperature Pulse Respirations Blood Pressure SpO2   05/21/23 1235 05/21/23 1235 05/21/23 1235 05/21/23 1235 05/21/23 1235   97 8 °F (36 6 °C) 75 18 150/72 97 %      Temp Source Heart Rate Source Patient Position - Orthostatic VS BP Location FiO2 (%)   05/21/23 1235 05/21/23 1235 05/21/23 1235 05/21/23 1235 --   Temporal Monitor Sitting Left arm       Pain Score       05/21/23 1319       3           Vitals:    05/21/23 1235 05/21/23 1448 05/21/23 1630   BP: 150/72 133/63 151/68   Pulse: 75 60 65   Patient Position - Orthostatic VS: Sitting Lying Lying         Visual Acuity      ED Medications  Medications   acetaminophen (TYLENOL) tablet 975 mg (975 mg Oral Given 5/21/23 1341)   iohexol (OMNIPAQUE) 350 MG/ML injection (MULTI-DOSE) 100 mL (100 mL Intravenous Given 5/21/23 1511)       Diagnostic Studies  Results Reviewed     Procedure Component Value Units Date/Time    Comprehensive metabolic panel [[id number]] Collected: 05/21/23 1340    Lab Status: Final result Specimen: Blood from Arm, Right Updated: 05/21/23 1406     Sodium 139 mmol/L      Potassium 3 9 mmol/L      Chloride 105 mmol/L      CO2 29 mmol/L      ANION GAP 5 mmol/L      BUN 15 mg/dL      Creatinine 0 98 mg/dL      Glucose 103 mg/dL      Calcium 9 6 mg/dL      AST 15 U/L      ALT 12 U/L      Alkaline Phosphatase 64 U/L      Total Protein 7 1 g/dL      Albumin 4 1 g/dL      Total Bilirubin 0 40 mg/dL      eGFR 54 ml/min/1 73sq m     Narrative:      Yolanda guidelines for Chronic Kidney Disease (CKD):   •  Stage 1 with normal or high GFR (GFR > 90 mL/min/1 73 square meters)  •  Stage 2 Mild CKD (GFR = 60-89 mL/min/1 73 square meters)  •  Stage 3A Moderate CKD (GFR = 45-59 mL/min/1 73 square meters)  •  Stage 3B Moderate CKD (GFR = 30-44 mL/min/1 73 square meters)  •  Stage 4 Severe CKD (GFR = 15-29 mL/min/1 73 square meters)  •  Stage 5 End Stage CKD (GFR <15 mL/min/1 73 square meters)  Note: GFR calculation is accurate only with a steady state creatinine    Protime-INR [[id number]]  (Normal) Collected: 05/21/23 1340    Lab Status: Final result Specimen: Blood from Arm, Right Updated: 05/21/23 1400     Protime 13 6 seconds      INR 1 06    APTT [[id number]]  (Normal) Collected: 05/21/23 1340    Lab Status: Final result Specimen: Blood from Arm, Right Updated: 05/21/23 1400     PTT 27 seconds     CBC and differential [[id number]]  (Abnormal) Collected: 05/21/23 1340    Lab Status: Final result Specimen: Blood from Arm, Right Updated: 05/21/23 1348     WBC 8 15 Thousand/uL      RBC 3 93 Million/uL      Hemoglobin 12 7 g/dL      Hematocrit 39 4 %       fL      MCH 32 3 pg      MCHC 32 2 g/dL      RDW 13 4 %      MPV 11 0 fL      Platelets 101 Thousands/uL      nRBC 0 /100 WBCs      Neutrophils Relative 66 %      Immat GRANS % 0 %      Lymphocytes Relative 25 %      Monocytes Relative 6 %      Eosinophils Relative 2 %      Basophils Relative 1 %      Neutrophils Absolute 5 50 Thousands/µL      Immature Grans Absolute 0 03 Thousand/uL      Lymphocytes Absolute 2 01 Thousands/µL      Monocytes Absolute 0 45 Thousand/µL      Eosinophils Absolute 0 12 Thousand/µL      Basophils Absolute 0 04 Thousands/µL                  CT lower extremity w contrast left   Final Result by Peewee Vásquez MD (05/21 1600)      1  No acute osseous abnormality  Advanced degenerative changes of the medial compartment left knee  2  3 3 x 1 8 x 1 8 cm collection within the subcutaneous fat anterior to the proximal tibial diaphysis measuring approximately 61 Hounsfield units  Appearance is suggestive of hematoma  Close clinical follow-up is recommended to ensure resolution and    exclude an underlying mass   There is no active extravasation of contrast material    3  Incidental note of small knee joint effusion and small popliteal cyst       Workstation performed: [id number]         CT head without contrast   Final Result by Andrew Stephen MD (05/21 2742)      No acute intracranial abnormality  Workstation performed: [id number]         CT spine cervical without contrast   Final Result by Andrew Stephen MD (05/21 0446)   Multilevel degenerative changes without a vertebral body fracture or subluxation  A thin lucency within a left-sided osteophyte along the posterior aspect of the C4-C5 facet is indeterminant for a nondisplaced fracture within the osteophyte, and    assessment for point tenderness at this site is recommended  The study was marked in [facility] for immediate notification  Workstation performed: [id number]                    Procedures  Procedures         ED Course                               SBIRT 22yo+    Flowsheet Row Most Recent Value   Initial Alcohol Screen: US AUDIT-C     1  How often do you have a drink containing alcohol? 0 Filed at: 05/21/2023 1240   2  How many drinks containing alcohol do you have on a typical day you are drinking? 0 Filed at: 05/21/2023 1240   3a  Male UNDER 65: How often do you have five or more drinks on one occasion? 0 Filed at: 05/21/2023 1240   3b  FEMALE Any Age, or MALE 65+: How often do you have 4 or more drinks on one occassion? 0 Filed at: 05/21/2023 1240   Audit-C Score 0 Filed at: 05/21/2023 1240   JAQUELINE: How many times in the past year have you    Used an illegal drug or used a prescription medication for non-medical reasons? Never Filed at: 05/21/2023 1240                    Medical Decision Making  Is an 82-year-old female past medical history of hyperlipidemia, GERD, CKD stage III, hypothyroid presenting with leg pain  Patient is well-appearing at bedside with stable vitals and in no acute distress  She has indurated ecchymotic tender area to the anterior portion of the shin overlying the tibia consistent with hematoma with no posterior tenderness    As this is spontaneous will obtain labs to rule out coagulopathies, CT to rule out abscess or other infection, do not feel the patient requires ultrasound at this time as she has no tenderness to deep venous system, will also obtain CT head and neck given recent history of fall  Amount and/or Complexity of Data Reviewed  Labs: ordered  Radiology: ordered  Risk  OTC drugs  Prescription drug management  Disposition  Final diagnoses:   Hematoma   Fall, initial encounter     Time reflects when diagnosis was documented in both MDM as applicable and the Disposition within this note     Time User Action Codes Description Comment    5/21/2023  3:17 PM Ike, [address] [facility] Charmaine Mesfin  8XXA] Hematoma     5/21/2023  3:17 PM Kranthi White Add [W19  Melvi Ariza, initial encounter       ED Disposition     ED Disposition   Discharge    Condition   Stable    Date/Time   Sun May 21, 2023  4:58 PM    Comment   Zayda Hatch discharge to home/self care  Follow-up Information     Follow up With Specialties Details Why Claudine Em MD Family Medicine In 1 week  [address]  [phone]            Discharge Medication List as of 5/21/2023  4:58 PM      CONTINUE these medications which have NOT CHANGED    Details   levothyroxine 100 mcg tablet take 1 tablet by mouth once daily, Normal      !! pravastatin (PRAVACHOL) 20 mg tablet Take 20 mg plus 40 mg total 60 mg daily, Normal      !! pravastatin (PRAVACHOL) 40 mg tablet take 1 tablet by mouth once daily, Normal       !! - Potential duplicate medications found  Please discuss with provider  No discharge procedures on file      PDMP Review     None          ED Provider  Electronically Signed by           Heidi Calderon DO  05/22/23 2958

## 2023-05-31 ENCOUNTER — OFFICE VISIT (OUTPATIENT)
Dept: FAMILY MEDICINE CLINIC | Facility: CLINIC | Age: 83
End: 2023-05-31

## 2023-05-31 VITALS
BODY MASS INDEX: 32.02 KG/M2 | HEART RATE: 64 BPM | WEIGHT: 174 LBS | SYSTOLIC BLOOD PRESSURE: 146 MMHG | OXYGEN SATURATION: 97 % | DIASTOLIC BLOOD PRESSURE: 90 MMHG | HEIGHT: 62 IN | TEMPERATURE: 97.2 F

## 2023-05-31 DIAGNOSIS — S80.12XD LEG HEMATOMA, LEFT, SUBSEQUENT ENCOUNTER: ICD-10-CM
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
## 2023-05-31 NOTE — PROGRESS NOTES
Name: Santy Harry      : 1940      MRN: [id number]  Encounter Provider: Emmanuel Page PA-C  Encounter Date: 2023   Encounter department: [address] [place] [address]     1  Fall, subsequent encounter    2  Leg hematoma, left, subsequent encounter    appearance of hematoma similar in size with reduction in pain  No posterior TTP or new LE edema  Head/neck exam normal  No neurologic complaints  Discussed supportive measures and return precautions  Discussed hematomas make take weeks to fully resolve and there may be residual induration in the area  Subjective     Pt presents for ER follow up  Was seen in ER on  after fall with headstrike - LOC, - thinners on   Non traumatic CT head/neck  CT LLE with hematoma  Notes improvement in hematoma appearance and sx since  No LE edema  Pain improved  No HA, N/V, dizziness, neck pain, syncope  Review of Systems   Constitutional: Negative for chills, fatigue and fever  HENT: Negative for congestion, ear pain, hearing loss, nosebleeds, postnasal drip, rhinorrhea, sinus pressure, sinus pain, sneezing and sore throat  Eyes: Negative for pain, discharge, itching and visual disturbance  Respiratory: Negative for cough, chest tightness, shortness of breath and wheezing  Cardiovascular: Negative for chest pain, palpitations and leg swelling  Gastrointestinal: Negative for abdominal pain, blood in stool, constipation, diarrhea, nausea and vomiting  Genitourinary: Negative for frequency and urgency  Musculoskeletal:        Hematoma LLE   Skin: Positive for color change  Neurological: Negative for dizziness, light-headedness and numbness         Past Medical History:   Diagnosis Date   • High cholesterol    • Hypothyroidism      Past Surgical History:   Procedure Laterality Date   • BREAST CYST EXCISION      cyst removals and aspirations-benign   • HYSTERECTOMY      at age 60   • OOPHORECTOMY Bilateral     at age 60   • STOMACH SURGERY       Family History   Problem Relation Age of Onset   • Heart disease Sister    • Breast cancer Sister 68   • Ovarian cancer Sister 77   • Breast cancer Sister 80   • Cervical cancer Paternal Aunt    • Breast cancer Cousin    • Breast cancer Cousin    • Hyperlipidemia Family      Social History     Socioeconomic History   • Marital status: /Civil Union     Spouse name: None   • Number of children: None   • Years of education: None   • Highest education level: None   Occupational History   • None   Tobacco Use   • Smoking status: Never   • Smokeless tobacco: Never   Substance and Sexual Activity   • Alcohol use: Not Currently   • Drug use: Not Currently   • Sexual activity: None   Other Topics Concern   • None   Social History Narrative   • None     Social Determinants of Health     Financial Resource Strain: Low Risk  (9/15/2022)    Overall Financial Resource Strain (CARDIA)    • Difficulty of Paying Living Expenses: Not very hard   Food Insecurity: Not on file   Transportation Needs: No Transportation Needs (9/15/2022)    PRAPARE - Transportation    • Lack of Transportation (Medical): No    • Lack of Transportation (Non-Medical): No   Physical Activity: Not on file   Stress: Not on file   Social Connections: Not on file   Intimate Partner Violence: Not on file   Housing Stability: Not on file     Current Outpatient Medications on File Prior to Visit   Medication Sig   • levothyroxine 100 mcg tablet take 1 tablet by mouth once daily   • pravastatin (PRAVACHOL) 20 mg tablet Take 20 mg plus 40 mg total 60 mg daily   • pravastatin (PRAVACHOL) 40 mg tablet take 1 tablet by mouth once daily (Patient taking differently: Take 40 mg by mouth daily Takes 60 mg total  One tablet 20 mg and one tablet 40 mg)     Allergies   Allergen Reactions   • Percocet [Oxycodone-Acetaminophen] GI Intolerance   • Statins      Other reaction(s): ELEVATED LFTS  Category:  Adverse Reaction;    • Vicodin "[Hydrocodone-Acetaminophen] GI Intolerance     Immunization History   Administered Date(s) Administered   • COVID-19 MODERNA VACC 0 5 ML IM 01/27/2021, 02/24/2021, 11/02/2021   • INFLUENZA 10/01/2016   • Influenza Quadrivalent Preservative Free 3 years and older IM 09/15/2017   • Influenza Split High Dose Preservative Free IM 09/12/2013, 10/21/2015, 10/01/2016   • Influenza, high dose seasonal 0 7 mL 10/15/2018, 09/10/2020, 09/02/2021, 09/15/2022   • Pneumococcal Conjugate 13-Valent 10/21/2015   • Pneumococcal Polysaccharide PPV23 07/23/2013   • influenza, trivalent, adjuvanted 10/11/2019       Objective     /90 (BP Location: Left arm, Patient Position: Sitting, Cuff Size: Adult)   Pulse 64   Temp (!) 97 2 °F (36 2 °C)   Ht 5' 1 5\" (1 562 m)   Wt 78 9 kg (174 lb)   SpO2 97%   BMI 32 34 kg/m²     Physical Exam  Vitals and nursing note reviewed  Constitutional:       General: She is not in acute distress  Appearance: Normal appearance  HENT:      Head: Normocephalic and atraumatic  No Lion's sign, contusion or masses  Nose: Nose normal       Mouth/Throat:      Mouth: Mucous membranes are moist       Pharynx: Oropharynx is clear  No oropharyngeal exudate or posterior oropharyngeal erythema  Eyes:      Pupils: Pupils are equal, round, and reactive to light  Cardiovascular:      Rate and Rhythm: Normal rate and regular rhythm  Heart sounds: Normal heart sounds  Pulmonary:      Effort: Pulmonary effort is normal  No respiratory distress  Breath sounds: Normal breath sounds  No wheezing, rhonchi or rales  Musculoskeletal:         General: Normal range of motion  Cervical back: Normal range of motion and neck supple  No pain with movement, spinous process tenderness or muscular tenderness  Normal range of motion  Right lower leg: No edema  Left lower leg: No edema  Legs:    Skin:     General: Skin is warm and dry     Neurological:      Mental Status: She is " alert and oriented to person, place, and time     Psychiatric:         Mood and Affect: Mood and affect normal        Dorys Lewis PA-C

## 2023-06-16 ENCOUNTER — OFFICE VISIT (OUTPATIENT)
Dept: OBGYN CLINIC | Facility: CLINIC | Age: 83
End: 2023-06-16
Payer: MEDICARE

## 2023-06-16 VITALS
SYSTOLIC BLOOD PRESSURE: 130 MMHG | BODY MASS INDEX: 32.02 KG/M2 | WEIGHT: 174 LBS | DIASTOLIC BLOOD PRESSURE: 70 MMHG | HEART RATE: 60 BPM | HEIGHT: 62 IN

## 2023-06-16 DIAGNOSIS — M17.0 BILATERAL PRIMARY OSTEOARTHRITIS OF KNEE: Primary | ICD-10-CM

## 2023-06-16 PROCEDURE — 20610 DRAIN/INJ JOINT/BURSA W/O US: CPT | Performed by: PHYSICIAN ASSISTANT

## 2023-06-16 PROCEDURE — 99213 OFFICE O/P EST LOW 20 MIN: CPT | Performed by: PHYSICIAN ASSISTANT

## 2023-06-16 RX ORDER — METHYLPREDNISOLONE ACETATE 40 MG/ML
2 INJECTION, SUSPENSION INTRA-ARTICULAR; INTRALESIONAL; INTRAMUSCULAR; SOFT TISSUE
Status: COMPLETED | OUTPATIENT
Start: 2023-06-16 | End: 2023-06-16

## 2023-06-16 RX ORDER — BUPIVACAINE HYDROCHLORIDE 2.5 MG/ML
2 INJECTION, SOLUTION INFILTRATION; PERINEURAL
Status: COMPLETED | OUTPATIENT
Start: 2023-06-16 | End: 2023-06-16

## 2023-06-16 RX ORDER — KETOROLAC TROMETHAMINE 30 MG/ML
30 INJECTION, SOLUTION INTRAMUSCULAR; INTRAVENOUS
Status: COMPLETED | OUTPATIENT
Start: 2023-06-16 | End: 2023-06-16

## 2023-06-16 RX ADMIN — KETOROLAC TROMETHAMINE 30 MG: 30 INJECTION, SOLUTION INTRAMUSCULAR; INTRAVENOUS at 09:45

## 2023-06-16 RX ADMIN — BUPIVACAINE HYDROCHLORIDE 2 ML: 2.5 INJECTION, SOLUTION INFILTRATION; PERINEURAL at 09:45

## 2023-06-16 RX ADMIN — METHYLPREDNISOLONE ACETATE 2 ML: 40 INJECTION, SUSPENSION INTRA-ARTICULAR; INTRALESIONAL; INTRAMUSCULAR; SOFT TISSUE at 09:45

## 2023-06-16 NOTE — PROGRESS NOTES
Orthopedics          Marika Sun [de-identified] y o  female MRN: [id number]      Chief Complaint:   bilateral knee pain    HPI:   [de-identified] y  o female complaining of bilateral knee pain  She presents office today regarding bilateral knee pain due to osteoarthritis  Patient has been treated for bilateral knee pain due to osteoarthritis with intra-articular cortisone injection as well as home exercise  Patient states the pain is aching nature localized to bilateral knees without radiation of pain denies any change numbness, denies instability                    Review Of Systems:   · Skin: Normal  · Neuro: See HPI  · Musculoskeletal: See HPI  · All other systems reviewed and are negative    Past Medical History:   Past Medical History:   Diagnosis Date   • High cholesterol    • Hypothyroidism        Past Surgical History:   Past Surgical History:   Procedure Laterality Date   • BREAST CYST EXCISION  1982    cyst removals and aspirations-benign   • HYSTERECTOMY      at age 60   • OOPHORECTOMY Bilateral     at age 60   • STOMACH SURGERY         Family History:  Family history reviewed and non-contributory  Family History   Problem Relation Age of Onset   • Heart disease Sister    • Breast cancer Sister 68   • Ovarian cancer Sister 77   • Breast cancer Sister 80   • Cervical cancer Paternal Aunt    • Breast cancer Cousin    • Breast cancer Cousin    • Hyperlipidemia Family          Social History:  Social History     Socioeconomic History   • Marital status: /Civil Union     Spouse name: None   • Number of children: None   • Years of education: None   • Highest education level: None   Occupational History   • None   Tobacco Use   • Smoking status: Never   • Smokeless tobacco: Never   Substance and Sexual Activity   • Alcohol use: Not Currently   • Drug use: Not Currently   • Sexual activity: None   Other Topics Concern   • None   Social History Narrative   • None     Social Determinants of Health     Financial Resource Strain: Low Risk  (9/15/2022)    Overall Financial Resource Strain (CARDIA)    • Difficulty of Paying Living Expenses: Not very hard   Food Insecurity: Not on file   Transportation Needs: No Transportation Needs (9/15/2022)    PRAPARE - Transportation    • Lack of Transportation (Medical): No    • Lack of Transportation (Non-Medical): No   Physical Activity: Not on file   Stress: Not on file   Social Connections: Not on file   Intimate Partner Violence: Not on file   Housing Stability: Not on file       Allergies: Allergies   Allergen Reactions   • Percocet [Oxycodone-Acetaminophen] GI Intolerance   • Statins      Other reaction(s): ELEVATED LFTS  Category:  Adverse Reaction;    • Vicodin [Hydrocodone-Acetaminophen] GI Intolerance       Labs:  0   Lab Value Date/Time    HCT 39 4 05/21/2023 1340    HCT 39 9 03/07/2022 0729    HCT 39 5 08/25/2021 0749    HCT 37 9 05/03/2015 0630    HCT 38 8 05/01/2015 0619    HCT 40 1 04/30/2015 2355    HGB 12 7 05/21/2023 1340    HGB 13 3 03/07/2022 0729    HGB 12 7 08/25/2021 0749    HGB 12 1 05/03/2015 0630    HGB 12 4 05/01/2015 0619    HGB 13 0 04/30/2015 2355    INR 1 06 05/21/2023 1340    WBC 8 15 05/21/2023 1340    WBC 7 01 03/07/2022 0729    WBC 8 02 08/25/2021 0749    WBC 6 75 05/03/2015 0630    WBC 8 98 05/01/2015 0619    WBC 10 44 (H) 04/30/2015 2355       Meds:    Current Outpatient Medications:   •  levothyroxine 100 mcg tablet, Take 1 tablet (100 mcg total) by mouth daily, Disp: 90 tablet, Rfl: 3  •  pravastatin (PRAVACHOL) 20 mg tablet, Take 20 mg plus 40 mg total 60 mg daily, Disp: 90 tablet, Rfl: 3  •  pravastatin (PRAVACHOL) 40 mg tablet, Take 1 tablet (40 mg total) by mouth daily, Disp: 90 tablet, Rfl: 3      Physical Exam:   Vitals:    06/16/23 0950   BP: 130/70   Pulse: 60       General Appearance:    Alert, cooperative, no distress, appears stated age   Head:    Normocephalic, without obvious abnormality, atraumatic   Eyes:    conjunctiva/corneas clear, both eyes Nose:   Nares normal, septum midline, no drainage    Throat:   Lips normal; teeth and gums normal   Neck:    symmetrical, trachea midline, ;     thyroid:  no enlargement/   Back:     Symmetric, no curvature, ROM normal   Lungs:   No audible wheezing or labored breathing   Chest Wall:    No tenderness or deformity    Heart:    Regular rate and rhythm               Pulses:   2+ and symmetric all extremities   Skin:   Skin color, texture, turgor normal, no rashes or lesions   Neurologic:   normal strength, sensation and reflexes     throughout       Musculoskeletal: bilateral lower extremity  On examination of the right knee there is no effusion, no erythema  Range of motion to full active extension and flexion to greater than 120°  Pain on palpation medial and lateral joint lines  There is crepitus with range of motion, no warmth to palpation, bony enlargement noted  No pain on palpation pes anserine bursa region or distal iliotibial band  Stable to varus and valgus stress without pain or gapping  Negative anterior and posterior drawer testing  Sensation intact distal pulses present  On examination of the left knee there is no effusion, no erythema  Range of motion to full active extension and flexion to greater than 120°  Pain on palpation medial and lateral joint lines  There is crepitus with range of motion, no warmth to palpation, bony enlargement noted  No pain on palpation pes anserine bursa region or distal iliotibial band  Stable to varus and valgus stress without pain or gapping  Negative anterior and posterior drawer testing  Sensation intact distal pulses present  Large joint arthrocentesis: R knee  Universal Protocol:  Consent: Verbal consent obtained    Consent given by: patient  Patient understanding: patient states understanding of the procedure being performed  Site marked: the operative site was marked  Patient identity confirmed: verbally with patient    Supporting Documentation  Indications: pain   Procedure Details  Location: knee - R knee  Needle size: 22 G  Approach: anterolateral  Medications administered: 2 mL bupivacaine 0 25 %; 2 mL methylPREDNISolone acetate 40 mg/mL; 30 mg ketorolac 30 mg/mL    Patient tolerance: patient tolerated the procedure well with no immediate complications    Large joint arthrocentesis: L knee  Universal Protocol:  Consent: Verbal consent obtained  Consent given by: patient  Patient understanding: patient states understanding of the procedure being performed  Site marked: the operative site was marked  Patient identity confirmed: verbally with patient    Supporting Documentation  Indications: pain   Procedure Details  Location: knee - L knee  Needle size: 22 G  Approach: anterolateral  Medications administered: 2 mL bupivacaine 0 25 %; 2 mL methylPREDNISolone acetate 40 mg/mL; 30 mg ketorolac 30 mg/mL    Patient tolerance: patient tolerated the procedure well with no immediate complications          _*_*_*_*_*_*_*_*_*_*_*_*_*_*_*_*_*_*_*_*_*_*_*_*_*_*_*_*_*_*_*_*_*_*_*_*_*_*_*_*_*    Assessment:  [de-identified] y  o female with bilateral knee pain due to osteoarthritis left worse than right    Plan:   · Weight bearing as tolerated  bilateral lower extremity  · Bilateral intra-articular corticosteroid and Toradol injections administered as noted above  · Patient advised should they develop any increasing pain, redness, drainage, numbness, tingling or swelling surrounding the injection site, they are to contact our office or present to the emergency department    · Advised patient she may be candidate for total knee arthroplasty should she fail conservative management  · Follow up in 3 months or sooner if needed      Spencer Yoder PA-C

## 2023-07-26 DIAGNOSIS — E03.9 HYPOTHYROIDISM, UNSPECIFIED TYPE: ICD-10-CM

## 2023-07-26 RX ORDER — LEVOTHYROXINE SODIUM 88 UG/1
88 TABLET ORAL DAILY
Qty: 90 TABLET | Refills: 0 | Status: SHIPPED | OUTPATIENT
Start: 2023-07-26 | End: 2023-07-26 | Stop reason: ALTCHOICE

## 2023-09-05 ENCOUNTER — IMMUNIZATIONS (OUTPATIENT)
Dept: FAMILY MEDICINE CLINIC | Facility: CLINIC | Age: 83
End: 2023-09-05
Payer: MEDICARE

## 2023-09-05 DIAGNOSIS — Z23 ENCOUNTER FOR IMMUNIZATION: Primary | ICD-10-CM

## 2023-09-05 PROCEDURE — G0008 ADMIN INFLUENZA VIRUS VAC: HCPCS | Performed by: FAMILY MEDICINE

## 2023-09-05 PROCEDURE — 90662 IIV NO PRSV INCREASED AG IM: CPT | Performed by: FAMILY MEDICINE

## 2023-09-07 ENCOUNTER — HOSPITAL ENCOUNTER (OUTPATIENT)
Age: 83
Discharge: HOME | End: 2023-09-07
Payer: MEDICARE

## 2023-09-07 VITALS — HEIGHT: 62 IN | WEIGHT: 174 LBS | BODY MASS INDEX: 32.02 KG/M2

## 2023-09-07 DIAGNOSIS — Z12.31 ENCOUNTER FOR SCREENING MAMMOGRAM FOR MALIGNANT NEOPLASM OF BREAST: ICD-10-CM

## 2023-09-07 PROCEDURE — 77063 BREAST TOMOSYNTHESIS BI: CPT

## 2023-09-07 PROCEDURE — 77067 SCR MAMMO BI INCL CAD: CPT

## 2023-09-12 ENCOUNTER — APPOINTMENT (OUTPATIENT)
Dept: LAB | Facility: CLINIC | Age: 83
End: 2023-09-12
Payer: MEDICARE

## 2023-09-12 DIAGNOSIS — E78.2 MIXED HYPERLIPIDEMIA: ICD-10-CM

## 2023-09-12 DIAGNOSIS — E03.9 HYPOTHYROIDISM, UNSPECIFIED TYPE: ICD-10-CM

## 2023-09-12 DIAGNOSIS — N18.31 STAGE 3A CHRONIC KIDNEY DISEASE (HCC): ICD-10-CM

## 2023-09-12 DIAGNOSIS — M85.80 OSTEOPENIA, UNSPECIFIED LOCATION: ICD-10-CM

## 2023-09-12 LAB
25(OH)D3 SERPL-MCNC: 31.1 NG/ML (ref 30–100)
ANION GAP SERPL CALCULATED.3IONS-SCNC: 7 MMOL/L
BUN SERPL-MCNC: 16 MG/DL (ref 5–25)
CALCIUM SERPL-MCNC: 9.3 MG/DL (ref 8.4–10.2)
CHLORIDE SERPL-SCNC: 105 MMOL/L (ref 96–108)
CHOLEST SERPL-MCNC: 279 MG/DL
CO2 SERPL-SCNC: 28 MMOL/L (ref 21–32)
CREAT SERPL-MCNC: 0.99 MG/DL (ref 0.6–1.3)
GFR SERPL CREATININE-BSD FRML MDRD: 53 ML/MIN/1.73SQ M
GLUCOSE P FAST SERPL-MCNC: 91 MG/DL (ref 65–99)
HDLC SERPL-MCNC: 52 MG/DL
LDLC SERPL CALC-MCNC: 195 MG/DL (ref 0–100)
POTASSIUM SERPL-SCNC: 4.1 MMOL/L (ref 3.5–5.3)
SODIUM SERPL-SCNC: 140 MMOL/L (ref 135–147)
TRIGL SERPL-MCNC: 162 MG/DL
TSH SERPL DL<=0.05 MIU/L-ACNC: 0.51 UIU/ML (ref 0.45–4.5)

## 2023-09-12 PROCEDURE — 80048 BASIC METABOLIC PNL TOTAL CA: CPT

## 2023-09-12 PROCEDURE — 80061 LIPID PANEL: CPT

## 2023-09-12 PROCEDURE — 82306 VITAMIN D 25 HYDROXY: CPT

## 2023-09-12 PROCEDURE — 36415 COLL VENOUS BLD VENIPUNCTURE: CPT

## 2023-09-12 PROCEDURE — 84443 ASSAY THYROID STIM HORMONE: CPT

## 2023-09-15 ENCOUNTER — APPOINTMENT (OUTPATIENT)
Dept: RADIOLOGY | Facility: AMBULARY SURGERY CENTER | Age: 83
End: 2023-09-15
Attending: ORTHOPAEDIC SURGERY
Payer: MEDICARE

## 2023-09-15 ENCOUNTER — OFFICE VISIT (OUTPATIENT)
Dept: OBGYN CLINIC | Facility: CLINIC | Age: 83
End: 2023-09-15
Payer: MEDICARE

## 2023-09-15 VITALS
WEIGHT: 174 LBS | HEIGHT: 62 IN | BODY MASS INDEX: 32.02 KG/M2 | HEART RATE: 62 BPM | SYSTOLIC BLOOD PRESSURE: 152 MMHG | DIASTOLIC BLOOD PRESSURE: 77 MMHG

## 2023-09-15 DIAGNOSIS — M17.0 BILATERAL PRIMARY OSTEOARTHRITIS OF KNEE: ICD-10-CM

## 2023-09-15 DIAGNOSIS — M17.12 PRIMARY OSTEOARTHRITIS OF LEFT KNEE: Primary | ICD-10-CM

## 2023-09-15 PROCEDURE — 73562 X-RAY EXAM OF KNEE 3: CPT

## 2023-09-15 PROCEDURE — 99214 OFFICE O/P EST MOD 30 MIN: CPT | Performed by: ORTHOPAEDIC SURGERY

## 2023-09-15 RX ORDER — SODIUM CHLORIDE, SODIUM LACTATE, POTASSIUM CHLORIDE, CALCIUM CHLORIDE 600; 310; 30; 20 MG/100ML; MG/100ML; MG/100ML; MG/100ML
125 INJECTION, SOLUTION INTRAVENOUS CONTINUOUS
OUTPATIENT
Start: 2023-09-15

## 2023-09-15 RX ORDER — CHLORHEXIDINE GLUCONATE ORAL RINSE 1.2 MG/ML
15 SOLUTION DENTAL ONCE
OUTPATIENT
Start: 2023-09-15 | End: 2023-09-15

## 2023-09-15 RX ORDER — FOLIC ACID 1 MG/1
1 TABLET ORAL DAILY
Qty: 30 TABLET | Refills: 1 | Status: SHIPPED | OUTPATIENT
Start: 2023-09-15

## 2023-09-15 RX ORDER — ASCORBIC ACID 500 MG
500 TABLET ORAL 2 TIMES DAILY
Qty: 60 TABLET | Refills: 1 | Status: SHIPPED | OUTPATIENT
Start: 2023-09-15

## 2023-09-15 RX ORDER — ACETAMINOPHEN 325 MG/1
975 TABLET ORAL ONCE
OUTPATIENT
Start: 2023-09-15 | End: 2023-09-15

## 2023-09-15 RX ORDER — CHLORHEXIDINE GLUCONATE 4 G/100ML
SOLUTION TOPICAL DAILY PRN
OUTPATIENT
Start: 2023-09-15

## 2023-09-15 RX ORDER — FERROUS SULFATE TAB EC 324 MG (65 MG FE EQUIVALENT) 324 (65 FE) MG
324 TABLET DELAYED RESPONSE ORAL
Qty: 60 TABLET | Refills: 1 | Status: SHIPPED | OUTPATIENT
Start: 2023-09-15

## 2023-09-15 NOTE — PROGRESS NOTES
Assessment:   Diagnosis ICD-10-CM Associated Orders   1. Bilateral primary osteoarthritis of knee  M17.0 XR knee 3 vw right non injury     XR knee 3 vw left non injury     Ambulatory Referral to Physical Therapy          Plan:  • New xrays of the left knee and reviewed with the patient and her daughter noting tricompartmental arthritis. • On exam, she has full ROM with most of her pain on the medial aspect. • A left total knee arthroplasty was discussed in depth with the knee model. • The benefits and purpose of the operations and/or procedure have been explained to me in language I understand by Dr. Erasto Viramontes well as the risks and benefits, alternatives and complications pertaining to the above procedure/surgery which include but is not limited to: infections, deeps vein thrombosis, blood clots to the lungs, wound healing problems, complications from extensive blood loss, including shock, possible death, continued pain, fracture, vascular or nerve injury, potential need for further surgery/revision, persistent pain/stiffness/instability, failure of hardware,heart attack, stroke and not obtaining results patient used. • A left total knee arthroplasty will be possible Same-day at [facility] (Please place as 1st case)   • No hx of DVT/PE and will be placed on baby aspirin 81mg bid for 5 weeks post op. • There will be up same day PT and her outpatient PT  • She understands and agrees with this plan of care. To do next visit:  Return for Follow up post- op. The above stated was discussed in layman's terms and the patient expressed understanding. All questions were answered to the patient's satisfaction.        Scribe Attestation    I,:  Joce Hughes am acting as a scribe while in the presence of the attending physician.:       I,:  Arian Arauz MD personally performed the services described in this documentation    as scribed in my presence.:             Subjective:   Stevie Fuller is a 82 y.o. female who presents today for a 3-month follow-up for her bilateral knee pain due to osteoarthritis. Patient continues to get significant relief with periodic cortisone injections to the knees. She states that the pain is dull and aching in nature within the medial aspect. She reports only getting 1 month of relief at this time. Review of systems negative unless otherwise specified in HPI  Review of Systems   Constitutional: Negative for chills, fever and unexpected weight change. HENT: Negative for hearing loss, nosebleeds and sore throat. Eyes: Negative for pain, redness and visual disturbance. Respiratory: Negative for cough, shortness of breath and wheezing. Cardiovascular: Negative for chest pain, palpitations and leg swelling. Gastrointestinal: Negative for abdominal pain and nausea. Genitourinary: Negative for dyspareunia, dysuria and frequency. Musculoskeletal: Positive for arthralgias. Skin: Negative for rash and wound. Neurological: Negative for dizziness, numbness and headaches. Psychiatric/Behavioral: Negative for decreased concentration and suicidal ideas. The patient is not nervous/anxious.         Past Medical History:   Diagnosis Date   • High cholesterol    • Hypothyroidism        Past Surgical History:   Procedure Laterality Date   • BREAST CYST EXCISION  1982    cyst removals and aspirations-benign   • HYSTERECTOMY      at age 60   • OOPHORECTOMY Bilateral     at age 60   • STOMACH SURGERY         Family History   Problem Relation Age of Onset   • Heart disease Sister    • Breast cancer Sister 68   • Ovarian cancer Sister 77   • Breast cancer Sister 80   • Cervical cancer Paternal Aunt    • Breast cancer Cousin    • Breast cancer Cousin    • Hyperlipidemia Family        Social History     Occupational History   • Not on file   Tobacco Use   • Smoking status: Never   • Smokeless tobacco: Never   Substance and Sexual Activity   • Alcohol use: Not Currently   • Drug use: Not Currently   • Sexual activity: Not on file         Current Outpatient Medications:   •  levothyroxine 100 mcg tablet, Take 1 tablet (100 mcg total) by mouth daily, Disp: 90 tablet, Rfl: 3  •  pravastatin (PRAVACHOL) 20 mg tablet, Take 20 mg plus 40 mg total 60 mg daily, Disp: 90 tablet, Rfl: 3  •  pravastatin (PRAVACHOL) 40 mg tablet, Take 1 tablet (40 mg total) by mouth daily, Disp: 90 tablet, Rfl: 3    Allergies   Allergen Reactions   • Percocet [Oxycodone-Acetaminophen] GI Intolerance   • Statins      Other reaction(s): ELEVATED LFTS  Category: Adverse Reaction;    • Vicodin [Hydrocodone-Acetaminophen] GI Intolerance            Vitals:    09/15/23 1038   BP: 152/77   Pulse: 62       Objective:     General: No apparent distress  CV: S1-S2  Abdomen: Soft  Chest: No audible wheezing                    Right Knee Exam     Tenderness   The patient is experiencing tenderness in the medial joint line. Range of Motion   Extension: 0   Flexion: 120     Tests   Varus: negative Valgus: negative  Lachman:  Anterior - negative        Other   Erythema: absent  Sensation: normal  Pulse: present  Swelling: none  Effusion: no effusion present    Comments:  Calf is soft and non tender  Varus deformity      Left Knee Exam     Tenderness   The patient is experiencing tenderness in the medial joint line. Range of Motion   Extension: 0   Flexion: 120     Tests   Varus: negative Valgus: negative  Lachman:  Anterior - negative        Other   Erythema: absent  Sensation: normal  Pulse: present  Swelling: none  Effusion: no effusion present    Comments:  Calf is soft and non tender  Varus deformity             Diagnostics, reviewed and taken today if performed as documented:       The attending physician has personally reviewed the pertinent films in PACS and interpretation is as follows:  Xrays of the right knee 3 views: TriCompartmental osteoarthritis    Xrays of the left knee 3 views: Severe medial compartment osteoarthritis with osteoarthritis and lateral and patellofemoral (bone-on-bone deformity noted over the medial tibiofemoral compartment)    Procedures, if performed today:    Procedures    None performed      Portions of the record may have been created with voice recognition software. Occasional wrong word or "sound a like" substitutions may have occurred due to the inherent limitations of voice recognition software. Read the chart carefully and recognize, using context, where substitutions have occurred.

## 2023-09-18 ENCOUNTER — OFFICE VISIT (OUTPATIENT)
Dept: FAMILY MEDICINE CLINIC | Facility: CLINIC | Age: 83
End: 2023-09-18
Payer: MEDICARE

## 2023-09-18 VITALS
WEIGHT: 176 LBS | TEMPERATURE: 97.9 F | BODY MASS INDEX: 32.39 KG/M2 | HEART RATE: 72 BPM | OXYGEN SATURATION: 97 % | HEIGHT: 62 IN | SYSTOLIC BLOOD PRESSURE: 118 MMHG | DIASTOLIC BLOOD PRESSURE: 70 MMHG

## 2023-09-18 DIAGNOSIS — M25.562 CHRONIC PAIN OF BOTH KNEES: ICD-10-CM

## 2023-09-18 DIAGNOSIS — M25.561 CHRONIC PAIN OF BOTH KNEES: ICD-10-CM

## 2023-09-18 DIAGNOSIS — G89.29 CHRONIC PAIN OF BOTH KNEES: ICD-10-CM

## 2023-09-18 DIAGNOSIS — E03.9 HYPOTHYROIDISM, UNSPECIFIED TYPE: Primary | ICD-10-CM

## 2023-09-18 DIAGNOSIS — Z00.00 MEDICARE ANNUAL WELLNESS VISIT, SUBSEQUENT: ICD-10-CM

## 2023-09-18 DIAGNOSIS — N18.31 STAGE 3A CHRONIC KIDNEY DISEASE (HCC): ICD-10-CM

## 2023-09-18 DIAGNOSIS — E78.2 MIXED HYPERLIPIDEMIA: ICD-10-CM

## 2023-09-18 PROCEDURE — 99214 OFFICE O/P EST MOD 30 MIN: CPT | Performed by: FAMILY MEDICINE

## 2023-09-18 PROCEDURE — G0439 PPPS, SUBSEQ VISIT: HCPCS | Performed by: FAMILY MEDICINE

## 2023-09-18 RX ORDER — PRAVASTATIN SODIUM 40 MG
40 TABLET ORAL DAILY
COMMUNITY

## 2023-09-18 NOTE — PATIENT INSTRUCTIONS
Medicare Preventive Visit Patient Instructions  Thank you for completing your Welcome to Medicare Visit or Medicare Annual Wellness Visit today. Your next wellness visit will be due in one year (9/18/2024). The screening/preventive services that you may require over the next 5-10 years are detailed below. Some tests may not apply to you based off risk factors and/or age. Screening tests ordered at today's visit but not completed yet may show as past due. Also, please note that scanned in results may not display below. Preventive Screenings:  Service Recommendations Previous Testing/Comments   Colorectal Cancer Screening  * Colonoscopy    * Fecal Occult Blood Test (FOBT)/Fecal Immunochemical Test (FIT)  * Fecal DNA/Cologuard Test  * Flexible Sigmoidoscopy Age: 43-73 years old   Colonoscopy: every 10 years (may be performed more frequently if at higher risk)  OR  FOBT/FIT: every 1 year  OR  Cologuard: every 3 years  OR  Sigmoidoscopy: every 5 years  Screening may be recommended earlier than age 39 if at higher risk for colorectal cancer. Also, an individualized decision between you and your healthcare provider will decide whether screening between the ages of 77-80 would be appropriate. Colonoscopy: Not on file  FOBT/FIT: Not on file  Cologuard: Not on file  Sigmoidoscopy: Not on file    Screening Not Indicated     Breast Cancer Screening Age: 36 years old  Frequency: every 1-2 years  Not required if history of left and right mastectomy Mammogram: 09/07/2023    Screening Current   Cervical Cancer Screening Between the ages of 21-29, pap smear recommended once every 3 years. Between the ages of 32-69, can perform pap smear with HPV co-testing every 5 years.    Recommendations may differ for women with a history of total hysterectomy, cervical cancer, or abnormal pap smears in past. Pap Smear: Not on file    Screening Not Indicated   Hepatitis C Screening Once for adults born between 1945 and 1965  More frequently in patients at high risk for Hepatitis C Hep C Antibody: Not on file    Screening Not Indicated   Diabetes Screening 1-2 times per year if you're at risk for diabetes or have pre-diabetes Fasting glucose: 91 mg/dL (9/12/2023)  A1C: No results in last 5 years (No results in last 5 years)  Screening Current   Cholesterol Screening Once every 5 years if you don't have a lipid disorder. May order more often based on risk factors. Lipid panel: 09/12/2023    Screening Not Indicated  History Lipid Disorder     Other Preventive Screenings Covered by Medicare:  1. Abdominal Aortic Aneurysm (AAA) Screening: covered once if your at risk. You're considered to be at risk if you have a family history of AAA. 2. Lung Cancer Screening: covers low dose CT scan once per year if you meet all of the following conditions: (1) Age 48-67; (2) No signs or symptoms of lung cancer; (3) Current smoker or have quit smoking within the last 15 years; (4) You have a tobacco smoking history of at least 20 pack years (packs per day multiplied by number of years you smoked); (5) You get a written order from a healthcare provider. 3. Glaucoma Screening: covered annually if you're considered high risk: (1) You have diabetes OR (2) Family history of glaucoma OR (3)  aged 48 and older OR (3)  American aged 72 and older  3. Osteoporosis Screening: covered every 2 years if you meet one of the following conditions: (1) You're estrogen deficient and at risk for osteoporosis based off medical history and other findings; (2) Have a vertebral abnormality; (3) On glucocorticoid therapy for more than 3 months; (4) Have primary hyperparathyroidism; (5) On osteoporosis medications and need to assess response to drug therapy. · Last bone density test (DXA Scan): 10/21/2022.  5. HIV Screening: covered annually if you're between the age of 15-65.  Also covered annually if you are younger than 13 and older than 72 with risk factors for HIV infection. For pregnant patients, it is covered up to 3 times per pregnancy. Immunizations:  Immunization Recommendations   Influenza Vaccine Annual influenza vaccination during flu season is recommended for all persons aged >= 6 months who do not have contraindications   Pneumococcal Vaccine   * Pneumococcal conjugate vaccine = PCV13 (Prevnar 13), PCV15 (Vaxneuvance), PCV20 (Prevnar 20)  * Pneumococcal polysaccharide vaccine = PPSV23 (Pneumovax) Adults 20-63 years old: 1-3 doses may be recommended based on certain risk factors  Adults 72 years old: 1-2 doses may be recommended based off what pneumonia vaccine you previously received   Hepatitis B Vaccine 3 dose series if at intermediate or high risk (ex: diabetes, end stage renal disease, liver disease)   Tetanus (Td) Vaccine - COST NOT COVERED BY MEDICARE PART B Following completion of primary series, a booster dose should be given every 10 years to maintain immunity against tetanus. Td may also be given as tetanus wound prophylaxis. Tdap Vaccine - COST NOT COVERED BY MEDICARE PART B Recommended at least once for all adults. For pregnant patients, recommended with each pregnancy. Shingles Vaccine (Shingrix) - COST NOT COVERED BY MEDICARE PART B  2 shot series recommended in those aged 48 and above     Health Maintenance Due:  There are no preventive care reminders to display for this patient. Immunizations Due:      Topic Date Due   • COVID-19 Vaccine (4 - Moderna series) 12/28/2021     Advance Directives   What are advance directives? Advance directives are legal documents that state your wishes and plans for medical care. These plans are made ahead of time in case you lose your ability to make decisions for yourself. Advance directives can apply to any medical decision, such as the treatments you want, and if you want to donate organs. What are the types of advance directives?   There are many types of advance directives, and each state has rules about how to use them. You may choose a combination of any of the following:  · Living will: This is a written record of the treatment you want. You can also choose which treatments you do not want, which to limit, and which to stop at a certain time. This includes surgery, medicine, IV fluid, and tube feedings. · Durable power of  for [facility]): This is a written record that states who you want to make healthcare choices for you when you are unable to make them for yourself. This person, called a proxy, is usually a family member or a friend. You may choose more than 1 proxy. · Do not resuscitate (DNR) order:  A DNR order is used in case your heart stops beating or you stop breathing. It is a request not to have certain forms of treatment, such as CPR. A DNR order may be included in other types of advance directives. · Medical directive: This covers the care that you want if you are in a coma, near death, or unable to make decisions for yourself. You can list the treatments you want for each condition. Treatment may include pain medicine, surgery, blood transfusions, dialysis, IV or tube feedings, and a ventilator (breathing machine). · Values history: This document has questions about your views, beliefs, and how you feel and think about life. This information can help others choose the care that you would choose. Why are advance directives important? An advance directive helps you control your care. Although spoken wishes may be used, it is better to have your wishes written down. Spoken wishes can be misunderstood, or not followed. Treatments may be given even if you do not want them. An advance directive may make it easier for your family to make difficult choices about your care.    Weight Management   Why it is important to manage your weight:  Being overweight increases your risk of health conditions such as heart disease, high blood pressure, type 2 diabetes, and certain types of cancer. It can also increase your risk for osteoarthritis, sleep apnea, and other respiratory problems. Aim for a slow, steady weight loss. Even a small amount of weight loss can lower your risk of health problems. How to lose weight safely:  A safe and healthy way to lose weight is to eat fewer calories and get regular exercise. You can lose up about 1 pound a week by decreasing the number of calories you eat by 500 calories each day. Healthy meal plan for weight management:  A healthy meal plan includes a variety of foods, contains fewer calories, and helps you stay healthy. A healthy meal plan includes the following:  · Eat whole-grain foods more often. A healthy meal plan should contain fiber. Fiber is the part of grains, fruits, and vegetables that is not broken down by your body. Whole-grain foods are healthy and provide extra fiber in your diet. Some examples of whole-grain foods are whole-wheat breads and pastas, oatmeal, brown rice, and bulgur. · Eat a variety of vegetables every day. Include dark, leafy greens such as spinach, kale, delmi greens, and mustard greens. Eat yellow and orange vegetables such as carrots, sweet potatoes, and winter squash. · Eat a variety of fruits every day. Choose fresh or canned fruit (canned in its own juice or light syrup) instead of juice. Fruit juice has very little or no fiber. · Eat low-fat dairy foods. Drink fat-free (skim) milk or 1% milk. Eat fat-free yogurt and low-fat cottage cheese. Try low-fat cheeses such as mozzarella and other reduced-fat cheeses. · Choose meat and other protein foods that are low in fat. Choose beans or other legumes such as split peas or lentils. Choose fish, skinless poultry (chicken or turkey), or lean cuts of red meat (beef or pork). Before you cook meat or poultry, cut off any visible fat. · Use less fat and oil. Try baking foods instead of frying them.  Add less fat, such as margarine, sour cream, regular salad dressing and mayonnaise to foods. Eat fewer high-fat foods. Some examples of high-fat foods include french fries, doughnuts, ice cream, and cakes. · Eat fewer sweets. Limit foods and drinks that are high in sugar. This includes candy, cookies, regular soda, and sweetened drinks. Exercise:  Exercise at least 30 minutes per day on most days of the week. Some examples of exercise include walking, biking, dancing, and swimming. You can also fit in more physical activity by taking the stairs instead of the elevator or parking farther away from stores. Ask your healthcare provider about the best exercise plan for you. © Copyright [address] 2018 Information is for End User's use only and may not be sold, redistributed or otherwise used for commercial purposes.  All illustrations and images included in CareNotes® are the copyrighted property of [employer] Inc[employer] or [address] [place] [address]

## 2023-09-18 NOTE — PROGRESS NOTES
Assessment and Plan:     Problem List Items Addressed This Visit        Endocrine    Hypothyroidism - Primary     TSH in range on current dose 100 mcg Levothyroxine          Relevant Orders    TSH, 3rd generation       Genitourinary    Stage 3a chronic kidney disease ([address])     Lab Results   Component Value Date    EGFR 53 09/12/2023    EGFR 54 05/21/2023    EGFR 53 03/08/2023    CREATININE 0.99 09/12/2023    CREATININE 0.98 05/21/2023    CREATININE 0.99 03/08/2023   CKD is stable will monitor          Relevant Orders    Comprehensive metabolic panel       Other    Hyperlipidemia     Continue pravastatin 60 mg  Unable to tolerate other statins         Relevant Medications    pravastatin (PRAVACHOL) 40 mg tablet    Other Relevant Orders    Lipid Panel with Direct LDL reflex    Chronic pain of both knees     Knee replacement scheduled        Other Visit Diagnoses     Medicare annual wellness visit, subsequent            BMI Counseling: Body mass index is 32.72 kg/m². The BMI is above normal. Exercise recommendations include strength training exercises. Depression Screening and Follow-up Plan: Patient was screened for depression during today's encounter. They screened negative with a PHQ-2 score of 0. Falls Plan of Care: balance, strength, and gait training instructions were provided. Nutrition and Exercise Counseling: The patient's Body mass index is 32.72 kg/m². This is Facility age limit for growth %mickey is 20 years. Nutrition counseling provided:  Reviewed long term health goals and risks of obesity. Exercise counseling provided:          Preventive health issues were discussed with patient, and age appropriate screening tests were ordered as noted in patient's After Visit Summary. Personalized health advice and appropriate referrals for health education or preventive services given if needed, as noted in patient's After Visit Summary.      History of Present Illness:     Patient presents for a Medicare Wellness Visit    82year old here had labs done. Lipids - remain elevated. Total cholesterol 279, . Is on Pravastatin 60 mg  TSH in range  BMP - GFR 53, stable  Having knee replacement in November. Patient Care Team:  Giuliana Merrill MD as PCP - General     Review of Systems:     Review of Systems   Constitutional: Negative for activity change. HENT: Positive for hearing loss. Respiratory: Negative for chest tightness and shortness of breath. Cardiovascular: Positive for leg swelling. Negative for chest pain. Musculoskeletal: Positive for arthralgias. Neurological: Negative for headaches. Psychiatric/Behavioral: Negative for dysphoric mood. The patient is not nervous/anxious.          Problem List:     Patient Active Problem List   Diagnosis   • Hyperlipidemia   • Hypothyroidism   • Venous stasis of lower extremity   • Age-related cataract of both eyes   • Bilateral hearing loss   • Arthritis of knee   • Screening for osteoporosis   • Breast lump in female   • Arthritis of right knee   • Class 1 obesity due to excess calories with serious comorbidity and body mass index (BMI) of 31.0 to 31.9 in adult   • Ganglion cyst of wrist, right   • Chronic pain of both knees   • Gastroesophageal reflux disease   • Stage 3a chronic kidney disease (HCC)   • Osteopenia      Past Medical and Surgical History:     Past Medical History:   Diagnosis Date   • High cholesterol    • Hypothyroidism      Past Surgical History:   Procedure Laterality Date   • BREAST CYST EXCISION  1982    cyst removals and aspirations-benign   • HYSTERECTOMY      at age 60   • OOPHORECTOMY Bilateral     at age 60   • STOMACH SURGERY        Family History:     Family History   Problem Relation Age of Onset   • Heart disease Sister    • Breast cancer Sister 68   • Ovarian cancer Sister 77   • Breast cancer Sister 80   • Cervical cancer Paternal Aunt    • Breast cancer Cousin    • Breast cancer Cousin    • Hyperlipidemia Family       Social History:     Social History     Socioeconomic History   • Marital status: /Civil Union     Spouse name: None   • Number of children: None   • Years of education: None   • Highest education level: None   Occupational History   • None   Tobacco Use   • Smoking status: Never   • Smokeless tobacco: Never   Substance and Sexual Activity   • Alcohol use: Not Currently   • Drug use: Not Currently   • Sexual activity: None   Other Topics Concern   • None   Social History Narrative   • None     Social Determinants of Health     Financial Resource Strain: Low Risk  (9/18/2023)    Overall Financial Resource Strain (CARDIA)    • Difficulty of Paying Living Expenses: Not hard at all   Food Insecurity: Not on file   Transportation Needs: No Transportation Needs (9/18/2023)    PRAPARE - Transportation    • Lack of Transportation (Medical): No    • Lack of Transportation (Non-Medical): No   Physical Activity: Not on file   Stress: Not on file   Social Connections: Not on file   Intimate Partner Violence: Not on file   Housing Stability: Not on file      Medications and Allergies:     Current Outpatient Medications   Medication Sig Dispense Refill   • pravastatin (PRAVACHOL) 40 mg tablet Take 40 mg by mouth daily     • ascorbic acid (VITAMIN C) 500 MG tablet Take 1 tablet (500 mg total) by mouth 2 (two) times a day 60 tablet 1   • ferrous sulfate 324 (65 Fe) mg Take 1 tablet (324 mg total) by mouth 2 (two) times a day before meals 60 tablet 1   • folic acid (FOLVITE) 1 mg tablet Take 1 tablet (1 mg total) by mouth daily 30 tablet 1   • levothyroxine 100 mcg tablet Take 1 tablet (100 mcg total) by mouth daily 90 tablet 3   • pravastatin (PRAVACHOL) 20 mg tablet Take 20 mg plus 40 mg total 60 mg daily 90 tablet 3     No current facility-administered medications for this visit.      Allergies   Allergen Reactions   • Percocet [Oxycodone-Acetaminophen] GI Intolerance   • Statins      Other reaction(s): ELEVATED LFTS  Category: Adverse Reaction;    • Vicodin [Hydrocodone-Acetaminophen] GI Intolerance      Immunizations:     Immunization History   Administered Date(s) Administered   • COVID-19 MODERNA VACC 0.5 ML IM 01/27/2021, 02/24/2021, 11/02/2021   • INFLUENZA 10/01/2016   • Influenza Quadrivalent Preservative Free 3 years and older IM 09/15/2017   • Influenza Split High Dose Preservative Free IM 09/12/2013, 10/21/2015, 10/01/2016   • Influenza, high dose seasonal 0.7 mL 10/15/2018, 09/10/2020, 09/02/2021, 09/15/2022, 09/05/2023   • Pneumococcal Conjugate 13-Valent 10/21/2015   • Pneumococcal Polysaccharide PPV23 07/23/2013   • influenza, trivalent, adjuvanted 10/11/2019      Health Maintenance: There are no preventive care reminders to display for this patient. Topic Date Due   • COVID-19 Vaccine (4 - Moderna series) 12/28/2021      Medicare Screening Tests and Risk Assessments:     Madyson Olea is here for her Subsequent Wellness visit. Last Medicare Wellness visit information reviewed, patient interviewed and updates made to the record today. Health Risk Assessment:   Patient rates overall health as good. Patient feels that their physical health rating is same. Patient is satisfied with their life. Eyesight was rated as slightly worse. Hearing was rated as same. Patient feels that their emotional and mental health rating is same. Patients states they are never, rarely angry. Patient states they are sometimes unusually tired/fatigued. Pain experienced in the last 7 days has been some. Patient's pain rating has been 7/10. Patient states that she has experienced no weight loss or gain in last 6 months. Knee pain    Depression Screening:   PHQ-2 Score: 0      Fall Risk Screening:    In the past year, patient has experienced: history of falling in past year    Number of falls: 1  Injured during fall?: Yes    Feels unsteady when standing or walking?: No    Worried about falling?: No      Urinary Incontinence Screening:   Patient has not leaked urine accidently in the last six months. Home Safety:  Patient has trouble with stairs inside or outside of their home. Patient has working smoke alarms and has working carbon monoxide detector. Home safety hazards include: none. Nutrition:   Current diet is Regular. BMI Counseling: @BMI@ The BMI is above normal. Nutrition recommendations include 3-5 servings of fruits/vegetables daily, consuming healthier snacks and moderation in carbohydrate intake. Exercise recommendations include strength training exercises. Medications:   Patient is not currently taking any over-the-counter supplements. Patient is able to manage medications. Activities of Daily Living (ADLs)/Instrumental Activities of Daily Living (IADLs):   Walk and transfer into and out of bed and chair?: Yes  Dress and groom yourself?: Yes    Bathe or shower yourself?: Yes    Feed yourself? Yes  Do your laundry/housekeeping?: Yes  Manage your money, pay your bills and track your expenses?: Yes  Make your own meals?: Yes    Do your own shopping?: Yes    Previous Hospitalizations:   Any hospitalizations or ED visits within the last 12 months?: Yes    How many hospitalizations have you had in the last year?: 1-2    Advance Care Planning:   Living will: No    Durable POA for healthcare:  Yes    Advanced directive: No      Cognitive Screening:   Provider or family/friend/caregiver concerned regarding cognition?: No    PREVENTIVE SCREENINGS      Cardiovascular Screening:    General: Screening Not Indicated, History Lipid Disorder, Risks and Benefits Discussed and Screening Current      Diabetes Screening:     General: Screening Current and Risks and Benefits Discussed      Colorectal Cancer Screening:     General: Screening Not Indicated      Breast Cancer Screening:     General: Screening Current      Cervical Cancer Screening:    General: Screening Not Indicated      Osteoporosis Screening: General: Risks and Benefits Discussed    Due for: DXA Axial      Abdominal Aortic Aneurysm (AAA) Screening:        General: Screening Not Indicated      Lung Cancer Screening:     General: Screening Not Indicated      Hepatitis C Screening:    General: Screening Not Indicated    Screening, Brief Intervention, and Referral to Treatment (SBIRT)    Screening  Typical number of drinks in a day: 0  Typical number of drinks in a week: 0  Interpretation: Low risk drinking behavior. Brief Intervention  Alcohol & drug use screenings were reviewed. No concerns regarding substance use disorder identified. No results found. Physical Exam:     /70   Pulse 72   Temp 97.9 °F (36.6 °C)   Ht 5' 1.5" (1.562 m)   Wt 79.8 kg (176 lb)   SpO2 97%   BMI 32.72 kg/m²     Physical Exam  Vitals and nursing note reviewed. Constitutional:       General: She is not in acute distress. Appearance: She is well-developed. She is obese. She is not diaphoretic. Comments: Hard of hearing    HENT:      Head: Normocephalic and atraumatic. Right Ear: External ear normal.      Left Ear: External ear normal.      Ears:      Comments: Hearing aids     Mouth/Throat:      Mouth: Mucous membranes are moist.      Pharynx: Oropharynx is clear. Eyes:      Conjunctiva/sclera: Conjunctivae normal.   Cardiovascular:      Rate and Rhythm: Normal rate and regular rhythm. Heart sounds: Normal heart sounds. No murmur heard. No friction rub. No gallop. Pulmonary:      Effort: Pulmonary effort is normal. No respiratory distress. Breath sounds: Normal breath sounds. No stridor. No wheezing or rales. Chest:      Chest wall: No tenderness. Musculoskeletal:      Right lower leg: Edema (trace) present. Left lower leg: Edema (trace) present. Neurological:      General: No focal deficit present. Mental Status: She is alert. Mental status is at baseline.       Gait: Gait abnormal.   Psychiatric: Behavior: Behavior normal.         Thought Content:  Thought content normal.         Judgment: Judgment normal.          Mel Miles MD

## 2023-09-18 NOTE — ASSESSMENT & PLAN NOTE
Lab Results   Component Value Date    EGFR 53 09/12/2023    EGFR 54 05/21/2023    EGFR 53 03/08/2023    CREATININE 0.99 09/12/2023    CREATININE 0.98 05/21/2023    CREATININE 0.99 03/08/2023   CKD is stable will monitor

## 2023-09-20 ENCOUNTER — TELEPHONE (OUTPATIENT)
Dept: ANESTHESIOLOGY | Facility: CLINIC | Age: 83
End: 2023-09-20

## 2023-10-04 ENCOUNTER — EVALUATION (OUTPATIENT)
Dept: PHYSICAL THERAPY | Facility: CLINIC | Age: 83
End: 2023-10-04
Payer: MEDICARE

## 2023-10-04 ENCOUNTER — TELEPHONE (OUTPATIENT)
Dept: OBGYN CLINIC | Facility: HOSPITAL | Age: 83
End: 2023-10-04

## 2023-10-04 DIAGNOSIS — M17.0 BILATERAL PRIMARY OSTEOARTHRITIS OF KNEE: Primary | ICD-10-CM

## 2023-10-04 PROCEDURE — 97161 PT EVAL LOW COMPLEX 20 MIN: CPT

## 2023-10-04 PROCEDURE — 97110 THERAPEUTIC EXERCISES: CPT

## 2023-10-04 NOTE — PROGRESS NOTES
PT Evaluation     Today's date: 10/4/2023  Patient name: Hamlet Feldman  : 1940  MRN: [id number]  Referring provider: Van Clemente MD  Dx:   Encounter Diagnosis     ICD-10-CM    1. Bilateral primary osteoarthritis of knee  M17.0 Ambulatory Referral to Physical Therapy                     Assessment  Assessment details: Hamlet Feldman is a 82 y.o. female presenting to physical therapy for a pre-operative initial evaluation as she will be undergoing a L TKA on 23. The patient demonstrates decreased left knee ROM, decreased knee strength, and limited tolerance to standing and walking activities. These deficits have limited the patient's ability to complete ADLs, being primary caretaker of her son, and completing recreational activities of outdoor walks and attending [employer] fairs. This patient would benefit from OP PT services to address their impairments and functional limitations to maximize functional mobility and strength prior to surgery for improved post operative outcomes. The patient and her daughter were extensively educated on post operative expectations, AD usage, pain/swelling, and importance of mobility after surgery. She was provided a HEP and demonstrated/verbalized understanding all education and denied questions at end of session. Impairments: abnormal gait, abnormal or restricted ROM, activity intolerance, impaired balance, impaired physical strength, lacks appropriate home exercise program, pain with function and weight-bearing intolerance    Symptom irritability: moderateUnderstanding of Dx/Px/POC: excellent   Prognosis: good    Goals  STG to be achieved in 4 weeks: The patient will report a decrease in left knee "at worst" subjective pain rating score to at least 6/10 to allow for improved tolerance for weightbearing activities. The patient will increase left knee flexion AROM to at least 90 degrees to allow for improved functional mobility.    The patient will increase left knee extension MMT score to at least 4-/5 to allow for improved functional mobility. LTG to be achieved by DC: The patient will be independent in comprehensive HEP. The patient will report no pain with usual activities. The patient will improve left knee flexion and extension AROM to [facility] to allow for improved functional mobility. The patient will increase left knee extension and flexion MMT score to [facility] to allow for improved functional mobility. The patient will be able to complete 5 STS and TUG <12 seconds to decrease her risk for falls and demonstrate improved functional mobility. The patient will be able to ambulate one mile without pain or difficulty to allow for return to her recreational activities. Plan  Patient would benefit from: skilled physical therapy  Planned modality interventions: thermotherapy: hydrocollator packs, TENS and cryotherapy  Planned therapy interventions: manual therapy, joint mobilization, balance/weight bearing training, neuromuscular re-education, patient education, flexibility, strengthening, stretching, therapeutic activities, therapeutic exercise, gait training and home exercise program  Frequency: 2x week  Duration in weeks: 12  Plan of Care beginning date: 10/4/2023  Plan of Care expiration date: 12/27/2023  Treatment plan discussed with: patient        Subjective Evaluation    History of Present Illness  Mechanism of injury: Olayinka Wayne presents to OP PT for a pre-operative initial evaluation as she will be undergoing a L TKA on 11/9/23. She reports that she has been experiencing long standing knee pain, with several rounds of injections, without relief. She lives at home with her  and son, as well as her daughter lives near by. She lives in a ranch home with 4 steps to enter with bilateral hand rails. Per the patient's daughter, her brother is handicapped and so her mother is the primary care taker for him for all of his ADLs.  The patient's daughter says that her father as well as herself will be there to care for her mother after the surgery. She currently sleeps in a recliner as it is too painful for her to sleep in bed with her knees. She has a standard tub shower, no bench or shower chair at home. She does have a SPC at home, however does not have a RW. Patient Goals  Patient goals for therapy: decreased pain, increased motion, increased strength and independence with ADLs/IADLs  Patient goal: be able to go for walks again, walk around a craft show   Pain  Current pain ratin  At best pain ratin  At worst pain ratin  Location: L knee  Quality: sharp, dull ache and burning  Relieving factors: medications (Voltaren Gel, tylenol )  Aggravating factors: standing, walking and stair climbing  Progression: worsening    Social Support  Steps to enter house: yes  Stairs in house: no   Lives in: [facility]  Lives with: adult children and spouse    Treatments  Previous treatment: injection treatment and medication  Current treatment: physical therapy        Objective     Palpation   Left   No palpable tenderness to the distal biceps femoris, lateral gastrocnemius and medial gastrocnemius. Tenderness of the distal semimembranosus and distal semitendinosus.      Active Range of Motion   Left Knee   Flexion: 118 degrees with pain  Extension: 0 degrees with pain    Right Knee   Flexion: 115 degrees with pain  Extension: 0 degrees with pain    Passive Range of Motion   Left Knee   Flexion: 118 degrees with pain  Extension: 0 degrees with pain    Mobility   Patellar Mobility:   Left Knee   Hypomobile: left medial, left lateral, left superior and left inferior    Right Knee   Hypomobile: medial, lateral, superior and inferior     Strength/Myotome Testing     Left Hip   Planes of Motion   Flexion: 4-    Right Hip   Planes of Motion   Flexion: 4-    Left Knee   Flexion: 4-  Extension: 4-    Right Knee   Flexion: 4  Extension: 4    Ambulation   Weight-Bearing Status Assistive device used: none    Observational Gait   Gait: antalgic     Additional Observational Gait Details  Increased lateral weight shifting              Precautions: L TKA 11/9/23, Hard of Hearing  Access Code: [email or web address]    POC expires Unit limit Auth  expiration date PT/OT + Visit Limit?   12/27/23 N/A N/A BOMN                 Visit/Unit Tracking  AUTH Status:  Date 10/4              BOMN Used 1               Remaining                  Manuals 10/4            L knee PROM with OP                                                    Neuro Re-Ed             Quad set with towel under knee HEP            Quad set with LAQ HEP            Quad set with [employer] set with SAQ                                                    Ther Ex             Rec bike for knee ROM             Heel slides with strap HEP            Hamstring stretch on step HEP            Calf stretch with rockerboard HEP            Standing hip 3 ways              Cl[place]                           Pt education PT POC, post op status, HEP                         Ther Activity             TG squats             Fwd step ups              Lat step downs             Lat step ups             Gait Training                                       Modalities

## 2023-10-10 ENCOUNTER — OFFICE VISIT (OUTPATIENT)
Dept: PHYSICAL THERAPY | Facility: CLINIC | Age: 83
End: 2023-10-10
Payer: MEDICARE

## 2023-10-10 DIAGNOSIS — M17.0 BILATERAL PRIMARY OSTEOARTHRITIS OF KNEE: Primary | ICD-10-CM

## 2023-10-10 PROCEDURE — 97530 THERAPEUTIC ACTIVITIES: CPT

## 2023-10-10 PROCEDURE — 97140 MANUAL THERAPY 1/> REGIONS: CPT

## 2023-10-10 PROCEDURE — 97110 THERAPEUTIC EXERCISES: CPT

## 2023-10-10 NOTE — PROGRESS NOTES
Daily Note     Today's date: 10/10/2023  Patient name: Stevie Fuller  : 1940  MRN: [id number]  Referring provider: Arian Arauz MD  Dx:   Encounter Diagnosis     ICD-10-CM    1. Bilateral primary osteoarthritis of knee  M17.0                      Subjective: Patient reports that her knees are very stiff this morning. She was having trouble doing the heel slide exercise on her carpets because the heel wont move well with the towel. Objective: See treatment diary below      Assessment: Initiated treatment session as outlined below; tolerated treatment well. Able to complete all exercises with reports of minor stiffness in the knees. Educated patient that she can complete heel slides on carpet with barefoot or sock, she does not need to use the towel if it is hindering the exercise. Advised on DOMS; verbalized understanding. Patient demonstrated fatigue post treatment, exhibited good technique with therapeutic exercises and would benefit from continued PT      Plan: Continue per plan of care.       Precautions: L TKA 23, Hard of Hearing  Access Code: [email or web address]    POC expires Unit limit Auth  expiration date PT/OT + Visit Limit?   23 N/A N/A BOMN                 Visit/Unit Tracking  AUTH Status:  Date 10/4 10/10             BOMN Used 1 2              Remaining                  Manuals 10/4 10/9           L knee PROM with OP  BE                                                  Neuro Re-Ed             Quad set with towel under knee HEP            Quad set with LAQ HEP 3" 2x10  B/L           Quad set with [employer] set with SAQ                                                    Ther Ex             Rec bike for knee ROM  Nu L3 10'           Heel slides with strap HEP            Hamstring stretch on step HEP 3x30"           Calf stretch with rockerboard HEP 3x30"           Standing hip 3 ways   x10 ea dir B/L            Clamshells                           Pt education PT POC, post op status, HEP                         Ther Activity             TG squats             Fwd step ups   6" x10 B/L           Lat step downs             Lat step ups             Gait Training                                       Modalities

## 2023-10-12 ENCOUNTER — OFFICE VISIT (OUTPATIENT)
Dept: PHYSICAL THERAPY | Facility: CLINIC | Age: 83
End: 2023-10-12
Payer: MEDICARE

## 2023-10-12 DIAGNOSIS — M17.0 BILATERAL PRIMARY OSTEOARTHRITIS OF KNEE: Primary | ICD-10-CM

## 2023-10-12 PROCEDURE — 97112 NEUROMUSCULAR REEDUCATION: CPT

## 2023-10-12 PROCEDURE — 97110 THERAPEUTIC EXERCISES: CPT

## 2023-10-12 PROCEDURE — 97530 THERAPEUTIC ACTIVITIES: CPT

## 2023-10-12 NOTE — PROGRESS NOTES
Daily Note     Today's date: 10/12/2023  Patient name: Angelito Sawant  : 1940  MRN: [id number]  Referring provider: Adriana Abreu MD  Dx:   Encounter Diagnosis     ICD-10-CM    1. Bilateral primary osteoarthritis of knee  M17.0                      Subjective: Patient reports that she is stiff this morning. She didn't do her stretches last night and feels that maybe if she stretched before bed, she wouldn't be too stiff this morning. Objective: See treatment diary below      Assessment: Tolerated treatment well. Able to progress patient with addition of TG squats and SAQs to facilitate improved lower extremity strength and quadriceps muscle activation. She noted decreased stiffness following session. Patient demonstrated fatigue post treatment, exhibited good technique with therapeutic exercises, and would benefit from continued PT      Plan: Continue per plan of care.       Precautions: L TKA 23, Hard of Hearing  Access Code: [email or web address]    POC expires Unit limit Auth  expiration date PT/OT + Visit Limit?   23 N/A N/A BOMN                 Visit/Unit Tracking  AUTH Status:  Date 10/4 10/10 10/12            BOMN Used 1 2 3             Remaining                  Manuals 10/4 10/10 10/12          L knee PROM with OP  BE                                                  Neuro Re-Ed             Quad set with towel under knee HEP            Quad set with LAQ HEP 3" 2x10  B/L 3" 2x10 B/L          Quad set with [employer] set with SAQ   2x10 B/L                                                  Ther Ex             Rec bike for knee ROM  Nu L3 10' Seat 7 UE 11  L3 10'          Heel slides with strap HEP            Hamstring stretch on step HEP 3x30" 3x30"          Calf stretch with rockerboard HEP 3x30" 3x30"          Standing hip 3 ways   x10 ea dir B/L  x10 ea dir B/L          Clamshells                           Pt education PT POC, post op status, HEP                         Ther Activity TG squats   L18 2x10          Fwd step ups   6" x10 B/L 6" x10 B/L          Lat step downs             Lat step ups             Gait Training                                       Modalities

## 2023-10-16 ENCOUNTER — OFFICE VISIT (OUTPATIENT)
Dept: PHYSICAL THERAPY | Facility: CLINIC | Age: 83
End: 2023-10-16
Payer: MEDICARE

## 2023-10-16 DIAGNOSIS — M17.0 BILATERAL PRIMARY OSTEOARTHRITIS OF KNEE: Primary | ICD-10-CM

## 2023-10-16 PROCEDURE — 97110 THERAPEUTIC EXERCISES: CPT

## 2023-10-16 PROCEDURE — 97530 THERAPEUTIC ACTIVITIES: CPT

## 2023-10-16 NOTE — PROGRESS NOTES
Daily Note     Today's date: 10/16/2023  Patient name: Anabel Sharma  : 1940  MRN: [id number]  Referring provider: Griselda Pinks, MD  Dx:   Encounter Diagnosis     ICD-10-CM    1. Bilateral primary osteoarthritis of knee  M17.0           Start Time:           Subjective: Pt noted that she does have a little bit more knee soreness upon arrival for treatment session. Objective: See treatment diary below      Assessment:  Continued with treatment session with focus on b/l knees. Overall was able to complete all exercises with no increase in pain. She did note having some "locking"Tolerated treatment well. Patient exhibited good technique with therapeutic exercises and would benefit from continued PT. Education reviewed with patient with verbal agreement and understanding.   - HEP compliance. - DOMS 24 to 48 hours of muscle soreness s/p treatment session. Plan: Continue per plan of care. Precautions: L TKA 23, Hard of Hearing  Access Code: [email or web address]    POC expires Unit limit Auth  expiration date PT/OT + Visit Limit?   23 N/A N/A BOMN                 Visit/Unit Tracking  AUTH Status:  Date 10/4 10/10 10/12 10/16           BOMN Used 1 2 3 4            Remaining                  Manuals 10/4 10/10 10/12 10/16         L knee PROM with OP  BE  SC                                                 Neuro Re-Ed             Quad set with towel under knee HEP            Quad set with LAQ HEP 3" 2x10  B/L 3" 2x10 B/L 3" 2x 10 b/l          Quad set with SLR             Quad set with SAQ   2x10 B/L  2x10 b/l                                                 Ther Ex             Rec bike for knee ROM  Nu L3 10' Seat 7 UE 11  L3 10' Seat 7 UE 11  L3 10'         Heel slides with strap HEP            Hamstring stretch on step HEP 3x30" 3x30" 3x 30"         Calf stretch with rockerboard HEP 3x30" 3x30" 3 x30"         Standing hip 3 ways   x10 ea dir B/L  x10 ea dir B/L 10x ea.  B/l Thiago                           Pt education PT POC, post op status, HEP                         Ther Activity             TG squats   L18 2x10 L18 2X 10          Fwd step ups   6" x10 B/L 6" x10 B/L 6" 2X 10 B/L          Lat step downs             Lat step ups             Gait Training                                       Modalities

## 2023-10-17 ENCOUNTER — APPOINTMENT (OUTPATIENT)
Dept: LAB | Facility: CLINIC | Age: 83
End: 2023-10-17
Payer: MEDICARE

## 2023-10-17 DIAGNOSIS — M17.12 PRIMARY OSTEOARTHRITIS OF LEFT KNEE: ICD-10-CM

## 2023-10-17 LAB
ALBUMIN SERPL BCP-MCNC: 3.9 G/DL (ref 3.5–5)
ALP SERPL-CCNC: 65 U/L (ref 34–104)
ALT SERPL W P-5'-P-CCNC: 16 U/L (ref 7–52)
ANION GAP SERPL CALCULATED.3IONS-SCNC: 7 MMOL/L
APTT PPP: 32 SECONDS (ref 23–37)
AST SERPL W P-5'-P-CCNC: 18 U/L (ref 13–39)
BASOPHILS # BLD AUTO: 0.05 THOUSANDS/ÂΜL (ref 0–0.1)
BASOPHILS NFR BLD AUTO: 1 % (ref 0–1)
BILIRUB SERPL-MCNC: 0.42 MG/DL (ref 0.2–1)
BUN SERPL-MCNC: 15 MG/DL (ref 5–25)
CALCIUM SERPL-MCNC: 9.4 MG/DL (ref 8.4–10.2)
CHLORIDE SERPL-SCNC: 106 MMOL/L (ref 96–108)
CO2 SERPL-SCNC: 28 MMOL/L (ref 21–32)
CREAT SERPL-MCNC: 0.89 MG/DL (ref 0.6–1.3)
EOSINOPHIL # BLD AUTO: 0.21 THOUSAND/ÂΜL (ref 0–0.61)
EOSINOPHIL NFR BLD AUTO: 3 % (ref 0–6)
ERYTHROCYTE [DISTWIDTH] IN BLOOD BY AUTOMATED COUNT: 13.3 % (ref 11.6–15.1)
EST. AVERAGE GLUCOSE BLD GHB EST-MCNC: 128 MG/DL
GFR SERPL CREATININE-BSD FRML MDRD: 60 ML/MIN/1.73SQ M
GLUCOSE P FAST SERPL-MCNC: 90 MG/DL (ref 65–99)
HBA1C MFR BLD: 6.1 %
HCT VFR BLD AUTO: 40.5 % (ref 34.8–46.1)
HGB BLD-MCNC: 13.3 G/DL (ref 11.5–15.4)
IMM GRANULOCYTES # BLD AUTO: 0.04 THOUSAND/UL (ref 0–0.2)
IMM GRANULOCYTES NFR BLD AUTO: 1 % (ref 0–2)
INR PPP: 1.07 (ref 0.84–1.19)
LYMPHOCYTES # BLD AUTO: 1.94 THOUSANDS/ÂΜL (ref 0.6–4.47)
LYMPHOCYTES NFR BLD AUTO: 27 % (ref 14–44)
MCH RBC QN AUTO: 33.3 PG (ref 26.8–34.3)
MCHC RBC AUTO-ENTMCNC: 32.8 G/DL (ref 31.4–37.4)
MCV RBC AUTO: 101 FL (ref 82–98)
MONOCYTES # BLD AUTO: 0.52 THOUSAND/ÂΜL (ref 0.17–1.22)
MONOCYTES NFR BLD AUTO: 7 % (ref 4–12)
NEUTROPHILS # BLD AUTO: 4.31 THOUSANDS/ÂΜL (ref 1.85–7.62)
NEUTS SEG NFR BLD AUTO: 61 % (ref 43–75)
NRBC BLD AUTO-RTO: 0 /100 WBCS
PLATELET # BLD AUTO: 295 THOUSANDS/UL (ref 149–390)
PMV BLD AUTO: 11.8 FL (ref 8.9–12.7)
POTASSIUM SERPL-SCNC: 4.3 MMOL/L (ref 3.5–5.3)
PROT SERPL-MCNC: 7.2 G/DL (ref 6.4–8.4)
PROTHROMBIN TIME: 13.8 SECONDS (ref 11.6–14.5)
RBC # BLD AUTO: 4 MILLION/UL (ref 3.81–5.12)
SODIUM SERPL-SCNC: 141 MMOL/L (ref 135–147)
WBC # BLD AUTO: 7.07 THOUSAND/UL (ref 4.31–10.16)

## 2023-10-17 PROCEDURE — 86850 RBC ANTIBODY SCREEN: CPT | Performed by: ORTHOPAEDIC SURGERY

## 2023-10-17 PROCEDURE — 85730 THROMBOPLASTIN TIME PARTIAL: CPT

## 2023-10-17 PROCEDURE — 36415 COLL VENOUS BLD VENIPUNCTURE: CPT

## 2023-10-17 PROCEDURE — 85025 COMPLETE CBC W/AUTO DIFF WBC: CPT

## 2023-10-17 PROCEDURE — 86900 BLOOD TYPING SEROLOGIC ABO: CPT | Performed by: ORTHOPAEDIC SURGERY

## 2023-10-17 PROCEDURE — 86901 BLOOD TYPING SEROLOGIC RH(D): CPT | Performed by: ORTHOPAEDIC SURGERY

## 2023-10-17 PROCEDURE — 80053 COMPREHEN METABOLIC PANEL: CPT

## 2023-10-17 PROCEDURE — 83036 HEMOGLOBIN GLYCOSYLATED A1C: CPT

## 2023-10-17 PROCEDURE — 85610 PROTHROMBIN TIME: CPT

## 2023-10-18 ENCOUNTER — LAB REQUISITION (OUTPATIENT)
Dept: LAB | Facility: HOSPITAL | Age: 83
End: 2023-10-18
Payer: MEDICARE

## 2023-10-18 DIAGNOSIS — M17.12 UNILATERAL PRIMARY OSTEOARTHRITIS, LEFT KNEE: ICD-10-CM

## 2023-10-18 PROBLEM — E66.811 CLASS 1 OBESITY DUE TO EXCESS CALORIES WITH SERIOUS COMORBIDITY AND BODY MASS INDEX (BMI) OF 31.0 TO 31.9 IN ADULT: Status: RESOLVED | Noted: 2019-10-10 | Resolved: 2023-10-18

## 2023-10-18 PROBLEM — E66.09 CLASS 1 OBESITY DUE TO EXCESS CALORIES WITH SERIOUS COMORBIDITY AND BODY MASS INDEX (BMI) OF 31.0 TO 31.9 IN ADULT: Status: RESOLVED | Noted: 2019-10-10 | Resolved: 2023-10-18

## 2023-10-18 PROBLEM — Z01.818 PREOPERATIVE CLEARANCE: Status: ACTIVE | Noted: 2023-10-18

## 2023-10-18 LAB
ABO GROUP BLD: NORMAL
BLD GP AB SCN SERPL QL: NEGATIVE
RH BLD: POSITIVE
SPECIMEN EXPIRATION DATE: NORMAL

## 2023-10-18 NOTE — H&P (VIEW-ONLY)
Internal Medicine Pre-Operative Evaluation:     Reason for Visit: Pre-operative Evaluation for Risk Stratification and Optimization    Patient ID: Trae Parsons is a 82 y.o. female. Surgery: Arthroplasty of left knee  Referring Provider: Dr Mirna Marquis      Recommendations to Proceed withSurgery    Patient is considered to be Low risk for Medium risk procedure. After evaluation and discussion with patient with emphasis that all surgery has some degree of inherent risk it is determined this procedure is of acceptable risk  medically. Patient may proceed with planned procedure      Assessment      Primary osteoarthritis left knee  Failed conservative measures  Electing to undergo total joint arthroplasty    Pre-operative Medical Evaluation for planned surgery  Patient meets preoperative quality goals as noted below  Recommendations as listed in PLAN section below  Contact surgical nurse  navigator with any questions regarding preoperative plan or schedule. Hypothyroid  Cont levothyroxine as prescribed    CKD  Level 3  Baseline 0.9-1.0  Avoid nephrotoxic medications  Avoid hypotension in the post operative setting  Monitor bmp    Impaired fasting glucose  Hgb A1c 6.1  Recommend following DM diet  Monitor FBS    Hyperlipidemia  Low cholesterol diet  Continue statin therapy        Patient Active Problem List   Diagnosis    Hyperlipidemia    Hypothyroidism    Venous stasis of lower extremity    Age-related cataract of both eyes    Bilateral hearing loss    Arthritis of knee    Screening for osteoporosis    Breast lump in female    Arthritis of right knee    Ganglion cyst of wrist, right    Chronic pain of both knees    Gastroesophageal reflux disease    Stage 3a chronic kidney disease (HCC)    Primary osteoarthritis of left knee    Preoperative clearance    Encounter for geriatric assessment        Plan:     1.  Further preoperative workup as follows:   - none no further testing required may proceed with surgery    2. Medication Management/Recommendations:   - hold aspirin 7 days prior to surgery  - avoid use of NSAID such as motrin, advil, aleve for 7 days prior to surgery  - hold all OTC herbal or nutritional supplements 7 days before surgery    3. Patient requires further consultation with:   No Consults Required    4. Discharge Planning / Barriers to Discharge  none identified - patients has post discharge therapy plan in place, transportation arranged for discharge day, adequate family support at home to assist with discharge to home. Subjective:           History of Present Illness:     Shanthi Gerard is a 82 y.o. female who presents to the office today for a preoperative consultation at the request of surgeon. The patient understands this is an elective procedure and not emergent. They are electing to undergo planned procedure with an understanding that all surgery has inherent risk. They have worked with their surgeon and failed conservative treatment measures. Today they present for preoperative risk assessment and recommendations for optimization in preparation for surgery. Pt seen in surgical optimization center for upcoming proposed surgery. They have failed previous conservative measures and have elected surgical intervention. Pt meets presurgical lab and BMI optimization goals, except Current GFR is <60. We will hold all nephrotoxic medications and monitor bmp closely in the post operative setting avoiding hypotension if at all possible. Upon interview questioning patient is able to perform greater than 4 METs workload in daily life without any reported cardio-pulmonary symptoms. ROS: No TIA's or unusual headaches, no dysphagia. No prolonged cough. No dyspnea or chest pain on exertion. No abdominal pain, change in bowel habits, black or bloody stools. No urinary tract or BPH symptoms. Positive reported pain in arthritic joint. Positive difficulty with gait.  No skin rashes or issues. Objective:      /76   Ht 5' 1.5" (1.562 m)   Wt 79.4 kg (175 lb)   BMI 32.53 kg/m²       General Appearance: no distress, conversive  HEENT: PERRLA, conjuctiva normal; oropharynx clear; mucous membranes moist;   Neck:  Supple, no lymphadenopathy or thyromegaly  Lungs: breath sounds normal, normal respiratory effort, no retractions, expiratory effort normal  CV: normal heart sounds S1/S2, PMI normal   ABD: soft non tender, no masses , no hepatic or splenomegaly  EXT: DP pulses intact, no lymphadenopathy, no edema  Skin: normal turgor, normal texture, no rash  Psych: affect normal, mood normal  Neuro: AAOx3        The following portions of the patient's history were reviewed and updated as appropriate: allergies, current medications, past family history, past medical history, past social history, past surgical history and problem list.     Past History:       Past Medical History:   Diagnosis Date    High cholesterol     Hypothyroidism     Past Surgical History:   Procedure Laterality Date    BREAST CYST EXCISION  1982    cyst removals and aspirations-benign    HYSTERECTOMY      at age 60    OOPHORECTOMY Bilateral     at age 60    STOMACH SURGERY            Social History     Tobacco Use    Smoking status: Never    Smokeless tobacco: Never   Substance Use Topics    Alcohol use: Not Currently    Drug use: Not Currently     Family History   Problem Relation Age of Onset    Heart disease Sister     Breast cancer Sister 68    Ovarian cancer Sister 77    Breast cancer Sister 80    Cervical cancer Paternal Aunt     Breast cancer Cousin     Breast cancer Cousin     Hyperlipidemia Family           Allergies: Allergies   Allergen Reactions    Percocet [Oxycodone-Acetaminophen] GI Intolerance    Statins      Other reaction(s): ELEVATED LFTS  Category:  Adverse Reaction;     Vicodin [Hydrocodone-Acetaminophen] GI Intolerance        Current Medications:     Current Outpatient Medications Medication Instructions    ascorbic acid (VITAMIN C) 500 mg, Oral, 2 times daily    ferrous sulfate 324 mg, Oral, 2 times daily before meals    folic acid (FOLVITE) 1 mg, Oral, Daily    levothyroxine 100 mcg, Oral, Daily    pravastatin (PRAVACHOL) 40 mg, Oral, Daily             PRE-OP WORKSHEET DATA    Assessment of Pre-Operative Risks     MLJ Quality Hard Stops:  BMI (<40) : Estimated body mass index is 32.53 kg/m² as calculated from the following:    Height as of this encounter: 5' 1.5" (1.562 m). Weight as of this encounter: 79.4 kg (175 lb). Hgb ( >11): Lab Results   Component Value Date    HGB 13.3 10/17/2023     HbA1c (<7.0) :   Lab Results   Component Value Date    HGBA1C 6.1 (H) 10/17/2023     GFR (>60) (Less then 45 = Nephrology consult):  CrCl cannot be calculated (Patient's most recent lab result is older than the maximum 7 days allowed. ). Active Decompensated Chronic Conditions which would delay surgery  No acutely decompensated medical issues such as recent CVA, MI, new onset arrhythmia, severe aortic stenosis, CHF, uncontrolled COPD       Exercise Capacity: (if less the 4 mets consider functional assessment via cardiac stress testing or consultation)    Able to walk 2 blocks without symptoms?: Yes  Able to walk 1 flights without symptoms?: Yes    Assessment of intra and post operative respiratory, hemodynamic and thrombotic risks     Prior Anesthesia Reactions: No     Personal history of venous thromboembolic disease? No    History of steroid use > 5 mg for >2 weeks within last year? No    Cardiac Risk Estimation: per the Revised Cardiac Risk Index (Circ. 100:1043, 1999),     The patient's risk factors for cardiac complications include :  none    Hever Davis has a in hospital cardiac risk of RCI RISK CLASS I (0 risk factors, risk of major cardiac compl. appr.  0.5%) based on RCRI calculator          Pre-Op Data Reviewed:       Laboratory Results: I have personally reviewed the pertinent laboratory results/reports     EKG:I have personally reviewed pertinent reports. . I personally reviewed and interpreted available tracings in the electronic medical record    Sinus bradycardia with sinus arrhythmia  Minimal voltage criteria for LVH, may be normal variant  Possible Anterior infarct , age undetermined  Confirmed by Gabriela Saucedo ([id number]) on 10/19/2023 10:47:32 AM    OLD RECORDS: reviewed old records in the chart review section if EHR on day of visit.     Previous cardiopulmonary studies within the past year:  Echocardiogram: no  Cardiac Catheterization: no  Stress Test: no      Time of visit including pre-visit chart review, visit and post-visit coordination of plan and care , review of pre-surgical lab work, preparation and time spent documenting note in electronic medical record, time spent face-to-face in physical examination answering patient questions by care team 55 minutes             [address] [facility] [address]

## 2023-10-18 NOTE — PROGRESS NOTES
Internal Medicine Pre-Operative Evaluation:     Reason for Visit: Pre-operative Evaluation for Risk Stratification and Optimization    Patient ID: Mary Ramesh is a 82 y.o. female. Surgery: Arthroplasty of left knee  Referring Provider: Dr Orion Bradley      Recommendations to Proceed withSurgery    Patient is considered to be Low risk for Medium risk procedure. After evaluation and discussion with patient with emphasis that all surgery has some degree of inherent risk it is determined this procedure is of acceptable risk  medically. Patient may proceed with planned procedure      Assessment      Primary osteoarthritis left knee  Failed conservative measures  Electing to undergo total joint arthroplasty    Pre-operative Medical Evaluation for planned surgery  Patient meets preoperative quality goals as noted below  Recommendations as listed in PLAN section below  Contact surgical nurse  navigator with any questions regarding preoperative plan or schedule. Hypothyroid  Cont levothyroxine as prescribed    CKD  Level 3  Baseline 0.9-1.0  Avoid nephrotoxic medications  Avoid hypotension in the post operative setting  Monitor bmp    Impaired fasting glucose  Hgb A1c 6.1  Recommend following DM diet  Monitor FBS    Hyperlipidemia  Low cholesterol diet  Continue statin therapy        Patient Active Problem List   Diagnosis    Hyperlipidemia    Hypothyroidism    Venous stasis of lower extremity    Age-related cataract of both eyes    Bilateral hearing loss    Arthritis of knee    Screening for osteoporosis    Breast lump in female    Arthritis of right knee    Ganglion cyst of wrist, right    Chronic pain of both knees    Gastroesophageal reflux disease    Stage 3a chronic kidney disease (HCC)    Primary osteoarthritis of left knee    Preoperative clearance    Encounter for geriatric assessment        Plan:     1.  Further preoperative workup as follows:   - none no further testing required may proceed with surgery    2. Medication Management/Recommendations:   - hold aspirin 7 days prior to surgery  - avoid use of NSAID such as motrin, advil, aleve for 7 days prior to surgery  - hold all OTC herbal or nutritional supplements 7 days before surgery    3. Patient requires further consultation with:   No Consults Required    4. Discharge Planning / Barriers to Discharge  none identified - patients has post discharge therapy plan in place, transportation arranged for discharge day, adequate family support at home to assist with discharge to home. Subjective:           History of Present Illness:     Stevie Fuller is a 82 y.o. female who presents to the office today for a preoperative consultation at the request of surgeon. The patient understands this is an elective procedure and not emergent. They are electing to undergo planned procedure with an understanding that all surgery has inherent risk. They have worked with their surgeon and failed conservative treatment measures. Today they present for preoperative risk assessment and recommendations for optimization in preparation for surgery. Pt seen in surgical optimization center for upcoming proposed surgery. They have failed previous conservative measures and have elected surgical intervention. Pt meets presurgical lab and BMI optimization goals, except Current GFR is <60. We will hold all nephrotoxic medications and monitor bmp closely in the post operative setting avoiding hypotension if at all possible. Upon interview questioning patient is able to perform greater than 4 METs workload in daily life without any reported cardio-pulmonary symptoms. ROS: No TIA's or unusual headaches, no dysphagia. No prolonged cough. No dyspnea or chest pain on exertion. No abdominal pain, change in bowel habits, black or bloody stools. No urinary tract or BPH symptoms. Positive reported pain in arthritic joint. Positive difficulty with gait.  No skin rashes or issues. Objective:      /76   Ht 5' 1.5" (1.562 m)   Wt 79.4 kg (175 lb)   BMI 32.53 kg/m²       General Appearance: no distress, conversive  HEENT: PERRLA, conjuctiva normal; oropharynx clear; mucous membranes moist;   Neck:  Supple, no lymphadenopathy or thyromegaly  Lungs: breath sounds normal, normal respiratory effort, no retractions, expiratory effort normal  CV: normal heart sounds S1/S2, PMI normal   ABD: soft non tender, no masses , no hepatic or splenomegaly  EXT: DP pulses intact, no lymphadenopathy, no edema  Skin: normal turgor, normal texture, no rash  Psych: affect normal, mood normal  Neuro: AAOx3        The following portions of the patient's history were reviewed and updated as appropriate: allergies, current medications, past family history, past medical history, past social history, past surgical history and problem list.     Past History:       Past Medical History:   Diagnosis Date    High cholesterol     Hypothyroidism     Past Surgical History:   Procedure Laterality Date    BREAST CYST EXCISION  1982    cyst removals and aspirations-benign    HYSTERECTOMY      at age 60    OOPHORECTOMY Bilateral     at age 60    STOMACH SURGERY            Social History     Tobacco Use    Smoking status: Never    Smokeless tobacco: Never   Substance Use Topics    Alcohol use: Not Currently    Drug use: Not Currently     Family History   Problem Relation Age of Onset    Heart disease Sister     Breast cancer Sister 68    Ovarian cancer Sister 77    Breast cancer Sister 80    Cervical cancer Paternal Aunt     Breast cancer Cousin     Breast cancer Cousin     Hyperlipidemia Family           Allergies: Allergies   Allergen Reactions    Percocet [Oxycodone-Acetaminophen] GI Intolerance    Statins      Other reaction(s): ELEVATED LFTS  Category:  Adverse Reaction;     Vicodin [Hydrocodone-Acetaminophen] GI Intolerance        Current Medications:     Current Outpatient Medications Medication Instructions    ascorbic acid (VITAMIN C) 500 mg, Oral, 2 times daily    ferrous sulfate 324 mg, Oral, 2 times daily before meals    folic acid (FOLVITE) 1 mg, Oral, Daily    levothyroxine 100 mcg, Oral, Daily    pravastatin (PRAVACHOL) 40 mg, Oral, Daily             PRE-OP WORKSHEET DATA    Assessment of Pre-Operative Risks     MLJ Quality Hard Stops:  BMI (<40) : Estimated body mass index is 32.53 kg/m² as calculated from the following:    Height as of this encounter: 5' 1.5" (1.562 m). Weight as of this encounter: 79.4 kg (175 lb). Hgb ( >11): Lab Results   Component Value Date    HGB 13.3 10/17/2023     HbA1c (<7.0) :   Lab Results   Component Value Date    HGBA1C 6.1 (H) 10/17/2023     GFR (>60) (Less then 45 = Nephrology consult):  CrCl cannot be calculated (Patient's most recent lab result is older than the maximum 7 days allowed. ). Active Decompensated Chronic Conditions which would delay surgery  No acutely decompensated medical issues such as recent CVA, MI, new onset arrhythmia, severe aortic stenosis, CHF, uncontrolled COPD       Exercise Capacity: (if less the 4 mets consider functional assessment via cardiac stress testing or consultation)    Able to walk 2 blocks without symptoms?: Yes  Able to walk 1 flights without symptoms?: Yes    Assessment of intra and post operative respiratory, hemodynamic and thrombotic risks     Prior Anesthesia Reactions: No     Personal history of venous thromboembolic disease? No    History of steroid use > 5 mg for >2 weeks within last year? No    Cardiac Risk Estimation: per the Revised Cardiac Risk Index (Circ. 100:1043, 1999),     The patient's risk factors for cardiac complications include :  none    Jarret Quijano has a in hospital cardiac risk of RCI RISK CLASS I (0 risk factors, risk of major cardiac compl. appr.  0.5%) based on RCRI calculator          Pre-Op Data Reviewed:       Laboratory Results: I have personally reviewed the pertinent laboratory results/reports     EKG:I have personally reviewed pertinent reports. . I personally reviewed and interpreted available tracings in the electronic medical record    Sinus bradycardia with sinus arrhythmia  Minimal voltage criteria for LVH, may be normal variant  Possible Anterior infarct , age undetermined  Confirmed by Roseann Hogan ([id number]) on 10/19/2023 10:47:32 AM    OLD RECORDS: reviewed old records in the chart review section if EHR on day of visit.     Previous cardiopulmonary studies within the past year:  Echocardiogram: no  Cardiac Catheterization: no  Stress Test: no      Time of visit including pre-visit chart review, visit and post-visit coordination of plan and care , review of pre-surgical lab work, preparation and time spent documenting note in electronic medical record, time spent face-to-face in physical examination answering patient questions by care team 55 minutes             [facility]

## 2023-10-19 ENCOUNTER — OFFICE VISIT (OUTPATIENT)
Dept: PHYSICAL THERAPY | Facility: CLINIC | Age: 83
End: 2023-10-19
Payer: MEDICARE

## 2023-10-19 ENCOUNTER — OFFICE VISIT (OUTPATIENT)
Dept: LAB | Facility: HOSPITAL | Age: 83
End: 2023-10-19
Payer: MEDICARE

## 2023-10-19 DIAGNOSIS — M17.0 BILATERAL PRIMARY OSTEOARTHRITIS OF KNEE: Primary | ICD-10-CM

## 2023-10-19 DIAGNOSIS — M17.12 PRIMARY OSTEOARTHRITIS OF LEFT KNEE: ICD-10-CM

## 2023-10-19 LAB
ATRIAL RATE: 59 BPM
P AXIS: 33 DEGREES
PR INTERVAL: 154 MS
QRS AXIS: -7 DEGREES
QRSD INTERVAL: 80 MS
QT INTERVAL: 400 MS
QTC INTERVAL: 396 MS
T WAVE AXIS: 6 DEGREES
VENTRICULAR RATE: 59 BPM

## 2023-10-19 PROCEDURE — 97110 THERAPEUTIC EXERCISES: CPT

## 2023-10-19 PROCEDURE — 97112 NEUROMUSCULAR REEDUCATION: CPT

## 2023-10-19 PROCEDURE — 93005 ELECTROCARDIOGRAM TRACING: CPT

## 2023-10-19 PROCEDURE — 97530 THERAPEUTIC ACTIVITIES: CPT

## 2023-10-19 NOTE — PROGRESS NOTES
Daily Note     Today's date: 10/19/2023  Patient name: Ole Lozada  : 1940  MRN: [id number]  Referring provider: Priscilla Baig MD  Dx:   Encounter Diagnosis     ICD-10-CM    1. Bilateral primary osteoarthritis of knee  M17.0                      Subjective: Patient reports that her knees have been bothering her more recently and she thinks it's because she is using muscles more than she has done in a long time. She states that aspera cream and voltaren aren't seeming to help at all. Objective: See treatment diary below      Assessment: Tolerated treatment well. Able to add supine SLRs with good tolerance. Able to maintain quadriceps contraction throughout without lag. Held step ups at 10 reps for B/L LE's secondary to increased soreness in knees this session. Discussed with patient's daughter the benefits of her attending the prehab prior to the surgery and advised on DOMS. Patient demonstrated fatigue post treatment, exhibited good technique with therapeutic exercises, and would benefit from continued PT      Plan: Continue per plan of care.       Precautions: L TKA 23, Hard of Hearing  Access Code: [email or web address]    POC expires Unit limit Auth  expiration date PT/OT + Visit Limit?   23 N/A N/A BOMN                 Visit/Unit Tracking  AUTH Status:  Date 10/4 10/10 10/12 10/16 10/19          BOMN Used 1 2 3 4 5           Remaining                  Manuals 10/4 10/10 10/12 10/16 10/19        L knee PROM with OP  BE  SC  BE                                               Neuro Re-Ed             Quad set with towel under knee HEP            Quad set with LAQ HEP 3" 2x10  B/L 3" 2x10 B/L 3" 2x 10 b/l  3" 2x10 B/L        Quad set with SLR     2x5 B/L        Quad set with SAQ   2x10 B/L  2x10 b/l  2x10 B/L                                                Ther Ex             Rec bike for knee ROM  Nu L3 10' Seat 7 UE 11  L3 10' Seat 7 UE 11  L3 10' Seat 7 UE 11  L3 10'        Heel slides with strap HEP Hamstring stretch on step HEP 3x30" 3x30" 3x 30" 3x30"        Calf stretch with rockerboard HEP 3x30" 3x30" 3 x30" 3x30"        Standing hip 3 ways   x10 ea dir B/L  x10 ea dir B/L 10x ea.  B/l          Thiago                           Pt education PT POC, post op status, HEP    DOMS, prehab                     Ther Activity             TG squats   L18 2x10 L18 2X 10  L18 2x10        Fwd step ups   6" x10 B/L 6" x10 B/L 6" 2X 10 B/L  6" x10 B/L        Lat step downs             Lat step ups             Gait Training                                       Modalities

## 2023-10-20 NOTE — PROGRESS NOTES
Daily Note     Today's date: 10/20/2023  Patient name: Rafia Mitchell  : 1940  MRN: [id number]  Referring provider: Margrett Heimlich, MD  Dx:   Encounter Diagnosis     ICD-10-CM    1. Bilateral primary osteoarthritis of knee  M17.0                      Subjective: Patient reports that her knees are very sore and she is had a tough weekend because they were bothersome to her. Objective: See treatment diary below      Assessment: Tolerated treatment well. Able to progress with increased reps for LAQs and add lateral step ups this session to facilitate improved lower extremity strength functionally. She does continue to report soreness and joint pain throughout session. Patient demonstrated fatigue post treatment, exhibited good technique with therapeutic exercises, and would benefit from continued PT      Plan: Continue per plan of care.       Precautions: L TKA 23, Hard of Hearing  Access Code: [email or web address]    POC expires Unit limit Auth  expiration date PT/OT + Visit Limit?   23 N/A N/A BOMN                 Visit/Unit Tracking  AUTH Status:  Date 10/4 10/10 10/12 10/16 10/19 10/23         BOMN Used 1 2 3 4 5 6          Remaining                  Manuals 10/4 10/10 10/12 10/16 10/19 10/23       L knee PROM with OP  BE  SC  BE BE                                              Neuro Re-Ed             Quad set with towel under knee HEP            Quad set with LAQ HEP 3" 2x10  B/L 3" 2x10 B/L 3" 2x 10 b/l  3" 2x10 B/L 3" 3x10 B/L       Quad set with SLR     2x5 B/L 2x5 B/L       Quad set with SAQ   2x10 B/L  2x10 b/l  2x10 B/L  2x10 B/L                                              Ther Ex             Rec bike for knee ROM  Nu L3 10' Seat 7 UE 11  L3 10' Seat 7 UE 11  L3 10' Seat 7 UE 11  L3 10' Seat 7 UE 11  L3 10'       Heel slides with strap HEP            Hamstring stretch on step HEP 3x30" 3x30" 3x 30" 3x30" 3x30"        Calf stretch with rockerboard HEP 3x30" 3x30" 3 x30" 3x30" 3x30"       Standing hip 3 ways   x10 ea dir B/L  x10 ea dir B/L 10x ea.  B/l          Thiago                           Pt education PT POC, post op status, HEP    DOMS, prehab                     Ther Activity             TG squats   L18 2x10 L18 2X 10  L18 2x10 L18 2x10       Fwd step ups   6" x10 B/L 6" x10 B/L 6" 2X 10 B/L  6" x10 B/L 6" x10 B/L       Lat step downs             Lat step ups      6" x10 B/L       Gait Training                                       Modalities

## 2023-10-23 ENCOUNTER — OFFICE VISIT (OUTPATIENT)
Dept: PHYSICAL THERAPY | Facility: CLINIC | Age: 83
End: 2023-10-23
Payer: MEDICARE

## 2023-10-23 DIAGNOSIS — M17.0 BILATERAL PRIMARY OSTEOARTHRITIS OF KNEE: Primary | ICD-10-CM

## 2023-10-23 PROBLEM — H91.93 BILATERAL HEARING LOSS: Status: RESOLVED | Noted: 2018-08-29 | Resolved: 2023-10-23

## 2023-10-23 PROBLEM — M17.12 PRIMARY OSTEOARTHRITIS OF LEFT KNEE: Status: RESOLVED | Noted: 2023-10-18 | Resolved: 2023-10-23

## 2023-10-23 PROBLEM — Z01.89 ENCOUNTER FOR GERIATRIC ASSESSMENT: Status: ACTIVE | Noted: 2023-10-23

## 2023-10-23 PROBLEM — N18.31 STAGE 3A CHRONIC KIDNEY DISEASE (HCC): Status: RESOLVED | Noted: 2021-12-09 | Resolved: 2023-10-23

## 2023-10-23 PROBLEM — M85.80 OSTEOPENIA: Status: RESOLVED | Noted: 2023-03-16 | Resolved: 2023-10-23

## 2023-10-23 PROCEDURE — 97530 THERAPEUTIC ACTIVITIES: CPT

## 2023-10-23 PROCEDURE — 97110 THERAPEUTIC EXERCISES: CPT

## 2023-10-23 PROCEDURE — 97112 NEUROMUSCULAR REEDUCATION: CPT

## 2023-10-23 NOTE — PROGRESS NOTES
THE [facility]ICAL [facility]IZATION [facility])  CONSULT: GERIATRIC SURGERY   Assessment/Plan:  27-year-old female referred to [facility] for presurgery geriatric screening secondary to advanced age  Consult concerns: This [facility] appointment is strictly for a geriatric assessment 2.2 advanced age. Patient was previously seen in [facility] by Dr. Alina Ruvalcaba and his team for medical clearance. Please refer to his note for medical history. Consult concerns: age   Patient is scheduled on   Case: [id number] Date/Time: 11/09/23 1451   Procedure: ARTHROPLASTY KNEE TOTAL AND ALL ASSOCIATED PROCEDURES (Left: Knee)   Anesthesia type: Choice   Diagnosis: Primary osteoarthritis of left knee [M17.12]   Pre-op diagnosis: Primary osteoarthritis of left knee [M17.12]   Location:  OR ROOM 01 / [address] [facility]   Surgeons: Toshia Lezama MD     No problem-specific Assessment & Plan notes found for this encounter.   LAST ANESTHESIA   NO CONCERNS        Problem List Items Addressed This Visit          Nervous and Auditory    Bilateral hearing loss - Primary  See full geriatric screening   If admitted recommend inpatient geriatrics and DP        Musculoskeletal and Integument    Primary osteoarthritis of left knee  Seen for surgical optimization  Seen for geriatric assessment  Started on best  If admitted to the hospital recommend inpatient geriatric consult after surgery  At risk for post-op BABAR - yes 2.2 RI- can see nephrology post-op   At risk for post-op SSI -no  BEST   Breathing- instructed to exercise lungs prior to surgery via IS  Eat- discussed increasing protein intake prior to surgery   Sleep/stress- encouraged 8-10 sleep @ night, stress reduction, avoid sick contacts and handwashing   Train- encouraged to remain active           Genitourinary    Stage 3a chronic kidney disease ([address])  HIGH RISK BABAR AFTER SURGERY   IF ADMITTED CONSULT INPATIENT NEPHROLOGY   CONSULT SHOULD NOT HOLD UP DISCHARGE        Other    Encounter for geriatric assessment  If admitted to the hospital recommend inpatient geriatric consult after surgery  Consult should not hold up discharge  See Geriatric Assessment below. .. Cognitive Assessment: 5  CAM: NO  TUG <15 sec:YES  Falls (last 6 months): YES  Hand  score:23  -Malcolm Total Score: 19  PHQ- 9 Depression Scale:3  Nutrition Assessment Score:14  METS:5  Health goals:  -What are your overall health goals? (quit smoking, wt. loss, rest, decrease stress)  To walk with no pain  -What brings you strength? (family, friends, Taoist, health)  Daughter, has been  -What activities are important to you? (exercise, reading, travel, work)  Reading, [employer] and to [employer]            Subjective:      Patient ID: Damian Cat is a 82 y.o. female who was referred to [facility] for presurgery geriatric screening secondary to advanced age, having surgery and receiving anesthetics. She has been managing ongoing left knee pain and is electing to have a left knee replacement. This [facility] appointment is strictly for geriatric assessment as she was previously seen in the [facility] by Dr. Justina Valerio and his team for medical clearance. Please refer to his note for medical history. I met patient in [facility]. She arrived with her daughter. She walks independently. She is hard of hearing. No walker and/or no cane use. Gait was slow. Admits to a good appetite. Lives at home with her . She is independent with all ADLs. She has a ranch style home. Her daughter will be staying with her after surgery. She wishes to go home as soon as possible. If admitted to the hospital after surgery in the next extended stay would recommend inpatient geriatric consult after surgery due to advanced age, having surgery, and receiving anesthetics. There were no cognitive issues identified today. PAT's were reviewed. She received medical clearance today    BABAR risk high due to renal insufficiency. If admitted to the hospital can see nephrology postop.   This consult should not hold up discharge    SSI risk low    SOC anemia review no further needs    As always we discussed having your BEST surgery, and BEST recovery. Surgery goals reviewed today. Breathing exercises   Patient was encouraged to begin lung exercises today. This could be accomplished through deep breathing and cough exercises. Patient was taught how to use an incentive spirometer. Return demonstration provided. Eating/nutrition   Encouraged patient to increase oral protein intake prior to surgery. This can be accomplished by consuming chicken, fish, tuna fish, cottage cheese, cheese, eggs, [place] yogurt, and protein shakes as needed. I encouraged use of protein shakes such ENLIVE. I also recommended making your own protein shakes with protein powder. Sleep/Stress management  Patient was encouraged to rest their body prior to surgery. Encouraged attempting to get 8 hours of sleep at night. Avoid stress. Avoid sick contacts. Encouraged to find a relaxing hobby such as reading, meditation, listening to music. Training exercises  Patient was encouraged to remain active as possible. Today bilateral lower extremity generic exercises were taught for muscle strengthening and balance. All exercises to be done sitting down. HPI    The following portions of the patient's history were reviewed and updated as appropriate: allergies, current medications, past family history, past medical history, past social history, past surgical history, and problem list.    Review of Systems      Objective: There were no vitals taken for this visit. Physical Exam  Pulmonary:      Effort: Pulmonary effort is normal.   Neurological:      General: No focal deficit present. Mental Status: She is alert and oriented to person, place, and time. Mental status is at baseline. Psychiatric:         Mood and Affect: Mood normal.         Behavior: Behavior normal.         Thought Content:  Thought content normal.         Judgment: Judgment normal.

## 2023-10-24 ENCOUNTER — CONSULT (OUTPATIENT)
Dept: ANESTHESIOLOGY | Facility: CLINIC | Age: 83
End: 2023-10-24
Payer: MEDICARE

## 2023-10-24 ENCOUNTER — OFFICE VISIT (OUTPATIENT)
Age: 83
End: 2023-10-24

## 2023-10-24 VITALS
WEIGHT: 175 LBS | SYSTOLIC BLOOD PRESSURE: 134 MMHG | BODY MASS INDEX: 32.2 KG/M2 | HEIGHT: 62 IN | DIASTOLIC BLOOD PRESSURE: 76 MMHG

## 2023-10-24 VITALS — HEART RATE: 73 BPM | OXYGEN SATURATION: 94 %

## 2023-10-24 DIAGNOSIS — M17.12 PRIMARY OSTEOARTHRITIS OF LEFT KNEE: ICD-10-CM

## 2023-10-24 DIAGNOSIS — E78.2 MIXED HYPERLIPIDEMIA: Primary | ICD-10-CM

## 2023-10-24 DIAGNOSIS — H91.93 BILATERAL HEARING LOSS, UNSPECIFIED HEARING LOSS TYPE: Primary | ICD-10-CM

## 2023-10-24 DIAGNOSIS — N18.31 STAGE 3A CHRONIC KIDNEY DISEASE (HCC): ICD-10-CM

## 2023-10-24 DIAGNOSIS — Z01.818 PREOPERATIVE CLEARANCE: ICD-10-CM

## 2023-10-24 DIAGNOSIS — Z01.89 ENCOUNTER FOR GERIATRIC ASSESSMENT: ICD-10-CM

## 2023-10-24 DIAGNOSIS — E03.9 HYPOTHYROIDISM, UNSPECIFIED TYPE: ICD-10-CM

## 2023-10-24 PROCEDURE — 99212 OFFICE O/P EST SF 10 MIN: CPT | Performed by: NURSE PRACTITIONER

## 2023-10-26 ENCOUNTER — OFFICE VISIT (OUTPATIENT)
Dept: PHYSICAL THERAPY | Facility: CLINIC | Age: 83
End: 2023-10-26
Payer: MEDICARE

## 2023-10-26 DIAGNOSIS — M17.0 BILATERAL PRIMARY OSTEOARTHRITIS OF KNEE: Primary | ICD-10-CM

## 2023-10-26 PROCEDURE — 97530 THERAPEUTIC ACTIVITIES: CPT

## 2023-10-26 PROCEDURE — 97110 THERAPEUTIC EXERCISES: CPT

## 2023-10-26 PROCEDURE — 97112 NEUROMUSCULAR REEDUCATION: CPT

## 2023-10-26 NOTE — PROGRESS NOTES
Daily Note     Today's date: 10/26/2023  Patient name: Rose Vicente  : 1940  MRN: [id number]  Referring provider: Abbey Langley MD  Dx:   Encounter Diagnosis     ICD-10-CM    1. Bilateral primary osteoarthritis of knee  M17.0             Start Time: 7632  Stop Time: 0932  Total time in clinic (min): 45 minutes      Subjective: Patient reports increased L knee soreness since she woke up this morning, but otherwise notes no new complaints or major concerns since last session. Objective: See treatment diary below      Assessment: Tolerated treatment fair. Patient continued to report soreness and joint pain throughout session. HS stretch at step was especially difficult for patient and caused increased L knee symptoms. Patient displayed proper form and had good recall/understanding of all exercises in her program. Muscle fatigue present at the conclusion of session. Continue to progress patient as able/as tolerated. Patient exhibited good technique with therapeutic exercises and would benefit from continued PT      Plan: Continue per plan of care.       Precautions: L TKA 23, Hard of Hearing  Access Code: [email or web address]    POC expires Unit limit Auth  expiration date PT/OT + Visit Limit?   23 N/A N/A BOMN                 Visit/Unit Tracking  AUTH Status:  Date 10/4 10/10 10/12 10/16 10/19 10/23 10/26        BOMN Used 1 2 3 4 5 6 7         Remaining                  Manuals 10/4 10/10 10/12 10/16 10/19 10/23 10/26      L knee PROM with OP  BE  SC  BE BE                                              Neuro Re-Ed             Quad set with towel under knee HEP            Quad set with LAQ HEP 3" 2x10  B/L 3" 2x10 B/L 3" 2x 10 b/l  3" 2x10 B/L 3" 3x10 B/L 3" 3x10 B/L      Quad set with SLR     2x5 B/L 2x5 B/L 2x5 B/L      Quad set with SAQ   2x10 B/L  2x10 b/l  2x10 B/L  2x10 B/L 2x10 B/L                                             Ther Ex             Rec bike for knee ROM  Nu L3 10' Seat 7 UE 11  L3 10' Seat 7 UE 11  L3 10' Seat 7 UE 11  L3 10' Seat 7 UE 11  L3 10' Seat 7 UE 11  L3 10'      Heel slides with strap HEP            Hamstring stretch on step HEP 3x30" 3x30" 3x 30" 3x30" 3x30"  3x30"      Calf stretch with rockerboard HEP 3x30" 3x30" 3 x30" 3x30" 3x30" 3X30"      Standing hip 3 ways   x10 ea dir B/L  x10 ea dir B/L 10x ea.  B/l          Clamshells                           Pt education PT POC, post op status, HEP    DOMS, prehab                     Ther Activity             TG squats   L18 2x10 L18 2X 10  L18 2x10 L18 2x10 L18 2x10      Fwd step ups   6" x10 B/L 6" x10 B/L 6" 2X 10 B/L  6" x10 B/L 6" x10 B/L 6" x10 B/L      Lat step downs             Lat step ups      6" x10 B/L 6" x 10 B/L      Gait Training                                       Modalities                                             [phone]

## 2023-10-26 NOTE — PRE-PROCEDURE INSTRUCTIONS
Pre-Surgery Instructions:   Medication Instructions    ascorbic acid (VITAMIN C) 500 MG tablet Instructions provided by MD    ferrous sulfate 324 (65 Fe) mg Instructions provided by MD    folic acid (FOLVITE) 1 mg tablet Instructions provided by MD    levothyroxine 100 mcg tablet Take day of surgery. pravastatin (PRAVACHOL) 40 mg tablet Take night before surgery      Medication instructions for day surgery reviewed. Please use only a sip of water to take your instructed medications. Avoid all over the counter vitamins, supplements and NSAIDS for one week prior to surgery per anesthesia guidelines. Tylenol is ok to take as needed. You will receive a call one business day prior to surgery with an arrival time and hospital directions. If your surgery is scheduled on a Monday, the hospital will be calling you on the Friday prior to your surgery. If you have not heard from anyone by 8pm, please call the hospital supervisor through the hospital  at [phone]. Marie Arroyo [phone]). Do not eat or drink anything after midnight the night before your surgery, including candy, mints, lifesavers, or chewing gum. Do not drink alcohol 24hrs before your surgery. Try not to smoke at least 24hrs before your surgery. Follow the pre surgery showering instructions as listed in the [facility] Surgical Experience Booklet” or otherwise provided by your surgeon's office. Do not use a blade to shave the surgical area 1 week before surgery. It is okay to use a clean electric clippers up to 24 hours before surgery. Do not apply any lotions, creams, including makeup, cologne, deodorant, or perfumes after showering on the day of your surgery. Do not use dry shampoo, hair spray, hair gel, or any type of hair products. No contact lenses, eye make-up, or artificial eyelashes. Remove nail polish, including gel polish, and any artificial, gel, or acrylic nails if possible.  Remove all jewelry including rings and body piercing jewelry. Wear causal clothing that is easy to take on and off. Consider your type of surgery. Keep any valuables, jewelry, piercings at home. Please bring any specially ordered equipment (sling, braces) if indicated. Arrange for a responsible person to drive you to and from the hospital on the day of your surgery. Visitor Guidelines discussed. Call the surgeon's office with any new illnesses, exposures, or additional questions prior to surgery. Please reference your [facility] Surgical Experience Booklet” for additional information to prepare for your upcoming surgery.

## 2023-10-28 LAB
DME PARACHUTE DELIVERY DATE ACTUAL: NORMAL
DME PARACHUTE DELIVERY DATE REQUESTED: NORMAL
DME PARACHUTE DELIVERY NOTE: NORMAL
DME PARACHUTE ITEM DESCRIPTION: NORMAL
DME PARACHUTE ORDER STATUS: NORMAL
DME PARACHUTE SUPPLIER NAME: NORMAL
DME PARACHUTE SUPPLIER PHONE: NORMAL

## 2023-10-30 ENCOUNTER — OFFICE VISIT (OUTPATIENT)
Dept: PHYSICAL THERAPY | Facility: CLINIC | Age: 83
End: 2023-10-30
Payer: MEDICARE

## 2023-10-30 DIAGNOSIS — M17.0 BILATERAL PRIMARY OSTEOARTHRITIS OF KNEE: Primary | ICD-10-CM

## 2023-10-30 PROCEDURE — 97530 THERAPEUTIC ACTIVITIES: CPT

## 2023-10-30 PROCEDURE — 97110 THERAPEUTIC EXERCISES: CPT

## 2023-10-30 PROCEDURE — 97112 NEUROMUSCULAR REEDUCATION: CPT

## 2023-10-30 NOTE — PROGRESS NOTES
Daily Note     Today's date: 10/30/2023  Patient name: Dalia Griffiths  : 1940  MRN: [id number]  Referring provider: Nallely Castellon MD  Dx:   Encounter Diagnosis     ICD-10-CM    1. Bilateral primary osteoarthritis of knee  M17.0                      Subjective: Patient reports that she is hobbling along today. She says that she had a busy end of the week last week with getting all of her clearances for her surgery. Objective: See treatment diary below      Assessment: Tolerated treatment well. Progressed patient with additional weight for LAQs as well as reps for step ups to continue to progress lower extremity strengthening exercises. She continues to have increased joint pain and stiffness during all exercises. Patient demonstrated fatigue post treatment, exhibited good technique with therapeutic exercises, and would benefit from continued PT      Plan: Continue per plan of care.       Precautions: L TKA 23, Hard of Hearing  Access Code: [email or web address]    POC expires Unit limit Auth  expiration date PT/OT + Visit Limit?   23 N/A N/A BOMN                 Visit/Unit Tracking  AUTH Status:  Date 10/4 10/10 10/12 10/16 10/19 10/23 10/26 10/30       BOMN Used 1 2 3 4 5 6 7 8        Remaining                  Manuals 10/4 10/10 10/12 10/16 10/19 10/23 10/26 10/30     L knee PROM with OP  BE  SC  BE BE                                              Neuro Re-Ed             Quad set with towel under knee HEP            Quad set with LAQ HEP 3" 2x10  B/L 3" 2x10 B/L 3" 2x 10 b/l  3" 2x10 B/L 3" 3x10 B/L 3" 3x10 B/L 1.5# 3x10 B/L     Quad set with SLR     2x5 B/L 2x5 B/L 2x5 B/L 2x5 B/L     Quad set with SAQ   2x10 B/L  2x10 b/l  2x10 B/L  2x10 B/L 2x10 B/L 3x10 B/L     Slider star exercise                                       Ther Ex             Rec bike for knee ROM  Nu L3 10' Seat 7 UE 11  L3 10' Seat 7 UE 11  L3 10' Seat 7 UE 11  L3 10' Seat 7 UE 11  L3 10' Seat 7 UE 11  L3 10' Seat 7 UE 11  L3 10' Heel slides with strap HEP            Hamstring stretch on step HEP 3x30" 3x30" 3x 30" 3x30" 3x30"  3x30"      Calf stretch with rockerboard HEP 3x30" 3x30" 3 x30" 3x30" 3x30" 3X30"      Standing hip 3 ways   x10 ea dir B/L  x10 ea dir B/L 10x ea.  B/l     X10 ea dir B/L     Clamshells                           Pt education PT POC, post op status, HEP    DOMS, prehab                     Ther Activity             TG squats   L18 2x10 L18 2X 10  L18 2x10 L18 2x10 L18 2x10 L20 3x10     Fwd step ups   6" x10 B/L 6" x10 B/L 6" 2X 10 B/L  6" x10 B/L 6" x10 B/L 6" x10 B/L 6" x15 B/L     Lat step downs             Lat step ups      6" x10 B/L 6" x 10 B/L 6"x10 B/L     Gait Training                                       Modalities

## 2023-11-02 ENCOUNTER — OFFICE VISIT (OUTPATIENT)
Dept: PHYSICAL THERAPY | Facility: CLINIC | Age: 83
End: 2023-11-02
Payer: MEDICARE

## 2023-11-02 DIAGNOSIS — M17.0 BILATERAL PRIMARY OSTEOARTHRITIS OF KNEE: Primary | ICD-10-CM

## 2023-11-02 PROCEDURE — 97110 THERAPEUTIC EXERCISES: CPT

## 2023-11-02 PROCEDURE — 97112 NEUROMUSCULAR REEDUCATION: CPT

## 2023-11-02 NOTE — PROGRESS NOTES
Daily Note     Today's date: 2023  Patient name: Cristel Miranda  : 1940  MRN: [id number]  Referring provider: Curt Conley MD  Dx:   Encounter Diagnosis     ICD-10-CM    1. Bilateral primary osteoarthritis of knee  M17.0                      Subjective: Per patient and her daughter, on Wednesday night she experienced significant pain radiating down into her left shin. She says that the pain did go away in the early morning following application of an ice pack. Her daughter says that that day she did a lot of walking around both inside her house and at the grocery store. She reports being very tired this morning as she didn't sleep well because of this. Objective: See treatment diary below      Assessment: Tolerated treatment well. Held squat and step ups this session secondary to patient noting increased discomfort and fatigue this session. She was able to complete all nonweightbearing exercises without increased symptoms. Patient demonstrated fatigue post treatment, exhibited good technique with therapeutic exercises, and would benefit from continued PT      Plan: Continue per plan of care.       Precautions: L TKA 23, Hard of Hearing  Access Code: [email or web address]    POC expires Unit limit Auth  expiration date PT/OT + Visit Limit?   23 N/A N/A BOMN                 Visit/Unit Tracking  AUTH Status:  Date 10/4 10/10 10/12 10/16 10/19 10/23 10/26 10/30 11/2      BOMN Used 1 2 3 4 5 6 7 8 9       Remaining                  Manuals 10/4 10/10 10/12 10/16 10/19 10/23 10/26 10/30 112    L knee PROM with OP  BE  SC  BE BE                                              Neuro Re-Ed             Quad set with towel under knee HEP            Quad set with LAQ HEP 3" 2x10  B/L 3" 2x10 B/L 3" 2x 10 b/l  3" 2x10 B/L 3" 3x10 B/L 3" 3x10 B/L 1.5# 3x10 B/L 1.5# 3x10 B/L    Quad set with SLR     2x5 B/L 2x5 B/L 2x5 B/L 2x5 B/L X10 B/L    Quad set with SAQ   2x10 B/L  2x10 b/l  2x10 B/L  2x10 B/L 2x10 B/L 3x10 B/L 3x10 B/L    Slider star exercise                                       Ther Ex             Rec bike for knee ROM  Nu L3 10' Seat 7 UE 11  L3 10' Seat 7 UE 11  L3 10' Seat 7 UE 11  L3 10' Seat 7 UE 11  L3 10' Seat 7 UE 11  L3 10' Seat 7 UE 11  L3 10' Rec no tension 10'    Heel slides with strap HEP            Hamstring stretch on step HEP 3x30" 3x30" 3x 30" 3x30" 3x30"  3x30"  3x30"     Calf stretch with rockerboard HEP 3x30" 3x30" 3 x30" 3x30" 3x30" 3X30"  3x30"    Standing hip 3 ways   x10 ea dir B/L  x10 ea dir B/L 10x ea.  B/l     X10 ea dir B/L X10 ea dir B/L    Clamshells          X15 B/L                 Pt education PT POC, post op status, HEP    DOMS, prehab                     Ther Activity             TG squats   L18 2x10 L18 2X 10  L18 2x10 L18 2x10 L18 2x10 L20 3x10     Fwd step ups   6" x10 B/L 6" x10 B/L 6" 2X 10 B/L  6" x10 B/L 6" x10 B/L 6" x10 B/L 6" x15 B/L     Lat step downs             Lat step ups      6" x10 B/L 6" x 10 B/L 6"x10 B/L     Gait Training                                       Modalities

## 2023-11-06 ENCOUNTER — OFFICE VISIT (OUTPATIENT)
Dept: PHYSICAL THERAPY | Facility: CLINIC | Age: 83
End: 2023-11-06
Payer: MEDICARE

## 2023-11-06 DIAGNOSIS — M17.0 BILATERAL PRIMARY OSTEOARTHRITIS OF KNEE: Primary | ICD-10-CM

## 2023-11-06 PROCEDURE — 97110 THERAPEUTIC EXERCISES: CPT | Performed by: PHYSICAL THERAPIST

## 2023-11-06 NOTE — PROGRESS NOTES
Daily Note     Today's date: 2023  Patient name: Maame Ohara  : 1940  MRN: [id number]  Referring provider: Yassine Espinosa MD  Dx:   Encounter Diagnosis     ICD-10-CM    1. Bilateral primary osteoarthritis of knee  M17.0                      Subjective: Patient states she has an upset stomach today. Objective: See treatment diary below      Assessment: Tolerated treatment poor. Patient  requested leaving early today due to her upset stomach. She also had increased knee pain due to not taking her pain medication because of her upcoming surgery. Plan: Continue per plan of care. Progress note during next visit. Progress treatment as tolerated.        Precautions: L TKA 23, Hard of Hearing  Access Code: [email or web address]    POC expires Unit limit Auth  expiration date PT/OT + Visit Limit?   23 N/A N/A BOMN                 Visit/Unit Tracking  AUTH Status:  Date 10/4 10/10 10/12 10/16 10/19 10/23 10/26 10/30 11/2 11/6     BOMN Used 1 2 3 4 5 6 7 8 9 10      Remaining                  Manuals 10/4 10/10 10/12 10/16 10/19 10/23 10/26 10/30 11/2 11/6   L knee PROM with OP  BE  SC  BE BE                                              Neuro Re-Ed             Quad set with towel under knee HEP            Quad set with LAQ HEP 3" 2x10  B/L 3" 2x10 B/L 3" 2x 10 b/l  3" 2x10 B/L 3" 3x10 B/L 3" 3x10 B/L 1.5# 3x10 B/L 1.5# 3x10 B/L Declined today   Quad set with SLR     2x5 B/L 2x5 B/L 2x5 B/L 2x5 B/L X10 B/L Declined today   Quad set with SAQ   2x10 B/L  2x10 b/l  2x10 B/L  2x10 B/L 2x10 B/L 3x10 B/L 3x10 B/L Declined today   Slider star exercise                                       Ther Ex             Rec bike for knee ROM  Nu L3 10' Seat 7 UE 11  L3 10' Seat 7 UE 11  L3 10' Seat 7 UE 11  L3 10' Seat 7 UE 11  L3 10' Seat 7 UE 11  L3 10' Seat 7 UE 11  L3 10' Rec no tension 10' Rec no tension 10'   Heel slides with strap HEP            Hamstring stretch on step HEP 3x30" 3x30" 3x 30" 3x30" 3x30"  3x30" 3x30"  3x30"   Calf stretch with rockerboard HEP 3x30" 3x30" 3 x30" 3x30" 3x30" 3X30"  3x30" 3x30"   Standing hip 3 ways   x10 ea dir B/L  x10 ea dir B/L 10x ea.  B/l     X10 ea dir B/L X10 ea dir B/L Declined today   Clamshells          X15 B/L Declined today                Pt education PT POC, post op status, HEP    DOMS, prehab                     Ther Activity             TG squats   L18 2x10 L18 2X 10  L18 2x10 L18 2x10 L18 2x10 L20 3x10     Fwd step ups   6" x10 B/L 6" x10 B/L 6" 2X 10 B/L  6" x10 B/L 6" x10 B/L 6" x10 B/L 6" x15 B/L     Lat step downs             Lat step ups      6" x10 B/L 6" x 10 B/L 6"x10 B/L     Gait Training                                       Modalities

## 2023-11-08 ENCOUNTER — ANESTHESIA EVENT (OUTPATIENT)
Dept: PERIOP | Facility: HOSPITAL | Age: 83
End: 2023-11-08
Payer: MEDICARE

## 2023-11-09 ENCOUNTER — ANESTHESIA (OUTPATIENT)
Dept: PERIOP | Facility: HOSPITAL | Age: 83
End: 2023-11-09
Payer: MEDICARE

## 2023-11-09 ENCOUNTER — HOSPITAL ENCOUNTER (OUTPATIENT)
Facility: HOSPITAL | Age: 83
Setting detail: OUTPATIENT SURGERY
Discharge: HOME | End: 2023-11-09
Attending: ORTHOPAEDIC SURGERY | Admitting: ORTHOPAEDIC SURGERY
Payer: MEDICARE

## 2023-11-09 VITALS
OXYGEN SATURATION: 95 % | WEIGHT: 174 LBS | HEIGHT: 62 IN | SYSTOLIC BLOOD PRESSURE: 116 MMHG | DIASTOLIC BLOOD PRESSURE: 57 MMHG | RESPIRATION RATE: 16 BRPM | TEMPERATURE: 97.6 F | BODY MASS INDEX: 32.02 KG/M2 | HEART RATE: 65 BPM

## 2023-11-09 DIAGNOSIS — Z96.652 S/P TOTAL KNEE ARTHROPLASTY, LEFT: Primary | ICD-10-CM

## 2023-11-09 PROCEDURE — C9290 INJ, BUPIVACAINE LIPOSOME: HCPCS | Performed by: STUDENT IN AN ORGANIZED HEALTH CARE EDUCATION/TRAINING PROGRAM

## 2023-11-09 PROCEDURE — C1713 ANCHOR/SCREW BN/BN,TIS/BN: HCPCS | Performed by: ORTHOPAEDIC SURGERY

## 2023-11-09 PROCEDURE — 27447 TOTAL KNEE ARTHROPLASTY: CPT | Performed by: PHYSICIAN ASSISTANT

## 2023-11-09 PROCEDURE — 97167 OT EVAL HIGH COMPLEX 60 MIN: CPT

## 2023-11-09 PROCEDURE — C1776 JOINT DEVICE (IMPLANTABLE): HCPCS | Performed by: ORTHOPAEDIC SURGERY

## 2023-11-09 PROCEDURE — 97116 GAIT TRAINING THERAPY: CPT

## 2023-11-09 PROCEDURE — 27447 TOTAL KNEE ARTHROPLASTY: CPT | Performed by: ORTHOPAEDIC SURGERY

## 2023-11-09 PROCEDURE — 97535 SELF CARE MNGMENT TRAINING: CPT

## 2023-11-09 PROCEDURE — 97163 PT EVAL HIGH COMPLEX 45 MIN: CPT

## 2023-11-09 PROCEDURE — 97110 THERAPEUTIC EXERCISES: CPT

## 2023-11-09 DEVICE — ATTUNE KNEE SYSTEM TIBIAL INSERT ROTATING PLATFORM POSTERIOR STABILIZED 6 7MM AOX
Type: IMPLANTABLE DEVICE | Site: KNEE | Status: FUNCTIONAL
Brand: ATTUNE

## 2023-11-09 DEVICE — SMARTSET HV HIGH VISCOSITY BONE CEMENT 40G
Type: IMPLANTABLE DEVICE | Site: KNEE | Status: FUNCTIONAL
Brand: SMARTSET

## 2023-11-09 DEVICE — ATTUNE KNEE SYSTEM TIBIAL BASE ROTATING PLATFORM SIZE 7 CEMENTED
Type: IMPLANTABLE DEVICE | Site: KNEE | Status: FUNCTIONAL
Brand: ATTUNE

## 2023-11-09 DEVICE — ATTUNE KNEE SYSTEM FEMORAL POSTERIOR STABILIZED SIZE 6 LEFT CEMENTED
Type: IMPLANTABLE DEVICE | Site: KNEE | Status: FUNCTIONAL
Brand: ATTUNE

## 2023-11-09 DEVICE — ATTUNE PATELLA MEDIALIZED DOME 38MM CEMENTED AOX
Type: IMPLANTABLE DEVICE | Site: KNEE | Status: FUNCTIONAL
Brand: ATTUNE

## 2023-11-09 RX ORDER — MAGNESIUM HYDROXIDE 1200 MG/15ML
LIQUID ORAL AS NEEDED
Status: DISCONTINUED | OUTPATIENT
Start: 2023-11-09 | End: 2023-11-09 | Stop reason: HOSPADM

## 2023-11-09 RX ORDER — ENOXAPARIN SODIUM 100 MG/ML
40 INJECTION SUBCUTANEOUS DAILY
Status: CANCELLED | OUTPATIENT
Start: 2023-11-09

## 2023-11-09 RX ORDER — OXYCODONE HYDROCHLORIDE 5 MG/1
10 TABLET ORAL EVERY 4 HOURS PRN
Status: DISCONTINUED | OUTPATIENT
Start: 2023-11-09 | End: 2023-11-09 | Stop reason: HOSPADM

## 2023-11-09 RX ORDER — LEVOTHYROXINE SODIUM 0.1 MG/1
100 TABLET ORAL DAILY
Status: CANCELLED | OUTPATIENT
Start: 2023-11-09

## 2023-11-09 RX ORDER — SODIUM CHLORIDE, SODIUM LACTATE, POTASSIUM CHLORIDE, CALCIUM CHLORIDE 600; 310; 30; 20 MG/100ML; MG/100ML; MG/100ML; MG/100ML
75 INJECTION, SOLUTION INTRAVENOUS CONTINUOUS
Status: CANCELLED | OUTPATIENT
Start: 2023-11-09 | End: 2023-11-10

## 2023-11-09 RX ORDER — ACETAMINOPHEN 500 MG
1000 TABLET ORAL EVERY 6 HOURS PRN
COMMUNITY
End: 2023-11-09

## 2023-11-09 RX ORDER — ACETAMINOPHEN 325 MG/1
975 TABLET ORAL ONCE
Status: COMPLETED | OUTPATIENT
Start: 2023-11-09 | End: 2023-11-09

## 2023-11-09 RX ORDER — OXYCODONE HYDROCHLORIDE 5 MG/1
TABLET ORAL
Qty: 30 TABLET | Refills: 0 | Status: SHIPPED | OUTPATIENT
Start: 2023-11-09 | End: 2023-11-15 | Stop reason: SDUPTHER

## 2023-11-09 RX ORDER — ALBUTEROL SULFATE 2.5 MG/3ML
2.5 SOLUTION RESPIRATORY (INHALATION) ONCE AS NEEDED
Status: DISCONTINUED | OUTPATIENT
Start: 2023-11-09 | End: 2023-11-09 | Stop reason: HOSPADM

## 2023-11-09 RX ORDER — HYDROMORPHONE HCL/PF 1 MG/ML
0.5 SYRINGE (ML) INJECTION EVERY 2 HOUR PRN
Status: DISCONTINUED | OUTPATIENT
Start: 2023-11-09 | End: 2023-11-09 | Stop reason: HOSPADM

## 2023-11-09 RX ORDER — MORPHINE SULFATE 10 MG/ML
INJECTION, SOLUTION INTRAMUSCULAR; INTRAVENOUS AS NEEDED
Status: DISCONTINUED | OUTPATIENT
Start: 2023-11-09 | End: 2023-11-09 | Stop reason: HOSPADM

## 2023-11-09 RX ORDER — BUPIVACAINE HYDROCHLORIDE 5 MG/ML
INJECTION, SOLUTION EPIDURAL; INTRACAUDAL
Status: COMPLETED | OUTPATIENT
Start: 2023-11-09 | End: 2023-11-09

## 2023-11-09 RX ORDER — CEFAZOLIN SODIUM 2 G/50ML
2000 SOLUTION INTRAVENOUS ONCE
Status: COMPLETED | OUTPATIENT
Start: 2023-11-09 | End: 2023-11-09

## 2023-11-09 RX ORDER — KETOROLAC TROMETHAMINE 30 MG/ML
INJECTION, SOLUTION INTRAMUSCULAR; INTRAVENOUS AS NEEDED
Status: DISCONTINUED | OUTPATIENT
Start: 2023-11-09 | End: 2023-11-09 | Stop reason: HOSPADM

## 2023-11-09 RX ORDER — DOCUSATE SODIUM 100 MG/1
100 CAPSULE, LIQUID FILLED ORAL 2 TIMES DAILY
Qty: 10 CAPSULE | Refills: 0 | Status: SHIPPED | OUTPATIENT
Start: 2023-11-09

## 2023-11-09 RX ORDER — OXYCODONE HYDROCHLORIDE 5 MG/1
5 TABLET ORAL EVERY 4 HOURS PRN
Status: DISCONTINUED | OUTPATIENT
Start: 2023-11-09 | End: 2023-11-09 | Stop reason: HOSPADM

## 2023-11-09 RX ORDER — CHLORHEXIDINE GLUCONATE 4 G/100ML
SOLUTION TOPICAL DAILY PRN
Status: DISCONTINUED | OUTPATIENT
Start: 2023-11-09 | End: 2023-11-09 | Stop reason: HOSPADM

## 2023-11-09 RX ORDER — ACETAMINOPHEN 325 MG/1
975 TABLET ORAL EVERY 8 HOURS
Status: DISCONTINUED | OUTPATIENT
Start: 2023-11-09 | End: 2023-11-09 | Stop reason: HOSPADM

## 2023-11-09 RX ORDER — TRANEXAMIC ACID 10 MG/ML
1000 INJECTION, SOLUTION INTRAVENOUS ONCE
Status: COMPLETED | OUTPATIENT
Start: 2023-11-09 | End: 2023-11-09

## 2023-11-09 RX ORDER — BUPIVACAINE HYDROCHLORIDE AND EPINEPHRINE 5; 5 MG/ML; UG/ML
INJECTION, SOLUTION EPIDURAL; INTRACAUDAL; PERINEURAL AS NEEDED
Status: DISCONTINUED | OUTPATIENT
Start: 2023-11-09 | End: 2023-11-09 | Stop reason: HOSPADM

## 2023-11-09 RX ORDER — ONDANSETRON 2 MG/ML
4 INJECTION INTRAMUSCULAR; INTRAVENOUS ONCE AS NEEDED
Status: COMPLETED | OUTPATIENT
Start: 2023-11-09 | End: 2023-11-09

## 2023-11-09 RX ORDER — PROPOFOL 10 MG/ML
INJECTION, EMULSION INTRAVENOUS CONTINUOUS PRN
Status: DISCONTINUED | OUTPATIENT
Start: 2023-11-09 | End: 2023-11-09

## 2023-11-09 RX ORDER — DEXAMETHASONE SODIUM PHOSPHATE 10 MG/ML
INJECTION, SOLUTION INTRAMUSCULAR; INTRAVENOUS AS NEEDED
Status: DISCONTINUED | OUTPATIENT
Start: 2023-11-09 | End: 2023-11-09

## 2023-11-09 RX ORDER — FENTANYL CITRATE 50 UG/ML
INJECTION, SOLUTION INTRAMUSCULAR; INTRAVENOUS AS NEEDED
Status: DISCONTINUED | OUTPATIENT
Start: 2023-11-09 | End: 2023-11-09

## 2023-11-09 RX ORDER — FENTANYL CITRATE/PF 50 MCG/ML
50 SYRINGE (ML) INJECTION
Status: DISCONTINUED | OUTPATIENT
Start: 2023-11-09 | End: 2023-11-09 | Stop reason: HOSPADM

## 2023-11-09 RX ORDER — SODIUM CHLORIDE, SODIUM LACTATE, POTASSIUM CHLORIDE, CALCIUM CHLORIDE 600; 310; 30; 20 MG/100ML; MG/100ML; MG/100ML; MG/100ML
125 INJECTION, SOLUTION INTRAVENOUS CONTINUOUS
Status: DISCONTINUED | OUTPATIENT
Start: 2023-11-09 | End: 2023-11-09 | Stop reason: HOSPADM

## 2023-11-09 RX ORDER — CHLORHEXIDINE GLUCONATE ORAL RINSE 1.2 MG/ML
15 SOLUTION DENTAL ONCE
Status: COMPLETED | OUTPATIENT
Start: 2023-11-09 | End: 2023-11-09

## 2023-11-09 RX ORDER — TRANEXAMIC ACID 100 MG/ML
INJECTION, SOLUTION INTRAVENOUS AS NEEDED
Status: DISCONTINUED | OUTPATIENT
Start: 2023-11-09 | End: 2023-11-09 | Stop reason: HOSPADM

## 2023-11-09 RX ORDER — SENNOSIDES 8.6 MG
650 CAPSULE ORAL EVERY 8 HOURS PRN
Qty: 30 TABLET | Refills: 0 | Status: SHIPPED | OUTPATIENT
Start: 2023-11-09 | End: 2023-12-09

## 2023-11-09 RX ORDER — CEFAZOLIN SODIUM 1 G/50ML
1000 SOLUTION INTRAVENOUS EVERY 8 HOURS
Status: CANCELLED | OUTPATIENT
Start: 2023-11-09 | End: 2023-11-10

## 2023-11-09 RX ORDER — FENTANYL CITRATE/PF 50 MCG/ML
25 SYRINGE (ML) INJECTION
Status: DISCONTINUED | OUTPATIENT
Start: 2023-11-09 | End: 2023-11-09 | Stop reason: HOSPADM

## 2023-11-09 RX ORDER — METHYLPREDNISOLONE 4 MG/1
TABLET ORAL
Qty: 1 EACH | Refills: 0 | Status: SHIPPED | OUTPATIENT
Start: 2023-11-09

## 2023-11-09 RX ORDER — CALCIUM CARBONATE 500 MG/1
1000 TABLET, CHEWABLE ORAL DAILY PRN
Status: CANCELLED | OUTPATIENT
Start: 2023-11-09

## 2023-11-09 RX ORDER — DOCUSATE SODIUM 100 MG/1
100 CAPSULE, LIQUID FILLED ORAL 2 TIMES DAILY
Status: CANCELLED | OUTPATIENT
Start: 2023-11-09

## 2023-11-09 RX ORDER — ONDANSETRON 2 MG/ML
INJECTION INTRAMUSCULAR; INTRAVENOUS AS NEEDED
Status: DISCONTINUED | OUTPATIENT
Start: 2023-11-09 | End: 2023-11-09

## 2023-11-09 RX ADMIN — BUPIVACAINE HYDROCHLORIDE 20 ML: 5 INJECTION, SOLUTION EPIDURAL; INTRACAUDAL at 08:31

## 2023-11-09 RX ADMIN — PHENYLEPHRINE HYDROCHLORIDE 30 MCG/MIN: 10 INJECTION INTRAVENOUS at 09:30

## 2023-11-09 RX ADMIN — CEFAZOLIN SODIUM 2000 MG: 2 SOLUTION INTRAVENOUS at 09:28

## 2023-11-09 RX ADMIN — TRANEXAMIC ACID 1000 MG: 10 INJECTION, SOLUTION INTRAVENOUS at 09:30

## 2023-11-09 RX ADMIN — SODIUM CHLORIDE, SODIUM LACTATE, POTASSIUM CHLORIDE, AND CALCIUM CHLORIDE: .6; .31; .03; .02 INJECTION, SOLUTION INTRAVENOUS at 09:08

## 2023-11-09 RX ADMIN — FENTANYL CITRATE 50 MCG: 50 INJECTION INTRAMUSCULAR; INTRAVENOUS at 09:09

## 2023-11-09 RX ADMIN — OXYCODONE HYDROCHLORIDE 10 MG: 5 TABLET ORAL at 13:46

## 2023-11-09 RX ADMIN — SODIUM CHLORIDE, SODIUM LACTATE, POTASSIUM CHLORIDE, AND CALCIUM CHLORIDE: .6; .31; .03; .02 INJECTION, SOLUTION INTRAVENOUS at 10:30

## 2023-11-09 RX ADMIN — ACETAMINOPHEN 975 MG: 325 TABLET, FILM COATED ORAL at 07:40

## 2023-11-09 RX ADMIN — ONDANSETRON 4 MG: 2 INJECTION INTRAMUSCULAR; INTRAVENOUS at 11:18

## 2023-11-09 RX ADMIN — FENTANYL CITRATE 50 MCG: 50 INJECTION INTRAMUSCULAR; INTRAVENOUS at 08:23

## 2023-11-09 RX ADMIN — ONDANSETRON 4 MG: 2 INJECTION INTRAMUSCULAR; INTRAVENOUS at 09:30

## 2023-11-09 RX ADMIN — PROPOFOL 100 MCG/KG/MIN: 10 INJECTION, EMULSION INTRAVENOUS at 09:22

## 2023-11-09 RX ADMIN — CHLORHEXIDINE GLUCONATE 0.12% ORAL RINSE 15 ML: 1.2 LIQUID ORAL at 07:40

## 2023-11-09 RX ADMIN — BUPIVACAINE 10 ML: 13.3 INJECTION, SUSPENSION, LIPOSOMAL INFILTRATION at 08:25

## 2023-11-09 RX ADMIN — DEXAMETHASONE SODIUM PHOSPHATE 10 MG: 10 INJECTION, SOLUTION INTRAMUSCULAR; INTRAVENOUS at 09:30

## 2023-11-09 RX ADMIN — BUPIVACAINE HYDROCHLORIDE 10 ML: 5 INJECTION, SOLUTION EPIDURAL; INTRACAUDAL; PERINEURAL at 08:25

## 2023-11-09 NOTE — DISCHARGE INSTR - AVS FIRST PAGE
Discharge Instructions - Orthopedics  Karan Cedeño 82 y.o. female MRN: [id number]  Unit/Bed#: EA OR MAIN    Weight Bearing Status:                                           Weight Bearing as tolerated to the left lower extremity. DVT prophylaxis:  Complete course of Aspirin 81 mg twice daily x 35 days from date of surgery as directed    Pain:  Start oxycodone 5 mg every 6 hrs after last dose taken after surgery for 1 day  Transition to oxycodone 5 mg every 6 hours as needed    Showering Instructions:   Do not shower until 5 days from date of surgery  Do not soak your incision(Tubs, Jacuzzi's, pools, ext)    Dressing Instructions: May remove dressing 5 days from date of surgery or may leave dressing in place until follow up office visit 2 weeks from surgery date  Keep dressing/incision clean, dry and intact until follow up appointment. Do not apply any creams or ointments/lotions to your incisions including antibiotic ointment, peroxide    Driving Instructions:  No driving until cleared by Orthopaedic Surgery. PT/OT:  Continue PT/OT on outpatient basis as directed  Continue motion and strengthening exercises while at home    Appt Instructions: If you do not have your appointment, please call the clinic at [phone]  Otherwise followup as scheduled    Contact the office sooner if you experience any increased numbness/tingling in the extremities.

## 2023-11-09 NOTE — ANESTHESIA PREPROCEDURE EVALUATION
Procedure:  ARTHROPLASTY KNEE TOTAL AND ALL ASSOCIATED PROCEDURES (Left: Knee)    Relevant Problems   CARDIO   (+) Hyperlipidemia   (+) Venous stasis of lower extremity      ENDO   (+) Hypothyroidism      GI/HEPATIC   (+) Gastroesophageal reflux disease      /RENAL   (+) Stage 3a chronic kidney disease (HCC)      MUSCULOSKELETAL   (+) Arthritis of knee   (+) Arthritis of right knee   (+) Primary osteoarthritis of left knee      Nervous and Auditory   (+) Bilateral hearing loss      Other   (+) Breast lump in female   (+) Chronic pain of both knees   (+) Ganglion cyst of wrist, right      Lab Results   Component Value Date     05/04/2015    SODIUM 141 10/17/2023    K 4.3 10/17/2023     10/17/2023    CO2 28 10/17/2023    ANIONGAP 7 05/04/2015    AGAP 7 10/17/2023    BUN 15 10/17/2023    CREATININE 0.89 10/17/2023    GLUC 103 05/21/2023    GLUF 90 10/17/2023    CALCIUM 9.4 10/17/2023    AST 18 10/17/2023    ALT 16 10/17/2023    ALKPHOS 65 10/17/2023    PROT 7.6 05/01/2015    TP 7.2 10/17/2023    BILITOT 0.5 05/01/2015    TBILI 0.42 10/17/2023    EGFR 60 10/17/2023     Lab Results   Component Value Date    WBC 7.07 10/17/2023    HGB 13.3 10/17/2023    HCT 40.5 10/17/2023     (H) 10/17/2023     10/17/2023       Physical Exam    Airway    Mallampati score: II  TM Distance: >3 FB  Neck ROM: limited     Dental   Comment: edentulous     Cardiovascular      Pulmonary      Other Findings        Anesthesia Plan  ASA Score- 2     Anesthesia Type- spinal with ASA Monitors. Additional Monitors:     Airway Plan:     Comment: + adductor canal block + IPACK. Plan Factors-Exercise tolerance (METS): >4 METS. Chart reviewed. EKG reviewed. Imaging results reviewed. Existing labs reviewed. Patient summary reviewed. Induction- intravenous. Postoperative Plan-     Informed Consent- Anesthetic plan and risks discussed with patient. I personally reviewed this patient with the CRNA. Discussed and agreed on the Anesthesia Plan with the CRNA. Nevin Weber

## 2023-11-09 NOTE — ANESTHESIA POSTPROCEDURE EVALUATION
Post-Op Assessment Note    CV Status:  Stable    Pain management: adequate     Mental Status:  Alert and awake   Hydration Status:  Euvolemic   PONV Controlled:  Controlled   Airway Patency:  Patent      Post Op Vitals Reviewed: Yes      Staff: CRNA         No notable events documented.     BP   109/54   Temp   97.6   Pulse  58   Resp   16   SpO2   100%

## 2023-11-09 NOTE — OCCUPATIONAL THERAPY NOTE
Occupational Therapy Evaluation/Treatment     Patient Name: Carey Arambula  JHIQEAILYN Date: 11/9/2023  Problem List  Principal Problem:    S/P total knee arthroplasty, left    Past Medical History  Past Medical History:   Diagnosis Date    Chronic kidney disease     Disease of thyroid gland     High cholesterol     Hypothyroidism     PONV (postoperative nausea and vomiting)      Past Surgical History  Past Surgical History:   Procedure Laterality Date    BREAST CYST EXCISION  1982    cyst removals and aspirations-benign    COLONOSCOPY      HYSTERECTOMY      at age 60    OOPHORECTOMY Bilateral     at age 60    STOMACH SURGERY               11/09/23 1500   OT Last Visit   OT Visit Date 11/09/23   Note Type   Note type Evaluation  (+treatment [id number])   Pain Assessment   Pain Assessment Tool 0-10   Pain Score 4   Pain Location/Orientation Orientation: Left; Location: Knee; Location: Incision   Pain Radiating Towards n/a   Pain Onset/Description Frequency: Constant/Continuous; Descriptor: Aching   Effect of Pain on Daily Activities limits comfort, fnxl mobility   Patient's Stated Pain Goal No pain   Hospital Pain Intervention(s) Repositioned; Ambulation/increased activity; Emotional support   Multiple Pain Sites No   Restrictions/Precautions   Weight Bearing Precautions Per Order Yes   LLE Weight Bearing Per Order WBAT  (POD#0 L TKA)   Other Precautions Fall Risk;Pain;WBS   Home Living   Type of [address] [place] [address] Dr One level;Performs ADLs on one level; Able to live on main level with bedroom/bathroom;Stairs to enter with rails  (4 LADI)   Bathroom Shower/Tub Tub/shower unit   Bathroom Toilet Standard   Bathroom Equipment Tub transfer bench;Grab bars in shower   [address] Walker;Cane;Grab bars   Additional Comments At baseline, pt would ambulate with SPC for longer distances and as needed. Prior Function   Level of [place] Independent with ADLs; Independent with functional mobility; Independent with IADLS   Lives With Spouse  (Son who is is mentally challenged)   Receives Help From Family;Friend(s); Outpatient therapy  (OPPT post op)   IADLs Independent with driving; Independent with meal prep; Independent with medication management   Falls in the last 6 months 1 to 4  (1 fall per patients report)   Vocational Retired   Lifestyle   Autonomy At baseline pt is (I) c ADLs/IADLs and RUDY for fnxl mobility with use of SPC prn vs no AD. Lives with family in a ranch style home. (+) driving. Reciprocal Relationships Supportive family and friends   Service to Others Retired   General   Additional Pertinent History Pt is POD#0 L TKA; WBAT   Family/Caregiver Present Yes  (Daughter)   Subjective   Subjective "I have lazy muscles"   ADL   Eating Assistance 7  Independent   Grooming Assistance 7  Independent   UB Bathing Assistance 5  Supervision/Setup   LB Bathing Assistance 5  Supervision/Setup   UB Dressing Assistance 5  Supervision/Setup   LB Dressing Assistance 5  Supervision/Setup   Toileting Assistance  5  Supervision/Setup   Additional Comments Unable to formally assess bathing, dressing, grooming and toileting at time of eval. The above levels of assistance are anticipated based on functional performance deficits with use of clinical judgement. Bed Mobility   Supine to Sit 5  Supervision   Additional items HOB elevated; Bedrails; Increased time required;Verbal cues   Additional Comments Pt denies dizziness/lightheadedness with postural changes. Vitals stable. Transfers   Sit to Stand 5  Supervision   Additional items Increased time required;Verbal cues   Stand to Sit 5  Supervision   Additional items Increased time required;Verbal cues   Stand pivot 5  Supervision   Additional items Increased time required;Verbal cues  (RW)   Additional Comments Verbal cues for optimal hand placement and body mechanics for safe transfers, pt with good application of cues.    Functional Mobility   Functional Mobility 5  Supervision   Additional Comments for functional household distance with use of RW. Additional items Rolling walker   Balance   Static Sitting Fair +   Dynamic Sitting Fair   Static Standing Fair +   Dynamic Standing Fair   Activity Tolerance   Activity Tolerance Patient limited by pain   Medical Staff Made Aware Spoke with PT   Nurse Made Aware Spoke with RN pre/post   RUE Assessment   RUE Assessment WFL   LUE Assessment   LUE Assessment WFL   Hand Function   Gross Motor Coordination Functional   Fine Motor Coordination Functional   Sensation   Light Touch No apparent deficits   Cognition   Overall Cognitive Status WFL   Arousal/Participation Alert; Responsive; Cooperative   Attention Within functional limits   Orientation Level Oriented X4   Memory Within functional limits   Following Commands Follows all commands and directions without difficulty   Assessment   Limitation Decreased ADL status; Decreased endurance;Decreased self-care trans;Decreased high-level ADLs   Prognosis Good   Assessment Patient is a 82 y.o. female seen for OT evaluation and treatment  at [address] [place] [address] following admission on 11/9/2023  s/p S/P total knee arthroplasty, left. Please see above for comprehensive list of comorbidities and significant PMHx impacting functional performance. At baseline, pt is fully independent with all aspects of self-care, ambulates with use of SPC for community distances vs no AD. Upon initial evaluation, pt appears to be performing below baseline functional status. Pt requires supervision for UB ADLs, supervision for LB ADLs, supervision for bed mobility, supervision for transfers and supervision for functional mobility household distance with RW. The AM-PAC & Barthel Index outcome tools were used to assist in determining pt safety w/self care /mobility and appropriate d/c recommendations, see above for score.  Occupational performance is affected by the following deficits: endurance , decreased standing tolerance for self care tasks , decreased dynamic balance impacting functional reach, decreased activity tolerance , and (+) pain . Personal/Environmental factors impacting D/C include: (+) Hx of falls , steps to enter/navigate the home, Assistance needed for ADL/IADLs, Assistance needed for ADLs and functional mobility, and High fall risk . Supporting factors include: able to maintain FFSU, accessible home environment, support system available, and attitude towards recovery Patient would benefit from OT services within the acute care setting to maximize level of functional independence in the following areas fall prevention , self-care transfers, bed mobility , functional mobility, and ADLs. From OT standpoint, recommendation at time of D/C would be with no post-acute rehabilitation needs. Goals   Patient Goals "to walk without pain"   LT Time Frame 10-14   Long Term Goal See below   Plan   Treatment Interventions ADL retraining;Functional transfer training; Endurance training;Patient/family training;Equipment evaluation/education; Compensatory technique education; Energy conservation; Activityengagement   Goal Expiration Date 11/19/23   OT Treatment Day 1   OT Frequency 3-5x/wk   Discharge Recommendation   Rehab Resource Intensity Level, OT No post-acute rehabilitation needs   Additional Comments  The patient's raw score on the AM-PAC Daily Activity Inpatient Short Form is 21. A raw score of greater than or equal to 19 suggests the patient may benefit from discharge to home. Please refer to the recommendation of the Occupational Therapist for safe discharge planning.    AM-PAC Daily Activity Inpatient   Lower Body Dressing 3   Bathing 3   Toileting 3   Upper Body Dressing 4   Grooming 4   Eating 4   Daily Activity Raw Score 21   Daily Activity Standardized Score (Calc for Raw Score >=11) 44.27   AM-PAC Applied Cognition Inpatient   Following a Speech/Presentation 4   Understanding Ordinary Conversation 4   Taking Medications 4   Remembering Where Things Are Placed or Put Away 4   Remembering List of 4-5 Errands 4   Taking Care of Complicated Tasks 4   Applied Cognition Raw Score 24   Applied Cognition Standardized Score 62.21   Barthel Index   Feeding 10   Bathing 0   Grooming Score 5   Dressing Score 5   Bladder Score 10   Bowels Score 10   Toilet Use Score 5   Transfers (Bed/Chair) Score 10   Mobility (Level Surface) Score 0   Stairs Score 5   Barthel Index Score 60   Additional Treatment Session   Start Time 1450   End Time 1500   Treatment Assessment Pt participated in tx session following IE for ADL training and to further challenge activity tolerance. Pt completed dressing seated at EOB. Education for compensatory techniques for pain/ decreased knee flexibility during LB dressing, pt able to verbalize and demonstrate understanding. Completed UB+ LB dressing with supervision/setup, able to thread BLE through underwear and pants without assistance or steadying. Donned/doffed B/L socks + shoes without assistance needed. Additional sit<>stand transfers completed supervision with use of RW. Pt education for safe sitting surfaces and placement of RW during LB dressing, as well as proper body mechanics for safe car transfers. Encouraged pt to continue c regular OOB mobility throughout healing process for pain management and to increase activity tolerance. Pt and daughter verbalized understanding. Following tx session, pt demonstrates adequate functional independence and safety for POD#0 D/C. End of Consult   Education Provided Yes;Family or social support of family present for education by provider   Patient Position at End of Consult Seated edge of bed; All needs within reach  (Care turned over to RN)   Nurse Communication Nurse aware of consult   Goals established on initial evaluation in order to achieve pt's goal of walking without pain.       Pt will complete LB ADLs Independent   for increased ADL independence within 10 days. Pt will complete toileting Independent   with use of DME for increased ADL independence within 10 days. Pt will demonstrate proper body mechanics to complete self-care transfers and functional mobility with Independent  and use of LRAD for increased safety and functional independence within 10 days. Pt will demonstrate use of pain management techniques PRN for increased activity tolerance and engagement. Pt benefited from a partial co-session of skilled OT and PT therapists in order to most appropriately address functional deficits d/t regression from functioning level prior to admission  and decreased activity tolerance. OT/PT objectives were addressed separately; please see PT note for specific goal areas targeted.       Tessy Mayers, MS OTR/L   [place] Licensure# [id number]'

## 2023-11-09 NOTE — OP NOTE
Op Note  Rafia Mitchell  MRN: [id number]      Unit/Bed#:  OR MAIN    PreOp Dx: left knee DJD      Postop Dx: left knee DJD      Procedure: left total knee arthroplasty      Surgeon: Margrett Heimlich, MD Zina Connor, PA-C      No Qualified Resident Available for this Case. The physician assistant was needed for all portions of this case including prepping and draping the patient as well as prepping and final implantation of the femoral, tibial, and patellar components. Fluids:       EBL:       Drains: none      Specimens: No       Complications: No       Condition: stable transferred to postanesthesia care unit      Implants: Depuy Attune RP  Femoral, size: 6  Tibial tray: 7  Polyethylene: 7  Patellar button: 45      83 y. 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 The patient was told of all the pros and cons of operative and nonoperative intervention. Some of the complications of operative intervention include infection, bleeding, scarring, nerve injury, vascular injury, fracture, continued pain, decreased range of motion, DVT, PE death, loss of limb, need for further surgery, not obtain an results. The patient wished. Patient did consent for operative intervention for this pathology. Patient was brought to the operating room. Patient was anesthetized as anesthesia team. Patient was prepped and draped in normal sterile fashion. After this was done, we did conduct a time out to make sure correct. Patient was in the room, prepped marked and draped. Implants were in the room, Rep. For the implants were present, DVT prophylaxis and antibiotics were addressed. Midline incision was made over the anterior knee after going through skin, fatty tissue, fascia was identified. Flaps were created both medially and laterally.  Medial parapatellar incision was made. Excision of Hoffa fat pad was conducted. At this time because this was a varus knee, we did release over the medial aspect including medial osteophytes and deep MCL. We're able to excise the fatty tissue from the anterior aspect of the distal femur. We were able to at this time, flex the knee with the patella everted. Intramedullary reamer was used. Intramedullary guide for the femur was then placed to determine how much of the distal femur to cut and also, valgus cut angle. Pins were then placed. Intramedullary guide was removed. Distal femoral cut was made. Attention was now to the proximal tibia. Extramedullary guide was used. After making sure that we took the appropriate amount from the medial and lateral side with the aid of a measuring device, the jig was held in place with pins. Protection of the medial and lateral ligaments, as well as the popliteal region, was done. Proximal tibial cut was made. Extension gaps were evaluated. Size of the femur was then determined with the aid of a guide. After this was done, we placed the appropriate sized block. These were held down with pins. Anterior and posterior cuts were made. Anterior, posterior, chamfer cuts were made. We did check our flexion gap and was noted to be appropriate. This was a PCL sacrificing device being used Therefore a box cut was made. Tibial tray size was determined. This was held down with 2 small screws. The reamer and the punch was then used with the appropriate guide to make sure that these were all done, the appropriate manner. Guide was removed. Trial femoral component was placed. Trial tibial polyethylene was placed. Patient was noted to be stable. On coronal and sagittal planes. Patellar button size was determined after the articular surface of the patella was excised. The patella button was placed on the medial aspect of the patella. Holes were drilled for our true patella button to be cemented on.  We tracked the knee, and we noted that the patella was tracking appropriately over the trochlear of the trial implants. After this was done we did drill holes in our trial femoral component. We then removed. All trial components. Copious irrigation was used at the operative site. We did place some bone within the intramedullary canal of the femur. High viscocity cement was used and all components were placed, including the femur, tibia, and patella button. A trial tibial Polyethylene was placed. After cement had dried, removed the trial polyethylene and removed any excess cement that was in the popliteal region. Copious irrigation was used. The tibial polyethylene was then placed. Patient was placed in the appropriate ranges of motion and patient's Knee was noted to be stable. Tourniquet was discontinued. Hemostasis was obtained. #1 Vicryl sutures were used to reapproximate the parapatellar incision. 2-0 Vicryl sutures for the subcutaneous closure. This was reinforced with staples. Wounds were cleaned and dried. Acticoat, 4 x 4, ABDs and web roll was placed prior to Ace bandage be placed from the foot. All the way to the mid thigh region.   Patient was awakened as anesthesia team noted to be a stable condition and transferred to postanesthesia care unit

## 2023-11-09 NOTE — PLAN OF CARE
Problem: PHYSICAL THERAPY ADULT  Goal: Performs mobility at highest level of function for planned discharge setting. See evaluation for individualized goals. Description: Treatment/Interventions: Functional transfer training, LE strengthening/ROM, Elevations, Therapeutic exercise, Endurance training, Patient/family training, Equipment eval/education, Bed mobility, Gait training          See flowsheet documentation for full assessment, interventions and recommendations. Outcome: Progressing  Note: Prognosis: Good  Problem List: Decreased strength, Decreased range of motion, Decreased endurance, Impaired balance, Decreased mobility, Decreased coordination, Impaired hearing, Orthopedic restrictions, Pain  Assessment: Orders for PT eval and treat received. Pt exhibits physical deficits noted in problem list above. Deficits listed contribute to functional limitations that are significant from the patient PLOF and include: impaired standing balance, inability to perform safe transfers, tolerance for OOB functional activities, unsafe/inefficient ambulation, fall risk, and unable to safely manage stairs/steps/ramp    Additionally, patient is limited by restrictions/precautions/DME: LLE WBAT    During today's session,initiated HEP. Pt responded well to exercises exhibiting improved ROM and muscle activation. Discussed at length about pain management strategies, postural corrections, proper use of RW, gait/stair training corrections, importance of gaining AROM, benefit of frequent tolerable physical activity/exercise, and fall risk precautions. Provided caregiver training and tips as appropriate and needed. The AM-PAC & Barthel Index outcome tools were used to assist in determining pt safety w/ mobility/self care & appropriate d/c recommendations, see above for scores. Patient's clinical presentation is stable  following recent surgery/procedure.      Considering the patient's PLOF, co-morbidities, acute functional limitations, functional outcome measures, and/or goal to progress functional independence; this patient would benefit from skilled Physical Therapy intervention in the acute care setting. Barriers to Discharge: None     Rehab Resource Intensity Level, PT: III (Minimum Resource Intensity)    See flowsheet documentation for full assessment.

## 2023-11-09 NOTE — ANESTHESIA PROCEDURE NOTES
Peripheral Block    Patient location during procedure: holding area  Start time: 11/9/2023 8:31 AM  Reason for block: at surgeon's request and post-op pain management  Staffing  Performed by: Jackelin Shelton MD  Authorized by: Jackelin Shelton MD    Preanesthetic Checklist  Completed: patient identified, IV checked, site marked, risks and benefits discussed, surgical consent, monitors and equipment checked, pre-op evaluation and timeout performed  Peripheral Block  Patient position: supine  Prep: ChloraPrep  Patient monitoring: frequent blood pressure checks, continuous pulse oximetry and heart rate  Block type: IPACK  Laterality: left  Injection technique: single-shot  Procedures: ultrasound guided, Ultrasound guidance required for the procedure to increase accuracy and safety of medication placement and decrease risk of complications.   Ultrasound permanent image savedbupivacaine (PF) (MARCAINE) 0.5 % injection 20 mL - Perineural   20 mL - 11/9/2023 8:31:00 AM  bupivacaine liposomal (EXPAREL) 1.3 % injection 20 mL - Perineural   10 mL - 11/9/2023 8:31:00 AM  Needle  Needle type: Stimuplex   Needle length: 4 in  Needle localization: anatomical landmarks and ultrasound guidance  Assessment  Injection assessment: incremental injection, frequent aspiration, injected with ease, negative aspiration, negative for heart rate change, no paresthesia on injection, no symptoms of intraneural/intravenous injection and needle tip visualized at all times  Paresthesia pain: none  Post-procedure:  site cleaned  patient tolerated the procedure well with no immediate complications

## 2023-11-09 NOTE — INTERVAL H&P NOTE
H&P reviewed. After examining the patient I find no changes in the patients condition since the H&P had been written. There were no vitals filed for this visit.     Patient seen and examined  General: No apparent distress  CV: S1-S2  Abdomen: Soft  Chest: No audible wheezing  Left lower extremity: No abrasions open wounds; minimal effusion; neurologically vascular intact distally  To the operating room for left total knee arthroplasty  Pros and cons of operative and nonoperative intervention discussed with patient  We will follow-up postoperatively

## 2023-11-09 NOTE — PHYSICAL THERAPY NOTE
PHYSICAL THERAPY Evaluation and Treatment  DATE: 11/09/23  TIME: 4511-6981 and 6832-3098    NAME:  Angelito Sawant  AGE:   82 y.o. Mrn:   [id number]  Length Of Stay: 0    ADMIT DX:  Primary osteoarthritis of left knee [M17.12]    Past Medical History:   Diagnosis Date    Chronic kidney disease     Disease of thyroid gland     High cholesterol     Hypothyroidism     PONV (postoperative nausea and vomiting)      Past Surgical History:   Procedure Laterality Date    BREAST CYST EXCISION  1982    cyst removals and aspirations-benign    COLONOSCOPY      HYSTERECTOMY      at age 60    OOPHORECTOMY Bilateral     at age 60    STOMACH SURGERY          11/09/23 1438   PT Last Visit   PT Visit Date 11/09/23   Note Type   Note type Evaluation   Pain Assessment   Pain Assessment Tool 0-10   Pain Score 4   Pain Location/Orientation Orientation: Left; Location: Knee   Hospital Pain Intervention(s) Repositioned; Ambulation/increased activity   Restrictions/Precautions   Weight Bearing Precautions Per Order Yes   LLE Weight Bearing Per Order WBAT   Other Precautions WBS; Fall Risk;Pain;Hard of hearing   Home Living   Type of [address] [place] [address] Dr One level  (4 LADI)   Bathroom Shower/Tub Tub/shower unit   Bathroom Toilet Standard   Bathroom Equipment Tub transfer bench   Home Equipment Walker;Cane   Additional Comments At baseline, pt would ambulate with [facility] for longer distances and as needed. Prior Function   Level of [place] Independent with ADLs; Independent with functional mobility; Independent with IADLS   Lives With Spouse; Son   [place] Help From Family;Friend(s); Outpatient therapy   IADLs Independent with driving; Independent with meal prep; Independent with medication management   Falls in the last 6 months 1 to 4   Vocational Retired   [employer]   Additional Pertinent History 83 y/o presents for elective left TKA   Family/Caregiver Present Yes   Cognition   Overall Cognitive Status [facility]   Arousal/Participation Alert Orientation Level Oriented X4   Subjective   Subjective Pt pleasant and agreeable. Pt does report fatigue at times needing rest breaks   RUE Assessment   RUE Assessment WFL   LUE Assessment   LUE Assessment WFL   RLE Assessment   RLE Assessment WFL   LLE Assessment   LLE Assessment   (knee limited due to pain and sx)   Coordination   Movements are Fluid and Coordinated 1   Sensation WFL   Bed Mobility   Supine to Sit 5  Supervision   Additional items HOB elevated; Bedrails; Increased time required   Transfers   Sit to Stand 5  Supervision   Additional items Increased time required;Verbal cues   Stand to Sit 5  Supervision   Additional items Increased time required;Verbal cues   Ambulation/Elevation   Gait Assistance 5  Supervision   Additional items Verbal cues; Tactile cues   Assistive Device Rolling walker   Distance 50'   Stair Management Assistance 4  Minimal assist   Additional items Verbal cues; Tactile cues   Stair Management Technique One rail R;With cane; One rail L  (2 strategies (laterally with 1 rail, and 1 rails+cane)   Number of Stairs 8   Ambulation/Elevation Additional Comments Pt exhibiting reciprocal gait pattern, but having decreased tolerance with wbing on LLE. decreased step length on right and requiring increased use of RW. Pt ascen/descended 4 steps x2 with different strategies.   Pt able to perform stair training with each strategy and pt's daughter present and verbalizes understanding   Balance   Static Sitting Fair +   Dynamic Sitting Fair +   Static Standing Fair +   Dynamic Standing Fair   Ambulatory Fair  (RW)   Endurance Deficit   Endurance Deficit Yes   Endurance Deficit Description fatigues easy with OOB activity   Activity Tolerance   Activity Tolerance Patient limited by fatigue;Patient limited by pain   Medical Staff Made Aware Spoke with OT   Nurse Made Aware Spoke with nursing   Assessment   Prognosis Good   Problem List Decreased strength;Decreased range of motion;Decreased endurance; Impaired balance;Decreased mobility; Decreased coordination; Impaired hearing;Orthopedic restrictions;Pain   Assessment Orders for PT eval and treat received. Pt exhibits physical deficits noted in problem list above. Deficits listed contribute to functional limitations that are significant from the patient PLOF and include: impaired standing balance, inability to perform safe transfers, tolerance for OOB functional activities, unsafe/inefficient ambulation, fall risk, and unable to safely manage stairs/steps/ramp    Additionally, patient is limited by restrictions/precautions/DME: LLE WBAT    During today's session,initiated HEP. Pt responded well to exercises exhibiting improved ROM and muscle activation. Discussed at length about pain management strategies, postural corrections, proper use of RW, gait/stair training corrections, importance of gaining AROM, benefit of frequent tolerable physical activity/exercise, and fall risk precautions. Provided caregiver training and tips as appropriate and needed. The AM-PAC & Barthel Index outcome tools were used to assist in determining pt safety w/ mobility/self care & appropriate d/c recommendations, see above for scores. Patient's clinical presentation is stable  following recent surgery/procedure. Considering the patient's PLOF, co-morbidities, acute functional limitations, functional outcome measures, and/or goal to progress functional independence; this patient would benefit from skilled Physical Therapy intervention in the acute care setting.    Barriers to Discharge None   Goals   Patient Goals walking without pain   STG Expiration Date 11/19/23   Short Term Goal #1 1: Pt will perform supine<>sit transfer on flat bed, mod I.  2: Pt will perform stand pivot transfer with RW, mod I.  3: Pt will ambulate 150' with reciprocal gait and appropriate pace using RW, mod I. 4: Pt will perform written HEP, mod I. 5: Pt will ascend/descend a curb step and/or stairs require to mobilize around the the patient's home with RW/rails, Sup A. Plan   Treatment/Interventions Functional transfer training;LE strengthening/ROM; Elevations; Therapeutic exercise; Endurance training;Patient/family training;Equipment eval/education; Bed mobility;Gait training   PT Frequency Twice a day   Discharge Recommendation   Rehab Resource Intensity Level, PT III (Minimum Resource Intensity)   AM-PAC Basic Mobility Inpatient   Turning in Flat Bed Without Bedrails 4   Lying on Back to Sitting on Edge of Flat Bed Without Bedrails 3   Moving Bed to Chair 3   Standing Up From Chair Using Arms 3   Walk in Room 3   Climb 3-5 Stairs With Railing 3   Basic Mobility Inpatient Raw Score 19   Basic Mobility Standardized Score 42.48   Highest Level Of Mobility   JH-HLM Goal 6: Walk 10 steps or more   JH-HLM Achieved 7: Walk 25 feet or more   Barthel Index   Feeding 10   Bathing 0   Grooming Score 5   Dressing Score 5   Bladder Score 10   Bowels Score 10   Toilet Use Score 5   Transfers (Bed/Chair) Score 10   Mobility (Level Surface) Score 0   Stairs Score 5   Barthel Index Score 60   Additional Treatment Session   Start Time 1443 and 1503   End Time 1453 and 1521   Treatment Assessment Pt responded well to supine and standing exercises, exhibiting improved ROM and muscle activation. Pt tolerated ambulation and stair training, but was fatigued needing standing rest breaks. pt exhibits good carry over of cues and instructions for safe transfers and mobility   Exercises   Quad Sets Supine;5 reps;Bilateral;AROM   Heelslides Supine;15 reps;AROM; Left   Hip Abduction Supine;10 reps; Left;AROM  (SL)   Hip Adduction Supine;10 reps;AROM; Left  (SL)   Ankle Pumps Standing;10 reps;Bilateral;AROM  (heel raises)   Balance Training Standing;10 reps;Bilateral;AROM  (lateral weight shifting)   Marching Standing;10 reps;AROM; Right;Left  (with RW)   End of Consult   Patient Position at End of Consult Bedside chair; All needs within reach  (Nursing present to take over care)   The patient's AM-PAC Basic Mobility Inpatient Short Form Raw Score is 19. A Raw score of greater than or equal to 16 suggests the patient may benefit from discharge to home. Please also refer to the recommendation of the Physical Therapist for safe discharge planning.     Kaden Garcia, PT, DPT  [place] Licensure #[id number]

## 2023-11-09 NOTE — PLAN OF CARE
Problem: OCCUPATIONAL THERAPY ADULT  Goal: Performs self-care activities at highest level of function for planned discharge setting. See evaluation for individualized goals. Description: Treatment Interventions: ADL retraining, Functional transfer training, Endurance training, Patient/family training, Equipment evaluation/education, Compensatory technique education, Energy conservation, Activityengagement          See flowsheet documentation for full assessment, interventions and recommendations. Outcome: Adequate for Discharge  Note: Limitation: Decreased ADL status, Decreased endurance, Decreased self-care trans, Decreased high-level ADLs  Prognosis: Good  Assessment: Patient is a 82 y.o. female seen for OT evaluation and treatment  at [address] [place] [address] following admission on 11/9/2023  s/p S/P total knee arthroplasty, left. Please see above for comprehensive list of comorbidities and significant PMHx impacting functional performance. At baseline, pt is fully independent with all aspects of self-care, ambulates with use of SPC for community distances vs no AD. Upon initial evaluation, pt appears to be performing below baseline functional status. Pt requires supervision for UB ADLs, supervision for LB ADLs, supervision for bed mobility, supervision for transfers and supervision for functional mobility household distance with RW. The AM-PAC & Barthel Index outcome tools were used to assist in determining pt safety w/self care /mobility and appropriate d/c recommendations, see above for score. Occupational performance is affected by the following deficits: endurance , decreased standing tolerance for self care tasks , decreased dynamic balance impacting functional reach, decreased activity tolerance , and (+) pain .  Personal/Environmental factors impacting D/C include: (+) Hx of falls , steps to enter/navigate the home, Assistance needed for ADL/IADLs, Assistance needed for ADLs and functional mobility, and High fall risk . Supporting factors include: able to maintain FFSU, accessible home environment, support system available, and attitude towards recovery Patient would benefit from OT services within the acute care setting to maximize level of functional independence in the following areas fall prevention , self-care transfers, bed mobility , functional mobility, and ADLs. From OT standpoint, recommendation at time of D/C would be with no post-acute rehabilitation needs.      Rehab Resource Intensity Level, OT: No post-acute rehabilitation needs     Faustino Jordan, [address] [facility] OTR/L   [place] Licensure# [id number]

## 2023-11-10 ENCOUNTER — TELEPHONE (OUTPATIENT)
Dept: OBGYN CLINIC | Facility: HOSPITAL | Age: 83
End: 2023-11-10

## 2023-11-10 NOTE — TELEPHONE ENCOUNTER
Patient contacted for a postoperative follow up assessment. Patient reports doing "good"  . Patient states current pain level of a  4/10  when sitting and 6/10 when walking with RW. Patient denies increase in swelling and dressing is clean, dry and intact. Patient is icing the site regularly. We reviewed patients AVS medication list. Patient is taking Tylenol 650mg every 8 hours, Oxycodone 5mg PRN, ASA 81mg BID, methylprednisolone 4mg as directed, Colace 100mg BID. Patient has not yet had a BM but is passing gas. Instructed to increase fluid and fiber intake. Patient denies nausea, vomiting, abdominal pain, chest pain, shortness of breath, fever, dizziness and calf pain. Patient confirmed post-op appointment with physical therapy on 11/13 and with the surgeon on 11/28. Patient does not have any other questions or concerns at this time. Pt was encouraged to call with any questions, concerns or issues.

## 2023-11-13 ENCOUNTER — OFFICE VISIT (OUTPATIENT)
Dept: PHYSICAL THERAPY | Facility: CLINIC | Age: 83
End: 2023-11-13
Payer: MEDICARE

## 2023-11-13 DIAGNOSIS — M17.0 BILATERAL PRIMARY OSTEOARTHRITIS OF KNEE: Primary | ICD-10-CM

## 2023-11-13 DIAGNOSIS — Z96.652 S/P TOTAL KNEE ARTHROPLASTY, LEFT: ICD-10-CM

## 2023-11-13 PROCEDURE — 97164 PT RE-EVAL EST PLAN CARE: CPT

## 2023-11-13 PROCEDURE — 97110 THERAPEUTIC EXERCISES: CPT

## 2023-11-13 NOTE — PROGRESS NOTES
PT Re-Evaluation     Today's date: 2023  Patient name: Shanthi Gerard  : 1940  MRN: [id number]  Referring provider: Aj Bustillos MD  Dx:   Encounter Diagnosis     ICD-10-CM    1. Bilateral primary osteoarthritis of knee  M17.0                      Assessment  Assessment details: Shanthi Gerard is a 82 y.o. female presenting to physical therapy for a reevaluation s/p L TKA on 23. She demonstrates decreased knee active and passive ROM, decreased knee strength, and limited tolerance to standing and walking due to knee pain. She demonstrates ambulatory dysfunction as noted by use of RW with antalgic gait pattern and decreased stance time on the left lower extremity. She is a fall risk as evident in her scores on the TUG and 5 STS. These deficits have limited the patient's ability to complete ADLs, being primary caretaker of her son, and completing recreational activities of outdoor walks and attending craft fairs. This patient would benefit from OP PT services to address their impairments and functional limitations to maximize functional mobility. The patient and her daughter were educated on importance of completing her HEP 2x per day in order to facilitate improved knee ROM and strength as well as decreased pain/tightness in knee. Impairments: abnormal gait, abnormal or restricted ROM, activity intolerance, impaired balance, impaired physical strength, lacks appropriate home exercise program, pain with function and weight-bearing intolerance    Symptom irritability: moderateUnderstanding of Dx/Px/POC: excellent   Prognosis: good    Goals  STG to be achieved in 4 weeks: ALL NOT MET PROGRESSING  The patient will report a decrease in left knee "at worst" subjective pain rating score to at least 6/10 to allow for improved tolerance for weightbearing activities. The patient will increase left knee flexion AROM to at least 90 degrees to allow for improved functional mobility.    The patient will increase left knee extension MMT score to at least 4-/5 to allow for improved functional mobility. LTG to be achieved by DC: ALL NOT MET PROGRESSING  The patient will be independent in comprehensive HEP. The patient will report no pain with usual activities. The patient will improve left knee flexion and extension AROM to [facility] to allow for improved functional mobility. The patient will increase left knee extension and flexion MMT score to [facility] to allow for improved functional mobility. The patient will be able to complete 5 STS and TUG <12 seconds to decrease her risk for falls and demonstrate improved functional mobility. The patient will be able to ambulate one mile without pain or difficulty to allow for return to her recreational activities. Plan  Patient would benefit from: skilled physical therapy  Planned modality interventions: thermotherapy: hydrocollator packs, TENS and cryotherapy  Planned therapy interventions: manual therapy, joint mobilization, balance/weight bearing training, neuromuscular re-education, patient education, flexibility, strengthening, stretching, therapeutic activities, therapeutic exercise, gait training and home exercise program  Frequency: 2x week  Duration in weeks: 12  Plan of Care beginning date: 11/13/2023  Plan of Care expiration date: 2/5/2024  Treatment plan discussed with: patient        Subjective Evaluation    History of Present Illness  Mechanism of injury: Akash Gallagher presents to OP PT s/p L TKA on 11/9/23. She reports that she was DC home that day. She lives at home with her  and her son who is handicapped. Her daughter has been living with them since the surgery in order to assist with her mobility. Per her daughter, she has been providing her the medications on schedule as provided by surgeon. She says that ice has been helpful for the pain as well. She does have swelling that is in her knee, thigh, and left foot.  Her daughter reports that she has had to adjust her ACE bandage because it gets bunched up or falls off. She was advised that she can remove this after Day 5 from the surgeon. She is ambulating with a RW at this time. Per her daughter, she has been completing her HEP periodically throughout the day and has been walking throughout the day. FROM INITIAL EVALUATION:   She lives in a ranch home with 4 steps to enter with bilateral hand rails. Per the patient's daughter, her brother is handicapped and so her mother is the primary care taker for him for all of his ADLs. he currently sleeps in a recliner as it is too painful for her to sleep in bed with her knees. She has a standard tub shower, no bench or shower chair at home. She does have a SPC at home, however does not have a RW. Patient Goals  Patient goals for therapy: decreased pain, increased motion, increased strength and independence with ADLs/IADLs  Patient goal: be able to go for walks again, walk around a craft show   Pain  Current pain ratin  At best pain ratin  At worst pain ratin  Location: L knee  Quality: sharp, dull ache and burning  Relieving factors: medications (Voltaren Gel, tylenol )  Aggravating factors: standing, walking and stair climbing    Social Support  Steps to enter house: yes  Stairs in house: no   Lives in: [facility] house  Lives with: adult children and spouse    Treatments  Previous treatment: injection treatment and medication  Current treatment: physical therapy        Objective     Palpation   Left   Tenderness of the distal biceps femoris, distal semimembranosus, distal semitendinosus, lateral gastrocnemius and medial gastrocnemius.      Active Range of Motion   Left Knee   Flexion: 80 degrees with pain  Extension: -9 degrees with pain    Right Knee   Flexion: 115 degrees with pain  Extension: 0 degrees with pain    Passive Range of Motion   Left Knee   Flexion: 85 degrees with pain  Extension: -9 degrees with pain    Mobility   Patellar Mobility: Left Knee   Hypomobile: left medial, left lateral, left superior and left inferior    Right Knee   Hypomobile: medial, lateral, superior and inferior     Strength/Myotome Testing     Left Hip   Planes of Motion   Flexion: 3+    Right Hip   Planes of Motion   Flexion: 4-    Left Knee   Flexion: 3-  Extension: 3-    Right Knee   Flexion: 4  Extension: 4    Ambulation   Weight-Bearing Status   Weight-Bearing Status (Left): weight-bearing as tolerated   Assistive device used: walker    Observational Gait   Gait: antalgic   Decreased left stance time.      Additional Observational Gait Details  Increased lateral weight shifting     Functional Assessment        Comments  Post-Operative Initial Evaluation:  5 STS-31.44 seconds with B/L UE push off and eccentric control to chair, L LE kicked out front  TUG- 37.78 seconds with B/L UE push off and eccentric control to chair, RW usage with step through gait pattern, decreased stance time on L LE             Precautions: L TKA 11/9/23, Hard of Hearing  Access Code: [email or web address]    POC expires Unit limit Auth  expiration date PT/OT + Visit Limit?   2/5/24    Re-eval by 12/13/23 N/A N/A BOMN                 Visit/Unit Tracking  AUTH Status:  Date 10/4 10/10 10/12 10/16 10/19 10/23 10/26 10/30 11/2 11/6 11/13    BOMN Used 1 2 3 4 5 6 7 8 9 10 11     Remaining                  Manuals 11/13   10/16 10/19 10/23 10/26 10/30 11/2 11/6   L knee PROM with OP BE   SC  BE BE                    Re-evaluation  BE                         Neuro Re-Ed             Quad set with towel under knee reviewed            Quad set with LAQ reviewed   3" 2x 10 b/l  3" 2x10 B/L 3" 3x10 B/L 3" 3x10 B/L 1.5# 3x10 B/L 1.5# 3x10 B/L Declined today   Quad set with SLR     2x5 B/L 2x5 B/L 2x5 B/L 2x5 B/L X10 B/L Declined today   Quad set with SAQ    2x10 b/l  2x10 B/L  2x10 B/L 2x10 B/L 3x10 B/L 3x10 B/L Declined today   Slider star exercise                                       Ther Ex             Rec bike for knee ROM    Seat 7 UE 11  L3 10' Seat 7 UE 11  L3 10' Seat 7 UE 11  L3 10' Seat 7 UE 11  L3 10' Seat 7 UE 11  L3 10' Rec no tension 10' Rec no tension 10'   Heel slides with strap reviewed            Hamstring stretch on step Reviewed seated   3x 30" 3x30" 3x30"  3x30"  3x30"  3x30"   Calf stretch with rockerboard Reviewed  seated   3 x30" 3x30" 3x30" 3X30"  3x30" 3x30"   Standing hip 3 ways     10x ea.  B/l     X10 ea dir B/L X10 ea dir B/L Declined today   Clamshells          X15 B/L Declined today                Pt education PT POC, HEP, post op status    DOMS, prehab                     Ther Activity             TG squats    L18 2X 10  L18 2x10 L18 2x10 L18 2x10 L20 3x10     Fwd step ups     6" 2X 10 B/L  6" x10 B/L 6" x10 B/L 6" x10 B/L 6" x15 B/L     Lat step downs             Lat step ups      6" x10 B/L 6" x 10 B/L 6"x10 B/L     Gait Training                                       Modalities

## 2023-11-15 ENCOUNTER — TELEPHONE (OUTPATIENT)
Age: 83
End: 2023-11-15

## 2023-11-15 DIAGNOSIS — Z96.652 S/P TOTAL KNEE ARTHROPLASTY, LEFT: ICD-10-CM

## 2023-11-15 RX ORDER — OXYCODONE HYDROCHLORIDE 5 MG/1
TABLET ORAL
Qty: 30 TABLET | Refills: 0 | Status: SHIPPED | OUTPATIENT
Start: 2023-11-15

## 2023-11-15 RX ORDER — OXYCODONE HYDROCHLORIDE 5 MG/1
TABLET ORAL
Qty: 30 TABLET | Refills: 0 | Status: CANCELLED | OUTPATIENT
Start: 2023-11-15

## 2023-11-15 NOTE — TELEPHONE ENCOUNTER
Patients daughter called in regards to her mothers Oxycodone, she is starting to run low (8 left) She's calling to see if they can get a refill before the weekend. She stated it does not need to be for the full 30 like before as her mother is taking it farther apart - was every 6, yesterday every 7, and today so far it was 8 hours. She has most pain after her PT and exercises. She said to fill for whatever Dr Pau Lozada feels is appropriate. Thank you.       Call Back [phone]  [facility] [address]

## 2023-11-16 ENCOUNTER — APPOINTMENT (OUTPATIENT)
Dept: PHYSICAL THERAPY | Facility: CLINIC | Age: 83
End: 2023-11-16
Payer: MEDICARE

## 2023-11-16 ENCOUNTER — NURSE TRIAGE (OUTPATIENT)
Age: 83
End: 2023-11-16

## 2023-11-16 DIAGNOSIS — M17.0 BILATERAL PRIMARY OSTEOARTHRITIS OF KNEE: Primary | ICD-10-CM

## 2023-11-16 DIAGNOSIS — Z96.652 S/P TOTAL KNEE ARTHROPLASTY, LEFT: ICD-10-CM

## 2023-11-16 NOTE — TELEPHONE ENCOUNTER
Spoke to patient's daughter. C/o intermittent sharp pain 3-7/10 (depending on intensity) in left lower leg, mid shin. Denies bruising, lump, redness, warmth, tenderness, and swelling. She reports similar episodes prior to sx (typically after her pre op PT appt) and rest relieved it. She is currently trying ice and oxycodone, both of which are not relieving it. Elevating her leg is helpful. She is unsure if it feels like a muscle spasm. Patient is currently taking Tylenol 650mg 1-2 times a day and Oxycodone with intermittent sharp pain. Steroid pack completed. Suggested to take Tylenol 650mg every 8 hours along with Oxycodone PRN and continue to ice/elevate. Please advise if anything further is required.

## 2023-11-16 NOTE — TELEPHONE ENCOUNTER
----- Message from Ely Shrestha sent at 11/16/2023  8:06 AM EST -----  PO L knee 7 days     Experiencing pain in the L lower leg. Describes shooting pain that comes and goes, no warmth, discoloration, no unusual swelling.  Believe she may have over done it at [address] [place] [facility]

## 2023-11-16 NOTE — PROGRESS NOTES
Daily Note     Today's date: 2023  Patient name: Mariola Mathew  : 1940  MRN: [id number]  Referring provider: Ruby Bojorquez MD  Dx:   Encounter Diagnosis     ICD-10-CM    1. Bilateral primary osteoarthritis of knee  M17.0       2. S/P total knee arthroplasty, left  G11.371                      Subjective: ***      Objective: See treatment diary below      Assessment: Tolerated treatment {Tolerated treatment :5759729542}. Patient {assessment:6727427689}      Plan: {PLAN:7331417949}     Precautions: L TKA 23, Hard of Hearing  Access Code: [email or web address]    POC expires Unit limit Auth  expiration date PT/OT + Visit Limit?   24    Re-eval by 23 N/A N/A BOMN                 Visit/Unit Tracking  AUTH Status:  Date 10/4 10/10 10/12 10/16 10/19 10/23 10/26 10/30 11/2 11/6 11/13    BOMN Used 1 2 3 4 5 6 7 8 9 10 11     Remaining                  Manuals 11/13   10/16 10/19 10/23 10/26 10/30 11/2 11/6   L knee PROM with OP BE   SC  BE BE                    Re-evaluation  BE                         Neuro Re-Ed             Quad set with towel under knee reviewed            Quad set with LAQ reviewed   3" 2x 10 b/l  3" 2x10 B/L 3" 3x10 B/L 3" 3x10 B/L 1.5# 3x10 B/L 1.5# 3x10 B/L Declined today   Quad set with SLR     2x5 B/L 2x5 B/L 2x5 B/L 2x5 B/L X10 B/L Declined today   Quad set with SAQ    2x10 b/l  2x10 B/L  2x10 B/L 2x10 B/L 3x10 B/L 3x10 B/L Declined today                                          Ther Ex             Rec bike for knee ROM    Seat 7 UE 11  L3 10' Seat 7 UE 11  L3 10' Seat 7 UE 11  L3 10' Seat 7 UE 11  L3 10' Seat 7 UE 11  L3 10' Rec no tension 10' Rec no tension 10'   Heel slides with strap reviewed            Hamstring stretch on step Reviewed seated   3x 30" 3x30" 3x30"  3x30"  3x30"  3x30"   Calf stretch with rockerboard Reviewed  seated   3 x30" 3x30" 3x30" 3X30"  3x30" 3x30"   Standing hip 3 ways     10x ea.  B/l     X10 ea dir B/L X10 ea dir B/L Declined today   Thiago X15 B/L Declined today                Pt education PT POC, HEP, post op status    DOMS, prehab                     Ther Activity             TG squats    L18 2X 10  L18 2x10 L18 2x10 L18 2x10 L20 3x10     Fwd step ups     6" 2X 10 B/L  6" x10 B/L 6" x10 B/L 6" x10 B/L 6" x15 B/L     Lat step downs             Lat step ups      6" x10 B/L 6" x 10 B/L 6"x10 B/L     Gait Training                                       Modalities

## 2023-11-17 NOTE — TELEPHONE ENCOUNTER
Pt daughter called in reporting mid-incision light pink in color and some dried yellow crust along the entire incision this am, which was able to be washed away. Denies swelling, redness, warmth, fevers. Incision is open to air and reports she felt her sweatpants tugging at the incision yesterday. Discussed normal vs abnormal drainage and encouraged to place a loose dry gauze with tape covering the incision if tugging is occurring. Pt daughter [facility]

## 2023-11-20 ENCOUNTER — OFFICE VISIT (OUTPATIENT)
Dept: PHYSICAL THERAPY | Facility: CLINIC | Age: 83
End: 2023-11-20
Payer: MEDICARE

## 2023-11-20 DIAGNOSIS — M17.0 BILATERAL PRIMARY OSTEOARTHRITIS OF KNEE: Primary | ICD-10-CM

## 2023-11-20 DIAGNOSIS — Z96.652 S/P TOTAL KNEE ARTHROPLASTY, LEFT: ICD-10-CM

## 2023-11-20 PROCEDURE — 97140 MANUAL THERAPY 1/> REGIONS: CPT

## 2023-11-20 PROCEDURE — 97110 THERAPEUTIC EXERCISES: CPT

## 2023-11-20 NOTE — PROGRESS NOTES
Daily Note     Today's date: 2023  Patient name: Julian Ng  : 1940  MRN: [id number]  Referring provider: Esther Soto MD  Dx:   Encounter Diagnosis     ICD-10-CM    1. Bilateral primary osteoarthritis of knee  M17.0       2. S/P total knee arthroplasty, left  F35.571                      Subjective: Patient reports that she has been experiencing intense pain and muscle spasms in her shin over the weekend. Her daughter reports that she does have mild redness/pink discoloration on the medial and lateral aspects of the knee. She denies TTP, drainage, calf tenderness, redness spreading into upper thigh or lower leg. She has a follow up with orthopedics on 23. Objective: See treatment diary below      Assessment: Tolerated treatment well. Large part of session spent educating the patient and her daughter on concerning signs for infection or DVT and was advised that at this time her sx's are not consistent with this. They were advised to continue to monitor the knee and notify therapist or orthopedics regarding any changes. Mild pink/redness noted at medial aspect of the knee with slight warmth noted with palpation. Nontender and not painful. Verbal and tactile cues needed for quadriceps muscle activation during quad sets. Patient demonstrated fatigue post treatment, exhibited good technique with therapeutic exercises, and would benefit from continued PT      Plan: Continue per plan of care.       Precautions: L TKA 23, Hard of Hearing  Access Code: [email or web address]    POC expires Unit limit Auth  expiration date PT/OT + Visit Limit?   24    Re-eval by 23 N/A N/A BOMN                 Visit/Unit Tracking  AUTH Status:  Date 10/4 10/10 10/12 10/16 10/19 10/23 10/26 10/30 11/2 11/6 11/13 11/20   BOMN Used 1 2 3 4 5 6 7 8 9 10 11 12    Remaining                  Manuals 11/13 11/20  10/16 10/19 10/23 10/26 10/30 11/2 11/6   L knee PROM with OP BE BE  SC  BE BE Re-evaluation  BE                         Neuro Re-Ed             Quad set with towel under knee reviewed 5" x15  Verbal and tactile cues           Quad set with LAQ reviewed 5" 2x10  3" 2x 10 b/l  3" 2x10 B/L 3" 3x10 B/L 3" 3x10 B/L 1.5# 3x10 B/L 1.5# 3x10 B/L Declined today   Quad set with SLR     2x5 B/L 2x5 B/L 2x5 B/L 2x5 B/L X10 B/L Declined today   Quad set with SAQ    2x10 b/l  2x10 B/L  2x10 B/L 2x10 B/L 3x10 B/L 3x10 B/L Declined today                                          Ther Ex             Rec bike for knee ROM    Seat 7 UE 11  L3 10' Seat 7 UE 11  L3 10' Seat 7 UE 11  L3 10' Seat 7 UE 11  L3 10' Seat 7 UE 11  L3 10' Rec no tension 10' Rec no tension 10'   Heel slides with strap reviewed 5" 2x10           Hamstring stretch on step Reviewed seated   3x 30" 3x30" 3x30"  3x30"  3x30"  3x30"   Calf stretch with rockerboard Reviewed  seated   3 x30" 3x30" 3x30" 3X30"  3x30" 3x30"   Standing hip 3 ways     10x ea.  B/l     X10 ea dir B/L X10 ea dir B/L Declined today   Clamshells          X15 B/L Declined today                Pt education PT POC, HEP, post op status Knee redness, sx's infection vs DVT   DOMS, prehab                     Ther Activity             TG squats    L18 2X 10  L18 2x10 L18 2x10 L18 2x10 L20 3x10     Fwd step ups     6" 2X 10 B/L  6" x10 B/L 6" x10 B/L 6" x10 B/L 6" x15 B/L     Lat step downs             Lat step ups      6" x10 B/L 6" x 10 B/L 6"x10 B/L     Gait Training                                       Modalities

## 2023-11-22 ENCOUNTER — OFFICE VISIT (OUTPATIENT)
Dept: PHYSICAL THERAPY | Facility: CLINIC | Age: 83
End: 2023-11-22
Payer: MEDICARE

## 2023-11-22 DIAGNOSIS — M17.0 BILATERAL PRIMARY OSTEOARTHRITIS OF KNEE: Primary | ICD-10-CM

## 2023-11-22 DIAGNOSIS — Z96.652 S/P TOTAL KNEE ARTHROPLASTY, LEFT: ICD-10-CM

## 2023-11-22 PROCEDURE — 97112 NEUROMUSCULAR REEDUCATION: CPT

## 2023-11-22 PROCEDURE — 97110 THERAPEUTIC EXERCISES: CPT

## 2023-11-22 PROCEDURE — 97140 MANUAL THERAPY 1/> REGIONS: CPT

## 2023-11-22 NOTE — PROGRESS NOTES
Daily Note     Today's date: 2023  Patient name: Angelito Sawant  : 1940  MRN: [id number]  Referring provider: Adriana Abreu MD  Dx:   Encounter Diagnosis     ICD-10-CM    1. Bilateral primary osteoarthritis of knee  M17.0       2. S/P total knee arthroplasty, left  U08.255           Start Time: 1218          Subjective: pt noted that she has more pain in the posterior medially of L knee. She did not take pain medication yesterday. She noted that she did have a little more pain at night last night as well. Objective: See treatment diary below      Assessment: Tolerated treatment well. Patient exhibited good technique with therapeutic exercises and would benefit from continued PT noted feeling better after stretches this visit. Education reviewed with patient with verbal agreement and understanding.   - HEP compliance she noted that she did have some discomfort with HS stretch at home   - DOMS 24 to 48 hours of muscle soreness s/p treatment session. Plan: Continue per plan of care. Precautions: L TKA 23, Hard of Hearing  Access Code: [email or web address]    POC expires Unit limit Auth  expiration date PT/OT + Visit Limit?   24    Re-eval by 23 N/A N/A BOMN                 Visit/Unit Tracking  AUTH Status:  Date 11/22  10/12 10/16 10/19 10/23 10/26 10/30 11/2 11   BOMN Used 13  3 4 5 6 7 8 9 10 11 12    Remaining                  Manuals 11/13 11/20 11/22  10/19 10/23 10/26 10/30 11/2 11/6   L knee PROM with OP BE BE SC  BE BE                    Re-evaluation  BE                         Neuro Re-Ed             Quad set with towel under knee reviewed 5" x15  Verbal and tactile cues P!  5x with towel           Quad set with LAQ reviewed 5" 2x10 5" 2x 10   3" 2x10 B/L 3" 3x10 B/L 3" 3x10 B/L 1.5# 3x10 B/L 1.5# 3x10 B/L Declined today   Quad set with SLR   10x with AAROM   2x5 B/L 2x5 B/L 2x5 B/L 2x5 B/L X10 B/L Declined today   Quad set with SAQ   2x 10   2x10 B/L  2x10 B/L 2x10 B/L 3x10 B/L 3x10 B/L Declined today                                          Ther Ex             Rec bike for knee ROM     Seat 7 UE 11  L3 10' Seat 7 UE 11  L3 10' Seat 7 UE 11  L3 10' Seat 7 UE 11  L3 10' Rec no tension 10' Rec no tension 10'   Heel slides with strap reviewed 5" 2x10 5" 30x           Hamstring stretch on step Reviewed seated Hold  did at home yesterday p!   3x30" 3x30"  3x30"  3x30"  3x30"   Calf stretch with rockerboard Reviewed  seated  With strap 30" x 3   3x30" 3x30" 3X30"  3x30" 3x30"   Standing hip 3 ways         X10 ea dir B/L X10 ea dir B/L Declined today   Clamshells          X15 B/L Declined today                Pt education PT POC, HEP, post op status Knee redness, sx's infection vs DVT   DOMS, prehab                     Ther Activity             TG squats     L18 2x10 L18 2x10 L18 2x10 L20 3x10     Fwd step ups      6" x10 B/L 6" x10 B/L 6" x10 B/L 6" x15 B/L     Lat step downs             Lat step ups      6" x10 B/L 6" x 10 B/L 6"x10 B/L     Gait Training                                       Modalities

## 2023-11-24 NOTE — PROGRESS NOTES
Daily Note     Today's date: 2023  Patient name: Yolande Bustos  : 1940  MRN: [id number]  Referring provider: Juan J Cerrato MD  Dx:   Encounter Diagnosis     ICD-10-CM    1. Bilateral primary osteoarthritis of knee  M17.0       2. S/P total knee arthroplasty, left  K79.114                      Subjective: ***      Objective: See treatment diary below      Assessment: Tolerated treatment {Tolerated treatment :8183014664}. Patient {assessment:7784037138}      Plan: {PLAN:7472085497}     Precautions: L TKA 23, Hard of Hearing  Access Code: [email or web address]    POC expires Unit limit Auth  expiration date PT/OT + Visit Limit?   24    Re-eval by 23 N/A N/A BOMN                 Visit/Unit Tracking  AUTH Status:  Date 11/22  10/12 10/16 10/19 10/23 10/26 10/30 11/2 11/6 11/13 11/20   BOMN Used 13  3 4 5 6 7 8 9 10 11 12    Remaining                  Manuals 11/13 11/20 11/22  10/19 10/23 10/26 10/30 11/2 11/6   L knee PROM with OP BE BE SC  BE BE                    Re-evaluation  BE                         Neuro Re-Ed             Quad set with towel under knee reviewed 5" x15  Verbal and tactile cues P!  5x with towel           Quad set with LAQ reviewed 5" 2x10 5" 2x 10   3" 2x10 B/L 3" 3x10 B/L 3" 3x10 B/L 1.5# 3x10 B/L 1.5# 3x10 B/L Declined today   Quad set with SLR   10x with AAROM   2x5 B/L 2x5 B/L 2x5 B/L 2x5 B/L X10 B/L Declined today   Quad set with SAQ   2x 10   2x10 B/L  2x10 B/L 2x10 B/L 3x10 B/L 3x10 B/L Declined today                                          Ther Ex             Rec bike for knee ROM     Seat 7 UE 11  L3 10' Seat 7 UE 11  L3 10' Seat 7 UE 11  L3 10' Seat 7 UE 11  L3 10' Rec no tension 10' Rec no tension 10'   Heel slides with strap reviewed 5" 2x10 5" 30x           Hamstring stretch on step Reviewed seated Hold  did at home yesterday p!   3x30" 3x30"  3x30"  3x30"  3x30"   Calf stretch with rockerboard Reviewed  seated  With strap 30" x 3   3x30" 3x30" 3X30"  3x30" 3x30" Standing hip 3 ways         X10 ea dir B/L X10 ea dir B/L Declined today   Clamshells          X15 B/L Declined today                Pt education PT POC, HEP, post op status Knee redness, sx's infection vs DVT   DOMS, prehab                     Ther Activity             TG squats     L18 2x10 L18 2x10 L18 2x10 L20 3x10     Fwd step ups      6" x10 B/L 6" x10 B/L 6" x10 B/L 6" x15 B/L     Lat step downs             Lat step ups      6" x10 B/L 6" x 10 B/L 6"x10 B/L     Gait Training                                       Modalities

## 2023-11-27 ENCOUNTER — APPOINTMENT (OUTPATIENT)
Dept: PHYSICAL THERAPY | Facility: CLINIC | Age: 83
End: 2023-11-27
Payer: MEDICARE

## 2023-11-27 DIAGNOSIS — Z96.652 S/P TOTAL KNEE ARTHROPLASTY, LEFT: ICD-10-CM

## 2023-11-27 DIAGNOSIS — M17.0 BILATERAL PRIMARY OSTEOARTHRITIS OF KNEE: Primary | ICD-10-CM

## 2023-11-28 ENCOUNTER — OFFICE VISIT (OUTPATIENT)
Dept: OBGYN CLINIC | Facility: CLINIC | Age: 83
End: 2023-11-28

## 2023-11-28 ENCOUNTER — OFFICE VISIT (OUTPATIENT)
Dept: PHYSICAL THERAPY | Facility: CLINIC | Age: 83
End: 2023-11-28
Payer: MEDICARE

## 2023-11-28 ENCOUNTER — HOSPITAL ENCOUNTER (OUTPATIENT)
Dept: RADIOLOGY | Facility: HOSPITAL | Age: 83
Discharge: HOME | End: 2023-11-28
Payer: MEDICARE

## 2023-11-28 VITALS
WEIGHT: 174 LBS | HEIGHT: 61 IN | HEART RATE: 67 BPM | SYSTOLIC BLOOD PRESSURE: 152 MMHG | DIASTOLIC BLOOD PRESSURE: 87 MMHG | BODY MASS INDEX: 32.85 KG/M2

## 2023-11-28 DIAGNOSIS — Z96.652 S/P TOTAL KNEE ARTHROPLASTY, LEFT: ICD-10-CM

## 2023-11-28 DIAGNOSIS — Z96.652 S/P TOTAL KNEE ARTHROPLASTY, LEFT: Primary | ICD-10-CM

## 2023-11-28 DIAGNOSIS — M17.0 BILATERAL PRIMARY OSTEOARTHRITIS OF KNEE: Primary | ICD-10-CM

## 2023-11-28 PROCEDURE — 99024 POSTOP FOLLOW-UP VISIT: CPT | Performed by: PHYSICIAN ASSISTANT

## 2023-11-28 PROCEDURE — 73562 X-RAY EXAM OF KNEE 3: CPT

## 2023-11-28 PROCEDURE — 97140 MANUAL THERAPY 1/> REGIONS: CPT

## 2023-11-28 PROCEDURE — 97530 THERAPEUTIC ACTIVITIES: CPT

## 2023-11-28 PROCEDURE — 97112 NEUROMUSCULAR REEDUCATION: CPT

## 2023-11-28 NOTE — PROGRESS NOTES
83 y.o.female presents for  3 weeks  postoperative visit status post left TKA. Patient states she is doing well she presents with her daughter in the office today. She denies any calf pain chest pain shortness of breath numbness tingling. Patient is taking aspirin for DVT reflexes. States her pain is significantly decreased over the past few weeks time.   She is doing well physical therap Ambulating with assistance of a walker    Review of Systems  Review of systems negative unless otherwise specified in HPI    Past Medical History  Past Medical History:   Diagnosis Date    Chronic kidney disease     Disease of thyroid gland     High cholesterol     Hypothyroidism     PONV (postoperative nausea and vomiting)        Past Surgical History  Past Surgical History:   Procedure Laterality Date    BREAST CYST EXCISION  1982    cyst removals and aspirations-benign    COLONOSCOPY      HYSTERECTOMY      at age 60    OOPHORECTOMY Bilateral     at age 60    IA ARTHROPLASTY FEM CONDYLES/TIBIAL PLATEAU KNEE Left 92/2/0639    Procedure: ARTHROPLASTY KNEE TOTAL AND ALL ASSOCIATED PROCEDURES;  Surgeon: Abbey Langley MD;  Location: [facility] OR;  Service: Orthopedics    STOMACH SURGERY         Current Medications  Current Outpatient Medications on File Prior to Visit   Medication Sig Dispense Refill    acetaminophen (TYLENOL) 650 mg CR tablet Take 1 tablet (650 mg total) by mouth every 8 (eight) hours as needed for mild pain 30 tablet 0    aspirin (ECOTRIN LOW STRENGTH) 81 mg EC tablet Take 1 tablet (81 mg total) by mouth 2 (two) times a day Take 1(one) 81 mg tablet twice daily x 35 days after total joint arthroplasty 70 tablet 0    docusate sodium (COLACE) 100 mg capsule Take 1 capsule (100 mg total) by mouth 2 (two) times a day 10 capsule 0    levothyroxine 100 mcg tablet Take 1 tablet (100 mcg total) by mouth daily 90 tablet 3    methylPREDNISolone 4 MG tablet therapy pack Use as directed on package 1 each 0    oxyCODONE (ROXICODONE) 5 immediate release tablet Take 1 tablets every 6 hrs as needed for pain control 30 tablet 0    pravastatin (PRAVACHOL) 20 mg tablet Take with 40 mg Pravastatin, total daily dose 60 mg 90 tablet 3    pravastatin (PRAVACHOL) 40 mg tablet Take 40 mg by mouth daily Pt takes 60 mg every evening      ascorbic acid (VITAMIN C) 500 MG tablet Take 1 tablet (500 mg total) by mouth 2 (two) times a day (Patient not taking: Reported on 11/28/2023) 60 tablet 1    ferrous sulfate 324 (65 Fe) mg Take 1 tablet (324 mg total) by mouth 2 (two) times a day before meals (Patient not taking: Reported on 11/28/2023) 60 tablet 1    folic acid (FOLVITE) 1 mg tablet Take 1 tablet (1 mg total) by mouth daily (Patient not taking: Reported on 11/28/2023) 30 tablet 1     No current facility-administered medications on file prior to visit.        Recent Labs [facility])  0   Lab Value Date/Time    HCT 40.5 10/17/2023 0712    HCT 37.9 05/03/2015 0630    HGB 13.3 10/17/2023 0712    HGB 12.1 05/03/2015 0630    WBC 7.07 10/17/2023 0712    WBC 6.75 05/03/2015 0630    INR 1.07 10/17/2023 0712    GLUCOSE 96 05/04/2015 0529    HGBA1C 6.1 (H) 10/17/2023 0712         Physical exam  General: Awake, Alert, Oriented  Eyes: Pupils equal, round and reactive to light  Heart: regular rate and rhythm  Lungs: No audible wheezing    left Knee exam  Examination patient's left knee well approximated anterior incision full active extension flexion to 110 degrees calf nontender palpation active ankle plantarflexion mild decrease sensation lateral to incision otherwise sensation intact distal pulses present    Imaging  X-ray images of the left knee reviewed x-rays reveal excellently aligned left total knee arthroplasty with evidence of loosening or osseous abnormality    Assessment:  Status post 3 weeks left total knee arthroplasty doing well  Plan:  Weightbearing left lower extremity and continue aspirin for DVT prophylaxis  Over-the-counter pain medication as needed for pain control  Physical therapy range of motion stretching strengthening  Ambulatory since with a walker as needed  Follow-up in 2 months time repeat x-rays left knee

## 2023-11-28 NOTE — PROGRESS NOTES
Daily Note     Today's date: 2023  Patient name: Ole Lozada  : 1940  MRN: [id number]  Referring provider: Priscilla Baig MD  Dx:   Encounter Diagnosis     ICD-10-CM    1. Bilateral primary osteoarthritis of knee  M17.0       2. S/P total knee arthroplasty, left  J25.394                      Subjective: Patient reports that she followed up with       Objective: See treatment diary below      Assessment: Tolerated treatment well. Initiated recumbent bike rocking to facilitate improved knee flexion ROM. She required increased visual, verbal, and tactile cues for quadriceps muscle activation during forward step ups as patient often compensated with UEs and locking knee into extension. Patient demonstrated fatigue post treatment, exhibited good technique with therapeutic exercises, and would benefit from continued PT      Plan: Continue per plan of care. Precautions: L TKA 23, Hard of Hearing  Access Code: [email or web address]    POC expires Unit limit Auth  expiration date PT/OT + Visit Limit?   24    Re-eval by 23 N/A N/A BOMN                 Visit/Unit Tracking  AUTH Status:  Date 11/22 11/28  10/16 10/19 10/23 10/26 10/30 11/2 11   BOMN Used 13 14  4 5 6 7 8 9 10 11 12    Remaining                  Manuals 11/13 11/20 11/22 11/28 10/19 10/23 10/26 10/30 11/2 11/6   L knee PROM with OP BE BE SC BE BE BE                    Re-evaluation  BE                         Neuro Re-Ed             Quad set with towel under knee reviewed 5" x15  Verbal and tactile cues P!  5x with towel           Quad set with LAQ reviewed 5" 2x10 5" 2x 10  5" 3x10 3" 2x10 B/L 3" 3x10 B/L 3" 3x10 B/L 1.5# 3x10 B/L 1.5# 3x10 B/L Declined today   Quad set with SLR   10x with AAROM  Min A x10 2x5 B/L 2x5 B/L 2x5 B/L 2x5 B/L X10 B/L Declined today   Quad set with SAQ   2x 10   2x10 B/L  2x10 B/L 2x10 B/L 3x10 B/L 3x10 B/L Declined today                                          Ther Ex             Rec bike for knee ROM    Rocking 6' Seat 7 UE 11  L3 10' Seat 7 UE 11  L3 10' Seat 7 UE 11  L3 10' Seat 7 UE 11  L3 10' Rec no tension 10' Rec no tension 10'   Heel slides with strap reviewed 5" 2x10 5" 30x           Hamstring stretch on step Reviewed seated Hold  did at home yesterday p!   3x30" 3x30"  3x30"  3x30"  3x30"   Calf stretch with rockerboard Reviewed  seated  With strap 30" x 3   3x30" 3x30" 3X30"  3x30" 3x30"   Standing hip 3 ways         X10 ea dir B/L X10 ea dir B/L Declined today   Clamshells          X15 B/L Declined today                Pt education PT POC, HEP, post op status Knee redness, sx's infection vs DVT   DOMS, prehab                     Ther Activity             TG squats    L17 x10 L18 2x10 L18 2x10 L18 2x10 L20 3x10     Fwd step ups     4" x15 6" x10 B/L 6" x10 B/L 6" x10 B/L 6" x15 B/L     Lat step downs             Lat step ups      6" x10 B/L 6" x 10 B/L 6"x10 B/L     Gait Training                                       Modalities

## 2023-11-30 ENCOUNTER — OFFICE VISIT (OUTPATIENT)
Dept: PHYSICAL THERAPY | Facility: CLINIC | Age: 83
End: 2023-11-30
Payer: MEDICARE

## 2023-11-30 DIAGNOSIS — Z96.652 S/P TOTAL KNEE ARTHROPLASTY, LEFT: ICD-10-CM

## 2023-11-30 DIAGNOSIS — M17.0 BILATERAL PRIMARY OSTEOARTHRITIS OF KNEE: Primary | ICD-10-CM

## 2023-11-30 PROCEDURE — 97112 NEUROMUSCULAR REEDUCATION: CPT

## 2023-11-30 PROCEDURE — 97530 THERAPEUTIC ACTIVITIES: CPT

## 2023-11-30 PROCEDURE — 97110 THERAPEUTIC EXERCISES: CPT

## 2023-11-30 NOTE — PROGRESS NOTES
Daily Note     Today's date: 2023  Patient name: Anabel Sharma  : 1940  MRN: [id number]  Referring provider: Griselda Pinks, MD  Dx:   Encounter Diagnosis     ICD-10-CM    1. Bilateral primary osteoarthritis of knee  M17.0       2. S/P total knee arthroplasty, left  Z96.652           Start Time: 1030  Stop Time: 1115  Total time in clinic (min): 45 minutes    Subjective: pt noted that she has been doing well  but reported that she was not good yesterday due to having an upset stomach. Objective: See treatment diary below      Assessment:  Continued with treatment session with focus on L knee s/p TKA ~3 weeks OOS. Tolerated treatment well. Pt was able to complete all exercises with no noted of exacerbated pain. She did note an increased challenged verses LV. Completed with verbal cues for proper mechanics and technique. Patient exhibited good technique with therapeutic exercises and would benefit from continued PT. Plan: Continue per plan of care. Precautions: L TKA 23, Hard of Hearing  Access Code: [email or web address]    POC expires Unit limit Auth  expiration date PT/OT + Visit Limit?   24    Re-eval by 23 N/A N/A BOMN                 Visit/Unit Tracking  AUTH Status:  Date 11/22 11/28 11/30  10/19 10/23 10/26 10/30 11/2 11/6 11/13 11/20   BOMN Used 13 14 15  5 6 7 8 9 10 11 12    Remaining                  Manuals 11/13 11/20 11/22 11/28 11/30  10/26 10/30 11/2 11/6   L knee PROM with OP BE BE SC BE SC                     Re-evaluation  BE                         Neuro Re-Ed             Quad set with towel under knee reviewed 5" x15  Verbal and tactile cues P!  5x with towel           Quad set with LAQ reviewed 5" 2x10 5" 2x 10  5" 3x10 5" 2x 10   3" 3x10 B/L 1.5# 3x10 B/L 1.5# 3x10 B/L Declined today   Quad set with SLR   10x with AAROM  Min A x10 2x 10 no assistance needed  2x5 B/L 2x5 B/L X10 B/L Declined today   Quad set with SAQ   2x 10     2x10 B/L 3x10 B/L 3x10 B/L Declined today                                          Ther Ex             Rec bike for knee ROM    Rocking 6' Rocking 10 min   Seat 7 UE 11  L3 10' Seat 7 UE 11  L3 10' Rec no tension 10' Rec no tension 10'   Heel slides with strap reviewed 5" 2x10 5" 30x           Hamstring stretch on step Reviewed seated Hold  did at home yesterday p!     3x30"  3x30"  3x30"   Calf stretch with rockerboard Reviewed  seated  With strap 30" x 3     3X30"  3x30" 3x30"   Standing hip 3 ways         X10 ea dir B/L X10 ea dir B/L Declined today   Clamshells          X15 B/L Declined today                Pt education PT POC, HEP, post op status Knee redness, sx's infection vs DVT                        Ther Activity             TG squats    L17 x10 L17 2x 10   L18 2x10 L20 3x10     Fwd step ups     4" x15 4" 15x   6" x10 B/L 6" x15 B/L     Lat step downs             Lat step ups       6" x 10 B/L 6"x10 B/L     Gait Training                                       Modalities

## 2023-12-04 ENCOUNTER — OFFICE VISIT (OUTPATIENT)
Dept: PHYSICAL THERAPY | Facility: CLINIC | Age: 83
End: 2023-12-04
Payer: MEDICARE

## 2023-12-04 DIAGNOSIS — Z96.652 S/P TOTAL KNEE ARTHROPLASTY, LEFT: ICD-10-CM

## 2023-12-04 DIAGNOSIS — M17.0 BILATERAL PRIMARY OSTEOARTHRITIS OF KNEE: Primary | ICD-10-CM

## 2023-12-04 PROCEDURE — 97140 MANUAL THERAPY 1/> REGIONS: CPT

## 2023-12-04 PROCEDURE — 97112 NEUROMUSCULAR REEDUCATION: CPT

## 2023-12-04 NOTE — PROGRESS NOTES
Daily Note     Today's date: 2023  Patient name: Mary Kay Olea  : 1940  MRN: [id number]  Referring provider: Jeni Jimenez MD  Dx:   Encounter Diagnosis     ICD-10-CM    1. Bilateral primary osteoarthritis of knee  M17.0       2. S/P total knee arthroplasty, left  K17.103                      Subjective: Patient reports that her knee is doing fairly good. Pain has been improving, but some days are worse than others. She says that she does find herself doing small steps around house without the walker, but she is afraid of falling. Objective: See treatment diary below      Assessment: Tolerated treatment well. Good quadriceps activation noted with supine SLR as she was able to complete without assistance from therapist and no quadriceps lag is evident. She ambulated 150ft this session with use of SPC and VC's needed for sequencing and safety/stability. She demonstrated mild instability during the gait training when her sequencing was impaired. She was advised on proper sequencing and educated with her daughter on continuing to practice within the home before performing in the community. Patient demonstrated fatigue post treatment, exhibited good technique with therapeutic exercises, and would benefit from continued PT      Plan: Continue per plan of care. Progress treatment as tolerated.        Precautions: L TKA 23, Hard of Hearing  Access Code: [email or web address]    POC expires Unit limit Auth  expiration date PT/OT + Visit Limit?   24    Re-eval by 23 N/A N/A BOMN                 Visit/Unit Tracking  AUTH Status:  Date 11/22 11/28 11/30 12/4 10/19 10/23 10/26 10/30 11/2 11/6 11/13 11/20   BOMN Used 13 14 15 16 5 6 7 8 9 10 11 12    Remaining                  Manuals 11/13 11/20 11/22 11/28 11/30 12/4 10/26 10/30 11/2 11/6   L knee PROM with OP BE BE SC BE SC BE                    Re-evaluation  BE                         Neuro Re-Ed             Quad set with towel under knee reviewed 5" x15  Verbal and tactile cues P!  5x with towel           Quad set with LAQ reviewed 5" 2x10 5" 2x 10  5" 3x10 5" 2x 10  2# 5" 2x10  3" 3x10 B/L 1.5# 3x10 B/L 1.5# 3x10 B/L Declined today   Quad set with SLR   10x with AAROM  Min A x10 2x 10 no assistance needed 2x10  2x5 B/L 2x5 B/L X10 B/L Declined today   Quad set with SAQ   2x 10     2x10 B/L 3x10 B/L 3x10 B/L Declined today                                          Ther Ex             Rec bike for knee ROM    Rocking 6' Rocking 10 min  Rocking 10 min  Seat 7 UE 11  L3 10' Seat 7 UE 11  L3 10' Rec no tension 10' Rec no tension 10'   Heel slides with strap reviewed 5" 2x10 5" 30x           Hamstring stretch on step Reviewed seated Hold  did at home yesterday p!     3x30"  3x30"  3x30"   Calf stretch with rockerboard Reviewed  seated  With strap 30" x 3     3X30"  3x30" 3x30"   Standing hip 3 ways       NV  X10 ea dir B/L X10 ea dir B/L Declined today   Clamshells          X15 B/L Declined today   Lateral walks at bar      NV       Pt education PT POC, HEP, post op status Knee redness, sx's infection vs DVT                        Ther Activity             TG squats    L17 x10 L17 2x 10  L17 2x 10 L18 2x10 L20 3x10     Fwd step ups     4" x15 4" 15x  4" 2x10  6" x10 B/L 6" x15 B/L     Lat step downs      NV       Lat step ups       6" x 10 B/L 6"x10 B/L     Gait Training             SPC ambulation      150ft with VC's sequencing                    Modalities

## 2023-12-07 ENCOUNTER — OFFICE VISIT (OUTPATIENT)
Dept: PHYSICAL THERAPY | Facility: CLINIC | Age: 83
End: 2023-12-07
Payer: MEDICARE

## 2023-12-07 DIAGNOSIS — M17.0 BILATERAL PRIMARY OSTEOARTHRITIS OF KNEE: Primary | ICD-10-CM

## 2023-12-07 DIAGNOSIS — Z96.652 S/P TOTAL KNEE ARTHROPLASTY, LEFT: ICD-10-CM

## 2023-12-07 PROCEDURE — 97140 MANUAL THERAPY 1/> REGIONS: CPT

## 2023-12-07 PROCEDURE — 97110 THERAPEUTIC EXERCISES: CPT

## 2023-12-07 PROCEDURE — 97112 NEUROMUSCULAR REEDUCATION: CPT

## 2023-12-07 RX ORDER — ASPIRIN 81 MG/1
TABLET, COATED ORAL
Qty: 70 TABLET | Refills: 0 | Status: SHIPPED | OUTPATIENT
Start: 2023-12-07

## 2023-12-07 NOTE — PROGRESS NOTES
Daily Note     Today's date: 2023  Patient name: Mary Ramesh  : 1940  MRN: [id number]  Referring provider: Joanie Romero MD  Dx:   Encounter Diagnosis     ICD-10-CM    1. Bilateral primary osteoarthritis of knee  M17.0       2. S/P total knee arthroplasty, left  Z96.652           Start Time: 1015  Stop Time: 1100  Total time in clinic (min): 45 minutes    Subjective: pt noted that her L knee feels stiff but noted that she did have some increase in L foot swelling this morning and noted that she thinks she turned her L LE different at night last night       Objective: See treatment diary below      Assessment:  Continued with treatment plan with stretching of L knee. Overall doing well and able to progress as she is able with no increase in pain noted. Added lateral side walks laterally with UE support as well as LSU with stairs. Tolerated treatment well. Patient exhibited good technique with therapeutic exercises and would benefit from continued PT. Advised that she may have some increase in muscle soreness s/p progressions. Plan: Continue per plan of care. Precautions: L TKA 23, Hard of Hearing  Access Code: [email or web address]    POC expires Unit limit Auth  expiration date PT/OT + Visit Limit?   24    Re-eval by 23 N/A N/A BOMN                 Visit/Unit Tracking  AUTH Status:  Date 11/22 11/28 11/30 12/4 12/7   10/30 11/2 11/6 11/13 11/20   BOMN Used 13 14 15 16 17   8 9 10 11 12    Remaining                  Manuals    L knee PROM with OP BE BE SC BE SC BE SC                   Re-evaluation  BE                         Neuro Re-Ed             Quad set with towel under knee reviewed 5" x15  Verbal and tactile cues P!  5x with towel           Quad set with LAQ reviewed 5" 2x10 5" 2x 10  5" 3x10 5" 2x 10  2# 5" 2x10  3x 10   1.5# 3x10 B/L Declined today   Quad set with SLR   10x with AAROM  Min A x10 2x 10 no assistance needed 2x10  2x 10   X10 B/L Declined today   Quad set with SAQ   2x 10       3x10 B/L Declined today                                          Ther Ex             Rec bike for knee ROM    Rocking 6' Rocking 10 min  Rocking 10 min  Rocking 10 min   Rec no tension 10' Rec no tension 10'   Heel slides with strap reviewed 5" 2x10 5" 30x           Hamstring stretch on step Reviewed seated Hold  did at home yesterday p!       3x30"  3x30"   Calf stretch with rockerboard Reviewed  seated  With strap 30" x 3       3x30" 3x30"   Standing hip 3 ways       NV 10x ea. X10 ea dir B/L Declined today   Clamshells          X15 B/L Declined today   Lateral walks at bar      NV 4 laps w/ UE support.        Pt education PT POC, HEP, post op status Knee redness, sx's infection vs DVT                        Ther Activity             TG squats    L17 x10 L17 2x 10  L17 2x 10 NV      Fwd step ups     4" x15 4" 15x  4" 2x10  6" 10x       Lat step downs      NV       Lat step ups       4" 2x 10       Gait Training             SPC ambulation      150ft with VC's sequencing                    Modalities

## 2023-12-11 ENCOUNTER — OFFICE VISIT (OUTPATIENT)
Dept: PHYSICAL THERAPY | Facility: CLINIC | Age: 83
End: 2023-12-11
Payer: MEDICARE

## 2023-12-11 DIAGNOSIS — M17.0 BILATERAL PRIMARY OSTEOARTHRITIS OF KNEE: Primary | ICD-10-CM

## 2023-12-11 DIAGNOSIS — Z96.652 S/P TOTAL KNEE ARTHROPLASTY, LEFT: ICD-10-CM

## 2023-12-11 PROCEDURE — 97140 MANUAL THERAPY 1/> REGIONS: CPT

## 2023-12-11 PROCEDURE — 97110 THERAPEUTIC EXERCISES: CPT

## 2023-12-11 PROCEDURE — 97530 THERAPEUTIC ACTIVITIES: CPT

## 2023-12-11 NOTE — PROGRESS NOTES
Daily Note     Today's date: 2023  Patient name: Yesenia Brambila  : 1940  MRN: [id number]  Referring provider: Sangeetha Velasquez MD  Dx:   Encounter Diagnosis     ICD-10-CM    1. Bilateral primary osteoarthritis of knee  M17.0       2. S/P total knee arthroplasty, left  [id number]                      Subjective: Patient reports that her right knee has been locking up on her for several years now and the only way to unlock it is to bend and straighten it while standing. She says that because of this, she feels concerned about moving to walking with the [address] at all times. She has been practicing with it, but doesn't quite trust her left knee at this point and is worried to fall. Objective: See treatment diary below      Assessment: Tolerated treatment well. Added lateral step downs this session with patient demonstrating difficulty with eccentric control aspect of the exercise. Increased assistance required from the UEs to complete. Advised patient that transitioning to the [address] once comfortable with it is best and to continue practicing as she is able to in order to get more comfortable. Patient demonstrated fatigue post treatment, exhibited good technique with therapeutic exercises, and would benefit from continued PT      Plan: Continue per plan of care. Precautions: L TKA 23, Hard of Hearing  Access Code: [email or web address]    POC expires Unit limit Auth  expiration date PT/OT + Visit Limit?   24    Re-eval by 23 N/A N/A BOMN                 Visit/Unit Tracking  AUTH Status:  Date 11/22 11/28 11/30 12/4 12/7 12/11  10/30 11/2 11/6 11/13 11/20   BOMN Used 13 14 15 16 17 18  8 9 10 11 12    Remaining                  Manuals    L knee PROM with OP BE BE SC BE SC BE SC BE                  Re-evaluation  BE                         Neuro Re-Ed             Quad set with towel under knee reviewed 5" x15  Verbal and tactile cues P!  5x with towel Quad set with LAQ reviewed 5" 2x10 5" 2x 10  5" 3x10 5" 2x 10  2# 5" 2x10  3x 10  2# 3x10  Declined today   Quad set with SLR   10x with AAROM  Min A x10 2x 10 no assistance needed 2x10  2x 10    Declined today   Quad set with SAQ   2x 10        Declined today                                          Ther Ex             Rec bike for knee ROM    Rocking 6' Rocking 10 min  Rocking 10 min  Rocking 10 min  Bwd rev   10'  Rec no tension 10'   Heel slides with strap reviewed 5" 2x10 5" 30x           Hamstring stretch on step Reviewed seated Hold  did at home yesterday p!        3x30"   Calf stretch with rockerboard Reviewed  seated  With strap 30" x 3        3x30"   Standing hip 3 ways       NV 10x ea. X10 ea dir B/L  Declined today   Clamshells           Declined today   Lateral walks at bar      NV 4 laps w/ UE support.        Pt education PT POC, HEP, post op status Knee redness, sx's infection vs DVT      SPC practice                  Ther Activity             TG squats    L17 x10 L17 2x 10  L17 2x 10 NV L20 3x10     Fwd step ups     4" x15 4" 15x  4" 2x10  6" 10x  6" x15      Lat step downs      NV  4" x10     Lat step ups       4" 2x 10       Gait Training             SPC ambulation      150ft with VC's sequencing                    Modalities

## 2023-12-14 ENCOUNTER — EVALUATION (OUTPATIENT)
Dept: PHYSICAL THERAPY | Facility: CLINIC | Age: 83
End: 2023-12-14
Payer: MEDICARE

## 2023-12-14 DIAGNOSIS — Z96.652 S/P TOTAL KNEE ARTHROPLASTY, LEFT: ICD-10-CM

## 2023-12-14 DIAGNOSIS — M17.0 BILATERAL PRIMARY OSTEOARTHRITIS OF KNEE: Primary | ICD-10-CM

## 2023-12-14 PROCEDURE — 97110 THERAPEUTIC EXERCISES: CPT

## 2023-12-14 PROCEDURE — 97140 MANUAL THERAPY 1/> REGIONS: CPT

## 2023-12-14 NOTE — PROGRESS NOTES
PT Re-Evaluation     Today's date: 2023  Patient name: Mary Ramesh  : 1940  MRN: [id number]  Referring provider: Joanie Romero MD  Dx:   Encounter Diagnosis     ICD-10-CM    1. Bilateral primary osteoarthritis of knee  M17.0       2. S/P total knee arthroplasty, left  Z96.652                      Assessment  Assessment details: Mary Ramesh is a 82 y.o. female presenting to physical therapy for a reevaluation  s/p L TKA on 23. Since her initial evaluation, she reports 60% improvement in her knee. The patient has demonstrated significant improvements in her knee ROM, strength, and pain. She also demonstrates improved functional mobility with regards to her times on the TUG and 5 STS. She demonstrates improved quality of gait with a step through gait pattern with use of a RW. She does continue to be a fall risk and ambulate with the RW, however this is largely due to progressively worsening right knee pain and "locking up" as described by the patient. She also continues with decreased hip and knee strength leading to difficulties with functional transfers. She continues to be limited with her ability to complete ADLs, be the primary caretaker of her son, and complete recreational activities of outdoor walks and attending craft fairs. This patient would benefit from continued PT services to address their impairments and functional limitations to maximize functional outcome.     Impairments: abnormal gait, abnormal or restricted ROM, activity intolerance, impaired balance, impaired physical strength, lacks appropriate home exercise program, pain with function and weight-bearing intolerance    Symptom irritability: moderateUnderstanding of Dx/Px/POC: excellent   Prognosis: good    Goals  STG to be achieved in 4 weeks: ALL NOT MET PROGRESSING  The patient will report a decrease in left knee "at worst" subjective pain rating score to at least 6/10 to allow for improved tolerance for weightbearing activities. MET  The patient will increase left knee flexion AROM to at least 90 degrees to allow for improved functional mobility. MET  The patient will increase left knee extension MMT score to at least 4-/5 to allow for improved functional mobility. MET    LTG to be achieved by DC: ALL NOT MET PROGRESSING  The patient will be independent in comprehensive HEP. The patient will report no pain with usual activities. The patient will improve left knee flexion and extension AROM to [facility] to allow for improved functional mobility. The patient will increase left knee extension and flexion MMT score to [facility] to allow for improved functional mobility. The patient will be able to complete 5 STS and TUG <12 seconds to decrease her risk for falls and demonstrate improved functional mobility. The patient will be able to ambulate one mile without pain or difficulty to allow for return to her recreational activities. Plan  Patient would benefit from: skilled physical therapy  Planned modality interventions: thermotherapy: hydrocollator packs, TENS and cryotherapy  Planned therapy interventions: manual therapy, joint mobilization, balance/weight bearing training, neuromuscular re-education, patient education, flexibility, strengthening, stretching, therapeutic activities, therapeutic exercise, gait training and home exercise program  Frequency: 2x week  Duration in weeks: 12  Plan of Care beginning date: 11/13/2023  Plan of Care expiration date: 2/5/2024  Treatment plan discussed with: patient        Subjective Evaluation    History of Present Illness  Mechanism of injury: Leonela Cole reports 60% improvement since beginning PT services. She notes improvement in the knee mobility since initial evaluation. She reports that she has much more motion in the knee and doesn't have nearly as much stiffness in the knee. She reports that the times that her stiffness and discomfort is the worst first thing in the morning.  She feels her strength continues to improve as well. She states that her right knee continues to be quite painful and aggravated with standing and walking, which she feels is limiting her progress that she is making with the left knee. She continues to use her walker for fear of falling, instability and "locking up" of the right knee when ambulating. She also reports difficulty with standing up from a chair as she feels that she continues to rely on her arms quite bit.        Patient Goals  Patient goals for therapy: decreased pain, increased motion, increased strength and independence with ADLs/IADLs  Patient goal: be able to go for walks again, walk around a craft show   Pain  Current pain rating: 3  At best pain rating: 3  At worst pain ratin  Location: L knee  Quality: dull ache  Relieving factors: medications (Voltaren Gel, tylenol )  Aggravating factors: standing, walking and stair climbing  Progression: improved    Social Support  Steps to enter house: yes  Stairs in house: no   L[facility] in: [facility]  Lives with: adult children and spouse    Treatments  Previous treatment: injection treatment and medication  Current treatment: physical therapy        Objective     Active Range of Motion   Left Knee   Flexion: 115 degrees   Extension: 0 degrees     Right Knee   Flexion: 115 degrees with pain  Extension: 0 degrees with pain    Passive Range of Motion   Left Knee   Flexion: 115 degrees   Extension: 0 degrees     Mobility   Patellar Mobility:   Left Knee   Hypomobile: left medial, left lateral, left superior and left inferior    Right Knee   Hypomobile: medial, lateral, superior and inferior     Strength/Myotome Testing     Left Hip   Planes of Motion   Flexion: 3+    Right Hip   Planes of Motion   Flexion: 4-    Left Knee   Flexion: 3+  Extension: 3+    Right Knee   Flexion: 4  Extension: 4    Ambulation   Weight-Bearing Status   Assistive device used: walker    Observational Gait   Gait: antalgic   Decreased walking speed.     Functional Assessment        Comments  Re-evaluation 12/14/23:  5 STS-30.00 seconds with B/L UE push off and eccentric control to chair, even foot placement for standing   TUG- 23.42 seconds with use of RW, step through gait pattern        Post-Operative Initial Evaluation:  5 STS-31.44 seconds with B/L UE push off and eccentric control to chair, L LE kicked out front  TUG- 37.78 seconds with B/L UE push off and eccentric control to chair, RW usage with step through gait pattern, decreased stance time on L LE             Precautions: L TKA 11/9/23, Hard of Hearing  Access Code: [email or web address]    POC expires Unit limit Auth  expiration date PT/OT + Visit Limit?   2/5/24    Re-eval by 1/14/24 N/A N/A BOMN                 Visit/Unit Tracking  AUTH Status:  Date 11/22 11/28 11/30 12/4 12/7 12/11 12/14 11/2 11/6 11/13 11/20   BOMN Used 13 14 15 16 17 18 19  9 10 11 12    Remaining                  Manuals 11/13 11/20 11/22 11/28 11/30 12/4 12/7 12/11 12/14 11/6   L knee PROM with OP BE BE SC BE SC BE SC BE BE                 Re-evaluation  BE        BE                 Neuro Re-Ed             Quad set with towel under knee reviewed 5" x15  Verbal and tactile cues P! 5x with towel           Quad set with LAQ reviewed 5" 2x10 5" 2x 10  5" 3x10 5" 2x 10  2# 5" 2x10  3x 10  2# 3x10  Declined today   Quad set with SLR   10x with AAROM  Min A x10 2x 10 no assistance needed 2x10  2x 10    Declined today                                          Ther Ex             Rec bike for knee ROM    Rocking 6' Rocking 10 min  Rocking 10 min  Rocking 10 min  Bwd rev   10' Bwd rev to fwd rev 10' Rec no tension 10'   Heel slides with strap reviewed 5" 2x10 5" 30x           Hamstring stretch on step Reviewed seated Hold  did at home yesterday p!        3x30"   Calf stretch with rockerboard Reviewed  seated  With strap 30" x 3        3x30"   Standing hip 3 ways       NV 10x ea.   X10 ea dir B/L X10 ea dir B/L Declined today   Thiago Declined today   Lateral walks at bar      NV 4 laps w/ UE support.    4 laps w/ UE support    Pt education PT POC, HEP, post op status Knee redness, sx's infection vs DVT      SPC practice PT POC, HEP                  Ther Activity             TG squats    L17 x10 L17 2x 10  L17 2x 10 NV L20 3x10     Fwd step ups     4" x15 4" 15x  4" 2x10  6" 10x  6" x15      Lat step downs      NV  4" x10     Lat step ups       4" 2x 10       Gait Training             SPC ambulation      150ft with VC's sequencing                    Modalities

## 2023-12-18 ENCOUNTER — OFFICE VISIT (OUTPATIENT)
Dept: PHYSICAL THERAPY | Facility: CLINIC | Age: 83
End: 2023-12-18
Payer: MEDICARE

## 2023-12-18 DIAGNOSIS — Z96.652 S/P TOTAL KNEE ARTHROPLASTY, LEFT: ICD-10-CM

## 2023-12-18 DIAGNOSIS — M17.0 BILATERAL PRIMARY OSTEOARTHRITIS OF KNEE: Primary | ICD-10-CM

## 2023-12-18 PROCEDURE — 97110 THERAPEUTIC EXERCISES: CPT

## 2023-12-18 PROCEDURE — 97530 THERAPEUTIC ACTIVITIES: CPT

## 2023-12-18 NOTE — PROGRESS NOTES
"Daily Note     Today's date: 2023  Patient name: Huyen Zhou  : 1940  MRN: [id number]  Referring provider: Rachel Momin MD  Dx:   Encounter Diagnosis     ICD-10-CM    1. Bilateral primary osteoarthritis of knee  M17.0       2. S/P total knee arthroplasty, left  Z96.652                      Subjective: Patient reports that she is having a really hard time today with the weather being so cold and rainy.       Objective: See treatment diary below      Assessment: Tolerated treatment well. Added weight to lateral walks and hip 3 ways to facilitate improved lower extremity strength. She was able to complete step ups with less reliance on UEs this session. Re-educated patient and her daughter on typical healing process following joint replacements, including stiffness/soreness in the knee for up to one year following surgery. Advised that she is doing very well and progressing nicely in therapy and post operatively in general.  Patient demonstrated fatigue post treatment, exhibited good technique with therapeutic exercises, and would benefit from continued PT      Plan: Progress treatment as tolerated.       Precautions: L TKA 23, Hard of Hearing  Access Code: [email or web address]    POC expires Unit limit Auth  expiration date PT/OT + Visit Limit?   24    Re-eval by 24 N/A N/A BOMN                 Visit/Unit Tracking  AUTH Status:  Date    BOMN Used 13 14 15 16 17 18 19 20  10 11 12    Remaining                  Manuals    L knee PROM with OP BE BE SC BE SC BE SC BE BE                 Re-evaluation  BE        BE                 Neuro Re-Ed             Quad set with towel under knee reviewed 5\" x15  Verbal and tactile cues P! 5x with towel           Quad set with LAQ reviewed 5\" 2x10 5\" 2x 10  5\" 3x10 5\" 2x 10  2# 5\" 2x10  3x 10  2# 3x10     Quad set with SLR   10x with AAROM  Min A " "x10 2x 10 no assistance needed 2x10  2x 10                                              Ther Ex             Rec bike for knee ROM    Rocking 6' Rocking 10 min  Rocking 10 min  Rocking 10 min  Bwd rev   10' Bwd rev to fwd rev 10' Bwd rev to fwd rev 10'   Heel slides with strap reviewed 5\" 2x10 5\" 30x           Hamstring stretch on step Reviewed seated Hold  did at home yesterday p!           Calf stretch with rockerboard Reviewed  seated  With strap 30\" x 3           Standing hip 3 ways       NV 10x ea.  X10 ea dir B/L X10 ea dir B/L 1.5# x10 ea dir B/L   Clamshells              Lateral walks at bar      NV 4 laps w/ UE support.   4 laps w/ UE support 1.5# B/L 4 laps w/ UE support   Pt education PT POC, HEP, post op status Knee redness, sx's infection vs DVT      SPC practice PT POC, HEP  Recovery process                 Ther Activity             TG squats    L17 x10 L17 2x 10  L17 2x 10 NV L20 3x10  L22 3x10   Fwd step ups     4\" x15 4\" 15x  4\" 2x10  6\" 10x  6\" x15   6\" 2x10    Lat step downs      NV  4\" x10     Lat step ups       4\" 2x 10       Gait Training             SPC ambulation      150ft with VC's sequencing                    Modalities                                                "

## 2023-12-20 ENCOUNTER — OFFICE VISIT (OUTPATIENT)
Dept: PHYSICAL THERAPY | Facility: CLINIC | Age: 83
End: 2023-12-20
Payer: MEDICARE

## 2023-12-20 DIAGNOSIS — Z96.652 S/P TOTAL KNEE ARTHROPLASTY, LEFT: ICD-10-CM

## 2023-12-20 DIAGNOSIS — M17.0 BILATERAL PRIMARY OSTEOARTHRITIS OF KNEE: Primary | ICD-10-CM

## 2023-12-20 PROCEDURE — 97112 NEUROMUSCULAR REEDUCATION: CPT

## 2023-12-20 PROCEDURE — 97530 THERAPEUTIC ACTIVITIES: CPT

## 2023-12-20 PROCEDURE — 97110 THERAPEUTIC EXERCISES: CPT

## 2023-12-20 NOTE — PROGRESS NOTES
"Daily Note     Today's date: 2023  Patient name: Huyen Zhou  : 1940  MRN: [id number]  Referring provider: Rachel Momin MD  Dx:   Encounter Diagnosis     ICD-10-CM    1. Bilateral primary osteoarthritis of knee  M17.0       2. S/P total knee arthroplasty, left  Z96.652                      Subjective: Patient presents today with SPC stating that she wanted to try walking in to the clinic with it instead of the walker. She says that she isn't comfortable walking around everywhere with the cane and so she may still use the walker at times.      Objective: See treatment diary below      Assessment: Tolerated treatment well. Added forward and lateral forest step overs to facilitate impoved dynamic balance and stability. She was able to complete with 1 UE support and CS. Adjusted SPC height with patient reporting improved stability during ambulation with this. Patient demonstrated fatigue post treatment, exhibited good technique with therapeutic exercises, and would benefit from continued PT      Plan: Continue per plan of care.      Precautions: L TKA 23, Hard of Hearing  Access Code: [email or web address]    POC expires Unit limit Auth  expiration date PT/OT + Visit Limit?   24    Re-eval by 24 N/A N/A BOMN                 Visit/Unit Tracking  AUTH Status:  Date    BOMN Used 13 14 15 16 17 18 19 20 21  11 12    Remaining                  Manuals    L knee PROM with OP  BE SC BE SC BE SC BE BE                 Re-evaluation          BE                 Neuro Re-Ed             Quad set with towel under knee  5\" x15  Verbal and tactile cues P! 5x with towel           Quad set with LAQ 2.5# 3x10 5\" 2x10 5\" 2x 10  5\" 3x10 5\" 2x 10  2# 5\" 2x10  3x 10  2# 3x10     Quad set with SLR   10x with AAROM  Min A x10 2x 10 no assistance needed 2x10  2x 10       Forest step overs fwd and lat X2 " "laps 1 UE support CS                                      Ther Ex             Rec bike for knee ROM Fwd rev 10'   Rocking 6' Rocking 10 min  Rocking 10 min  Rocking 10 min  Bwd rev   10' Bwd rev to fwd rev 10' Bwd rev to fwd rev 10'   Standing hip 3 ways  1.5# x10 ea dir B/L     NV 10x ea.  X10 ea dir B/L X10 ea dir B/L 1.5# x10 ea dir B/L   Clamshells              Lateral walks at bar 1.5# B/L 4 laps w/ UE support     NV 4 laps w/ UE support.   4 laps w/ UE support 1.5# B/L 4 laps w/ UE support   Pt education  Knee redness, sx's infection vs DVT      SPC practice PT POC, HEP  Recovery process                 Ther Activity             TG squats L22 3x10   L17 x10 L17 2x 10  L17 2x 10 NV L20 3x10  L22 3x10   Fwd step ups     4\" x15 4\" 15x  4\" 2x10  6\" 10x  6\" x15   6\" 2x10    Lat step downs      NV  4\" x10     Lat step ups       4\" 2x 10       Gait Training             SPC ambulation      150ft with VC's sequencing                    Modalities                                                  "

## 2023-12-26 ENCOUNTER — OFFICE VISIT (OUTPATIENT)
Dept: PHYSICAL THERAPY | Facility: CLINIC | Age: 83
End: 2023-12-26
Payer: MEDICARE

## 2023-12-26 DIAGNOSIS — M17.0 BILATERAL PRIMARY OSTEOARTHRITIS OF KNEE: Primary | ICD-10-CM

## 2023-12-26 DIAGNOSIS — Z96.652 S/P TOTAL KNEE ARTHROPLASTY, LEFT: ICD-10-CM

## 2023-12-26 PROCEDURE — 97112 NEUROMUSCULAR REEDUCATION: CPT

## 2023-12-26 PROCEDURE — 97110 THERAPEUTIC EXERCISES: CPT

## 2023-12-26 PROCEDURE — 97530 THERAPEUTIC ACTIVITIES: CPT

## 2023-12-26 NOTE — PROGRESS NOTES
"Daily Note     Today's date: 2023  Patient name: Huyen Zhou  : 1940  MRN: [id number]  Referring provider: Rachel Momin MD  Dx:   Encounter Diagnosis     ICD-10-CM    1. Bilateral primary osteoarthritis of knee  M17.0       2. S/P total knee arthroplasty, left  Z96.652                      Subjective: Patient reports that she feels she had a set back because she experienced a lot of pain and tightness in her legs over the weekend. She presents today with RW.       Objective: See treatment diary below      Assessment: Tolerated treatment well. She was able to complete all exercises today without exacerbation of knee/shin pain. Progressed to sit to stands with patient reporting increased difficulty completing compared to TG squats. She was unable to complete sit to stands without UE support due to LE weakness. Patient demonstrated fatigue post treatment, exhibited good technique with therapeutic exercises, and would benefit from continued PT      Plan: Progress treatment as tolerated.       Precautions: L TKA 23, Hard of Hearing  Access Code: [email or web address]    POC expires Unit limit Auth  expiration date PT/OT + Visit Limit?   24    Re-eval by 24 N/A N/A BOMN                 Visit/Unit Tracking  AUTH Status:  Date      BOMN Used 13 14 15 16 17 18 19 20 21 22      Remaining                  Manuals    L knee PROM with OP   SC BE SC BE SC BE BE                 Re-evaluation          BE                 Neuro Re-Ed             Quad set with towel under knee   P! 5x with towel           Quad set with LAQ 2.5# 3x10  5\" 2x 10  5\" 3x10 5\" 2x 10  2# 5\" 2x10  3x 10  2# 3x10     Quad set with SLR   10x with AAROM  Min A x10 2x 10 no assistance needed 2x10  2x 10       Chuck step overs fwd and lat X2 laps 1 UE support CS X2 laps 1 UE support CS                                     Ther Ex    " "         Rec bike for knee ROM Fwd rev 10' Fwd rev 10'  Rocking 6' Rocking 10 min  Rocking 10 min  Rocking 10 min  Bwd rev   10' Bwd rev to fwd rev 10' Bwd rev to fwd rev 10'   Standing hip 3 ways  1.5# x10 ea dir B/L 2# x10 ea dir B/L    NV 10x ea.  X10 ea dir B/L X10 ea dir B/L 1.5# x10 ea dir B/L   Clamshells              Lateral walks at bar 1.5# B/L 4 laps w/ UE support 2# 4 laps   B/L UE support    NV 4 laps w/ UE support.   4 laps w/ UE support 1.5# B/L 4 laps w/ UE support   Pt education        SPC practice PT POC, HEP  Recovery process                 Ther Activity             TG squats L22 3x10   L17 x10 L17 2x 10  L17 2x 10 NV L20 3x10  L22 3x10   Fwd step ups   6\" 3x10  4\" x15 4\" 15x  4\" 2x10  6\" 10x  6\" x15   6\" 2x10    Lat step downs      NV  4\" x10     Lat step ups       4\" 2x 10       Sit to stands   With UE 2x5           Gait Training             SPC ambulation      150ft with VC's sequencing                    Modalities                                                    "

## 2023-12-29 ENCOUNTER — OFFICE VISIT (OUTPATIENT)
Dept: PHYSICAL THERAPY | Facility: CLINIC | Age: 83
End: 2023-12-29
Payer: MEDICARE

## 2023-12-29 DIAGNOSIS — Z96.652 S/P TOTAL KNEE ARTHROPLASTY, LEFT: ICD-10-CM

## 2023-12-29 DIAGNOSIS — M17.0 BILATERAL PRIMARY OSTEOARTHRITIS OF KNEE: Primary | ICD-10-CM

## 2023-12-29 PROCEDURE — 97110 THERAPEUTIC EXERCISES: CPT

## 2023-12-29 PROCEDURE — 97530 THERAPEUTIC ACTIVITIES: CPT

## 2023-12-29 NOTE — PROGRESS NOTES
"Daily Note     Today's date: 2023  Patient name: Huyen Zhou  : 1940  MRN: [id number]  Referring provider: Rachel Momin MD  Dx:   Encounter Diagnosis     ICD-10-CM    1. Bilateral primary osteoarthritis of knee  M17.0       2. S/P total knee arthroplasty, left  Z96.652           Start Time: 1100  Stop Time: 1143  Total time in clinic (min): 43 minutes    Subjective: pt noted that yesterday she was trying to ambulate without any assistive devise and felt like she was going to fall and twisted both of her knees while she was going to catch herself to avoid to fall. She reported that she did not fall but it was close. As of this time noted feeling sore upon arrival in her L knee.       Objective: See treatment diary below      Assessment:  Modification noted as needed secondary to having more discomfort in L knee. She noted that she has  some increase in L knee muscle soreness.  Tolerated treatment well. Patient exhibited good technique with therapeutic exercises and would benefit from continued PT.           Plan: Continue per plan of care.      Precautions: L TKA 23, Hard of Hearing  Access Code: [email or web address]    POC expires Unit limit Auth  expiration date PT/OT + Visit Limit?   24    Re-eval by 24 N/A N/A BOMN                 Visit/Unit Tracking  AUTH Status:  Date     BOMN Used 13 14 15 16 17 18 19 20 21 22 23     Remaining                  Manuals    L knee PROM with OP     SC BE SC BE BE                 Re-evaluation          BE                 Neuro Re-Ed             Quad set with towel under knee             Quad set with LAQ 2.5# 3x10  2# 3x 10   5\" 2x 10  2# 5\" 2x10  3x 10  2# 3x10     Quad set with SLR     2x 10 no assistance needed 2x10  2x 10       Chuck step overs fwd and lat X2 laps 1 UE support CS X2 laps 1 UE support CS Hold                                  " "  Ther Ex             Rec bike for knee ROM Fwd rev 10' Fwd rev 10' Fwd rec no tension 10 min   Rocking 10 min  Rocking 10 min  Rocking 10 min  Bwd rev   10' Bwd rev to fwd rev 10' Bwd rev to fwd rev 10'   Standing hip 3 ways  1.5# x10 ea dir B/L 2# x10 ea dir B/L 2# 10x ea. Direction b/l    NV 10x ea.  X10 ea dir B/L X10 ea dir B/L 1.5# x10 ea dir B/L   Clamshells              Lateral walks at bar 1.5# B/L 4 laps w/ UE support 2# 4 laps   B/L UE support 2# 4 laps   B/L UE support   NV 4 laps w/ UE support.   4 laps w/ UE support 1.5# B/L 4 laps w/ UE support   Pt education        SPC practice PT POC, HEP  Recovery process                 Ther Activity             TG squats L22 3x10    L17 2x 10  L17 2x 10 NV L20 3x10  L22 3x10   Fwd step ups   6\" 3x10 6\" 2x 10   4\" 15x  4\" 2x10  6\" 10x  6\" x15   6\" 2x10    Lat step downs      NV  4\" x10     Lat step ups       4\" 2x 10       Sit to stands   With UE 2x5 10x w/ UE           Gait Training             SPC ambulation      150ft with VC's sequencing                    Modalities                                                      "

## 2024-01-02 ENCOUNTER — OFFICE VISIT (OUTPATIENT)
Dept: PHYSICAL THERAPY | Facility: CLINIC | Age: 84
End: 2024-01-02
Payer: MEDICARE

## 2024-01-02 DIAGNOSIS — Z96.652 S/P TOTAL KNEE ARTHROPLASTY, LEFT: ICD-10-CM

## 2024-01-02 DIAGNOSIS — M17.0 BILATERAL PRIMARY OSTEOARTHRITIS OF KNEE: Primary | ICD-10-CM

## 2024-01-02 DIAGNOSIS — E78.2 MIXED HYPERLIPIDEMIA: Primary | ICD-10-CM

## 2024-01-02 PROCEDURE — 97530 THERAPEUTIC ACTIVITIES: CPT

## 2024-01-02 PROCEDURE — 97110 THERAPEUTIC EXERCISES: CPT

## 2024-01-02 RX ORDER — PRAVASTATIN SODIUM 40 MG
40 TABLET ORAL DAILY
Qty: 90 TABLET | Refills: 0 | Status: SHIPPED | OUTPATIENT
Start: 2024-01-02

## 2024-01-02 NOTE — PROGRESS NOTES
"Daily Note     Today's date: 2024  Patient name: Huyen Zhou  : 1940  MRN: [id number]  Referring provider: Rachel Momin MD  Dx:   Encounter Diagnosis     ICD-10-CM    1. Bilateral primary osteoarthritis of knee  M17.0       2. S/P total knee arthroplasty, left  Z96.652           Start Time: 1150  Stop Time: 1235  Total time in clinic (min): 45 minutes    Subjective: Patient reports feeling okay. No complaints voiced.       Objective: See treatment diary below      Assessment:  Tolerated treatment well. Patient exhibited good technique with therapeutic exercises and would benefit from continued PT to meet functional goals.           Plan: Continue per plan of care.      Precautions: L TKA 23, Hard of Hearing  Access Code: [email or web address]    POC expires Unit limit Auth  expiration date PT/OT + Visit Limit?   24    Re-eval by 24 N/A N/A BOMN                 Visit/Unit Tracking  AUTH Status:  Date     BOMN Used  14 15 16 17 18 19 20 21 22 23 24    Remaining                  Manuals    L knee PROM with OP      BE SC BE BE                 Re-evaluation          BE                 Neuro Re-Ed             Quad set with towel under knee             Quad set with LAQ 2.5# 3x10  2# 3x 10  2# 3x 10   2# 5\" 2x10  3x 10  2# 3x10     Quad set with SLR      2x10  2x 10       Chuck step overs fwd and lat X2 laps 1 UE support CS X2 laps 1 UE support CS Hold                                    Ther Ex             Rec bike for knee ROM Fwd rev 10' Fwd rev 10' Fwd rec no tension 10 min  Fwd rec no tension 10 min   Rocking 10 min  Rocking 10 min  Bwd rev   10' Bwd rev to fwd rev 10' Bwd rev to fwd rev 10'   Standing hip 3 ways  1.5# x10 ea dir B/L 2# x10 ea dir B/L 2# 10x ea. Direction b/l  2# 10x ea. Direction b/l   NV 10x ea.  X10 ea dir B/L X10 ea dir B/L 1.5# x10 ea dir B/L   Thiago            " "  Lateral walks at bar 1.5# B/L 4 laps w/ UE support 2# 4 laps   B/L UE support 2# 4 laps   B/L UE support 2# 4 laps   B/L UE support  NV 4 laps w/ UE support.   4 laps w/ UE support 1.5# B/L 4 laps w/ UE support   Pt education        SPC practice PT POC, HEP  Recovery process                 Ther Activity             TG squats L22 3x10     L17 2x 10 NV L20 3x10  L22 3x10   Fwd step ups   6\" 3x10 6\" 2x 10  6\" 2x 10   4\" 2x10  6\" 10x  6\" x15   6\" 2x10    Lat step downs      NV  4\" x10     Lat step ups       4\" 2x 10       Sit to stands   With UE 2x5 10x w/ UE  10x w/ UE          Gait Training             SPC ambulation      150ft with VC's sequencing                    Modalities                                                      "

## 2024-01-05 ENCOUNTER — OFFICE VISIT (OUTPATIENT)
Dept: PHYSICAL THERAPY | Facility: CLINIC | Age: 84
End: 2024-01-05
Payer: MEDICARE

## 2024-01-05 DIAGNOSIS — M17.0 BILATERAL PRIMARY OSTEOARTHRITIS OF KNEE: Primary | ICD-10-CM

## 2024-01-05 DIAGNOSIS — Z96.652 S/P TOTAL KNEE ARTHROPLASTY, LEFT: ICD-10-CM

## 2024-01-05 PROCEDURE — 97110 THERAPEUTIC EXERCISES: CPT

## 2024-01-05 PROCEDURE — 97530 THERAPEUTIC ACTIVITIES: CPT

## 2024-01-05 NOTE — PROGRESS NOTES
Daily Note     Today's date: 2024  Patient name: Huyen Zhou  : 1940  MRN: [id number]  Referring provider: Rachel Momin MD  Dx:   Encounter Diagnosis     ICD-10-CM    1. Bilateral primary osteoarthritis of knee  M17.0       2. S/P total knee arthroplasty, left  Z96.652           Start Time: 1114  Stop Time: 1200  Total time in clinic (min): 46 minutes    Subjective: pt noted that she feels better than she did earlier this week but did note having some over the counter pain medication along with a medicated topical cream that she applied on her R knee  for pain.       Objective: See treatment diary below      Assessment:  Minimal progressions noted at this time. W/ Lateral step ups and increase reps with hip 3 way.  Still noted having some difficulty with exercises completed at this time. Demonstrated proper technique with minimal cues required. Tolerated treatment well. Patient exhibited good technique with therapeutic exercises and would benefit from continued PT      Plan: Continue per plan of care.      Precautions: L TKA 23, Hard of Hearing  Access Code: [email or web address]    POC expires Unit limit Auth  expiration date PT/OT + Visit Limit?   24    Re-eval by 24 N/A N/A BOMN                 Visit/Unit Tracking  AUTH Status:  Date              BOMN Used 1 2              Remaining                  Manuals    L knee PROM with OP       SC BE BE                 Re-evaluation          BE                 Neuro Re-Ed             Quad set with towel under knee             Quad set with LAQ 2.5# 3x10  2# 3x 10  2# 3x 10  2# 3x 10   3x 10  2# 3x10     Quad set with SLR       2x 10       Chuck step overs fwd and lat X2 laps 1 UE support CS X2 laps 1 UE support CS Hold  NV                                  Ther Ex             Rec bike for knee ROM Fwd rev 10' Fwd rev 10' Fwd rec no tension 10 min  Fwd rec no tension 10 min  Fwd L1 10 min   Rocking  "10 min  Bwd rev   10' Bwd rev to fwd rev 10' Bwd rev to fwd rev 10'   Standing hip 3 ways  1.5# x10 ea dir B/L 2# x10 ea dir B/L 2# 10x ea. Direction b/l  2# 10x ea. Direction b/l  2#  2x 10 b/l   10x ea.  X10 ea dir B/L X10 ea dir B/L 1.5# x10 ea dir B/L   Clamshells              Lateral walks at bar 1.5# B/L 4 laps w/ UE support 2# 4 laps   B/L UE support 2# 4 laps   B/L UE support 2# 4 laps   B/L UE support 2# 4 laps B/l UE support.   4 laps w/ UE support.   4 laps w/ UE support 1.5# B/L 4 laps w/ UE support   Pt education        SPC practice PT POC, HEP  Recovery process                 Ther Activity             TG squats L22 3x10      NV L20 3x10  L22 3x10   Fwd step ups   6\" 3x10 6\" 2x 10  6\" 2x 10  6\" 2x 10   6\" 10x  6\" x15   6\" 2x10    Lat step downs        4\" x10     Lat step ups     10x b/l   4\" 2x 10       Sit to stands   With UE 2x5 10x w/ UE  10x w/ UE          Gait Training             SPC ambulation                          Modalities                                                        "

## 2024-01-05 NOTE — PROGRESS NOTES
"PT Re-Evaluation     Today's date: 2024  Patient name: Huyen Zhou  : 1940  MRN: [id number]  Referring provider: Rachel Momin MD  Dx:   Encounter Diagnosis     ICD-10-CM    1. Bilateral primary osteoarthritis of knee  M17.0       2. S/P total knee arthroplasty, left  Z96.652                      Assessment  Assessment details: Huyen Zhou is a 83 y.o. female presenting to physical therapy for a reevaluation  s/p L TKA on 23. Since her initial evaluation, she reports 60% improvement in her knee. The patient has demonstrated significant improvements in her knee ROM, strength, and pain. She also demonstrates improved functional mobility with regards to her times on the TUG and 5 STS. She demonstrates improved quality of gait with a step through gait pattern with use of a RW. She does continue to be a fall risk and ambulate with the RW, however this is largely due to progressively worsening right knee pain and \"locking up\" as described by the patient. She also continues with decreased hip and knee strength leading to difficulties with functional transfers. She continues to be limited with her ability to complete ADLs, be the primary caretaker of her son, and complete recreational activities of outdoor walks and attending craft fairs. This patient would benefit from continued PT services to address their impairments and functional limitations to maximize functional outcome.    Impairments: abnormal gait, abnormal or restricted ROM, activity intolerance, impaired balance, impaired physical strength, lacks appropriate home exercise program, pain with function and weight-bearing intolerance    Symptom irritability: moderateUnderstanding of Dx/Px/POC: excellent   Prognosis: good    Goals  STG to be achieved in 4 weeks: ALL NOT MET PROGRESSING  The patient will report a decrease in left knee \"at worst\" subjective pain rating score to at least 6/10 to allow for improved tolerance for weightbearing " activities. MET  The patient will increase left knee flexion AROM to at least 90 degrees to allow for improved functional mobility. MET  The patient will increase left knee extension MMT score to at least 4-/5 to allow for improved functional mobility. MET    LTG to be achieved by DC: ALL NOT MET PROGRESSING  The patient will be independent in comprehensive HEP.   The patient will report no pain with usual activities.   The patient will improve left knee flexion and extension AROM to WFL to allow for improved functional mobility.   The patient will increase left knee extension and flexion MMT score to WFL to allow for improved functional mobility.   The patient will be able to complete 5 STS and TUG <12 seconds to decrease her risk for falls and demonstrate improved functional mobility.  The patient will be able to ambulate one mile without pain or difficulty to allow for return to her recreational activities.      Plan  Patient would benefit from: skilled physical therapy  Planned modality interventions: thermotherapy: hydrocollator packs, TENS and cryotherapy  Planned therapy interventions: manual therapy, joint mobilization, balance/weight bearing training, neuromuscular re-education, patient education, flexibility, strengthening, stretching, therapeutic activities, therapeutic exercise, gait training and home exercise program  Frequency: 2x week  Duration in weeks: 12  Plan of Care beginning date: 11/13/2023  Plan of Care expiration date: 2/5/2024  Treatment plan discussed with: patient        Subjective Evaluation    History of Present Illness  Mechanism of injury: Huyen reports 60% improvement since beginning PT services. She notes improvement in the knee mobility since initial evaluation. She reports that she has much more motion in the knee and doesn't have nearly as much stiffness in the knee. She reports that the times that her stiffness and discomfort is the worst first thing in the morning. She feels her  "strength continues to improve as well. She states that her right knee continues to be quite painful and aggravated with standing and walking, which she feels is limiting her progress that she is making with the left knee. She continues to use her walker for fear of falling, instability and \"locking up\" of the right knee when ambulating. She also reports difficulty with standing up from a chair as she feels that she continues to rely on her arms quite bit.       Patient Goals  Patient goals for therapy: decreased pain, increased motion, increased strength and independence with ADLs/IADLs  Patient goal: be able to go for walks again, walk around a craft show   Pain  Current pain rating: 3  At best pain rating: 3  At worst pain ratin  Location: L knee  Quality: dull ache  Relieving factors: medications (Voltaren Gel, tylenol )  Aggravating factors: standing, walking and stair climbing  Progression: improved    Social Support  Steps to enter house: yes  Stairs in house: no   Lives in: one-story house  Lives with: adult children and spouse    Treatments  Previous treatment: injection treatment and medication  Current treatment: physical therapy        Objective     Active Range of Motion   Left Knee   Flexion: 115 degrees   Extension: 0 degrees     Right Knee   Flexion: 115 degrees with pain  Extension: 0 degrees with pain    Passive Range of Motion   Left Knee   Flexion: 115 degrees   Extension: 0 degrees     Mobility   Patellar Mobility:   Left Knee   Hypomobile: left medial, left lateral, left superior and left inferior    Right Knee   Hypomobile: medial, lateral, superior and inferior     Strength/Myotome Testing     Left Hip   Planes of Motion   Flexion: 3+    Right Hip   Planes of Motion   Flexion: 4-    Left Knee   Flexion: 3+  Extension: 3+    Right Knee   Flexion: 4  Extension: 4    Ambulation   Weight-Bearing Status   Assistive device used: walker    Observational Gait   Gait: antalgic   Decreased walking " speed.     Functional Assessment        Comments  Re-evaluation 12/14/23:  5 STS-30.00 seconds with B/L UE push off and eccentric control to chair, even foot placement for standing   TUG- 23.42 seconds with use of RW, step through gait pattern        Post-Operative Initial Evaluation:  5 STS-31.44 seconds with B/L UE push off and eccentric control to chair, L LE kicked out front  TUG- 37.78 seconds with B/L UE push off and eccentric control to chair, RW usage with step through gait pattern, decreased stance time on L LE             Precautions: L TKA 11/9/23, Hard of Hearing  Access Code: [email or web address]    POC expires Unit limit Auth  expiration date PT/OT + Visit Limit?   2/5/24    Re-eval by 1/14/24 N/A N/A BOMN                 Visit/Unit Tracking  AUTH Status:  Date 1/2 1/5             BOMN Used 1 2              Remaining                  Manuals 12/20 12/26 12/29 1/2 1/5 12/7 12/11 12/14 12/18   L knee PROM with OP       SC BE BE                 Re-evaluation          BE                 Neuro Re-Ed             Quad set with towel under knee             Quad set with LAQ 2.5# 3x10  2# 3x 10  2# 3x 10  2# 3x 10   3x 10  2# 3x10     Quad set with SLR       2x 10       Chuck step overs fwd and lat X2 laps 1 UE support CS X2 laps 1 UE support CS Hold  NV                                  Ther Ex             Rec bike for knee ROM Fwd rev 10' Fwd rev 10' Fwd rec no tension 10 min  Fwd rec no tension 10 min  Fwd L1 10 min   Rocking 10 min  Bwd rev   10' Bwd rev to fwd rev 10' Bwd rev to fwd rev 10'   Standing hip 3 ways  1.5# x10 ea dir B/L 2# x10 ea dir B/L 2# 10x ea. Direction b/l  2# 10x ea. Direction b/l  2#  2x 10 b/l   10x ea.  X10 ea dir B/L X10 ea dir B/L 1.5# x10 ea dir B/L   Clamshells              Lateral walks at bar 1.5# B/L 4 laps w/ UE support 2# 4 laps   B/L UE support 2# 4 laps   B/L UE support 2# 4 laps   B/L UE support 2# 4 laps B/l UE support.   4 laps w/ UE support.   4 laps w/ UE support 1.5# B/L 4 laps  "w/ UE support   Pt education        SPC practice PT POC, HEP  Recovery process                 Ther Activity             TG squats L22 3x10      NV L20 3x10  L22 3x10   Fwd step ups   6\" 3x10 6\" 2x 10  6\" 2x 10  6\" 2x 10   6\" 10x  6\" x15   6\" 2x10    Lat step downs        4\" x10     Lat step ups     10x b/l   4\" 2x 10       Sit to stands   With UE 2x5 10x w/ UE  10x w/ UE          Gait Training             SPC ambulation                          Modalities                                                "

## 2024-01-08 ENCOUNTER — APPOINTMENT (OUTPATIENT)
Dept: PHYSICAL THERAPY | Facility: CLINIC | Age: 84
End: 2024-01-08
Payer: MEDICARE

## 2024-01-08 DIAGNOSIS — M17.0 BILATERAL PRIMARY OSTEOARTHRITIS OF KNEE: Primary | ICD-10-CM

## 2024-01-08 DIAGNOSIS — Z96.652 S/P TOTAL KNEE ARTHROPLASTY, LEFT: ICD-10-CM

## 2024-01-11 ENCOUNTER — EVALUATION (OUTPATIENT)
Dept: PHYSICAL THERAPY | Facility: CLINIC | Age: 84
End: 2024-01-11
Payer: MEDICARE

## 2024-01-11 DIAGNOSIS — Z96.652 S/P TOTAL KNEE ARTHROPLASTY, LEFT: ICD-10-CM

## 2024-01-11 DIAGNOSIS — M17.0 BILATERAL PRIMARY OSTEOARTHRITIS OF KNEE: Primary | ICD-10-CM

## 2024-01-11 PROCEDURE — 97110 THERAPEUTIC EXERCISES: CPT

## 2024-01-11 PROCEDURE — 97140 MANUAL THERAPY 1/> REGIONS: CPT

## 2024-01-11 NOTE — PROGRESS NOTES
PT Re-Evaluation     Today's date: 1/10/2024  Patient name: Huyen Zhou  : 1940  MRN: [id number]  Referring provider: Rachel Momin MD  Dx:   Encounter Diagnosis     ICD-10-CM    1. Bilateral primary osteoarthritis of knee  M17.0       2. S/P total knee arthroplasty, left  Z96.652                      Assessment  Assessment details: Huyen Zhou is a 83 y.o. female presenting to physical therapy for a reevaluation  s/p L TKA on 23. Since her initial evaluation, she reports 70-80%% improvement in her knee. The patient has demonstrated left knee ROM to WFL as well as improved knee strength. She is able to ambulate without an AD within her home and uses a SPC when outside of her home. Her functional mobility continues to improve with decreased times on the TUG and 5 STS and improved quality of her movements with the SPC. Despite her improvements, she does still continue with decreased hip and knee strength as well as increased pain limiting her tolerance to completing ADLs, be the primary caretaker of her son, and complete recreational activities of outdoor walks and attending craft fairs. This patient would benefit from continued PT services to address their impairments and functional limitations to maximize functional outcome.  The patient and her daughter were educated extensively on her progress thus far as well as the expectations for her mobility and pain, given that her right knee remains very painful and unstable for the patient. She was provided with an updated HEP and demonstrated/verbalized understanding it's completion.   Impairments: abnormal gait, abnormal or restricted ROM, activity intolerance, impaired balance, impaired physical strength, lacks appropriate home exercise program, pain with function and weight-bearing intolerance    Symptom irritability: moderateUnderstanding of Dx/Px/POC: excellent   Prognosis: good    Goals  STG to be achieved in 4 weeks: ALL NOT MET PROGRESSING  The  "patient will report a decrease in left knee \"at worst\" subjective pain rating score to at least 6/10 to allow for improved tolerance for weightbearing activities. MET  The patient will increase left knee flexion AROM to at least 90 degrees to allow for improved functional mobility. MET  The patient will increase left knee extension MMT score to at least 4-/5 to allow for improved functional mobility. MET    LTG to be achieved by DC:   The patient will be independent in comprehensive HEP. MET  The patient will report no pain with usual activities. NOT MET  The patient will improve left knee flexion and extension AROM to WFL to allow for improved functional mobility. MET  The patient will increase left knee extension and flexion MMT score to WFL to allow for improved functional mobility. NOT MET  The patient will be able to complete 5 STS and TUG <12 seconds to decrease her risk for falls and demonstrate improved functional mobility. NOT MET  The patient will be able to ambulate one mile without pain or difficulty to allow for return to her recreational activities. NOT MET      Plan  Patient would benefit from: skilled physical therapy  Planned modality interventions: thermotherapy: hydrocollator packs, TENS and cryotherapy  Planned therapy interventions: manual therapy, joint mobilization, balance/weight bearing training, neuromuscular re-education, patient education, flexibility, strengthening, stretching, therapeutic activities, therapeutic exercise, gait training and home exercise program  Frequency: 2x week  Duration in weeks: 6  Plan of Care beginning date: 1/11/2024  Plan of Care expiration date: 2/22/2024  Treatment plan discussed with: patient        Subjective Evaluation    History of Present Illness  Mechanism of injury: Huyen reports 70-80% improvement since beginning PT services. She reports that she has been walking around her house without any AD and without balance issues in the left knee. No changes " "to her right knee as this remains painful, unstable, and \"locks up\" at times when standing or ambulating which has her concerned about falling. She uses a SPC when outside of her home. She says that she does still have good and bad days of pain in both of her knees. She feels that when she doesn't do too much around her house, her pain/soreness  in both knees is generally much less. However, if she were to do a lot of cleaning and other activities around her house, she does have more pain/soreness and requires a rest break because of this. She does still notice pain radiating down into her left shin, however thinks that this is because of a hematoma she had previously. Per patient and her daughter, she didn't have great mobility around her home and her tolerance for completing exercises was very minimal due to pain. Her daughter believes that she is able to do much more standing and walking, despite the pain she is experiencing. Her next visit with her surgeon is 24.    Patient Goals  Patient goals for therapy: decreased pain, increased motion, increased strength and independence with ADLs/IADLs  Patient goal: be able to go for walks again, walk around a craft show   Pain  Current pain rating: 3  At best pain rating: 3  At worst pain ratin  Location: L knee  Quality: dull ache  Relieving factors: medications (Voltaren Gel, tylenol )  Aggravating factors: standing, walking and stair climbing  Progression: improved    Social Support  Steps to enter house: yes  Stairs in house: no   Lives in: one-story house  Lives with: adult children and spouse    Treatments  Previous treatment: injection treatment and medication  Current treatment: physical therapy      Objective     Active Range of Motion   Left Knee   Flexion: 115 degrees   Extension: 0 degrees     Right Knee   Flexion: 115 degrees with pain  Extension: 0 degrees with pain    Passive Range of Motion   Left Knee   Flexion: 115 degrees   Extension: 0 " degrees     Mobility   Patellar Mobility:   Left Knee   Hypomobile: left medial, left lateral, left superior and left inferior    Right Knee   Hypomobile: medial, lateral, superior and inferior     Strength/Myotome Testing     Left Hip   Planes of Motion   Flexion: 3+    Right Hip   Planes of Motion   Flexion: 4-    Left Knee   Flexion: 4  Extension: 4  Quadriceps contraction: good    Right Knee   Flexion: 4  Extension: 4    Ambulation   Weight-Bearing Status   Assistive device used: single point cane    Observational Gait   Gait: antalgic   Decreased walking speed.     Functional Assessment        Comments    Re-evaluation 1/11/24:  5 STS-25.08 seconds with B/L UE push off and eccentric control to chair, even foot placement for standing   TUG- 23.00 seconds with use of SPC, step through gait pattern    Re-evaluation 12/14/23:  5 STS-30.00 seconds with B/L UE push off and eccentric control to chair, even foot placement for standing   TUG- 23.42 seconds with use of RW, step through gait pattern        Post-Operative Initial Evaluation:  5 STS-31.44 seconds with B/L UE push off and eccentric control to chair, L LE kicked out front  TUG- 37.78 seconds with B/L UE push off and eccentric control to chair, RW usage with step through gait pattern, decreased stance time on L LE             Precautions: L TKA 11/9/23, Hard of Hearing  Access Code: [email or web address]    POC expires Unit limit Auth  expiration date PT/OT + Visit Limit?   2/5/24    Re-eval by 1/14/24 N/A N/A BOMN                 Visit/Unit Tracking  AUTH Status:  Date 1/2 1/5 1/11            BOMN Used 1 2 3             Remaining                  Manuals 12/20 12/26 12/29 1/2 1/5 1/11 12/7 12/11 12/14 12/18   L knee PROM with OP      DC SC BE BE                 Re-evaluation       BE   BE                 Neuro Re-Ed             Quad set with towel under knee      DC       Quad set with LAQ 2.5# 3x10  2# 3x 10  2# 3x 10  2# 3x 10   3x 10  2# 3x10     Quad set with SLR       " 2x 10       Chuck step overs fwd and lat X2 laps 1 UE support CS X2 laps 1 UE support CS Hold  NV        Alt cone taps              Rockerboard taps Fwd/bwd, left right              Ther Ex             Rec bike for knee ROM Fwd rev 10' Fwd rev 10' Fwd rec no tension 10 min  Fwd rec no tension 10 min  Fwd L1 10 min  Fwd L1 10 min  Rocking 10 min  Bwd rev   10' Bwd rev to fwd rev 10' Bwd rev to fwd rev 10'   Standing hip 3 ways  1.5# x10 ea dir B/L 2# x10 ea dir B/L 2# 10x ea. Direction b/l  2# 10x ea. Direction b/l  2#  2x 10 b/l   10x ea.  X10 ea dir B/L X10 ea dir B/L 1.5# x10 ea dir B/L   Lateral walks at bar 1.5# B/L 4 laps w/ UE support 2# 4 laps   B/L UE support 2# 4 laps   B/L UE support 2# 4 laps   B/L UE support 2# 4 laps B/l UE support.   4 laps w/ UE support.   4 laps w/ UE support 1.5# B/L 4 laps w/ UE support   Pt education      PT POC, functional mobility, HEP  SPC practice PT POC, HEP  Recovery process                 Ther Activity             TG squats L22 3x10     DC NV L20 3x10  L22 3x10   Fwd step ups   6\" 3x10 6\" 2x 10  6\" 2x 10  6\" 2x 10   6\" 10x  6\" x15   6\" 2x10    Lat step downs      DC  4\" x10     Lat step ups     10x b/l   4\" 2x 10       Sit to stands   With UE 2x5 10x w/ UE  10x w/ UE          Gait Training             SPC ambulation                          Modalities                                                "

## 2024-01-15 ENCOUNTER — OFFICE VISIT (OUTPATIENT)
Dept: PHYSICAL THERAPY | Facility: CLINIC | Age: 84
End: 2024-01-15
Payer: MEDICARE

## 2024-01-15 DIAGNOSIS — M17.0 BILATERAL PRIMARY OSTEOARTHRITIS OF KNEE: Primary | ICD-10-CM

## 2024-01-15 DIAGNOSIS — Z96.652 S/P TOTAL KNEE ARTHROPLASTY, LEFT: ICD-10-CM

## 2024-01-15 PROCEDURE — 97110 THERAPEUTIC EXERCISES: CPT

## 2024-01-15 PROCEDURE — 97112 NEUROMUSCULAR REEDUCATION: CPT

## 2024-01-15 NOTE — PROGRESS NOTES
Daily Note     Today's date: 1/15/2024  Patient name: Huyen Zhou  : 1940  MRN: [id number]  Referring provider: Rachel Momin MD  Dx:   Encounter Diagnosis     ICD-10-CM    1. Bilateral primary osteoarthritis of knee  M17.0       2. S/P total knee arthroplasty, left  Z96.652           Start Time: 1630  Stop Time: 1713  Total time in clinic (min): 43 minutes    Subjective: Pt noted that she has been feeling pretty good.       Objective: See treatment diary below      Assessment: Continued with treatment session with focus on L knee. Pt was able to make progressions with no increase in pain noted at this time. Tolerated treatment well. Patient exhibited good technique with therapeutic exercises and would benefit from continued PT. S/p treatment session noted some increase fatigue.       Plan: Continue per plan of care.      Precautions: L TKA 23, Hard of Hearing  Access Code: [email or web address]    POC expires Unit limit Auth  expiration date PT/OT + Visit Limit?   24    Re-eval by 24 N/A N/A BOMN                 Visit/Unit Tracking  AUTH Status:  Date 1/2 1/5 1/11 1/15            BOMN Used 1 2 3 4            Remaining                  Manuals 12/20 12/26 12/29 1/2 1/5 1/11 1/15  12/14 12/18   L knee PROM with OP      DC   BE                 Re-evaluation       BE   BE                 Neuro Re-Ed             Quad set with towel under knee      DC       Quad set with LAQ 2.5# 3x10  2# 3x 10  2# 3x 10  2# 3x 10         Quad set with SLR       NV      Chuck step overs fwd and lat X2 laps 1 UE support CS X2 laps 1 UE support CS Hold  NV        Alt cone taps        20x medium cone       Rockerboard taps Fwd/bwd, left right        20x                                 Ther Ex             Rec bike for knee ROM Fwd rev 10' Fwd rev 10' Fwd rec no tension 10 min  Fwd rec no tension 10 min  Fwd L1 10 min  Fwd L1 10 min  Fwd L1 10 min   Bwd rev to fwd rev 10' Bwd rev to fwd rev 10'   Standing hip 3 ways  1.5# x10 ea  "dir B/L 2# x10 ea dir B/L 2# 10x ea. Direction b/l  2# 10x ea. Direction b/l  2#  2x 10 b/l   2# 2x 10   X10 ea dir B/L 1.5# x10 ea dir B/L   Lateral walks at bar 1.5# B/L 4 laps w/ UE support 2# 4 laps   B/L UE support 2# 4 laps   B/L UE support 2# 4 laps   B/L UE support 2# 4 laps B/l UE support.     4 laps w/ UE support 1.5# B/L 4 laps w/ UE support   Pt education      PT POC, functional mobility, HEP   PT POC, HEP  Recovery process                 Ther Activity             TG squats L22 3x10     DC    L22 3x10   Fwd step ups   6\" 3x10 6\" 2x 10  6\" 2x 10  6\" 2x 10   6\" 3x 10    6\" 2x10    Lat step downs      DC       Lat step ups     10x b/l         Sit to stands   With UE 2x5 10x w/ UE  10x w/ UE    10x  w/ UE   (Unable w/o UE)      Gait Training             SPC ambulation                          Modalities                                                   1 on 1 time 24 minutes on 1/15/24.   "

## 2024-01-16 ENCOUNTER — APPOINTMENT (OUTPATIENT)
Dept: PHYSICAL THERAPY | Facility: CLINIC | Age: 84
End: 2024-01-16
Payer: MEDICARE

## 2024-01-18 NOTE — PROGRESS NOTES
Daily Note     Today's date: 2024  Patient name: Huyen Zhou  : 1940  MRN: [id number]  Referring provider: Rachel Momin MD  Dx:   Encounter Diagnosis     ICD-10-CM    1. Bilateral primary osteoarthritis of knee  M17.0       2. S/P total knee arthroplasty, left  Z96.652                      Subjective: Patient reports that with the snow, her knees feel quite stiff.       Objective: See treatment diary below      Assessment: Tolerated treatment well. Patient was able to complete 5 sit to stands this session without use of her UEs demonstrating improving lower extremity strength. She was challenged with dynamic balance exercises of forest step overs and cone taps. Patient demonstrated fatigue post treatment, exhibited good technique with therapeutic exercises, and would benefit from continued PT      Plan: Progress treatment as tolerated.       Precautions: L TKA 23, Hard of Hearing  Access Code: [email or web address]    POC expires Unit limit Auth  expiration date PT/OT + Visit Limit?   24 N/A N/A BOMN                 Visit/Unit Tracking  AUTH Status:  Date 1/2 1/5 1/11 1/15  1/19          BOMN Used 1 2 3 4 5           Remaining                  Manuals 12/20 12/26 12/29 1/2 1/5 1/11 1/15 1/19 12/14 12/18                Re-evaluation       BE   BE                 Neuro Re-Ed             Quad set with LAQ 2.5# 3x10  2# 3x 10  2# 3x 10  2# 3x 10    2# 3x 10      Quad set with SLR       NV      Forest step overs fwd and lat X2 laps 1 UE support CS X2 laps 1 UE support CS Hold  NV   2 laps 1 UE support      Alt cone taps        20x medium cone  20x finger touch support     Rockerboard taps Fwd/bwd, left right        20x  30x                               Ther Ex             Rec bike for knee ROM Fwd rev 10' Fwd rev 10' Fwd rec no tension 10 min  Fwd rec no tension 10 min  Fwd L1 10 min  Fwd L1 10 min  Fwd L1 10 min  Nu L3 10' Bwd rev to fwd rev 10' Bwd rev to fwd rev 10'   Standing hip 3 ways  1.5# x10 ea  "dir B/L 2# x10 ea dir B/L 2# 10x ea. Direction b/l  2# 10x ea. Direction b/l  2#  2x 10 b/l   2# 2x 10   X10 ea dir B/L 1.5# x10 ea dir B/L   Lateral walks at bar 1.5# B/L 4 laps w/ UE support 2# 4 laps   B/L UE support 2# 4 laps   B/L UE support 2# 4 laps   B/L UE support 2# 4 laps B/l UE support.    2# 4 laps B/L UE support 4 laps w/ UE support 1.5# B/L 4 laps w/ UE support   Standing hamstring curls         2# 2x10 B/L     Pt education      PT POC, functional mobility, HEP   PT POC, HEP  Recovery process                 Ther Activity             TG squats L22 3x10     DC    L22 3x10   Fwd step ups   6\" 3x10 6\" 2x 10  6\" 2x 10  6\" 2x 10   6\" 3x 10    6\" 2x10    Lat step ups     10x b/l         Sit to stands   With UE 2x5 10x w/ UE  10x w/ UE    10x  w/ UE   (Unable w/o UE) 15x (5 without UEs)     Gait Training             SPC ambulation                          Modalities                                                    "

## 2024-01-19 ENCOUNTER — OFFICE VISIT (OUTPATIENT)
Dept: PHYSICAL THERAPY | Facility: CLINIC | Age: 84
End: 2024-01-19
Payer: MEDICARE

## 2024-01-19 DIAGNOSIS — Z96.652 S/P TOTAL KNEE ARTHROPLASTY, LEFT: ICD-10-CM

## 2024-01-19 DIAGNOSIS — M17.0 BILATERAL PRIMARY OSTEOARTHRITIS OF KNEE: Primary | ICD-10-CM

## 2024-01-19 PROCEDURE — 97530 THERAPEUTIC ACTIVITIES: CPT

## 2024-01-19 PROCEDURE — 97112 NEUROMUSCULAR REEDUCATION: CPT

## 2024-01-19 PROCEDURE — 97110 THERAPEUTIC EXERCISES: CPT

## 2024-01-22 ENCOUNTER — OFFICE VISIT (OUTPATIENT)
Dept: PHYSICAL THERAPY | Facility: CLINIC | Age: 84
End: 2024-01-22
Payer: MEDICARE

## 2024-01-22 DIAGNOSIS — Z96.652 S/P TOTAL KNEE ARTHROPLASTY, LEFT: ICD-10-CM

## 2024-01-22 DIAGNOSIS — M17.0 BILATERAL PRIMARY OSTEOARTHRITIS OF KNEE: Primary | ICD-10-CM

## 2024-01-22 PROCEDURE — 97530 THERAPEUTIC ACTIVITIES: CPT

## 2024-01-22 PROCEDURE — 97112 NEUROMUSCULAR REEDUCATION: CPT

## 2024-01-22 PROCEDURE — 97110 THERAPEUTIC EXERCISES: CPT

## 2024-01-22 NOTE — PROGRESS NOTES
Daily Note     Today's date: 2024  Patient name: Huyen Zhou  : 1940  MRN: [id number]  Referring provider: Rachel Momin MD  Dx:   Encounter Diagnosis     ICD-10-CM    1. Bilateral primary osteoarthritis of knee  M17.0       2. S/P total knee arthroplasty, left  Z96.652                      Subjective: Patient reports that she feels quite fatigued today and her knees are sore.       Objective: See treatment diary below      Assessment: Tolerated treatment well. Patient requiring increased time to complete activities this session secondary to increased soreness and fatigue. She did report increased pain in both knees when finished with standing balance activities. Patient demonstrated fatigue post treatment, exhibited good technique with therapeutic exercises, and would benefit from continued PT      Plan: Continue per plan of care.      Precautions: L TKA 23, Hard of Hearing  Access Code: [email or web address]    POC expires Unit limit Auth  expiration date PT/OT + Visit Limit?   24 N/A N/A BOMN                 Visit/Unit Tracking  AUTH Status:  Date 1/2 1/5 1/11 1/15  1/19 1/22         BOMN Used 1 2 3 4 5 6          Remaining                  Manuals 12/20 12/26 12/29 1/2 1/5 1/11 1/15 1/19 1/22                 Re-evaluation       BE                    Neuro Re-Ed             Quad set with LAQ 2.5# 3x10  2# 3x 10  2# 3x 10  2# 3x 10    2# 3x 10      Quad set with SLR       NV      Chuck step overs fwd and lat X2 laps 1 UE support CS X2 laps 1 UE support CS Hold  NV   2 laps 1 UE support  2 laps 1 UE support     Alt cone taps        20x medium cone  20x finger touch support 20x finger touch support    Rockerboard taps Fwd/bwd, left right        20x  30x 30x    Tandem walking at bar         4 laps                               Ther Ex             Rec bike for knee ROM Fwd rev 10' Fwd rev 10' Fwd rec no tension 10 min  Fwd rec no tension 10 min  Fwd L1 10 min  Fwd L1 10 min  Fwd L1 10 min  Nu L3 10' Nu  "L3 10'    Standing hip 3 ways  1.5# x10 ea dir B/L 2# x10 ea dir B/L 2# 10x ea. Direction b/l  2# 10x ea. Direction b/l  2#  2x 10 b/l   2# 2x 10       Lateral walks at bar 1.5# B/L 4 laps w/ UE support 2# 4 laps   B/L UE support 2# 4 laps   B/L UE support 2# 4 laps   B/L UE support 2# 4 laps B/l UE support.    2# 4 laps B/L UE support     Standing hamstring curls         2# 2x10 B/L     Pt education      PT POC, functional mobility, HEP                    Ther Activity             TG squats L22 3x10     DC       Fwd step ups   6\" 3x10 6\" 2x 10  6\" 2x 10  6\" 2x 10   6\" 3x 10       Lat step ups     10x b/l         Sit to stands   With UE 2x5 10x w/ UE  10x w/ UE    10x  w/ UE   (Unable w/o UE) 15x (5 without UEs) 15x (5 without UEs)    Gait Training             SPC ambulation                          Modalities                                                      "

## 2024-01-25 ENCOUNTER — OFFICE VISIT (OUTPATIENT)
Dept: PHYSICAL THERAPY | Facility: CLINIC | Age: 84
End: 2024-01-25
Payer: MEDICARE

## 2024-01-25 DIAGNOSIS — M17.0 BILATERAL PRIMARY OSTEOARTHRITIS OF KNEE: Primary | ICD-10-CM

## 2024-01-25 DIAGNOSIS — Z96.652 S/P TOTAL KNEE ARTHROPLASTY, LEFT: ICD-10-CM

## 2024-01-25 PROCEDURE — 97110 THERAPEUTIC EXERCISES: CPT

## 2024-01-25 PROCEDURE — 97112 NEUROMUSCULAR REEDUCATION: CPT

## 2024-01-25 NOTE — PROGRESS NOTES
Daily Note     Today's date: 2024  Patient name: Huyen Zhou  : 1940  MRN: [id number]  Referring provider: Rachel Momin MD  Dx:   Encounter Diagnosis     ICD-10-CM    1. Bilateral primary osteoarthritis of knee  M17.0       2. S/P total knee arthroplasty, left  Z96.652                      Subjective: Patient reports that her ankles are swollen today but she isn't sure why.      Objective: See treatment diary below      Assessment: Tolerated treatment well. Continues to be challenged with balance and weightbearing strengthening exercises due to fatigue and stiffness in joints. Was able to complete all exercises with encouragement. Patient demonstrated fatigue post treatment, exhibited good technique with therapeutic exercises, and would benefit from continued PT      Plan: Continue per plan of care.      Precautions: L TKA 23, Hard of Hearing  Access Code: [email or web address]    POC expires Unit limit Auth  expiration date PT/OT + Visit Limit?   24 N/A N/A BOMN                 Visit/Unit Tracking  AUTH Status:  Date 1/2 1/5 1/11 1/15  1/19 1/22 1/25        BOMN Used 1 2 3 4 5 6 7         Remaining                  Manuals 12/20 12/26 12/29 1/2 1/5 1/11 1/15 1/19 1/22 1/25                Re-evaluation       BE                    Neuro Re-Ed             Quad set with LAQ 2.5# 3x10  2# 3x 10  2# 3x 10  2# 3x 10    2# 3x 10      Quad set with SLR       NV      Chuck step overs fwd and lat X2 laps 1 UE support CS X2 laps 1 UE support CS Hold  NV   2 laps 1 UE support  2 laps 1 UE support  3 laps finger touch    Alt cone taps        20x medium cone  20x finger touch support 20x finger touch support    Rockerboard taps Fwd/bwd, left right        20x  30x 30x 30x   Tandem walking at bar         4 laps  4 laps                             Ther Ex             Rec bike for knee ROM Fwd rev 10' Fwd rev 10' Fwd rec no tension 10 min  Fwd rec no tension 10 min  Fwd L1 10 min  Fwd L1 10 min  Fwd L1 10 min  Nu L3  "10' Nu L3 10' Nu L4 10'   Standing hip 3 ways  1.5# x10 ea dir B/L 2# x10 ea dir B/L 2# 10x ea. Direction b/l  2# 10x ea. Direction b/l  2#  2x 10 b/l   2# 2x 10    2# 2x 10    Lateral walks at bar 1.5# B/L 4 laps w/ UE support 2# 4 laps   B/L UE support 2# 4 laps   B/L UE support 2# 4 laps   B/L UE support 2# 4 laps B/l UE support.    2# 4 laps B/L UE support     Standing hamstring curls         2# 2x10 B/L  2# 2x10 B/L   Pt education      PT POC, functional mobility, HEP                    Ther Activity             TG squats L22 3x10     DC       Fwd step ups   6\" 3x10 6\" 2x 10  6\" 2x 10  6\" 2x 10   6\" 3x 10       Lat step ups     10x b/l         Sit to stands   With UE 2x5 10x w/ UE  10x w/ UE    10x  w/ UE   (Unable w/o UE) 15x (5 without UEs) 15x (5 without UEs)    Gait Training             SPC ambulation                          Modalities                                                        "

## 2024-01-30 ENCOUNTER — HOSPITAL ENCOUNTER (OUTPATIENT)
Dept: RADIOLOGY | Facility: HOSPITAL | Age: 84
Discharge: HOME | End: 2024-01-30
Attending: ORTHOPAEDIC SURGERY
Payer: MEDICARE

## 2024-01-30 ENCOUNTER — OFFICE VISIT (OUTPATIENT)
Dept: OBGYN CLINIC | Facility: CLINIC | Age: 84
End: 2024-01-30

## 2024-01-30 ENCOUNTER — OFFICE VISIT (OUTPATIENT)
Dept: PHYSICAL THERAPY | Facility: CLINIC | Age: 84
End: 2024-01-30
Payer: MEDICARE

## 2024-01-30 VITALS
HEART RATE: 60 BPM | WEIGHT: 174 LBS | HEIGHT: 61 IN | SYSTOLIC BLOOD PRESSURE: 118 MMHG | DIASTOLIC BLOOD PRESSURE: 84 MMHG | BODY MASS INDEX: 32.85 KG/M2

## 2024-01-30 DIAGNOSIS — Z96.652 S/P TOTAL KNEE ARTHROPLASTY, LEFT: Primary | ICD-10-CM

## 2024-01-30 DIAGNOSIS — M17.11 PRIMARY OSTEOARTHRITIS OF RIGHT KNEE: ICD-10-CM

## 2024-01-30 DIAGNOSIS — Z96.652 S/P TOTAL KNEE ARTHROPLASTY, LEFT: ICD-10-CM

## 2024-01-30 DIAGNOSIS — Z01.818 PREOP TESTING: ICD-10-CM

## 2024-01-30 DIAGNOSIS — N18.31 STAGE 3A CHRONIC KIDNEY DISEASE (HCC): ICD-10-CM

## 2024-01-30 DIAGNOSIS — M17.0 BILATERAL PRIMARY OSTEOARTHRITIS OF KNEE: Primary | ICD-10-CM

## 2024-01-30 PROCEDURE — 99024 POSTOP FOLLOW-UP VISIT: CPT | Performed by: ORTHOPAEDIC SURGERY

## 2024-01-30 PROCEDURE — 73562 X-RAY EXAM OF KNEE 3: CPT

## 2024-01-30 PROCEDURE — 97110 THERAPEUTIC EXERCISES: CPT

## 2024-01-30 PROCEDURE — 97112 NEUROMUSCULAR REEDUCATION: CPT

## 2024-01-30 RX ORDER — ASCORBIC ACID 500 MG
500 TABLET ORAL 2 TIMES DAILY
Qty: 60 TABLET | Refills: 1 | Status: SHIPPED | OUTPATIENT
Start: 2024-01-30

## 2024-01-30 RX ORDER — ACETAMINOPHEN 325 MG/1
975 TABLET ORAL ONCE
OUTPATIENT
Start: 2024-01-30 | End: 2024-01-30

## 2024-01-30 RX ORDER — SODIUM CHLORIDE, SODIUM LACTATE, POTASSIUM CHLORIDE, CALCIUM CHLORIDE 600; 310; 30; 20 MG/100ML; MG/100ML; MG/100ML; MG/100ML
125 INJECTION, SOLUTION INTRAVENOUS CONTINUOUS
OUTPATIENT
Start: 2024-01-30

## 2024-01-30 RX ORDER — FERROUS SULFATE 324(65)MG
324 TABLET, DELAYED RELEASE (ENTERIC COATED) ORAL
Qty: 60 TABLET | Refills: 1 | Status: SHIPPED | OUTPATIENT
Start: 2024-01-30

## 2024-01-30 RX ORDER — FOLIC ACID 1 MG/1
1 TABLET ORAL DAILY
Qty: 30 TABLET | Refills: 1 | Status: SHIPPED | OUTPATIENT
Start: 2024-01-30

## 2024-01-30 RX ORDER — CHLORHEXIDINE GLUCONATE 4 G/100ML
SOLUTION TOPICAL DAILY PRN
OUTPATIENT
Start: 2024-01-30

## 2024-01-30 RX ORDER — CHLORHEXIDINE GLUCONATE ORAL RINSE 1.2 MG/ML
15 SOLUTION DENTAL ONCE
OUTPATIENT
Start: 2024-01-30 | End: 2024-01-30

## 2024-01-30 NOTE — PROGRESS NOTES
Daily Note     Today's date: 2024  Patient name: Huyen Zhou  : 1940  MRN: [id number]  Referring provider: Rachel Momin MD  Dx:   Encounter Diagnosis     ICD-10-CM    1. Bilateral primary osteoarthritis of knee  M17.0       2. S/P total knee arthroplasty, left  Z96.652           Start Time: 1000  Stop Time: 1045  Total time in clinic (min): 45 minutes    Subjective: pt noted that she has been having some changes in pain status of her L knee but noted that since her R knee has been so bad that she has been relying more on the L. She noted that she does go back to ortho today.       Objective: See treatment diary below      Assessment: Continued with treatment session with focus on L knee.  Tolerated treatment well. Noted that she did have some increase in L knee muscle soreness. Overall was able to complete all exercises with proper form and technique.  Patient exhibited good technique with therapeutic exercises and would benefit from continued PT      Plan: Continue per plan of care.      Precautions: L TKA 23, Hard of Hearing  Access Code: [email or web address]    POC expires Unit limit Auth  expiration date PT/OT + Visit Limit?   24 N/A N/A BOMN                 Visit/Unit Tracking  AUTH Status:  Date 1/2 1/5 1/11 1/15  1/19 1/22 1/25 1/30       BOMN Used 1 2 3 4 5 6 7 8         Remaining                  Manuals 1/30  12/29 1/2 1/5 1/11 1/15 1/19 1/22 1/25                Re-evaluation       BE                    Neuro Re-Ed             Quad set with LAQ 2x 10   2# 3x 10  2# 3x 10  2# 3x 10    2# 3x 10      Quad set with SLR       NV      Chuck step overs fwd and lat 3 laps finger touch   Hold  NV   2 laps 1 UE support  2 laps 1 UE support  3 laps finger touch    Alt cone taps  20x w/ finger touch support.       20x medium cone  20x finger touch support 20x finger touch support    Rockerboard taps Fwd/bwd, left right  20x       20x  30x 30x 30x   Tandem walking at bar 4 laps         4 laps  4 laps     "                         Ther Ex             Rec bike for knee ROM Bike L1 10 min fwd no tension   Fwd rec no tension 10 min  Fwd rec no tension 10 min  Fwd L1 10 min  Fwd L1 10 min  Fwd L1 10 min  Nu L3 10' Nu L3 10' Nu L4 10'   Standing hip 3 ways    2# 10x ea. Direction b/l  2# 10x ea. Direction b/l  2#  2x 10 b/l   2# 2x 10    2# 2x 10    Lateral walks at bar   2# 4 laps   B/L UE support 2# 4 laps   B/L UE support 2# 4 laps B/l UE support.    2# 4 laps B/L UE support     Standing hamstring curls         2# 2x10 B/L  2# 2x10 B/L   Pt education      PT POC, functional mobility, HEP                    Ther Activity             TG squats      DC       Fwd step ups    6\" 2x 10  6\" 2x 10  6\" 2x 10   6\" 3x 10       Lat step ups     10x b/l         Sit to stands  5x w/o UE   10x w/ UE  10x w/ UE    10x  w/ UE   (Unable w/o UE) 15x (5 without UEs) 15x (5 without UEs)    Gait Training             SPC ambulation                          Modalities                                                          "

## 2024-01-30 NOTE — PROGRESS NOTES
Assessment:  1. S/P total knee arthroplasty, left  XR knee 3 vw left non injury    Ambulatory referral to Physical Therapy      2. Primary osteoarthritis of right knee  Ambulatory referral to Physical Therapy          Plan:    Patient is  11 weeks status post left  total knee arthroplasty performed on 11/9/23 and right knee osteoarthritis   Weightbearing as tolerated   Continue with physical therapy working on strengthening exercises  Continue using Voltaren gel on both of her knees   Discussed with patient surgery for right total knee arthroplasty   The benefits and purpose of the operations and/or procedure have been explained to me in language I understand by Dr. Momin well as the risks and benefits, alternatives and complications pertaining to the above procedure/surgery which include but is not limited to: infections, deeps vein thrombosis, blood clots to the lungs, wound healing problems, complications from extensive blood loss, including shock, possible death, continued pain, fracture, vascular or nerve injury, potential need for further surgery/revision, persistent pain/stiffness/instability, failure of hardware,heart attack, stroke and not obtaining results patient used.    SAME DAY SURGERY   She sill be on ASA 81 mg BID for DVT prophylaxis  She will need clearance from her  PCP prior to surgery   She will be on Vitamins 30 days prior to surgery   Will repeat XR right knee and remove stables at PO visit   She is to follow PP right TKA 2 weeks          The above stated was discussed in layman's terms and the patient expressed understanding.  All questions were answered to the patient's satisfaction.         Subjective:   Huyen Zhou is a 83 y.o. female who presents today 11 weeks status post left total knee arthroplasty performed on 11/9/2023. She is doing well. She states she is having pain over anterior and medial left knee. She has bene going to physical therapy and have been working on strengthening  exercises. She is still having trouble with stairs. She states she is having increase pain in the right knee. She states locking and catching. Pain is worse in right knee with weightbearing and increase activities. She had a right knee steroid injection on 6/23/23 with no relief. She is taking Tylenol Extra strength for pain relief       Review of systems negative unless otherwise specified in HPI    Past Medical History:   Diagnosis Date    Chronic kidney disease     Disease of thyroid gland     High cholesterol     Hypothyroidism     PONV (postoperative nausea and vomiting)        Past Surgical History:   Procedure Laterality Date    BREAST CYST EXCISION  1982    cyst removals and aspirations-benign    COLONOSCOPY      HYSTERECTOMY      at age 64    OOPHORECTOMY Bilateral     at age 64    HI ARTHROPLASTY FEM CONDYLES/TIBIAL PLATEAU KNEE Left 11/9/2023    Procedure: ARTHROPLASTY KNEE TOTAL AND ALL ASSOCIATED PROCEDURES;  Surgeon: Rachel Momin MD;  Location:  MAIN OR;  Service: Orthopedics    STOMACH SURGERY         Family History   Problem Relation Age of Onset    Heart disease Sister     Breast cancer Sister 77    Ovarian cancer Sister 66    Breast cancer Sister 87    Cervical cancer Paternal Aunt     Breast cancer Cousin     Breast cancer Cousin     Hyperlipidemia Family        Social History     Occupational History    Not on file   Tobacco Use    Smoking status: Never    Smokeless tobacco: Never   Vaping Use    Vaping status: Never Used   Substance and Sexual Activity    Alcohol use: Not Currently    Drug use: Not Currently    Sexual activity: Not on file         Current Outpatient Medications:     levothyroxine 100 mcg tablet, Take 1 tablet (100 mcg total) by mouth daily, Disp: 90 tablet, Rfl: 3    pravastatin (PRAVACHOL) 20 mg tablet, Take with 40 mg Pravastatin, total daily dose 60 mg, Disp: 90 tablet, Rfl: 3    pravastatin (PRAVACHOL) 40 mg tablet, Take 1 tablet (40 mg total) by mouth daily Pt takes  60 mg every evening, Disp: 90 tablet, Rfl: 0    ascorbic acid (VITAMIN C) 500 MG tablet, Take 1 tablet (500 mg total) by mouth 2 (two) times a day (Patient not taking: Reported on 11/28/2023), Disp: 60 tablet, Rfl: 1    Aspirin Low Dose 81 MG EC tablet, take 1 tablet by mouth twice a day AFTER TOTAL JOINT ARTHROPLASTY (Patient not taking: Reported on 1/30/2024), Disp: 70 tablet, Rfl: 0    docusate sodium (COLACE) 100 mg capsule, Take 1 capsule (100 mg total) by mouth 2 (two) times a day, Disp: 10 capsule, Rfl: 0    ferrous sulfate 324 (65 Fe) mg, Take 1 tablet (324 mg total) by mouth 2 (two) times a day before meals (Patient not taking: Reported on 11/28/2023), Disp: 60 tablet, Rfl: 1    folic acid (FOLVITE) 1 mg tablet, Take 1 tablet (1 mg total) by mouth daily (Patient not taking: Reported on 11/28/2023), Disp: 30 tablet, Rfl: 1    methylPREDNISolone 4 MG tablet therapy pack, Use as directed on package, Disp: 1 each, Rfl: 0    oxyCODONE (ROXICODONE) 5 immediate release tablet, Take 1 tablets every 6 hrs as needed for pain control (Patient not taking: Reported on 1/30/2024), Disp: 30 tablet, Rfl: 0    Allergies   Allergen Reactions    Percocet [Oxycodone-Acetaminophen] GI Intolerance    Statins      Other reaction(s): ELEVATED LFTS  Category: Adverse Reaction;     Vicodin [Hydrocodone-Acetaminophen] GI Intolerance            Vitals:    01/30/24 1335   BP: 118/84   Pulse: 60     Body mass index is 32.88 kg/m².  Wt Readings from Last 3 Encounters:   01/30/24 78.9 kg (174 lb)   11/28/23 78.9 kg (174 lb)   11/09/23 78.9 kg (174 lb)       Objective:            Physical Exam  Physical Exam:      General Appearance:    Alert, cooperative, no distress, appears stated age   Head:    Normocephalic, without obvious abnormality, atraumatic   Eyes:    conjunctiva/corneas clear, both eyes         Nose:   Nares normal, septum midline, no drainage    Throat:   Lips normal; teeth and gums normal   Neck:    symmetrical, trachea  "midline, ;     thyroid:  no enlargement/   Back:     Symmetric, no curvature, ROM normal   Lungs:   No audible wheezing or labored breathing   Chest Wall:    No tenderness or deformity    Heart:    Regular rate and rhythm                         Pulses:   2+ and symmetric all extremities   Skin:   Skin color, texture, turgor normal, no rashes or lesions   Neurologic:   normal strength, sensation and reflexes     throughout                       Ortho Exam  Left knee   Surgical incision well healed   No erythema   Full extension   No TTP   NVID     Right  Knee  Skin intact  No erythema or open wounds  Mild effusion  + Tenderness palpation of medial joint line  No lateral joint line tenderness  full extension  Full flexion  Patellar grind test +/-  Stable to varus and valgus stress  Negative posterior drawer  Negative Lachman test  Neurovascular intact distally     Diagnostics, reviewed and taken today if performed as documented:      The attending physician has personally reviewed the pertinent films in PACS and interpretation is as follows:XR left knee demonstrates s/p TKA adequate alignment of implant, no sign of loosening       Procedures, if performed today:    Procedures    None performed      Scribe Attestation      I,:  Mukesh Bruno am acting as a scribe while in the presence of the attending physician.:       I,:  Rachel Momin MD personally performed the services described in this documentation    as scribed in my presence.:               Portions of the record may have been created with voice recognition software.  Occasional wrong word or \"sound a like\" substitutions may have occurred due to the inherent limitations of voice recognition software.  Read the chart carefully and recognize, using context, where substitutions have occurred.  "

## 2024-02-01 ENCOUNTER — OFFICE VISIT (OUTPATIENT)
Dept: PHYSICAL THERAPY | Facility: CLINIC | Age: 84
End: 2024-02-01
Payer: MEDICARE

## 2024-02-01 DIAGNOSIS — Z96.652 S/P TOTAL KNEE ARTHROPLASTY, LEFT: ICD-10-CM

## 2024-02-01 DIAGNOSIS — M17.0 BILATERAL PRIMARY OSTEOARTHRITIS OF KNEE: Primary | ICD-10-CM

## 2024-02-01 PROCEDURE — 97110 THERAPEUTIC EXERCISES: CPT

## 2024-02-01 PROCEDURE — 97530 THERAPEUTIC ACTIVITIES: CPT

## 2024-02-01 PROCEDURE — 97112 NEUROMUSCULAR REEDUCATION: CPT

## 2024-02-01 NOTE — PROGRESS NOTES
Daily Note     Today's date: 2024  Patient name: Huyen Zhou  : 1940  MRN: [id number]  Referring provider: Rachel Momin MD  Dx:   Encounter Diagnosis     ICD-10-CM    1. Bilateral primary osteoarthritis of knee  M17.0       2. S/P total knee arthroplasty, left  Z96.652                      Subjective: Patient reports that she did see her surgeon on 24 at which time she did schedule her R TKA for 24. She was advised to continue PT and may use OTC medications and/or voltaren gel as needed for pain.      Objective: See treatment diary below      Assessment: Tolerated treatment well. Continues to require encouragement throughout session for completion of exercises due to reports of general and muscular fatigue as well as mild pain in her bilateral knees. Good quadriceps activation and control demonstrated during SLR and LAQs with weight. Patient demonstrated fatigue post treatment, exhibited good technique with therapeutic exercises, and would benefit from continued PT      Plan: Progress treatment as tolerated.       Precautions: L TKA 23, Hard of Hearing  Access Code: [email or web address]    POC expires Unit limit Auth  expiration date PT/OT + Visit Limit?   24 N/A N/A BOMN                 Visit/Unit Tracking  AUTH Status:  Date 1/2 1/5 1/11 1/15  1/19 1/22 1/25 1/30 2/1      BOMN Used 1 2 3 4 5 6 7 8  9       Remaining                  Manuals 1/30 2/1 12/29 1/2 1/5 1/11 1/15 1/19 1/22 1/25                Re-evaluation       BE                    Neuro Re-Ed             Quad set with LAQ 2x 10  2# 3x10 B/L 2# 3x 10  2# 3x 10  2# 3x 10    2# 3x 10      Quad set with SLR  1x10  1x5 B/L     NV      Chuck step overs fwd and lat 3 laps finger touch   Hold  NV   2 laps 1 UE support  2 laps 1 UE support  3 laps finger touch    Alt cone taps  20x w/ finger touch support.       20x medium cone  20x finger touch support 20x finger touch support    Rockerboard taps Fwd/bwd, left right  20x       20x  30x  "30x 30x   Tandem walking at bar 4 laps         4 laps  4 laps                             Ther Ex             Rec bike for knee ROM Bike L1 10 min fwd no tension  Nu L5 10' Fwd rec no tension 10 min  Fwd rec no tension 10 min  Fwd L1 10 min  Fwd L1 10 min  Fwd L1 10 min  Nu L3 10' Nu L3 10' Nu L4 10'   Standing hip 3 ways    2# 10x ea. Direction b/l  2# 10x ea. Direction b/l  2#  2x 10 b/l   2# 2x 10    2# 2x 10    Lateral walks at bar   2# 4 laps   B/L UE support 2# 4 laps   B/L UE support 2# 4 laps B/l UE support.    2# 4 laps B/L UE support     Standing hamstring curls   2# 2x10 B/L      2# 2x10 B/L  2# 2x10 B/L   Pt education      PT POC, functional mobility, HEP                    Ther Activity             Fwd step ups   6\" 2x10 B/L 6\" 2x 10  6\" 2x 10  6\" 2x 10   6\" 3x 10       Lat step ups     10x b/l         Sit to stands  5x w/o UE  15x (5 w/o UE) 10x w/ UE  10x w/ UE    10x  w/ UE   (Unable w/o UE) 15x (5 without UEs) 15x (5 without UEs)    Gait Training             SPC ambulation                          Modalities                                                            "

## 2024-02-06 ENCOUNTER — OFFICE VISIT (OUTPATIENT)
Dept: PHYSICAL THERAPY | Facility: CLINIC | Age: 84
End: 2024-02-06
Payer: MEDICARE

## 2024-02-06 DIAGNOSIS — Z96.652 S/P TOTAL KNEE ARTHROPLASTY, LEFT: ICD-10-CM

## 2024-02-06 DIAGNOSIS — M17.0 BILATERAL PRIMARY OSTEOARTHRITIS OF KNEE: Primary | ICD-10-CM

## 2024-02-06 PROCEDURE — 97110 THERAPEUTIC EXERCISES: CPT

## 2024-02-06 PROCEDURE — 97530 THERAPEUTIC ACTIVITIES: CPT

## 2024-02-06 PROCEDURE — 97112 NEUROMUSCULAR REEDUCATION: CPT

## 2024-02-06 NOTE — PROGRESS NOTES
Daily Note     Today's date: 2024  Patient name: Huyen Zhou  : 1940  MRN: [id number]  Referring provider: Rachel Momin MD  Dx:   Encounter Diagnosis     ICD-10-CM    1. Bilateral primary osteoarthritis of knee  M17.0       2. S/P total knee arthroplasty, left  Z96.652           Start Time: 09  Stop Time: 1045  Total time in clinic (min): 46 minutes    Subjective: Pt noted that she has been okay and no changes from last visit. She noted that she has been putting some topical pain relieving cream on b/l knees to help with inflammation and p!.       Objective: See treatment diary below      Assessment:  Continued with treatment session with focus on L knee s/p surgery. At this time focus on functional strengthening. Proper form demonstrated w/ appropriate verbal cues.  Seated and standing rest breaks required. Tolerated treatment well.  Progressed to 2.5# w/ LAQ and HS curls w/o p!. Patient exhibited good technique with therapeutic exercises and would benefit from continued PT.     Education reviewed with patient with verbal agreement and understanding.   - HEP compliance.   - DOMS 24 to 48 hours of muscle soreness.       Plan: Continue per plan of care.      Precautions: L TKA 23, Hard of Hearing  Access Code: [email or web address]    POC expires Unit limit Auth  expiration date PT/OT + Visit Limit?   24 N/A N/A BOMN                 Visit/Unit Tracking  AUTH Status:  Date 1/2 1/5 1/11 1/15  1/19 1/22 1/25 1/30 2/1 2/     BOMN Used 1 2 3 4 5 6 7 8  9 10      Remaining                  Manuals  2/6  1/5 1/11 1/15 1/19 1/22 1/25                Re-evaluation       BE                    Neuro Re-Ed             Quad set with LAQ 2x 10  2# 3x10 B/L 2.5# 2x 10 b/l   2# 3x 10    2# 3x 10      Quad set with SLR  1x10  1x5 B/L NV    NV      Chuck step overs fwd and lat 3 laps finger touch     NV   2 laps 1 UE support  2 laps 1 UE support  3 laps finger touch    Alt cone taps  20x w/ finger touch  "support.       20x medium cone  20x finger touch support 20x finger touch support    Rockerboard taps Fwd/bwd, left right  20x       20x  30x 30x 30x   Tandem walking at bar 4 laps         4 laps  4 laps                             Ther Ex             Rec bike for knee ROM Bike L1 10 min fwd no tension  Nu L5 10' Nustep 10 min   Fwd L1 10 min  Fwd L1 10 min  Fwd L1 10 min  Nu L3 10' Nu L3 10' Nu L4 10'   Standing hip 3 ways      2#  2x 10 b/l   2# 2x 10    2# 2x 10    Lateral walks at bar     2# 4 laps B/l UE support.    2# 4 laps B/L UE support     Standing hamstring curls   2# 2x10 B/L 2.5# 2x 10 b/l      2# 2x10 B/L  2# 2x10 B/L   Pt education      PT POC, functional mobility, HEP                    Ther Activity             Fwd step ups   6\" 2x10 B/L 6\" 2x 10 b/l   (U/l UE)  6\" 2x 10   6\" 3x 10       Lat step ups     10x b/l         Sit to stands  5x w/o UE  15x (5 w/o UE) 15x (5 w/o UE)    10x  w/ UE   (Unable w/o UE) 15x (5 without UEs) 15x (5 without UEs)    Front step downs    4\" 10x (L)  4\" 5x (R)  (b/l UE)          Lateral step downs              Mini squats              Mini lunges    Trial NV                       Gait Training             SPC ambulation                          Modalities                                                              "

## 2024-02-08 ENCOUNTER — OFFICE VISIT (OUTPATIENT)
Dept: PHYSICAL THERAPY | Facility: CLINIC | Age: 84
End: 2024-02-08
Payer: MEDICARE

## 2024-02-08 DIAGNOSIS — Z96.652 S/P TOTAL KNEE ARTHROPLASTY, LEFT: ICD-10-CM

## 2024-02-08 DIAGNOSIS — M17.0 BILATERAL PRIMARY OSTEOARTHRITIS OF KNEE: Primary | ICD-10-CM

## 2024-02-08 PROCEDURE — 97110 THERAPEUTIC EXERCISES: CPT

## 2024-02-08 PROCEDURE — 97530 THERAPEUTIC ACTIVITIES: CPT

## 2024-02-08 NOTE — PROGRESS NOTES
Daily Note     Today's date: 2024  Patient name: Huyen Zhou  : 1940  MRN: [id number]  Referring provider: Rachel Momin MD  Dx:   Encounter Diagnosis     ICD-10-CM    1. Bilateral primary osteoarthritis of knee  M17.0       2. S/P total knee arthroplasty, left  Z96.652                      Subjective: Patient reports that her knees are sore today.       Objective: See treatment diary below      Assessment: Tolerated treatment well. Able to complete 10 forward step downs this session on bilateral LE's with patient reporting increased pain with right knee. She remains fatigued with sit to stands. Long discussion had with patient and her daughter regarding medicare guidelines for therapy and medical necessity. Discussed continuing with a comprehensive HEP at this time and returning to therapy prior to her right knee replacement for prehab. They demonstrated/verbalized understanding the HEP and were in agreement with PT POC. Patient demonstrated fatigue post treatment and exhibited good technique with therapeutic exercises      Plan: DC to comprehensive HEP.     Precautions: L TKA 23, Hard of Hearing  Access Code: [email or web address]    POC expires Unit limit Auth  expiration date PT/OT + Visit Limit?   24 N/A N/A BOMN                 Visit/Unit Tracking  AUTH Status:  Date 1/2 1/5 1/11 1/15  1/19 1/22 1/25 1/30 2/1 2/6 2/8    BOMN Used 1 2 3 4 5 6 7 8  9 10 11     Remaining                  Manuals  2 2/6 2/8 1/5 1/11 1/15 1/19 1/22 1/25                Re-evaluation       BE                    Neuro Re-Ed             Quad set with LAQ 2x 10  2# 3x10 B/L 2.5# 2x 10 b/l  2.5# 3x10 B/L 2# 3x 10    2# 3x 10      Quad set with SLR  1x10  1x5 B/L NV    NV      Chuck step overs fwd and lat 3 laps finger touch     NV   2 laps 1 UE support  2 laps 1 UE support  3 laps finger touch    Alt cone taps  20x w/ finger touch support.       20x medium cone  20x finger touch support 20x finger touch support   "  Rockerboard taps Fwd/bwd, left right  20x       20x  30x 30x 30x   Tandem walking at bar 4 laps         4 laps  4 laps                             Ther Ex             Rec bike for knee ROM Bike L1 10 min fwd no tension  Nu L5 10' Nustep 10 min  Nu L5 10' Fwd L1 10 min  Fwd L1 10 min  Fwd L1 10 min  Nu L3 10' Nu L3 10' Nu L4 10'   Standing hip 3 ways      2#  2x 10 b/l   2# 2x 10    2# 2x 10    Lateral walks at bar     2# 4 laps B/l UE support.    2# 4 laps B/L UE support     Standing hamstring curls   2# 2x10 B/L 2.5# 2x 10 b/l      2# 2x10 B/L  2# 2x10 B/L   Pt education    HEP review  PT POC, functional mobility, HEP                    Ther Activity             Fwd step ups   6\" 2x10 B/L 6\" 2x 10 b/l   (U/l UE) 6\" 2x10 B/L  6\" 2x 10   6\" 3x 10       Lat step ups     10x b/l         Sit to stands  5x w/o UE  15x (5 w/o UE) 15x (5 w/o UE) 15x (5w/o UE)    10x  w/ UE   (Unable w/o UE) 15x (5 without UEs) 15x (5 without UEs)    Front step downs    4\" 10x (L)  4\" 5x (R)  (b/l UE) 4\" 10x B/L          Lateral step downs              Mini squats     X10          Mini lunges    Trial NV                       Gait Training             SPC ambulation                          Modalities                                                                " ID Consulted

## 2024-02-21 PROBLEM — Z13.820 SCREENING FOR OSTEOPOROSIS: Status: RESOLVED | Noted: 2019-01-08 | Resolved: 2024-02-21

## 2024-02-27 ENCOUNTER — TELEPHONE (OUTPATIENT)
Dept: OBGYN CLINIC | Facility: HOSPITAL | Age: 84
End: 2024-02-27

## 2024-02-27 NOTE — TELEPHONE ENCOUNTER
Preoperative Elective Admission Assessment- spoke to patients daughter and confirmed assessment/updated as needed.      LTKA November same day DC     Living Situation:    Who does pt live with: spouse, Kenney and mentally challenged son  What kind of home: ranch  How do they enter the home: front  How many levels in home: 1 level home    # of steps to enter home: 4 to enter with railing   # of steps to second floor: N/A   Are there handrails: Yes  Are there landings: No  Sleeping arrangement: first/entry floor  Where is Bathroom: First floor step in tub, and they purchased a shower bench.     First Floor Setup:   Is there a bathroom: Yes  Where would pt sleep: bed     DME: cane and RW from last surgery (instructed to bring DOS)     We discussed clearing pathways in the home and making sure there is accessibly to use the walker, for example, removing throw rugs.      Patient's Current Level of Function: Ambulates with cane in public, independent with ADLS.      Post-op Caregiver: child, Jesica - will be staying with patient   Currently receive any HHC/aides/community supports: No     Post-op Transport: child  To/from hospital: child  To/from PT 2-3x/week: child  Uses community transport now: No     Outpatient Physical Therapy Site:  Site: [facility]   pre and post-op appts scheduled? Yes     Medication Management: self  Preferred Pharmacy for Post-op Medications: [id number] - [place] - [facility] [address] [[id number]]   Blood Management Vitamin Regimen: Pt has started taking preoperative vitamins   Post-op anticoagulant: to be determined by surgical team postoperatively     DC Plan: Pt plans to be discharged home     Barriers to DC identified preoperatively: none identified     BMI: 32.53     Patient Education:  Pt educated on post-op pain, early mobilization (POD0), LOS goals, OP PT goals, and preoperative bathing. Patient educated that our goal is to appropriately discharge patient based off their post-op  function while striving to maintain maximal independence. The goal is to discharge patient to home and for them to attend outpatient physical therapy.     Assigned to care team? Yes

## 2024-03-11 NOTE — PATIENT INSTRUCTIONS
Contact surgical nurse  navigator with any questions regarding preoperative plan or schedule.  Stop all over the counter supplements, herbal, naturopathic  medications for 1 week prior to surgery UNLESS prescribed by your surgeon  Hold NSAIDS (i.e. advil, alleve, motrin, ibuprofen, celebrex) minimum 7 days prior to surgery  Hold Asprin minimum 7 days prior to surgery unless instructed otherwise by your physician  Recommend using Tylenol ( acetaminophen ) 500mg every eight hours during the first week post discharge in conjunction with any additional pain medicine prescribed by your surgeon  Use bowel medicines prescribed by your surgeon ( colace) daily post op during the first 1-2 weeks to avoid post operative constipation issues  Practice deep breathing and blood clot prevention exercises starting 2 weeks before surgery and continue for minimum of 2 weeks after surgery. Examples can be found in the following video link: https://[email or web address].com/watch?v=[email or web address]  Follow presurgical medication instructions provided by preadmission nursing team reviewed during your presurgery phone call  Call [phone] with any post discharge concerns or medical issues

## 2024-03-11 NOTE — ASSESSMENT & PLAN NOTE
Cont levothyroxine as prescribed  Take levothyroxine the morning of surgery with small sip of water

## 2024-03-11 NOTE — PROGRESS NOTES
Internal Medicine Pre-Operative Evaluation:     Reason for Visit: Pre-operative Evaluation for Risk Stratification and Optimization    Patient ID: Huyen Zhou is a 83 y.o. female.     Surgery: Arthroplasty of right knee  Referring Provider: Dr Momin      Recommendations to Proceed withSurgery    Patient is considered to be Low risk for Medium risk procedure.     After evaluation and discussion with patient with emphasis that all surgery has some degree of inherent risk it is determined this procedure is of acceptable risk  medically.    Patient may proceed with planned procedure      Assessment    Pre-operative Medical Evaluation for planned surgery  Recommendations as listed in PLAN section below  Contact surgical nurse  navigator with any questions regarding preoperative plan or schedule.      Problem List Items Addressed This Visit          Endocrine    Hypothyroidism     Cont levothyroxine as prescribed  Take levothyroxine the morning of surgery with small sip of water              Musculoskeletal and Integument    Arthritis of right knee - Primary     Failed outpatient conservative measures  Electing to undergo arthroplasty              Genitourinary    Stage 3a chronic kidney disease (HCC)     Lab Results   Component Value Date    EGFR 60 10/17/2023    EGFR 53 09/12/2023    EGFR 54 05/21/2023    CREATININE 0.89 10/17/2023    CREATININE 0.99 09/12/2023    CREATININE 0.98 05/21/2023   Level 3a  Baseline as above  Avoid nephrotoxic medications  Avoid hypotension in the post operative setting  Monitor bmp                Other    Preoperative clearance    S/P total knee arthroplasty, left     S/p TKA 11/2023                 Plan:     1. Further preoperative workup as follows:   - none no further testing required may proceed with surgery    2. Medication Management/Recommendations:   - hold aspirin 7 days prior to surgery  - avoid use of NSAID such as motrin, advil, aleve for 7 days prior to surgery  - hold  "all OTC herbal or nutritional supplements 7 days before surgery    3. Patient requires further consultation with:   No Consults Required    4. Discharge Planning / Barriers to Discharge  none identified - patients has post discharge therapy plan in place, transportation arranged for discharge day, adequate family support at home to assist with discharge to home.        Subjective:           History of Present Illness:     Huyen Zhou is a 83 y.o. female who presents to the office today for a preoperative consultation at the request of surgeon. The patient understands this is an elective procedure and not emergent. They are electing to undergo planned procedure with an understanding that all surgery has inherent risk. They have worked with their surgeon and failed conservative treatment measures. Today they present for preoperative risk assessment and recommendations for optimization in preparation for surgery.    Labs/ekg          ROS: No TIA's or unusual headaches, no dysphagia.  No prolonged cough. No dyspnea or chest pain on exertion.  No abdominal pain, change in bowel habits, black or bloody stools.  No urinary tract or BPH symptoms.  Positive reported pain in arthritic joint. Positive difficulty with gait. No skin rashes or issues.      Objective:    /80   Pulse 77   Ht 5' 1\" (1.549 m)   Wt 78.6 kg (173 lb 3.2 oz)   SpO2 98%   BMI 32.73 kg/m²       General Appearance: no distress, conversive  HEENT: PERRLA, conjuctiva normal; oropharynx clear; mucous membranes moist;   Neck:  Supple, no lymphadenopathy or thyromegaly  Lungs: breath sounds normal, normal respiratory effort, no retractions, expiratory effort normal  CV: normal heart sounds S1/S2, PMI normal   ABD: soft non tender, no masses , no hepatic or splenomegaly  EXT: DP pulses intact, no lymphadenopathy, no edema  Skin: normal turgor, normal texture, no rash  Psych: affect normal, mood normal  Neuro: AAOx3        The following portions of the " patient's history were reviewed and updated as appropriate: allergies, current medications, past family history, past medical history, past social history, past surgical history and problem list.     Past History:       Past Medical History:   Diagnosis Date   • Chronic kidney disease    • Disease of thyroid gland    • High cholesterol    • Hypothyroidism    • PONV (postoperative nausea and vomiting)     Past Surgical History:   Procedure Laterality Date   • BREAST CYST EXCISION  1982    cyst removals and aspirations-benign   • COLONOSCOPY     • HYSTERECTOMY      at age 64   • OOPHORECTOMY Bilateral     at age 64   • IL ARTHROPLASTY FEM CONDYLES/TIBIAL PLATEAU KNEE Left 11/9/2023    Procedure: ARTHROPLASTY KNEE TOTAL AND ALL ASSOCIATED PROCEDURES;  Surgeon: Rachel Momin MD;  Location: [facility] OR;  Service: Orthopedics   • STOMACH SURGERY            Social History     Tobacco Use   • Smoking status: Never   • Smokeless tobacco: Never   Vaping Use   • Vaping status: Never Used   Substance Use Topics   • Alcohol use: Not Currently   • Drug use: Not Currently     Family History   Problem Relation Age of Onset   • Heart disease Sister    • Breast cancer Sister 77   • Ovarian cancer Sister 66   • Breast cancer Sister 87   • Cervical cancer Paternal Aunt    • Breast cancer Cousin    • Breast cancer Cousin    • Hyperlipidemia Family           Allergies:     Allergies   Allergen Reactions   • Percocet [Oxycodone-Acetaminophen] GI Intolerance   • Statins      Other reaction(s): ELEVATED LFTS  Category: Adverse Reaction;    • Vicodin [Hydrocodone-Acetaminophen] GI Intolerance        Current Medications:     Current Outpatient Medications   Medication Instructions   • ascorbic acid (VITAMIN C) 500 mg, Oral, 2 times daily   • ascorbic acid (VITAMIN C) 500 mg, Oral, 2 times daily   • Aspirin Low Dose 81 MG EC tablet take 1 tablet by mouth twice a day AFTER TOTAL JOINT ARTHROPLASTY   • docusate sodium (COLACE) 100 mg, Oral,  "2 times daily   • ferrous sulfate 324 mg, Oral, 2 times daily before meals   • ferrous sulfate 324 mg, Oral, 2 times daily before meals   • folic acid (FOLVITE) 1 mg, Oral, Daily   • folic acid (FOLVITE) 1 mg, Oral, Daily   • levothyroxine 100 mcg, Oral, Daily   • methylPREDNISolone 4 MG tablet therapy pack Use as directed on package   • oxyCODONE (ROXICODONE) 5 immediate release tablet Take 1 tablets every 6 hrs as needed for pain control   • pravastatin (PRAVACHOL) 20 mg tablet Take with 40 mg Pravastatin, total daily dose 60 mg   • pravastatin (PRAVACHOL) 40 mg, Oral, Daily, Pt takes 60 mg every evening           PRE-OP WORKSHEET DATA    Assessment of Pre-Operative Risks     MLJ Quality Hard Stops:  BMI (<40) : Estimated body mass index is 32.73 kg/m² as calculated from the following:    Height as of this encounter: 5' 1\" (1.549 m).    Weight as of this encounter: 78.6 kg (173 lb 3.2 oz).    Hgb ( >11):   Lab Results   Component Value Date    HGB 12.9 03/12/2024     HbA1c (<7.5) :   Lab Results   Component Value Date    HGBA1C 6.0 (H) 03/12/2024     GFR (>60) (Less then 45 = Nephrology consult):  CrCl cannot be calculated (Patient's most recent lab result is older than the maximum 7 days allowed.).      Active Decompensated Chronic Conditions which would delay surgery  No acutely decompensated medical issues such as recent CVA, MI, new onset arrhythmia, severe aortic stenosis, CHF, uncontrolled COPD       Exercise Capacity: (if less the 4 mets consider functional assessment via cardiac stress testing or consultation)    Able to walk 2 blocks without symptoms?: Yes  Able to walk 1 flights without symptoms?: Yes    Assessment of intra and post operative respiratory, hemodynamic and thrombotic risks     Prior Anesthesia Reactions: Yes, post anesthesia nausea but seems form pain med; recommend use Tylenol first line     Personal history of venous thromboembolic disease? No    History of steroid use > 5 mg for >2 " weeks within last year? No      The patient's risk factors for cardiac complications include :  none    Huyen Zhou has an IN HOSPITAL cardiac risk of RCI RISK CLASS I (0 risk factors, risk of major cardiac compl. appr. 0.5%) based on RCRI calculator    Cardiac Risk Estimation: per the Revised Cardiac Risk Index (Circ. 100:1043, 1999),        Pre-Op Data Reviewed:       Laboratory Results: I have personally reviewed the pertinent laboratory results/reports     EKG:I have personally reviewed pertinent reports.  . I personally reviewed and interpreted available tracings in the electronic medical record    Narrative & Impression    Normal sinus rhythm  Minimal voltage criteria for LVH, may be normal variant  Confirmed by Angélica Baker ([id number]) on 3/12/2024 4:00:58 PM       OLD RECORDS: reviewed old records in the chart review section if EHR on day of visit.    Previous cardiopulmonary studies within the past year:  Echocardiogram: no  Cardiac Catheterization: no  Stress Test: no      Time of visit including pre-visit chart review, visit and post-visit coordination of plan and care , review of pre-surgical lab work, preparation and time spent documenting note in electronic medical record, time spent face-to-face in physical examination answering patient questions by care team 35 minutes             [facility]

## 2024-03-11 NOTE — ASSESSMENT & PLAN NOTE
Lab Results   Component Value Date    EGFR 60 10/17/2023    EGFR 53 09/12/2023    EGFR 54 05/21/2023    CREATININE 0.89 10/17/2023    CREATININE 0.99 09/12/2023    CREATININE 0.98 05/21/2023   Level 3a  Baseline as above  Avoid nephrotoxic medications  Avoid hypotension in the post operative setting  Monitor bmp

## 2024-03-12 ENCOUNTER — LAB REQUISITION (OUTPATIENT)
Dept: LAB | Facility: HOSPITAL | Age: 84
End: 2024-03-12
Payer: MEDICARE

## 2024-03-12 ENCOUNTER — APPOINTMENT (OUTPATIENT)
Dept: LAB | Facility: HOSPITAL | Age: 84
End: 2024-03-12
Attending: PHYSICIAN ASSISTANT
Payer: MEDICARE

## 2024-03-12 DIAGNOSIS — Z01.818 PREOP TESTING: ICD-10-CM

## 2024-03-12 DIAGNOSIS — N18.31 STAGE 3A CHRONIC KIDNEY DISEASE (HCC): ICD-10-CM

## 2024-03-12 DIAGNOSIS — E03.9 HYPOTHYROIDISM, UNSPECIFIED TYPE: ICD-10-CM

## 2024-03-12 DIAGNOSIS — M17.11 PRIMARY OSTEOARTHRITIS OF RIGHT KNEE: ICD-10-CM

## 2024-03-12 DIAGNOSIS — E78.2 MIXED HYPERLIPIDEMIA: ICD-10-CM

## 2024-03-12 DIAGNOSIS — M17.11 UNILATERAL PRIMARY OSTEOARTHRITIS, RIGHT KNEE: ICD-10-CM

## 2024-03-12 LAB
ALBUMIN SERPL BCP-MCNC: 4 G/DL (ref 3.5–5)
ALBUMIN SERPL BCP-MCNC: 4 G/DL (ref 3.5–5)
ALP SERPL-CCNC: 71 U/L (ref 34–104)
ALP SERPL-CCNC: 71 U/L (ref 34–104)
ALT SERPL W P-5'-P-CCNC: 10 U/L (ref 7–52)
ALT SERPL W P-5'-P-CCNC: 10 U/L (ref 7–52)
ANION GAP SERPL CALCULATED.3IONS-SCNC: 6 MMOL/L (ref 4–13)
ANION GAP SERPL CALCULATED.3IONS-SCNC: 6 MMOL/L (ref 4–13)
APTT PPP: 31 SECONDS (ref 23–37)
AST SERPL W P-5'-P-CCNC: 15 U/L (ref 13–39)
AST SERPL W P-5'-P-CCNC: 16 U/L (ref 13–39)
ATRIAL RATE: 60 BPM
ATRIAL RATE: 60 BPM
BASOPHILS # BLD AUTO: 0.06 THOUSANDS/ÂΜL (ref 0–0.1)
BASOPHILS NFR BLD AUTO: 1 % (ref 0–1)
BILIRUB SERPL-MCNC: 0.52 MG/DL (ref 0.2–1)
BILIRUB SERPL-MCNC: 0.52 MG/DL (ref 0.2–1)
BUN SERPL-MCNC: 17 MG/DL (ref 5–25)
BUN SERPL-MCNC: 18 MG/DL (ref 5–25)
CALCIUM SERPL-MCNC: 9.4 MG/DL (ref 8.4–10.2)
CALCIUM SERPL-MCNC: 9.5 MG/DL (ref 8.4–10.2)
CHLORIDE SERPL-SCNC: 105 MMOL/L (ref 96–108)
CHLORIDE SERPL-SCNC: 105 MMOL/L (ref 96–108)
CHOLEST SERPL-MCNC: 262 MG/DL
CO2 SERPL-SCNC: 29 MMOL/L (ref 21–32)
CO2 SERPL-SCNC: 29 MMOL/L (ref 21–32)
CREAT SERPL-MCNC: 0.93 MG/DL (ref 0.6–1.3)
CREAT SERPL-MCNC: 0.94 MG/DL (ref 0.6–1.3)
EOSINOPHIL # BLD AUTO: 0.19 THOUSAND/ÂΜL (ref 0–0.61)
EOSINOPHIL NFR BLD AUTO: 3 % (ref 0–6)
ERYTHROCYTE [DISTWIDTH] IN BLOOD BY AUTOMATED COUNT: 13.6 % (ref 11.6–15.1)
EST. AVERAGE GLUCOSE BLD GHB EST-MCNC: 126 MG/DL
GFR SERPL CREATININE-BSD FRML MDRD: 57 ML/MIN/1.73SQ M
GFR SERPL CREATININE-BSD FRML MDRD: 57 ML/MIN/1.73SQ M
GLUCOSE P FAST SERPL-MCNC: 86 MG/DL (ref 65–99)
GLUCOSE P FAST SERPL-MCNC: 86 MG/DL (ref 65–99)
HBA1C MFR BLD: 6 %
HCT VFR BLD AUTO: 39.8 % (ref 34.8–46.1)
HDLC SERPL-MCNC: 54 MG/DL
HGB BLD-MCNC: 12.9 G/DL (ref 11.5–15.4)
IMM GRANULOCYTES # BLD AUTO: 0.02 THOUSAND/UL (ref 0–0.2)
IMM GRANULOCYTES NFR BLD AUTO: 0 % (ref 0–2)
INR PPP: 0.97 (ref 0.84–1.19)
LDLC SERPL CALC-MCNC: 174 MG/DL (ref 0–100)
LYMPHOCYTES # BLD AUTO: 1.8 THOUSANDS/ÂΜL (ref 0.6–4.47)
LYMPHOCYTES NFR BLD AUTO: 30 % (ref 14–44)
MCH RBC QN AUTO: 31.9 PG (ref 26.8–34.3)
MCHC RBC AUTO-ENTMCNC: 32.4 G/DL (ref 31.4–37.4)
MCV RBC AUTO: 99 FL (ref 82–98)
MONOCYTES # BLD AUTO: 0.42 THOUSAND/ÂΜL (ref 0.17–1.22)
MONOCYTES NFR BLD AUTO: 7 % (ref 4–12)
NEUTROPHILS # BLD AUTO: 3.6 THOUSANDS/ÂΜL (ref 1.85–7.62)
NEUTS SEG NFR BLD AUTO: 59 % (ref 43–75)
NRBC BLD AUTO-RTO: 0 /100 WBCS
P AXIS: 31 DEGREES
P AXIS: 33 DEGREES
PLATELET # BLD AUTO: 265 THOUSANDS/UL (ref 149–390)
PMV BLD AUTO: 11.6 FL (ref 8.9–12.7)
POTASSIUM SERPL-SCNC: 4.2 MMOL/L (ref 3.5–5.3)
POTASSIUM SERPL-SCNC: 4.2 MMOL/L (ref 3.5–5.3)
PR INTERVAL: 180 MS
PR INTERVAL: 180 MS
PROT SERPL-MCNC: 7.3 G/DL (ref 6.4–8.4)
PROT SERPL-MCNC: 7.4 G/DL (ref 6.4–8.4)
PROTHROMBIN TIME: 13.5 SECONDS (ref 11.6–14.5)
QRS AXIS: -8 DEGREES
QRS AXIS: -8 DEGREES
QRSD INTERVAL: 80 MS
QRSD INTERVAL: 82 MS
QT INTERVAL: 408 MS
QT INTERVAL: 410 MS
QTC INTERVAL: 408 MS
QTC INTERVAL: 410 MS
RBC # BLD AUTO: 4.04 MILLION/UL (ref 3.81–5.12)
SODIUM SERPL-SCNC: 140 MMOL/L (ref 135–147)
SODIUM SERPL-SCNC: 140 MMOL/L (ref 135–147)
T WAVE AXIS: 0 DEGREES
T WAVE AXIS: 1 DEGREES
TRIGL SERPL-MCNC: 172 MG/DL
TSH SERPL DL<=0.05 MIU/L-ACNC: 1.53 UIU/ML (ref 0.45–4.5)
VENTRICULAR RATE: 60 BPM
VENTRICULAR RATE: 60 BPM
WBC # BLD AUTO: 6.09 THOUSAND/UL (ref 4.31–10.16)

## 2024-03-12 PROCEDURE — 83036 HEMOGLOBIN GLYCOSYLATED A1C: CPT

## 2024-03-12 PROCEDURE — 86850 RBC ANTIBODY SCREEN: CPT | Performed by: PHYSICIAN ASSISTANT

## 2024-03-12 PROCEDURE — 85610 PROTHROMBIN TIME: CPT

## 2024-03-12 PROCEDURE — 86901 BLOOD TYPING SEROLOGIC RH(D): CPT | Performed by: PHYSICIAN ASSISTANT

## 2024-03-12 PROCEDURE — 93010 ELECTROCARDIOGRAM REPORT: CPT | Performed by: INTERNAL MEDICINE

## 2024-03-12 PROCEDURE — 36415 COLL VENOUS BLD VENIPUNCTURE: CPT

## 2024-03-12 PROCEDURE — 86900 BLOOD TYPING SEROLOGIC ABO: CPT | Performed by: PHYSICIAN ASSISTANT

## 2024-03-12 PROCEDURE — 80053 COMPREHEN METABOLIC PANEL: CPT

## 2024-03-12 PROCEDURE — 85730 THROMBOPLASTIN TIME PARTIAL: CPT

## 2024-03-12 PROCEDURE — 84443 ASSAY THYROID STIM HORMONE: CPT

## 2024-03-12 PROCEDURE — 80061 LIPID PANEL: CPT

## 2024-03-12 PROCEDURE — 93005 ELECTROCARDIOGRAM TRACING: CPT

## 2024-03-12 PROCEDURE — 85025 COMPLETE CBC W/AUTO DIFF WBC: CPT

## 2024-03-19 ENCOUNTER — OFFICE VISIT (OUTPATIENT)
Age: 84
End: 2024-03-19
Payer: MEDICARE

## 2024-03-19 ENCOUNTER — CONSULT (OUTPATIENT)
Dept: ANESTHESIOLOGY | Facility: CLINIC | Age: 84
End: 2024-03-19
Payer: MEDICARE

## 2024-03-19 VITALS
SYSTOLIC BLOOD PRESSURE: 134 MMHG | HEIGHT: 61 IN | BODY MASS INDEX: 32.7 KG/M2 | WEIGHT: 173.2 LBS | HEART RATE: 77 BPM | OXYGEN SATURATION: 98 % | DIASTOLIC BLOOD PRESSURE: 80 MMHG

## 2024-03-19 DIAGNOSIS — E03.9 HYPOTHYROIDISM, UNSPECIFIED TYPE: ICD-10-CM

## 2024-03-19 DIAGNOSIS — M17.11 PRIMARY OSTEOARTHRITIS OF RIGHT KNEE: ICD-10-CM

## 2024-03-19 DIAGNOSIS — Z01.818 PREOP TESTING: ICD-10-CM

## 2024-03-19 DIAGNOSIS — N18.31 STAGE 3A CHRONIC KIDNEY DISEASE (HCC): ICD-10-CM

## 2024-03-19 DIAGNOSIS — Z01.818 PREOPERATIVE CLEARANCE: ICD-10-CM

## 2024-03-19 DIAGNOSIS — M17.11 ARTHRITIS OF RIGHT KNEE: Primary | ICD-10-CM

## 2024-03-19 DIAGNOSIS — Z01.89 ENCOUNTER FOR GERIATRIC ASSESSMENT: Primary | ICD-10-CM

## 2024-03-19 DIAGNOSIS — Z96.652 S/P TOTAL KNEE ARTHROPLASTY, LEFT: ICD-10-CM

## 2024-03-19 PROCEDURE — 99213 OFFICE O/P EST LOW 20 MIN: CPT | Performed by: NURSE PRACTITIONER

## 2024-03-19 PROCEDURE — G2211 COMPLEX E/M VISIT ADD ON: HCPCS | Performed by: INTERNAL MEDICINE

## 2024-03-19 PROCEDURE — 99215 OFFICE O/P EST HI 40 MIN: CPT | Performed by: INTERNAL MEDICINE

## 2024-03-19 NOTE — PROGRESS NOTES
[facility]  CONSULT: GERIATRIC SURGERY   Assessment/Plan:  83 year old female referred to [facility] for pre-surgery geriatric screening 2.2 age   Other consult concerns include: surgical optimization & BEST program   This [facility] appointment is strictly for a geriatric assessment 2.2 advanced age.  Patient was previously seen in SOC by Dr. Hinojosa and his team for medical clearance. Please refer to his note for medical history.  Consult concerns: age   Patient is scheduled on   Case: [id number] Date/Time: 04/04/24 1000   Procedure: ARTHROPLASTY KNEE TOTAL AND ALL ASSOCIATED PROCEDURES (Right: Knee)   Anesthesia type: Choice   Diagnosis: Primary osteoarthritis of right knee [M17.11]   Pre-op diagnosis: Primary osteoarthritis of right knee [M17.11]   Location:  OR ROOM 01 / [facility]   Surgeons: Rachel Momin MD     No problem-specific Assessment & Plan notes found for this encounter.     Problem List Items Addressed This Visit    None  Visit Diagnoses       Primary osteoarthritis of right knee      Seen for SO   Seen for geriatrics   Received    METS 5.8  Started BEST   At risk for post-op BABAR - no   At risk for post-op SSI - no   BEST   Breathing- instructed to exercise lungs prior to surgery via IS  Eat- discussed increasing protein intake prior to surgery   Sleep/stress- encouraged 8-10 sleep @ night, stress reduction, avoid sick contacts and handwashing   Train- encouraged to remain active      Geriatric screening     Preop testing         See Geriatric Assessment below...  There were no cognitive concerns identified today  There were no geriatric concerns identified today  Recommend inpatient geriatric consult after surgery due to advanced age, having surgery, receiving anesthetics, and recent surgery in the past  Cognitive Assessment: 4  TUG <15 sec:no   Falls (last 6 months): yes, no injuries  Hand  score:23.33  -Attempt 1:26  -Attempt 2:24  -Attempt  3:20  Malcolm Total Score: 20  PHQ- 9 Depression Scale:0  Nutrition Assessment Score:14  METS:5.81  Incentive Spirometry Level: 1500  Health goals:  -What are your overall health goals? No pain     -What brings you strength? family     -What activities are important to you    High risk for post-op delirium RT age, inpatient surgery  Mini cognitive screen level  Inpatient geriatric consult ordered for post-op   Delirium precautions on admit  High risk for post- op pneumonia RT age, inpatient surgery  Incentive spirometer taught to the patient and given  Instructed to start lung exercises tonight  Non smoker  High fall risk RT age, inpatient surgery  Fall precautions on admit  Standard PT eval after surgery for safe discharge  Pre- frail, Frail RT age, inpatient surgery  Inpatient geriatric consult ordered for postop RT  Nutritional supplements  On admission aspiration precautions, skin precautions, delirium precautions, fall precautions  Caregiver strain  No    Be your BEST pathway   Breath, eat, sleep/stress relief, and tracking exercise               Subjective:      Patient ID: Huyen Zhou is a 83 y.o. female who was referred to [facility] for pre-surgery geriatric screening.  She has been having ongoing right knee pain is electing to have a right knee replacement.  She has a history of having a left knee replaced in November 2023.  She tolerated the procedure well.  She is now looking forward to having the right knee done.    Met patient in [facility].  She arrived with her daughter.  She walked independently.  No walker and/or no cane use.  Lives at home with her .  She is independent with all ADLs.  She does care for her disabled handicapped son.  She admits that the first surgery in November 2023 went well.  She does complain of some cognitive changes saying she has increased forgetfulness since the surgery.  She feels she is not thinking as fast as she used to.  I questioned if she needed more time to recover before  undergoing her next knee surgery.  Patient declined stating she wants to get the right knee taken care of as soon as possible because it is painful.  They have goals to limit pain medication use after surgery.  Stick to Tylenol around-the-clock.  And get moving quicker than she did last time.  She has her daughter to help.  Her daughter lives around the corner.  Her daughter will be staying with her after surgery.        I did have the pleasure meeting her in November 2023 before her left knee.  I am pleased to say that her geriatric scores remain the same.  I do recommend inpatient geriatric consult after surgery if the hospital stays extended.  Consult should not hold up discharge.    As always we discussed having your BEST surgery, and BEST recovery.  Surgery goals reviewed today.      Breathing exercises   Patient was encouraged to begin lung exercises today.  This could be accomplished through deep breathing and cough exercises.  Patient was taught how to use an incentive spirometer.  Return demonstration provided.    Eating/nutrition   Encouraged patient to increase oral protein intake prior to surgery.    This can be accomplished by consuming chicken, fish, tuna fish, cottage cheese, cheese, eggs, Greek yogurt, and protein shakes as needed.  I encouraged use of protein shakes such ENLIVE.  I also recommended making your own protein shakes with protein powder.   Sleep/Stress management  Patient was encouraged to rest their body prior to surgery.  Encouraged attempting to get 8 hours of sleep at night.  Avoid stress.  Avoid sick contacts.  Encouraged to find a relaxing hobby such as reading, meditation, listening to music.  Training exercises  Patient was encouraged to remain active as possible.  Today bilateral lower extremity generic exercises were taught for muscle strengthening and balance.  All exercises to be done sitting down.       The following portions of the patient's history were reviewed and  updated as appropriate: allergies, current medications, past family history, past medical history, past social history, past surgical history, and problem list.    Review of Systems   All other systems reviewed and are negative.        Objective:      There were no vitals taken for this visit.         Physical Exam  Neurological:      General: No focal deficit present.      Mental Status: She is alert and oriented to person, place, and time. Mental status is at baseline.   Psychiatric:         Mood and Affect: Mood normal.         Behavior: Behavior normal.         Thought Content: Thought content normal.         Judgment: Judgment normal.

## 2024-03-19 NOTE — PROGRESS NOTES
[facility]  CONSULT       Patient has the ability to take their vital signs at home?  Yes/no   Allergies reviewed today   PMH reviewed today   Medications reviewed today     See Geriatric Assessment below...  Cognitive Assessment:   CAM:   TUG <15 sec:  Falls (last 6 months): yes, no injuries  Hand  score:23.33  -Attempt 1:26  -Attempt 2:24  -Attempt 3:20  Malcolm Total Score: 20  PHQ- 9 Depression Scale:0  Nutrition Assessment Score:14  METS:5.81  Incentive Spirometry Level: 1500  Health goals:  -What are your overall health goals? No pain    -What brings you strength? family    -What activities are important to you? Read, puzzles-     As always we discussed having your BEST surgery, and BEST recovery.  Surgery goals reviewed today.      Breathing exercises   Patient was encouraged to begin lung exercises today.  This could be accomplished through deep breathing and cough exercises.  Patient was taught how to use an incentive spirometer.  Return demonstration provided.    Eating/nutrition   Encouraged patient to increase oral protein intake prior to surgery.  This can be accomplished by consuming chicken, fish, tuna fish, cottage cheese, cheese, eggs, Greek yogurt, and protein shakes as needed.  I encouraged use of protein shakes such ENLIVE.  I also recommended making your own protein shakes with protein powder.     Sleep/Stress management  Patient was encouraged to rest their body prior to surgery.  Encouraged attempting to get 8 hours of sleep at night.  Avoid stress.  Avoid sick contacts.  Encouraged to find a relaxing hobby such as reading, meditation, listening to music.  Training exercises  Patient was encouraged to remain active as possible.  Today bilateral lower extremity generic exercises were taught for muscle strengthening and balance.  All exercises to be done sitting down.

## 2024-03-20 ENCOUNTER — EVALUATION (OUTPATIENT)
Dept: PHYSICAL THERAPY | Facility: CLINIC | Age: 84
End: 2024-03-20
Payer: MEDICARE

## 2024-03-20 DIAGNOSIS — M17.11 PRIMARY OSTEOARTHRITIS OF RIGHT KNEE: Primary | ICD-10-CM

## 2024-03-20 DIAGNOSIS — Z01.818 PREOP TESTING: ICD-10-CM

## 2024-03-20 PROCEDURE — 97110 THERAPEUTIC EXERCISES: CPT

## 2024-03-20 PROCEDURE — 97161 PT EVAL LOW COMPLEX 20 MIN: CPT

## 2024-03-20 NOTE — PROGRESS NOTES
"PT Evaluation     Today's date: 3/20/2024  Patient name: Huyen Zhou  : 1940  MRN: [id number]  Referring provider: Rodger Altman*  Dx:   Encounter Diagnosis     ICD-10-CM    1. Primary osteoarthritis of right knee  M17.11 Ambulatory referral to Physical Therapy      2. Preop testing  Z01.818 Ambulatory referral to Physical Therapy                     Assessment  Assessment details: Huyen Zhou is a 83 y.o. female presenting to physical therapy for an initial evaluation with MD referral of primary osteoarthritis of the right knee and s/p L TKA on 23. She will be undergoing a R TKA on 24. The patient demonstrates WFL bilateral knee ROM, however is painful on the right lower extremity. She demonstrates decreased lower extremity strength bilaterally and subjectively reports difficulty with household and community level ambulation due to pain. She does utilize a SPC at times to assist with ambulation when her knee is more painful. These deficits have limited the patient's ability to complete ADLs, avocational responsibilities, and recreational activities. This patient would benefit from OP PT services to address their impairments and functional limitations to maximize functional mobility. The patient was educated on continued completion of her HEP provided to her following her last round of therapy. She demonstrated/verbalized understanding.    Impairments: abnormal gait, abnormal or restricted ROM, activity intolerance, impaired balance, impaired physical strength, lacks appropriate home exercise program, pain with function and weight-bearing intolerance    Symptom irritability: moderateUnderstanding of Dx/Px/POC: excellent   Prognosis: good    Goals  STG to be achieved in 4 weeks:   The patient will report a decrease in right knee \"at worst\" subjective pain rating score to at least 6/10 to allow for improved tolerance for weightbearing activities.   The patient will increase right knee flexion " "AROM to at least 90 degrees to allow for improved functional mobility.   The patient will increase right knee extension MMT score to at least 4/5 to allow for improved functional mobility.     LTG to be achieved by DC:   The patient will be independent in comprehensive HEP.   The patient will report a decrease in right knee \"at worst\" subjective pain rating score to at least 3/10 to allow for improved tolerance  for functional mobility.   The patient will improve right knee flexion and extension AROM to WFL to allow for improved functional mobility.   The patient will increase right knee flexion and extension MMT score to WFL to allow for improved functional mobility.   The patient will be able to complete one full flight of stairs to her basement for completion of laundry with minimal to no difficulty.   The patient will be able to complete household chores and care for her son with minimal to no difficulties due to knee pain.      Plan  Patient would benefit from: skilled physical therapy  Planned modality interventions: thermotherapy: hydrocollator packs, TENS and cryotherapy  Planned therapy interventions: manual therapy, joint mobilization, balance/weight bearing training, neuromuscular re-education, patient education, flexibility, strengthening, stretching, therapeutic activities, therapeutic exercise, gait training and home exercise program  Frequency: 2x week  Duration in weeks: 12  Plan of Care beginning date: 3/20/2024  Plan of Care expiration date: 6/12/2024  Treatment plan discussed with: patient        Subjective Evaluation    History of Present Illness  Mechanism of injury: Huyen presents to OP PT with chief complaints of lower extremity weakness. She had a L TKA on 11/9/23 and will be undergoing a R TKA on 4/4/24. She reports that since she was last here, she did do some of her exercises that she was provided, however not as regularly as she should have. 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 She says that between this and her usual housework, she gets too tired to be able to do her exercises. She reports that her right knee continues to be very painful and does lock up on her throughout the day. When standing and walking she finds herself keeping her knee slightly bent which helps her with pain and keeps the knee from locking up on her. She says that she is able to walk at home without her cane, however does find herself taking it with her on her errands just in case she needs the additional support. She reports that she takes aspirin, 1-2x per day, and uses voltaren gel on the knees to give her some pain relief.   Patient Goals  Patient goals for therapy: decreased pain and increased strength  Patient goal: to get moving and walking better  Pain  Current pain ratin  At best pain ratin  At worst pain ratin  Location: bilateral knees  Quality: dull ache and throbbing  Relieving factors: medications, change in position and relaxation  Aggravating factors: standing, walking and stair climbing  Progression: no change    Social Support  Steps to enter house: yes  Stairs in house: yes (to basement)   Lives in: one-story house  Lives with: spouse and adult children    Employment status: not working    Diagnostic Tests  X-ray: abnormal  Treatments  Previous treatment: medication and physical therapy  Current treatment: physical therapy        Objective     Palpation     Right   No palpable tenderness to the lateral gastrocnemius and medial gastrocnemius.   Tenderness of the distal biceps femoris, distal semimembranosus and distal semitendinosus.     Tenderness     Right Knee   Tenderness in the lateral joint line, LCL (distal), LCL (proximal), MCL (distal), MCL (proximal), medial joint line and popliteal fossa. No tenderness in the inferior patella, lateral patella, medial patella, patellar tendon, quadriceps tendon and  superior patella.     Active Range of Motion   Left Knee   Flexion: 115 degrees   Extensor la degrees     Right Knee   Flexion: 110 degrees with pain  Extensor la degrees     Passive Range of Motion   Left Knee   Normal passive range of motion    Right Knee   Flexion: 115 degrees with pain  Extension: 0 degrees     Mobility   Patellar Mobility:   Left Knee   Hypomobile: left medial, left lateral, left superior and left inferior    Right Knee   Hypomobile: medial, lateral, superior and inferior     Strength/Myotome Testing     Left Hip   Planes of Motion   Flexion: 4-  Abduction: 4-    Right Hip   Planes of Motion   Flexion: 4-  Abduction: 4-    Left Knee   Flexion: 4  Extension: 4  Quadriceps contraction: good    Right Knee   Flexion: 4  Extension: 4  Quadriceps contraction: good    Ambulation   Weight-Bearing Status   Weight-Bearing Status (Left): full weight bearing   Weight-Bearing Status (Right): full weight-bearing    Assistive device used: none and single point cane    Ambulation: Level Surfaces   Ambulation without assistive device: independent    Observational Gait   Gait: antalgic     Additional Observational Gait Details  Decreased knee flexion in swing phases of gait bilaterally, increased lateral weight shifting bilaterally              Precautions: s/p L TKA 23, R TKA 24  POC expires Unit limit Auth  expiration date PT/OT + Visit Limit?   24 N/A N/A BOMN                 Visit/Unit Tracking  AUTH Status:  Date 3/20              BOMN Used 1               Remaining                     Manuals 3/20                                                                Neuro Re-Ed                                                                                                        Ther Ex             Rec bike Fwd no tension 10'            LAQ with weight             Hamstring curls with TB seated vs standing with weight             Standing hip 3 ways with weight             Lateral walks with  TB             Supine SLR             Pt education PT POC, HEP review                          Ther Activity             TG squats             Fwd step ups              Lateral step ups              Gait Training                                       Modalities

## 2024-03-21 DIAGNOSIS — M17.11 PRIMARY OSTEOARTHRITIS OF RIGHT KNEE: ICD-10-CM

## 2024-03-21 DIAGNOSIS — Z01.818 PREOP TESTING: ICD-10-CM

## 2024-03-21 RX ORDER — FOLIC ACID 1 MG/1
1000 TABLET ORAL DAILY
Qty: 30 TABLET | Refills: 5 | Status: SHIPPED | OUTPATIENT
Start: 2024-03-21

## 2024-03-22 ENCOUNTER — OFFICE VISIT (OUTPATIENT)
Dept: PHYSICAL THERAPY | Facility: CLINIC | Age: 84
End: 2024-03-22
Payer: MEDICARE

## 2024-03-22 DIAGNOSIS — Z01.818 PREOP TESTING: ICD-10-CM

## 2024-03-22 DIAGNOSIS — M17.11 PRIMARY OSTEOARTHRITIS OF RIGHT KNEE: Primary | ICD-10-CM

## 2024-03-22 PROCEDURE — 97110 THERAPEUTIC EXERCISES: CPT

## 2024-03-22 PROCEDURE — 97530 THERAPEUTIC ACTIVITIES: CPT

## 2024-03-22 NOTE — PROGRESS NOTES
"Daily Note     Today's date: 3/22/2024  Patient name: Huyen Zhou  : 1940  MRN: [id number]  Referring provider: Rodger Altman*  Dx:   Encounter Diagnosis     ICD-10-CM    1. Primary osteoarthritis of right knee  M17.11       2. Preop testing  Z01.818                      Subjective: Patient reports that her right knee just isn't cooperating with her today.       Objective: See treatment diary below      Assessment: Tolerated treatment well. Able to complete all exercises without increased pain in the knees. She did have muscular fatigue and soreness after session. Patient demonstrated fatigue post treatment, exhibited good technique with therapeutic exercises, and would benefit from continued PT      Plan: Continue per plan of care.      Precautions: s/p L TKA 23, R TKA 24  POC expires Unit limit Auth  expiration date PT/OT + Visit Limit?   24 N/A N/A BOMN                 Visit/Unit Tracking  AUTH Status:  Date 3/20 3/22             BOMN Used 1 2              Remaining                     Manuals 3/20 3/22                                                               Neuro Re-Ed                                                                                                        Ther Ex             Rec bike Fwd no tension 10' Fwd no tension 10'           LAQ with weight  1.5# 2x10 B/L           Hamstring curls with TB seated vs standing with weight B/L  Standing 1.5# 2x10            Standing hip 3 ways with weight B/L  1.5# x10 ea dir            Lateral walks with TB             Supine SLR B/L  2x10            SAQ with ankle weights             Pt education PT POC, HEP review                          Ther Activity             TG squats  L19 2x10           Fwd step ups  B/L  6\" 2x10            Lateral step ups B/L              Gait Training                                       Modalities                                            "

## 2024-03-23 DIAGNOSIS — E78.2 MIXED HYPERLIPIDEMIA: ICD-10-CM

## 2024-03-25 ENCOUNTER — OFFICE VISIT (OUTPATIENT)
Dept: PHYSICAL THERAPY | Facility: CLINIC | Age: 84
End: 2024-03-25
Payer: MEDICARE

## 2024-03-25 ENCOUNTER — OFFICE VISIT (OUTPATIENT)
Dept: FAMILY MEDICINE CLINIC | Facility: CLINIC | Age: 84
End: 2024-03-25
Payer: MEDICARE

## 2024-03-25 VITALS
DIASTOLIC BLOOD PRESSURE: 76 MMHG | HEART RATE: 80 BPM | WEIGHT: 174 LBS | SYSTOLIC BLOOD PRESSURE: 136 MMHG | BODY MASS INDEX: 32.85 KG/M2 | TEMPERATURE: 98.2 F | OXYGEN SATURATION: 97 % | HEIGHT: 61 IN

## 2024-03-25 DIAGNOSIS — E55.9 VITAMIN D DEFICIENCY: ICD-10-CM

## 2024-03-25 DIAGNOSIS — E03.9 HYPOTHYROIDISM, UNSPECIFIED TYPE: ICD-10-CM

## 2024-03-25 DIAGNOSIS — N18.31 STAGE 3A CHRONIC KIDNEY DISEASE (HCC): Primary | ICD-10-CM

## 2024-03-25 DIAGNOSIS — R73.03 PREDIABETES: ICD-10-CM

## 2024-03-25 DIAGNOSIS — M17.11 PRIMARY OSTEOARTHRITIS OF RIGHT KNEE: Primary | ICD-10-CM

## 2024-03-25 DIAGNOSIS — R53.83 OTHER FATIGUE: ICD-10-CM

## 2024-03-25 DIAGNOSIS — E78.2 MIXED HYPERLIPIDEMIA: ICD-10-CM

## 2024-03-25 DIAGNOSIS — M17.11 ARTHRITIS OF RIGHT KNEE: ICD-10-CM

## 2024-03-25 DIAGNOSIS — Z01.818 PREOP TESTING: ICD-10-CM

## 2024-03-25 PROCEDURE — 99214 OFFICE O/P EST MOD 30 MIN: CPT | Performed by: FAMILY MEDICINE

## 2024-03-25 PROCEDURE — 97530 THERAPEUTIC ACTIVITIES: CPT

## 2024-03-25 PROCEDURE — G2211 COMPLEX E/M VISIT ADD ON: HCPCS | Performed by: FAMILY MEDICINE

## 2024-03-25 PROCEDURE — 97110 THERAPEUTIC EXERCISES: CPT

## 2024-03-25 RX ORDER — PRAVASTATIN SODIUM 40 MG
TABLET ORAL
Qty: 90 TABLET | Refills: 0 | Status: SHIPPED | OUTPATIENT
Start: 2024-03-25

## 2024-03-25 NOTE — ASSESSMENT & PLAN NOTE
Lab Results   Component Value Date    EGFR 57 03/12/2024    EGFR 57 03/12/2024    EGFR 60 10/17/2023    CREATININE 0.94 03/12/2024    CREATININE 0.93 03/12/2024    CREATININE 0.89 10/17/2023   CKD stable

## 2024-03-25 NOTE — PROGRESS NOTES
Assessment/Plan:         Problem List Items Addressed This Visit        Endocrine    Hypothyroidism     On Levothyroxine and TSH in range          Relevant Orders    TSH, 3rd generation       Musculoskeletal and Integument    Arthritis of right knee     Upcoming knee surgery planned             Genitourinary    Stage 3a chronic kidney disease (HCC) - Primary     Lab Results   Component Value Date    EGFR 57 03/12/2024    EGFR 57 03/12/2024    EGFR 60 10/17/2023    CREATININE 0.94 03/12/2024    CREATININE 0.93 03/12/2024    CREATININE 0.89 10/17/2023   CKD stable         Relevant Orders    Comprehensive metabolic panel    CBC and differential       Other    Hyperlipidemia     Stable/improved on pravastatin 60 mg - continue same         Relevant Orders    Lipid Panel with Direct LDL reflex    Comprehensive metabolic panel    Other fatigue     Multifactorial          Relevant Orders    Vitamin D 25 hydroxy    CBC and differential    Prediabetes     Low carb diet, exercise         Relevant Orders    Hemoglobin A1C    Vitamin D deficiency     Advised to take 1000 IU vit d daily         Relevant Orders    Vitamin D 25 hydroxy         Subjective:      Patient ID: Huyen Zhou is a 83 y.o. female.    83 year old here with her daughter.  Lipids - remain elevated,  but improved. Taking total 60 mg Pravastatin daily.  Has a long hx of elevated lipids  CKD - GFR 57.  A1c 6%   She would like to lose weight - plans to work on this after surgery - having knee replacement soon  Asking about Vitamin D level - last check was in September was 31.    She reports being tired and she wonders if she has inflammation in her body. Daughter thinks it is related to recent L knee replacement (November) and upcoming surgery and taking care of handicapped son.  She does not sleep well - interrupted.     Hyperlipidemia  Pertinent negatives include no chest pain or shortness of breath.       The following portions of the patient's history were  reviewed and updated as appropriate:   Past Medical History:  She has a past medical history of Chronic kidney disease, Disease of thyroid gland, High cholesterol, Hypothyroidism, and PONV (postoperative nausea and vomiting).,  _______________________________________________________________________  Medical Problems:  does not have any pertinent problems on file.,  _______________________________________________________________________  Past Surgical History:   has a past surgical history that includes Stomach surgery; Oophorectomy (Bilateral); Breast cyst excision (1982); Hysterectomy; Colonoscopy; and pr arthroplasty fem condyles/tibial plateau knee (Left, 11/9/2023).,  _______________________________________________________________________  Family History:  family history includes Breast cancer in her cousin and cousin; Breast cancer (age of onset: 77) in her sister; Breast cancer (age of onset: 87) in her sister; Cervical cancer in her paternal aunt; Heart disease in her sister; Hyperlipidemia in her family; Ovarian cancer (age of onset: 66) in her sister.,  _______________________________________________________________________  Social History:   reports that she has never smoked. She has never used smokeless tobacco. She reports that she does not currently use alcohol. She reports that she does not currently use drugs.,  _______________________________________________________________________  Allergies:  is allergic to percocet [oxycodone-acetaminophen], statins, and vicodin [hydrocodone-acetaminophen]..  _______________________________________________________________________  Current Outpatient Medications   Medication Sig Dispense Refill   • ascorbic acid (VITAMIN C) 500 MG tablet Take 1 tablet (500 mg total) by mouth 2 (two) times a day 60 tablet 1   • Aspirin Low Dose 81 MG EC tablet take 1 tablet by mouth twice a day AFTER TOTAL JOINT ARTHROPLASTY (Patient not taking: Reported on 1/30/2024) 70 tablet 0  "  • ferrous sulfate 324 (65 Fe) mg Take 1 tablet (324 mg total) by mouth 2 (two) times a day before meals 60 tablet 1   • folic acid (FOLVITE) 1 mg tablet take 1 tablet by mouth once daily 30 tablet 5   • levothyroxine 100 mcg tablet Take 1 tablet (100 mcg total) by mouth daily 90 tablet 3   • oxyCODONE (ROXICODONE) 5 immediate release tablet Take 1 tablets every 6 hrs as needed for pain control (Patient not taking: Reported on 1/30/2024) 30 tablet 0   • pravastatin (PRAVACHOL) 20 mg tablet Take with 40 mg Pravastatin, total daily dose 60 mg 90 tablet 3   • pravastatin (PRAVACHOL) 40 mg tablet take 1 tablet by mouth daily (60 MGS EVERY EVENING) 90 tablet 0     No current facility-administered medications for this visit.     _______________________________________________________________________  Review of Systems   Constitutional:  Positive for fatigue. Negative for activity change.   Respiratory:  Negative for chest tightness and shortness of breath.    Cardiovascular:  Negative for chest pain and leg swelling.   Musculoskeletal:  Positive for arthralgias.   Neurological:  Negative for headaches.   Psychiatric/Behavioral:  Positive for sleep disturbance. Negative for dysphoric mood. The patient is not nervous/anxious.          Objective:  Vitals:    03/25/24 0901   BP: 136/76   Pulse: 80   Temp: 98.2 °F (36.8 °C)   SpO2: 97%   Weight: 78.9 kg (174 lb)   Height: 5' 1\" (1.549 m)     Body mass index is 32.88 kg/m².     Physical Exam  Vitals and nursing note reviewed.   Constitutional:       General: She is not in acute distress.     Appearance: Normal appearance. She is obese. She is not ill-appearing, toxic-appearing or diaphoretic.   HENT:      Head: Normocephalic and atraumatic.      Right Ear: External ear normal.      Left Ear: External ear normal.      Ears:      Comments: Hard of hearing  Cardiovascular:      Rate and Rhythm: Normal rate and regular rhythm.      Heart sounds: No murmur heard.     No friction " rub.   Pulmonary:      Effort: Pulmonary effort is normal. No respiratory distress.      Breath sounds: Normal breath sounds. No stridor. No wheezing, rhonchi or rales.   Musculoskeletal:         General: No swelling.      Right knee: Deformity present.      Right lower leg: No edema.      Left lower leg: No edema.        Legs:    Neurological:      General: No focal deficit present.      Mental Status: She is alert. Mental status is at baseline.   Psychiatric:         Attention and Perception: Attention normal.         Mood and Affect: Mood normal.         Speech: Speech normal.         Behavior: Behavior normal.         Thought Content: Thought content normal.         Judgment: Judgment normal.

## 2024-03-25 NOTE — PROGRESS NOTES
"Daily Note     Today's date: 3/25/2024  Patient name: Huyen Zhou  : 1940  MRN: [id number]  Referring provider: Rodger Altman*  Dx:   Encounter Diagnosis     ICD-10-CM    1. Primary osteoarthritis of right knee  M17.11       2. Preop testing  Z01.818                      Subjective: Patient reports that she had a follow up with her PCP this morning and received a \"clean bill of health for her surgery\".       Objective: See treatment diary below      Assessment: Tolerated treatment well. Added lateral step ups this session as well as SAQs to facilitate improved lower extremity strength and stability. She was able to complete with muscular fatigue present. Patient demonstrated fatigue post treatment, exhibited good technique with therapeutic exercises, and would benefit from continued PT      Plan: Continue per plan of care.      Precautions: s/p L TKA 23, R TKA 24  POC expires Unit limit Auth  expiration date PT/OT + Visit Limit?   24 N/A N/A BOMN                 Visit/Unit Tracking  AUTH Status:  Date 3/20 3/22 3/25            BOMN Used 1 2 3             Remaining                     Manuals 3/20 3/22 3/25                                                              Neuro Re-Ed                                                                                                        Ther Ex             Rec bike Fwd no tension 10' Fwd no tension 10' Fwd no tension 10'          LAQ with weight  1.5# 2x10 B/L 1.5# 2x10 B/L          Hamstring curls with TB seated vs standing with weight B/L  Standing 1.5# 2x10            Standing hip 3 ways with weight B/L  1.5# x10 ea dir            Lateral walks with TB             Supine SLR B/L  2x10  2x10           SAQ with ankle weights   1.5# 2x10 B/L          Pt education PT POC, HEP review                          Ther Activity             TG squats  L19 2x10 L19 2x10          Fwd step ups  B/L  6\" 2x10  6\" 2x10           Lateral step ups B/L    6\" " 2x10           Gait Training                                       Modalities

## 2024-03-26 RX ORDER — SENNOSIDES 8.6 MG
650 CAPSULE ORAL AS NEEDED
COMMUNITY
End: 2024-04-04

## 2024-03-26 NOTE — PRE-PROCEDURE INSTRUCTIONS
Pre-Surgery Instructions:   Medication Instructions    acetaminophen (TYLENOL) 650 mg CR tablet Uses PRN- OK to take day of surgery    ascorbic acid (VITAMIN C) 500 MG tablet Hold day of surgery.    Diclofenac Sodium (VOLTAREN) 1 % Stop taking 3 days prior to surgery.    ferrous sulfate 324 (65 Fe) mg Hold day of surgery.    folic acid (FOLVITE) 1 mg tablet Hold day of surgery.    levothyroxine 100 mcg tablet Take day of surgery.    pravastatin (PRAVACHOL) 20 mg tablet Take night before surgery    pravastatin (PRAVACHOL) 40 mg tablet Take night before surgery    Medication instructions for day surgery reviewed. Please use only a sip of water to take your instructed medications. Avoid all over the counter vitamins, supplements and NSAIDS for one week prior to surgery per anesthesia guidelines. Tylenol is ok to take as needed.     You will receive a call one business day prior to surgery with an arrival time and hospital directions. If your surgery is scheduled on a Monday, the hospital will be calling you on the Friday prior to your surgery. If you have not heard from anyone by 8pm, please call the hospital supervisor through the hospital  at [phone]. ([place] [phone] or [place] [phone]).    Do not eat or drink anything after midnight the night before your surgery, including candy, mints, lifesavers, or chewing gum. Do not drink alcohol 24hrs before your surgery. Try not to smoke at least 24hrs before your surgery.       Follow the pre surgery showering instructions as listed in the “My Surgical Experience Booklet” or otherwise provided by your surgeon's office. Do not use a blade to shave the surgical area 1 week before surgery. It is okay to use a clean electric clippers up to 24 hours before surgery. Do not apply any lotions, creams, including makeup, cologne, deodorant, or perfumes after showering on the day of your surgery. Do not use dry shampoo, hair spray, hair gel, or any type of  hair products.     No contact lenses, eye make-up, or artificial eyelashes. Remove nail polish, including gel polish, and any artificial, gel, or acrylic nails if possible. Remove all jewelry including rings and body piercing jewelry.     Wear causal clothing that is easy to take on and off. Consider your type of surgery.    Keep any valuables, jewelry, piercings at home. Please bring any specially ordered equipment (sling, braces) if indicated.    Arrange for a responsible person to drive you to and from the hospital on the day of your surgery. Please confirm the visitor policy for the day of your procedure when you receive your phone call with an arrival time.     Call the surgeon's office with any new illnesses, exposures, or additional questions prior to surgery.    Please reference your “My Surgical Experience Booklet” for additional information to prepare for your upcoming surgery.     Instructions reviewed with daughter, verbalized understanding.

## 2024-03-28 ENCOUNTER — OFFICE VISIT (OUTPATIENT)
Dept: PHYSICAL THERAPY | Facility: CLINIC | Age: 84
End: 2024-03-28
Payer: MEDICARE

## 2024-03-28 DIAGNOSIS — M17.11 PRIMARY OSTEOARTHRITIS OF RIGHT KNEE: Primary | ICD-10-CM

## 2024-03-28 PROCEDURE — 97110 THERAPEUTIC EXERCISES: CPT

## 2024-03-28 PROCEDURE — 97530 THERAPEUTIC ACTIVITIES: CPT

## 2024-03-28 NOTE — PROGRESS NOTES
"Daily Note     Today's date: 3/28/2024  Patient name: Huyen Zhou  : 1940  MRN: [id number]  Referring provider: Rodger Altman*  Dx:   Encounter Diagnosis     ICD-10-CM    1. Primary osteoarthritis of right knee  M17.11                      Subjective: Patient reports that she is cleaning up for her home and getting things ready for her surgery next week.       Objective: See treatment diary below      Assessment: Tolerated treatment well. Challenged with current exercise program. Reports good relief in stiffness and knee discomfort following completion of her exercise program. Patient demonstrated fatigue post treatment, exhibited good technique with therapeutic exercises, and would benefit from continued PT      Plan: Continue per plan of care.      Precautions: s/p L TKA 23, R TKA 24  POC expires Unit limit Auth  expiration date PT/OT + Visit Limit?   24 N/A N/A BOMN                 Visit/Unit Tracking  AUTH Status:  Date 3/20 3/22 3/25 3/28           BOMN Used 1 2 3 4            Remaining                     Manuals 3/20 3/22 3/25 3/28                                                             Neuro Re-Ed                                                                                                        Ther Ex             Rec bike Fwd no tension 10' Fwd no tension 10' Fwd no tension 10' Fwd no tension 10'         LAQ with weight  1.5# 2x10 B/L 1.5# 2x10 B/L 1.5# 2x10 B/L         Hamstring curls with TB seated vs standing with weight B/L  Standing 1.5# 2x10   1.5# 2x10 standing          Standing hip 3 ways with weight B/L  1.5# x10 ea dir   1.5# 2x10 ea dir          Lateral walks with TB             Supine SLR B/L  2x10  2x10           SAQ with ankle weights   1.5# 2x10 B/L 1.5# 2x10 B/L         Pt education PT POC, HEP review                          Ther Activity             TG squats  L19 2x10 L19 2x10 L20 2x10         Fwd step ups  B/L  6\" 2x10  6\" 2x10  6\" 2x10        " "  Lateral step ups B/L    6\" 2x10  6\" 2x10         Gait Training                                       Modalities                                                "

## 2024-04-02 ENCOUNTER — OFFICE VISIT (OUTPATIENT)
Dept: PHYSICAL THERAPY | Facility: CLINIC | Age: 84
End: 2024-04-02
Payer: MEDICARE

## 2024-04-02 DIAGNOSIS — M17.11 PRIMARY OSTEOARTHRITIS OF RIGHT KNEE: Primary | ICD-10-CM

## 2024-04-02 DIAGNOSIS — Z01.818 PREOP TESTING: ICD-10-CM

## 2024-04-02 PROCEDURE — 97530 THERAPEUTIC ACTIVITIES: CPT

## 2024-04-02 PROCEDURE — 97110 THERAPEUTIC EXERCISES: CPT

## 2024-04-02 NOTE — PROGRESS NOTES
Daily Note     Today's date: 2024  Patient name: Huyen Zhou  : 1940  MRN: [id number]  Referring provider: Rodger Altman*  Dx:   Encounter Diagnosis     ICD-10-CM    1. Primary osteoarthritis of right knee  M17.11       2. Preop testing  Z01.818                      Subjective: Pt has her usual pain levels today.  Rates pain at 4-5/10.  Pt is eager to get her knee replacement this week.        Objective: See treatment diary below      Assessment: Tolerated treatment well. Good performance of exercises with minimal cueing required for proper form.  Fatigue noted with functional activity of step ups, though pt was able to tolerate full repetitions.  Noted pain of R knee with LAQ.  Pt states that she is fatigued after session.  Post-op HEP reviewed and handout provided.  Pt is scheduled for R TKA on 24.  First post-op PT session scheduled for 24.       Plan: Continue per plan of care.   First post-op treatment next session.      Pura Ribeiro, SPT performed treatment session under direct supervision of Seble Diaz PT, DPT        Precautions: s/p L TKA 23, R TKA 24  Access Code: [email or web address]  POC expires Unit limit Auth  expiration date PT/OT + Visit Limit?   24 N/A N/A BOMN                 Visit/Unit Tracking  AUTH Status:  Date 3/20 3/22 3/25 3/28 4/2          BOMN Used 1 2 3 4 5           Remaining                     Manuals 3/20 3/22 3/25 3/28 4/2                                                            Neuro Re-Ed                                                                                                        Ther Ex             Rec bike Fwd no tension 10' Fwd no tension 10' Fwd no tension 10' Fwd no tension 10' Fwd no tension 10'        LAQ with weight  1.5# 2x10 B/L 1.5# 2x10 B/L 1.5# 2x10 B/L 1.5# 2x10 B/L        Hamstring curls with TB seated vs standing with weight B/L  Standing 1.5# 2x10   1.5# 2x10 standing  1.5# 2x10 standing         Standing hip 3 ways with  "weight B/L  1.5# x10 ea dir   1.5# 2x10 ea dir  1.5# 2x10 ea dir         Lateral walks with TB             Supine SLR B/L  2x10  2x10           SAQ with ankle weights   1.5# 2x10 B/L 1.5# 2x10 B/L 1.5# 2x10 B/L        Pt education PT POC, HEP review     Post-op HEP reviewed                     Ther Activity             TG squats  L19 2x10 L19 2x10 L20 2x10 L20 2x10        Fwd step ups  B/L  6\" 2x10  6\" 2x10  6\" 2x10  6\" 2x10        Lateral step ups B/L    6\" 2x10  6\" 2x10 6\" 2x10        Gait Training                                       Modalities                                                  "

## 2024-04-04 ENCOUNTER — HOSPITAL ENCOUNTER (OUTPATIENT)
Facility: HOSPITAL | Age: 84
Setting detail: OUTPATIENT SURGERY
Discharge: HOME | End: 2024-04-04
Attending: ORTHOPAEDIC SURGERY | Admitting: ORTHOPAEDIC SURGERY
Payer: MEDICARE

## 2024-04-04 ENCOUNTER — ANESTHESIA (OUTPATIENT)
Dept: PERIOP | Facility: HOSPITAL | Age: 84
End: 2024-04-04
Payer: MEDICARE

## 2024-04-04 ENCOUNTER — ANESTHESIA EVENT (OUTPATIENT)
Dept: PERIOP | Facility: HOSPITAL | Age: 84
End: 2024-04-04
Payer: MEDICARE

## 2024-04-04 VITALS
RESPIRATION RATE: 13 BRPM | OXYGEN SATURATION: 98 % | HEIGHT: 60 IN | WEIGHT: 172 LBS | BODY MASS INDEX: 33.77 KG/M2 | HEART RATE: 71 BPM | SYSTOLIC BLOOD PRESSURE: 152 MMHG | TEMPERATURE: 97.6 F | DIASTOLIC BLOOD PRESSURE: 71 MMHG

## 2024-04-04 DIAGNOSIS — Z96.651 STATUS POST TOTAL RIGHT KNEE REPLACEMENT USING CEMENT: Primary | ICD-10-CM

## 2024-04-04 PROCEDURE — 97167 OT EVAL HIGH COMPLEX 60 MIN: CPT

## 2024-04-04 PROCEDURE — C9290 INJ, BUPIVACAINE LIPOSOME: HCPCS | Performed by: STUDENT IN AN ORGANIZED HEALTH CARE EDUCATION/TRAINING PROGRAM

## 2024-04-04 PROCEDURE — C1776 JOINT DEVICE (IMPLANTABLE): HCPCS | Performed by: ORTHOPAEDIC SURGERY

## 2024-04-04 PROCEDURE — 97110 THERAPEUTIC EXERCISES: CPT

## 2024-04-04 PROCEDURE — C1713 ANCHOR/SCREW BN/BN,TIS/BN: HCPCS | Performed by: ORTHOPAEDIC SURGERY

## 2024-04-04 PROCEDURE — 27447 TOTAL KNEE ARTHROPLASTY: CPT | Performed by: ORTHOPAEDIC SURGERY

## 2024-04-04 PROCEDURE — 97163 PT EVAL HIGH COMPLEX 45 MIN: CPT

## 2024-04-04 PROCEDURE — 97535 SELF CARE MNGMENT TRAINING: CPT

## 2024-04-04 PROCEDURE — 27447 TOTAL KNEE ARTHROPLASTY: CPT | Performed by: PHYSICIAN ASSISTANT

## 2024-04-04 PROCEDURE — 97116 GAIT TRAINING THERAPY: CPT

## 2024-04-04 DEVICE — SMARTSET HV HIGH VISCOSITY BONE CEMENT 40G
Type: IMPLANTABLE DEVICE | Site: KNEE | Status: FUNCTIONAL
Brand: SMARTSET

## 2024-04-04 DEVICE — ATTUNE KNEE SYSTEM TIBIAL INSERT ROTATING PLATFORM POSTERIOR STABILIZED 6 7MM AOX
Type: IMPLANTABLE DEVICE | Site: KNEE | Status: FUNCTIONAL
Brand: ATTUNE

## 2024-04-04 DEVICE — ATTUNE KNEE SYSTEM FEMORAL POSTERIOR STABILIZED SIZE 6 RIGHT CEMENTED
Type: IMPLANTABLE DEVICE | Site: KNEE | Status: FUNCTIONAL
Brand: ATTUNE

## 2024-04-04 DEVICE — ATTUNE KNEE SYSTEM TIBIAL BASE ROTATING PLATFORM SIZE 6 CEMENTED
Type: IMPLANTABLE DEVICE | Site: KNEE | Status: FUNCTIONAL
Brand: ATTUNE

## 2024-04-04 DEVICE — ATTUNE PATELLA MEDIALIZED DOME 38MM CEMENTED AOX
Type: IMPLANTABLE DEVICE | Site: KNEE | Status: FUNCTIONAL
Brand: ATTUNE

## 2024-04-04 RX ORDER — LIDOCAINE HYDROCHLORIDE 10 MG/ML
INJECTION, SOLUTION EPIDURAL; INFILTRATION; INTRACAUDAL; PERINEURAL AS NEEDED
Status: DISCONTINUED | OUTPATIENT
Start: 2024-04-04 | End: 2024-04-04

## 2024-04-04 RX ORDER — KETOROLAC TROMETHAMINE 30 MG/ML
INJECTION, SOLUTION INTRAMUSCULAR; INTRAVENOUS AS NEEDED
Status: DISCONTINUED | OUTPATIENT
Start: 2024-04-04 | End: 2024-04-04 | Stop reason: HOSPADM

## 2024-04-04 RX ORDER — SENNOSIDES 8.6 MG
650 CAPSULE ORAL EVERY 8 HOURS PRN
Qty: 30 TABLET | Refills: 0 | Status: SHIPPED | OUTPATIENT
Start: 2024-04-04 | End: 2024-05-04

## 2024-04-04 RX ORDER — ONDANSETRON 2 MG/ML
INJECTION INTRAMUSCULAR; INTRAVENOUS AS NEEDED
Status: DISCONTINUED | OUTPATIENT
Start: 2024-04-04 | End: 2024-04-04

## 2024-04-04 RX ORDER — PROPOFOL 10 MG/ML
INJECTION, EMULSION INTRAVENOUS CONTINUOUS PRN
Status: DISCONTINUED | OUTPATIENT
Start: 2024-04-04 | End: 2024-04-04

## 2024-04-04 RX ORDER — METHOCARBAMOL 750 MG/1
750 TABLET, FILM COATED ORAL EVERY 6 HOURS SCHEDULED
Status: CANCELLED | OUTPATIENT
Start: 2024-04-04

## 2024-04-04 RX ORDER — SENNOSIDES 8.6 MG
1 TABLET ORAL DAILY
Status: CANCELLED | OUTPATIENT
Start: 2024-04-04

## 2024-04-04 RX ORDER — CEFAZOLIN SODIUM 1 G/50ML
1000 SOLUTION INTRAVENOUS EVERY 8 HOURS
Status: CANCELLED | OUTPATIENT
Start: 2024-04-04 | End: 2024-04-05

## 2024-04-04 RX ORDER — ENOXAPARIN SODIUM 100 MG/ML
40 INJECTION SUBCUTANEOUS DAILY
Status: CANCELLED | OUTPATIENT
Start: 2024-04-04

## 2024-04-04 RX ORDER — MAGNESIUM HYDROXIDE 1200 MG/15ML
LIQUID ORAL AS NEEDED
Status: DISCONTINUED | OUTPATIENT
Start: 2024-04-04 | End: 2024-04-04 | Stop reason: HOSPADM

## 2024-04-04 RX ORDER — ACETAMINOPHEN 325 MG/1
975 TABLET ORAL ONCE
Status: COMPLETED | OUTPATIENT
Start: 2024-04-04 | End: 2024-04-04

## 2024-04-04 RX ORDER — OXYCODONE HYDROCHLORIDE 5 MG/1
10 TABLET ORAL EVERY 4 HOURS PRN
Status: DISCONTINUED | OUTPATIENT
Start: 2024-04-04 | End: 2024-04-04 | Stop reason: HOSPADM

## 2024-04-04 RX ORDER — BUPIVACAINE HYDROCHLORIDE AND EPINEPHRINE 2.5; 5 MG/ML; UG/ML
INJECTION, SOLUTION EPIDURAL; INFILTRATION; INTRACAUDAL; PERINEURAL AS NEEDED
Status: DISCONTINUED | OUTPATIENT
Start: 2024-04-04 | End: 2024-04-04 | Stop reason: HOSPADM

## 2024-04-04 RX ORDER — ONDANSETRON 2 MG/ML
4 INJECTION INTRAMUSCULAR; INTRAVENOUS EVERY 6 HOURS PRN
Status: CANCELLED | OUTPATIENT
Start: 2024-04-04

## 2024-04-04 RX ORDER — CALCIUM CARBONATE 500 MG/1
1000 TABLET, CHEWABLE ORAL DAILY PRN
Status: CANCELLED | OUTPATIENT
Start: 2024-04-04

## 2024-04-04 RX ORDER — MORPHINE SULFATE 10 MG/ML
INJECTION, SOLUTION INTRAMUSCULAR; INTRAVENOUS AS NEEDED
Status: DISCONTINUED | OUTPATIENT
Start: 2024-04-04 | End: 2024-04-04 | Stop reason: HOSPADM

## 2024-04-04 RX ORDER — TRANEXAMIC ACID 100 MG/ML
INJECTION, SOLUTION INTRAVENOUS AS NEEDED
Status: DISCONTINUED | OUTPATIENT
Start: 2024-04-04 | End: 2024-04-04 | Stop reason: HOSPADM

## 2024-04-04 RX ORDER — DEXAMETHASONE SODIUM PHOSPHATE 10 MG/ML
INJECTION, SOLUTION INTRAMUSCULAR; INTRAVENOUS AS NEEDED
Status: DISCONTINUED | OUTPATIENT
Start: 2024-04-04 | End: 2024-04-04

## 2024-04-04 RX ORDER — OXYCODONE HYDROCHLORIDE 5 MG/1
5 TABLET ORAL EVERY 4 HOURS PRN
Status: DISCONTINUED | OUTPATIENT
Start: 2024-04-04 | End: 2024-04-04 | Stop reason: HOSPADM

## 2024-04-04 RX ORDER — METHYLPREDNISOLONE 4 MG/1
TABLET ORAL
Qty: 1 EACH | Refills: 0 | Status: SHIPPED | OUTPATIENT
Start: 2024-04-04

## 2024-04-04 RX ORDER — LEVOTHYROXINE SODIUM 0.1 MG/1
100 TABLET ORAL DAILY
Status: CANCELLED | OUTPATIENT
Start: 2024-04-04

## 2024-04-04 RX ORDER — TRANEXAMIC ACID 10 MG/ML
1000 INJECTION, SOLUTION INTRAVENOUS ONCE
Status: COMPLETED | OUTPATIENT
Start: 2024-04-04 | End: 2024-04-04

## 2024-04-04 RX ORDER — ONDANSETRON 2 MG/ML
4 INJECTION INTRAMUSCULAR; INTRAVENOUS ONCE AS NEEDED
Status: DISCONTINUED | OUTPATIENT
Start: 2024-04-04 | End: 2024-04-04 | Stop reason: HOSPADM

## 2024-04-04 RX ORDER — CEFAZOLIN SODIUM 2 G/50ML
2000 SOLUTION INTRAVENOUS ONCE
Status: COMPLETED | OUTPATIENT
Start: 2024-04-04 | End: 2024-04-04

## 2024-04-04 RX ORDER — SODIUM CHLORIDE, SODIUM LACTATE, POTASSIUM CHLORIDE, CALCIUM CHLORIDE 600; 310; 30; 20 MG/100ML; MG/100ML; MG/100ML; MG/100ML
125 INJECTION, SOLUTION INTRAVENOUS CONTINUOUS
Status: DISCONTINUED | OUTPATIENT
Start: 2024-04-04 | End: 2024-04-04 | Stop reason: HOSPADM

## 2024-04-04 RX ORDER — SODIUM CHLORIDE, SODIUM LACTATE, POTASSIUM CHLORIDE, CALCIUM CHLORIDE 600; 310; 30; 20 MG/100ML; MG/100ML; MG/100ML; MG/100ML
75 INJECTION, SOLUTION INTRAVENOUS CONTINUOUS
Status: CANCELLED | OUTPATIENT
Start: 2024-04-04 | End: 2024-04-05

## 2024-04-04 RX ORDER — OXYCODONE HYDROCHLORIDE 5 MG/1
TABLET ORAL
Qty: 30 TABLET | Refills: 0 | Status: SHIPPED | OUTPATIENT
Start: 2024-04-04

## 2024-04-04 RX ORDER — DOCUSATE SODIUM 100 MG/1
100 CAPSULE, LIQUID FILLED ORAL 2 TIMES DAILY
Qty: 10 CAPSULE | Refills: 0 | Status: SHIPPED | OUTPATIENT
Start: 2024-04-04

## 2024-04-04 RX ORDER — CHLORHEXIDINE GLUCONATE ORAL RINSE 1.2 MG/ML
15 SOLUTION DENTAL ONCE
Status: COMPLETED | OUTPATIENT
Start: 2024-04-04 | End: 2024-04-04

## 2024-04-04 RX ORDER — BUPIVACAINE HYDROCHLORIDE 2.5 MG/ML
INJECTION, SOLUTION EPIDURAL; INFILTRATION; INTRACAUDAL AS NEEDED
Status: DISCONTINUED | OUTPATIENT
Start: 2024-04-04 | End: 2024-04-04

## 2024-04-04 RX ORDER — DOCUSATE SODIUM 100 MG/1
100 CAPSULE, LIQUID FILLED ORAL 2 TIMES DAILY
Status: CANCELLED | OUTPATIENT
Start: 2024-04-04

## 2024-04-04 RX ORDER — FENTANYL CITRATE/PF 50 MCG/ML
25 SYRINGE (ML) INJECTION
Status: DISCONTINUED | OUTPATIENT
Start: 2024-04-04 | End: 2024-04-04 | Stop reason: HOSPADM

## 2024-04-04 RX ORDER — PROPOFOL 10 MG/ML
INJECTION, EMULSION INTRAVENOUS AS NEEDED
Status: DISCONTINUED | OUTPATIENT
Start: 2024-04-04 | End: 2024-04-04

## 2024-04-04 RX ORDER — HYDROMORPHONE HCL/PF 1 MG/ML
0.5 SYRINGE (ML) INJECTION EVERY 2 HOUR PRN
Status: CANCELLED | OUTPATIENT
Start: 2024-04-04 | End: 2024-04-06

## 2024-04-04 RX ORDER — CHLORHEXIDINE GLUCONATE 4 G/100ML
SOLUTION TOPICAL DAILY PRN
Status: DISCONTINUED | OUTPATIENT
Start: 2024-04-04 | End: 2024-04-04 | Stop reason: HOSPADM

## 2024-04-04 RX ORDER — ACETAMINOPHEN 325 MG/1
650 TABLET ORAL EVERY 6 HOURS PRN
Status: CANCELLED | OUTPATIENT
Start: 2024-04-04

## 2024-04-04 RX ADMIN — BUPIVACAINE 20 ML: 13.3 INJECTION, SUSPENSION, LIPOSOMAL INFILTRATION at 07:20

## 2024-04-04 RX ADMIN — BUPIVACAINE HYDROCHLORIDE 20 ML: 2.5 INJECTION, SOLUTION EPIDURAL; INFILTRATION; INTRACAUDAL; PERINEURAL at 07:25

## 2024-04-04 RX ADMIN — LIDOCAINE HYDROCHLORIDE 30 MG: 10 INJECTION, SOLUTION EPIDURAL; INFILTRATION; INTRACAUDAL at 07:45

## 2024-04-04 RX ADMIN — ONDANSETRON 4 MG: 2 INJECTION INTRAMUSCULAR; INTRAVENOUS at 07:45

## 2024-04-04 RX ADMIN — SODIUM CHLORIDE, SODIUM LACTATE, POTASSIUM CHLORIDE, AND CALCIUM CHLORIDE: .6; .31; .03; .02 INJECTION, SOLUTION INTRAVENOUS at 07:30

## 2024-04-04 RX ADMIN — CHLORHEXIDINE GLUCONATE 0.12% ORAL RINSE 15 ML: 1.2 LIQUID ORAL at 07:04

## 2024-04-04 RX ADMIN — PROPOFOL 30 MG: 10 INJECTION, EMULSION INTRAVENOUS at 07:45

## 2024-04-04 RX ADMIN — TRANEXAMIC ACID 1000 MG: 10 INJECTION, SOLUTION INTRAVENOUS at 07:55

## 2024-04-04 RX ADMIN — DEXAMETHASONE SODIUM PHOSPHATE 10 MG: 10 INJECTION, SOLUTION INTRAMUSCULAR; INTRAVENOUS at 07:53

## 2024-04-04 RX ADMIN — PROPOFOL 100 MCG/KG/MIN: 10 INJECTION, EMULSION INTRAVENOUS at 07:45

## 2024-04-04 RX ADMIN — CEFAZOLIN SODIUM 2000 MG: 2 SOLUTION INTRAVENOUS at 07:45

## 2024-04-04 RX ADMIN — ACETAMINOPHEN 975 MG: 325 TABLET, FILM COATED ORAL at 07:02

## 2024-04-04 RX ADMIN — PHENYLEPHRINE HYDROCHLORIDE 20 MCG/MIN: 10 INJECTION INTRAVENOUS at 08:07

## 2024-04-04 NOTE — PLAN OF CARE
Problem: PHYSICAL THERAPY ADULT  Goal: Performs mobility at highest level of function for planned discharge setting.  See evaluation for individualized goals.  Description: Treatment/Interventions: Functional transfer training, LE strengthening/ROM, Elevations, Therapeutic exercise, Endurance training, Patient/family training, Equipment eval/education, Bed mobility, Gait training          See flowsheet documentation for full assessment, interventions and recommendations.  Outcome: Progressing  Note: Prognosis: Good  Problem List: Decreased strength, Decreased range of motion, Decreased endurance, Impaired balance, Decreased mobility, Impaired hearing, Orthopedic restrictions, Pain  Assessment: Orders for PT eval and treat received. Pt's PMHx: left TKA Pt exhibits physical deficits noted in problem list above.  Deficits listed contribute to functional limitations that are significant from the patient PLOF and include: impaired standing balance, inability to perform safe transfers, tolerance for OOB functional activities, unsafe/inefficient ambulation, fall risk, and unable to safely manage stairs/steps/ramp    Additionally, patient is limited by restrictions/precautions/DME:  RLE WBAT.    During today's session, formal PT evaluation performed.  Initiated supine and standing exercises.  Educated and instructed pt on proper transfer training, initiated gait and stair training.  Provided caregiver training for guarding and assist.  Pt responded well to exercises and mobility training, progressing well and appropriate to return home.  Advised pt and family on safety considerations due to right knee weakness at this time and proper use of walker.      The AM-PAC & Barthel Index outcome tools were used to assist in determining pt safety w/ mobility/self care & appropriate d/c recommendations, see above for scores. Patient's clinical presentation is evolving due to significant acute change in functional independence,  recent surgery/procedure, and ongoing medical management needs.     Considering the patient's PLOF, co-morbidities, acute functional limitations, functional outcome measures, and/or goal to progress functional independence; this patient would benefit from skilled Physical Therapy intervention in the acute care setting.  Barriers to Discharge: None     Rehab Resource Intensity Level, PT: III (Minimum Resource Intensity)    See flowsheet documentation for full assessment.

## 2024-04-04 NOTE — PHYSICAL THERAPY NOTE
PHYSICAL THERAPY Evaluation and Treatment  DATE: 04/04/24  TIME: 0583-9196    NAME:  Huyen Zhou  AGE:   83 y.o.  Mrn:   [id number]  Length Of Stay: 0    ADMIT DX:  Primary osteoarthritis of right knee [M17.11]    Past Medical History:   Diagnosis Date    Chronic kidney disease     Disease of thyroid gland     GI bleed     High cholesterol     History of transfusion     Hypothyroidism     PONV (postoperative nausea and vomiting)      Past Surgical History:   Procedure Laterality Date    BREAST CYST EXCISION  1982    cyst removals and aspirations-benign    CATARACT EXTRACTION, BILATERAL      COLONOSCOPY      HYSTERECTOMY      at age 64    OOPHORECTOMY Bilateral     at age 64    PARATHYROID GLAND SURGERY      WA ARTHROPLASTY FEM CONDYLES/TIBIAL PLATEAU KNEE Left 11/09/2023    Procedure: ARTHROPLASTY KNEE TOTAL AND ALL ASSOCIATED PROCEDURES;  Surgeon: Rachel Momin MD;  Location:  MAIN OR;  Service: Orthopedics    STOMACH SURGERY      TOTAL KNEE ARTHROPLASTY Left         04/04/24 1242   PT Last Visit   PT Visit Date 04/04/24   Note Type   Note type Evaluation   Additional Comments 84 y/o presents for elective right TKA.   Pain Assessment   Pain Assessment Tool 0-10   Pain Score 2   Pain Location/Orientation Orientation: Right;Location: Knee   Hospital Pain Intervention(s) Repositioned;Ambulation/increased activity   Restrictions/Precautions   Weight Bearing Precautions Per Order Yes   RLE Weight Bearing Per Order WBAT   Other Precautions Fall Risk;Pain;WBS;Hard of hearing   Home Living   Additional Comments Pt lives with spouse and son in 1-level house, 4 LADI (left rail).  Bed/bathroom: shower/tub with bench, standard toilet.  DME: SPC, RW.   Prior Function   Comments Prior to admission: independent with ADL, IADLs, ambulates with SPC (+).  Daughter assist with some IADLs.   General   Family/Caregiver Present Yes   Cognition   Overall Cognitive Status WFL   Arousal/Participation Alert   Orientation Level  Oriented X4   Subjective   Subjective Pt pleasant and agreeable. Pt reports fatigue and pain with activities, but able to tolerate and continue without seated rest break   RUE Assessment   RUE Assessment WFL   LUE Assessment   LUE Assessment WFL   RLE Assessment   RLE Assessment WFL  (knee AROM ~10-60 deg)   LLE Assessment   LLE Assessment WFL   Coordination   Movements are Fluid and Coordinated 1   Sensation WFL   Bed Mobility   Supine to Sit 6  Modified independent   Additional items Bedrails;HOB elevated;Increased time required   Transfers   Sit to Stand 4  Minimal assistance  (progressed to close sup A)   Additional items Assist x 1;Increased time required;Verbal cues;Impulsive  (cues for hand placement and heavy use of UEs to clear hips/manage balance)   Stand to Sit 5  Supervision   Additional items Assist x 1;Increased time required;Verbal cues  (cue for positioning and use of UEs)   Additional Comments During initial stand from EOB, pt exhibited RLE weakness/buckling, causing loss of balance and requiring assist to return to sitting.  Repositioning and cues provided, assist to get to standing at walker, and cues to engage right quadriceps for terminal knee ext.   Ambulation/Elevation   Gait Assistance 4  Minimal assist  (progressed to close sup A)   Additional items Assist x 1;Verbal cues;Tactile cues   Assistive Device Rolling walker   Distance 100'   Stair Management Assistance 4  Minimal assist   Additional items Assist x 1;Verbal cues;Tactile cues   Stair Management Technique One rail R;With cane   Number of Stairs 8   Ambulation/Elevation Additional Comments Pt initiated gait with step-through pattern, but exhiited dcreased RLE stability and uncontrolled step lengths.  cues provided for posture, walker management, and appropriate step lengths.  pt exhibited improved RLE stability with practice and able to progress to reciprocal gait.  Pt ascend/descended 4 steps moving laterally with only 1 rail, and 4  steps moving forward with 1 rail+cane.   Balance   Static Sitting Normal   Dynamic Sitting Good   Static Standing Fair +   Dynamic Standing Fair   Ambulatory Fair  (RW)   Endurance Deficit   Endurance Deficit Yes   Endurance Deficit Description Pt self reports fatigue with prolonged standing activity   Activity Tolerance   Activity Tolerance Patient limited by fatigue;Patient limited by pain   Medical Staff Made Aware collaborated with OT   Nurse Made Aware nursing present and aware of patient's performance   Assessment   Prognosis Good   Problem List Decreased strength;Decreased range of motion;Decreased endurance;Impaired balance;Decreased mobility;Impaired hearing;Orthopedic restrictions;Pain   Assessment Orders for PT eval and treat received. Pt's PMHx: left TKA Pt exhibits physical deficits noted in problem list above.  Deficits listed contribute to functional limitations that are significant from the patient PLOF and include: impaired standing balance, inability to perform safe transfers, tolerance for OOB functional activities, unsafe/inefficient ambulation, fall risk, and unable to safely manage stairs/steps/ramp    Additionally, patient is limited by restrictions/precautions/DME:  RLE WBAT.    During today's session, formal PT evaluation performed.  Initiated supine and standing exercises.  Educated and instructed pt on proper transfer training, initiated gait and stair training.  Provided caregiver training for guarding and assist.  Pt responded well to exercises and mobility training, progressing well and appropriate to return home.  Advised pt and family on safety considerations due to right knee weakness at this time and proper use of walker.      The AM-PAC & Barthel Index outcome tools were used to assist in determining pt safety w/ mobility/self care & appropriate d/c recommendations, see above for scores. Patient's clinical presentation is evolving due to significant acute change in functional  independence, recent surgery/procedure, and ongoing medical management needs.     Considering the patient's PLOF, co-morbidities, acute functional limitations, functional outcome measures, and/or goal to progress functional independence; this patient would benefit from skilled Physical Therapy intervention in the acute care setting.   Barriers to Discharge None   Goals   Patient Goals return home   STG Expiration Date 04/14/24   Short Term Goal #1 1: Pt will perform supine<>sit transfer on flat bed, mod I.  2: Pt will perform stand pivot transfer with RW, mod I.  3: Pt will ambulate 150' with reciprocal gait and appropriate pace using RW, mod I. 4: Pt will perform written HEP, mod I. 5: Pt will ascend/descend a curb step and/or stairs require to mobilize around the the patient's home with RW/rails, Sup A.   Plan   Treatment/Interventions Functional transfer training;LE strengthening/ROM;Elevations;Therapeutic exercise;Endurance training;Patient/family training;Equipment eval/education;Bed mobility;Gait training   PT Frequency Twice a day   Discharge Recommendation   Rehab Resource Intensity Level, PT III (Minimum Resource Intensity)   AM-PAC Basic Mobility Inpatient   Turning in Flat Bed Without Bedrails 4   Lying on Back to Sitting on Edge of Flat Bed Without Bedrails 3   Moving Bed to Chair 3   Standing Up From Chair Using Arms 3   Walk in Room 3   Climb 3-5 Stairs With Railing 3   Basic Mobility Inpatient Raw Score 19   Basic Mobility Standardized Score 42.48   [facility] Highest Level Of Mobility   -HLM Goal 6: Walk 10 steps or more   -HLM Achieved 7: Walk 25 feet or more   Additional Treatment Session   Start Time 1252   End Time 1320   Treatment Assessment Discussed at length about pain management strategies, postural corrections, proper use of RW, gait/stair training corrections, importance of gaining AROM, benefit of frequent tolerable physical activity/exercise, and fall risk precautions.  Provided  caregiver training and tips as appropriate and needed. Pt responded well to supine and standing exercises, exhibiting improved ROM, muscle activation, and pain tolerance.  Pt progressed gait and stair training as expected, and displayed ability to return home safely with her family support. Pt did fatigue with ambulating household distances and stairs, and suggest to pt/family to allow for extra time to rest.   Exercises   Quad Sets Supine;10 reps;AROM;Bilateral  (10 sec)   Heelslides Supine;10 reps;AROM;Right   Marching Standing;10 reps;AROM;Right;Left  (RW)   Balance training  standing at RW: lateral weight shifting 10x, paras heel raises 10x   End of Consult   Patient Position at End of Consult Seated edge of bed;All needs within reach  (Nursing present to take over pt care.)   The patient's AM-PAC Basic Mobility Inpatient Short Form Raw Score is 19. A Raw score of greater than or equal to 16 suggests the patient may benefit from discharge to home. Please also refer to the recommendation of the Physical Therapist for safe discharge planning.    Francis Thomas, PT, DPT  [place] Licensure #[id number]

## 2024-04-04 NOTE — ANESTHESIA PREPROCEDURE EVALUATION
Procedure:  ARTHROPLASTY KNEE TOTAL AND ALL ASSOCIATED PROCEDURES (Right: Knee)    Relevant Problems   CARDIO   (+) Hyperlipidemia      ENDO   (+) Hypothyroidism      GI/HEPATIC   (+) Gastroesophageal reflux disease      /RENAL   (+) Stage 3a chronic kidney disease (HCC)      MUSCULOSKELETAL   (+) Arthritis of knee   (+) Arthritis of right knee   (+) Primary osteoarthritis of left knee        Physical Exam    Airway    Mallampati score: II  TM Distance: >3 FB  Neck ROM: full     Dental       Cardiovascular      Pulmonary      Other Findings  post-pubertal.      Anesthesia Plan  ASA Score- 3     Anesthesia Type- spinal with ASA Monitors.         Additional Monitors:     Airway Plan:     Comment: PLTs normal. .       Plan Factors-Exercise tolerance (METS): >4 METS.    Chart reviewed.        Patient is not a current smoker.  Patient did not smoke on day of surgery.    Obstructive sleep apnea risk education given perioperatively.        Induction- intravenous.    Postoperative Plan-     Informed Consent- Anesthetic plan and risks discussed with patient.  I personally reviewed this patient with the CRNA. Discussed and agreed on the Anesthesia Plan with the CRNA..              NPO appropriate. Discussed benefits/risks of monitored anesthetic care and discussed providing a dynamic level of mild to deep sedation. Risks include awareness, airway obstruction, aspiration which may necessitate conversion to general anesthesia. All questions answered. Patient understands and wishes to proceed.    Anesthesia plan and consent discussed with Huyen who expressed understanding and agreement. Risks/benefits and alternatives discussed with patient including possible PONV, sore throat, damage to teeth/lips/gums and possibility of rare anesthetic and surgical emergencies.

## 2024-04-04 NOTE — ANESTHESIA PROCEDURE NOTES
Peripheral Block    Patient location during procedure: holding area  Start time: 4/4/2024 7:25 AM  Reason for block: at surgeon's request and post-op pain management  Staffing  Performed by: García Ferguson MD  Authorized by: García Ferguson MD    Preanesthetic Checklist  Completed: patient identified, IV checked, site marked, risks and benefits discussed, surgical consent, monitors and equipment checked, pre-op evaluation and timeout performed  Peripheral Block  Prep: ChloraPrep  Patient monitoring: frequent blood pressure checks, continuous pulse oximetry and heart rate  Block type: IPACK  Laterality: right  Injection technique: single-shot  Procedures: ultrasound guided, Ultrasound guidance required for the procedure to increase accuracy and safety of medication placement and decrease risk of complications.  Ultrasound permanent image saved  Needle  Needle type: Stimuplex   Needle localization: anatomical landmarks and ultrasound guidance  Assessment  Injection assessment: incremental injection, frequent aspiration, injected with ease, negative aspiration, negative for heart rate change, no paresthesia on injection, no symptoms of intraneural/intravenous injection and needle tip visualized at all times  Paresthesia pain: none  Post-procedure:  site cleaned  patient tolerated the procedure well with no immediate complications  Additional Notes  Uncomplicated single shot IPACK

## 2024-04-04 NOTE — OP NOTE
Brief Op Note  Huyen Zhou  MRN: [id number]      Unit/Bed#: U 4    PreOp Dx: right knee DJD      Postop Dx: right knee DJD      Procedure: right total knee arthroplasty      Surgeon: Rachel Momin MD      Assistants:Rodger Altman PA-C      No Qualified Resident Available for this Case.  The physician assistant was needed for all portions of this case including prepping and draping the patient as well as prepping and final implantation of the femoral, tibial, and patellar components.    Fluids:       EBL:       Drains: none      Specimens: No       Complications: No       Condition: stable transferred to postanesthesia care unit      Implants: Depuy Attune RP  Femoral, size: 6  Tibial tray: 6  Polyethylene: 7  Patellar button: 38      83 y.o.female, presents at this time, secondary to treatment of right knee DJD with varus deformity and flexion contracture of about 10 degrees, which has failed conservative treatment.    The patient was told of all the pros and cons of operative and nonoperative intervention. Some of the complications of operative intervention include infection, bleeding, scarring, nerve injury, vascular injury, fracture, continued pain, decreased range of motion, DVT, PE death, loss of limb, need for further surgery, not obtain an results. The patient wished. Patient did consent for operative intervention for this pathology.    Patient was brought to the operating room. Patient was anesthetized as anesthesia team. Patient was prepped and draped in normal sterile fashion. After this was done, we did conduct a time out to make sure correct. Patient was in the room, prepped marked and draped. Implants were in the room, Rep. For the implants were present, DVT prophylaxis and antibiotics were addressed.    Midline incision was made over the anterior knee after going through skin, fatty tissue, fascia was identified. Flaps were created both medially and laterally. Medial parapatellar incision was  made. Excision of Hoffa fat pad was conducted. At this time because this was a varus knee, we did release to were able to visualize the medial and lateral plateaus.  No releases of the ligaments or osteophyte excision was conducted. We're able to excise the fatty tissue from the anterior aspect of the distal femur. We were able to at this time, flex the knee with the patella everted. Intramedullary reamer was used. Intramedullary guide for the femur was then placed to determine how much of the distal femur to cut and also, valgus cut angle. Pins were then placed. Intramedullary guide was removed. Distal femoral cut was made.  Attention was now to the proximal tibia. Extramedullary guide was used. After making sure that we took the appropriate amount from the medial and lateral side with the aid of a measuring device, the jig was held in place with pins. Protection of the medial and lateral ligaments, as well as the popliteal region, was done. Proximal tibial cut was made. Extension gaps were evaluated.  Size of the femur was then determined with the aid of a guide. After this was done, we placed the appropriate sized block. These were held down with pins. Anterior and posterior cuts were made. Anterior, posterior, chamfer cuts were made. We did check our flexion gap and was noted to be appropriate. This was a PCL sacrificing device being used Therefore a box cut was made.  Tibial tray size was determined. This was held down with 2 small screws. The reamer and the punch was then used with the appropriate guide to make sure that these were all done, the appropriate manner. Guide was removed. Trial femoral component was placed. Trial tibial polyethylene was placed. Patient was noted to be stable. On coronal and sagittal planes.   Patellar button size was determined after the articular surface of the patella was excised. The patella button was placed on the medial aspect of the patella. Holes were drilled for our true  patella button to be cemented on. We tracked the knee, and we noted that the patella was tracking appropriately over the trochlear of the trial implants. After this was done we did drill holes in our trial femoral component. We then removed. All trial components.  Copious irrigation was used at the operative site. We did place some bone within the intramedullary canal of the femur. High viscocity cement was used and all components were placed, including the femur, tibia, and patella button. A trial tibial Polyethylene was placed. After cement had dried, removed the trial polyethylene and removed any excess cement that was in the popliteal region. Copious irrigation was used. The tibial polyethylene was then placed. Patient was placed in the appropriate ranges of motion and patient's Knee was noted to be stable.  Tourniquet was discontinued. Hemostasis was obtained.  #1 Vicryl sutures were used to reapproximate the parapatellar incision. 2-0 Vicryl sutures for the subcutaneous closure. This was reinforced with staples. Wounds were cleaned and dried. Acticoat, 4 x 4, ABDs and web roll was placed prior to Ace bandage be placed from the foot. All the way to the mid thigh region.  Patient was awakened as anesthesia team noted to be a stable condition and transferred to postanesthesia care unit

## 2024-04-04 NOTE — ANESTHESIA POSTPROCEDURE EVALUATION
Post-Op Assessment Note    CV Status:  Stable    Pain management: adequate       Mental Status:  Sleepy   Hydration Status:  Euvolemic   PONV Controlled:  Controlled   Airway Patency:  Patent     Post Op Vitals Reviewed: Yes    No anethesia notable event occurred.    Staff: CRNA               BP   111/53   Temp 97.6   Pulse 71   Resp 18   SpO2 100

## 2024-04-04 NOTE — DISCHARGE INSTR - AVS FIRST PAGE
Discharge Instructions - Orthopedics  Huyen Zhou 83 y.o. female MRN: [id number]  Unit/Bed#: [facility] 4    Weight Bearing Status:                                           Weight Bearing as tolerated to the right lower extremity.    DVT prophylaxis:  Complete course of Aspirin 81 mg twice daily x 35 days from date of surgery as directed    Pain:  Start oxycodone 5 mg every 6 hrs after last dose taken after surgery for 1 day  Transition to oxycodone 5 mg every 6 hours as needed    Showering Instructions:   Do not shower until 5 days from date of surgery  Do not soak your incision(Tubs, Jacuzzi's, pools, ext)    Dressing Instructions:   May remove dressing 5 days from date of surgery OR may leave dressing in place until follow up office visit 2 weeks from surgery date  Keep dressing/incision clean and intact until follow up appointment.  Do not apply any creams or ointments/lotions to your incisions including antibiotic ointment, peroxide    Driving Instructions:  No driving until cleared by Orthopaedic Surgery.    PT/OT:  Continue PT/OT on outpatient basis as directed  Continue motion and strengthening exercises while at home    Appt Instructions:   If you do not have your appointment, please call the clinic at [phone]  Otherwise followup as scheduled    Contact the office sooner if you experience any increased numbness/tingling in the extremities.

## 2024-04-04 NOTE — ANESTHESIA PROCEDURE NOTES
Peripheral Block    Patient location during procedure: holding area  Start time: 4/4/2024 7:25 AM  Reason for block: at surgeon's request and post-op pain management  Staffing  Performed by: García Ferguson MD  Authorized by: García Ferguson MD    Preanesthetic Checklist  Completed: patient identified, IV checked, site marked, risks and benefits discussed, surgical consent, monitors and equipment checked, pre-op evaluation and timeout performed  Peripheral Block  Patient position: supine  Prep: ChloraPrep  Patient monitoring: frequent blood pressure checks, continuous pulse oximetry and heart rate  Block type: Adductor Canal  Laterality: right  Injection technique: single-shot  Procedures: ultrasound guided, Ultrasound guidance required for the procedure to increase accuracy and safety of medication placement and decrease risk of complications.  Ultrasound permanent image saved  Needle  Needle type: Stimuplex   Needle length: 4 in  Needle localization: anatomical landmarks and ultrasound guidance  Assessment  Injection assessment: incremental injection, frequent aspiration, injected with ease, negative aspiration, negative for heart rate change, no paresthesia on injection, no symptoms of intraneural/intravenous injection and needle tip visualized at all times  Paresthesia pain: none  Post-procedure:  site cleaned  patient tolerated the procedure well with no immediate complications  Additional Notes  Uncomplicated single shot adductor canal block w/ Exparel

## 2024-04-04 NOTE — OCCUPATIONAL THERAPY NOTE
Occupational Therapy Evaluation & Treat     Patient Name: Huyen Zhou  Today's Date: 4/4/2024  Problem List  Principal Problem:    Status post total knee replacement using cement, right  Active Problems:    Status post total right knee replacement using cement    Past Medical History  Past Medical History:   Diagnosis Date    Chronic kidney disease     Disease of thyroid gland     GI bleed     High cholesterol     History of transfusion     Hypothyroidism     PONV (postoperative nausea and vomiting)      Past Surgical History  Past Surgical History:   Procedure Laterality Date    BREAST CYST EXCISION  1982    cyst removals and aspirations-benign    CATARACT EXTRACTION, BILATERAL      COLONOSCOPY      HYSTERECTOMY      at age 64    OOPHORECTOMY Bilateral     at age 64    PARATHYROID GLAND SURGERY      RI ARTHROPLASTY FEM CONDYLES/TIBIAL PLATEAU KNEE Left 11/09/2023    Procedure: ARTHROPLASTY KNEE TOTAL AND ALL ASSOCIATED PROCEDURES;  Surgeon: Rachel Momin MD;  Location:  MAIN OR;  Service: Orthopedics    STOMACH SURGERY      TOTAL KNEE ARTHROPLASTY Left            04/04/24 1318   OT Last Visit   OT Visit Date 04/04/24   Note Type   Note type Evaluation  (& Treat)   Additional Comments s/p R TKA day #0   Pain Assessment   Pain Assessment Tool 0-10   Pain Score 2   Pain Location/Orientation Orientation: Right;Location: Knee   Hospital Pain Intervention(s) Repositioned;Rest   Restrictions/Precautions   Other Precautions Fall Risk;Pain;Hard of hearing  (+ b/l hearing aides)   Home Living   Type of Home House   Home Layout One level;Stairs to enter with rails  (4 LADI)   Bathroom Shower/Tub Tub/shower unit   Bathroom Toilet Standard  (has sink adjacent to toilet to use as support)   Bathroom Equipment Tub transfer bench   Bathroom Accessibility Accessible   Home Equipment Walker;Cane  (Pt was using cane PRN.)   Prior Function   Level of [place] Independent with ADLs;Independent with functional  mobility;Needs assistance with IADLS   Lives With Spouse;Son  (Per chart review, son is mentally disabled)   Receives Help From Family   IADLs Independent with driving;Independent with meal prep;Independent with medication management  (Pt reports that she drives minimally)   Subjective   Subjective Pt received in supine. Pt pleasant t/o. Daughter at bedside.   ADL   Eating Assistance 7  Independent   Grooming Assistance 7  Independent   UB Bathing Assistance 5  Supervision/Setup   LB Bathing Assistance 5  Supervision/Setup   UB Dressing Assistance 5  Supervision/Setup   LB Dressing Assistance 4  Minimal Assistance   Toileting Assistance  5  Supervision/Setup   Bed Mobility   Supine to Sit 6  Modified independent   Additional items Increased time required   Additional Comments Pt remained seated EOB by end of session.   Transfers   Sit to Stand 4  Minimal assistance   Additional items Assist x 1;Verbal cues   Stand to Sit 5  Supervision   Additional items Assist x 1;Verbal cues   Functional Mobility   Functional Mobility 5  Supervision   Additional Comments RW   Balance   Static Sitting Normal   Dynamic Sitting Good   Static Standing Fair +   Dynamic Standing Fair   Activity Tolerance   Activity Tolerance Patient tolerated treatment well   Medical Staff Made Aware PT [place]   Nurse Made Aware JOSE MEDEROS Assessment   RUE Assessment WFL   LUE Assessment   LUE Assessment WFL   Cognition   Overall Cognitive Status WFL   Arousal/Participation Alert   Attention Within functional limits   Orientation Level Oriented X4   Memory Within functional limits   Following Commands Follows all commands and directions without difficulty   Comments Pt very [place]. Relied on lip reading at times.   Assessment   Limitation Decreased ADL status;Decreased self-care trans;Decreased high-level ADLs   Prognosis Good   Assessment Pt is a 83 y.o. female seen for OT evaluation at [facility], admitted 4/4/2024 w/ Status post total  knee replacement using cement, right.  Comorbidities affecting pt's functional performance at time of assessment include: b/l hearing loss, hx of left TKA, b/l cataracts,  etc (see chart).  Prior to admission, pt was living with spouse and son in house with 4 steps to manage.  Pt was I w/  ADLS and most IADLS, (+) drove, & required cane PTA. Upon evaluation, pt appears to be performing below baseline functional status. Pt currently requires mod I for bed mobility, sup-min Ax 1 for functional mobility/transfers, sup for UB ADLs and sup-min A for LB ADLS 2* the following deficits impacting occupational performance: weakness, decreased strength, decreased balance, decreased tolerance, and decreased safety awareness. Full objective findings from OT assessment regarding body systems outlined above. Personal factors affecting pt at time of IE include:limited home support, difficulty performing ADLS, difficulty performing IADLS , and decreased functional mobility . These impairments, as well as risk for falls  limit pt's ability to safely engage in all baseline areas of occupation and mobility. Pt /daughter educated this encounter re: ADL and transfer strategies. This evaluation required an extensive review of medical and/or therapy records and additional review of physical, cognitive and psychosocial history related to functional performance. Based upon functional performance deficits and assessments, this evaluation has been identified as a high complexity evaluation.  At this time, OT recommendations at time of discharge are home with no OT needs.   Goals   Patient Goals Pt wishes to improve functional mobility   Plan   Treatment Interventions ADL retraining;Functional transfer training;Patient/family training;Compensatory technique education   OT Frequency Eval only   Discharge Recommendation   Rehab Resource Intensity Level, OT No post-acute rehabilitation needs   Additional Comments  The patient's raw score on the  AM-PAC Daily Activity inpatient short form is 23, standardized score is 51.12, greater than 39.4. Patients at this level are likely to benefit from DC to home. However please refer to therapist recommendation for discharge planning given other factors that may influence destination.   AM-PAC Daily Activity Inpatient   Lower Body Dressing 3   Bathing 4   Toileting 4   Upper Body Dressing 4   Grooming 4   Eating 4   Daily Activity Raw Score 23   Daily Activity Standardized Score (Calc for Raw Score >=11) 51.12   AM-PAC Applied Cognition Inpatient   Following a Speech/Presentation 3   Understanding Ordinary Conversation 4   Taking Medications 3   Remembering Where Things Are Placed or Put Away 4   Remembering List of 4-5 Errands 3   Taking Care of Complicated Tasks 3   Applied Cognition Raw Score 20   Applied Cognition Standardized Score 41.76   Additional Treatment Session   Start Time 1300   End Time 1318   Treatment Assessment Pt seated EOB. Pt educated on LB ADL strategies. Pt donned underwear with supervision. Donned pants with min A x 1. Pt able to don socks/shoes with supervision. Performed additional sit>stand transfers with supervision with VC for hand placement and well as maintaining R knee extension to maintain balance. Pt also educated on car transfers. Performed functional mobility with supervision with RW. Remained seated in EOB. NAD. Call bell in reach. Following tx session, pt demonstrates adequate functional independence and safety for POD#0 D/C.       End of Consult   Education Provided Yes   Patient Position at End of Consult Seated edge of bed;All needs within reach   Nurse Communication Nurse aware of consult         Irais Ludwig OTR/L

## 2024-04-04 NOTE — INTERVAL H&P NOTE
H&P reviewed. After examining the patient I find no changes in the patients condition since the H&P had been written.    There were no vitals filed for this visit.    Patient seen and examined  Gen: no apparent distress  CV: S1-S2  Chest: no audible wheezing  Abdomen: soft  Right LE: no open wounds or abrasions; minimal effusion; distally NVI  To the OR for Right TKA  Pros and cons of op and non-op intervention discussed with patient  We will follow up postoperatively

## 2024-04-05 ENCOUNTER — TELEPHONE (OUTPATIENT)
Dept: OBGYN CLINIC | Facility: HOSPITAL | Age: 84
End: 2024-04-05

## 2024-04-05 NOTE — TELEPHONE ENCOUNTER
Patient contacted for a postoperative follow up assessment. Patient states current pain level of a  4/10  when sitting and 4/10 when walking with RW. Patient states they have minimal pain and it is relieved with medication regimen. Patient denies increase in swelling and dressing is clean, dry and intact. Patient is icing the site regularly. PT 4/8 at 2PM.     Patient fell while standing up. Patient's daughter witnessed the fall. Patient fell onto her buttocks onto the carpet and a pile of blankets. Daughter denies injury and head strike.     We reviewed patients AVS medication list. Patient is taking Tylenol 650mg every 8 hours, Oxycodone 5mg PRN, ASA 81mg BID, methylprednisolone 4mg, Colace 100mg BID. Patient has not yet had a BM but is passing gas.      Patient denies nausea, vomiting, abdominal pain, chest pain, shortness of breath, fever, dizziness and calf pain. Patient confirmed post-op appointment with surgeon on  4/19 at 10:30AM. Patient does not have any other questions or concerns at this time. Pt was encouraged to call with any questions, concerns or issues.

## 2024-04-08 ENCOUNTER — OFFICE VISIT (OUTPATIENT)
Dept: PHYSICAL THERAPY | Facility: CLINIC | Age: 84
End: 2024-04-08
Payer: MEDICARE

## 2024-04-08 DIAGNOSIS — M17.11 PRIMARY OSTEOARTHRITIS OF RIGHT KNEE: Primary | ICD-10-CM

## 2024-04-08 DIAGNOSIS — Z96.651 STATUS POST TOTAL RIGHT KNEE REPLACEMENT: ICD-10-CM

## 2024-04-08 PROCEDURE — 97110 THERAPEUTIC EXERCISES: CPT

## 2024-04-08 PROCEDURE — 97164 PT RE-EVAL EST PLAN CARE: CPT

## 2024-04-08 NOTE — PROGRESS NOTES
"PT Re-Evaluation     Today's date: 2024  Patient name: Huyen Zhou  : 1940  MRN: [id number]  Referring provider: Rodger Altman*  Dx:   Encounter Diagnosis     ICD-10-CM    1. Primary osteoarthritis of right knee  M17.11       2. Status post total right knee replacement  Z96.651                      Assessment  Assessment details: Huyen Zhou is a 83 y.o. female presenting to physical therapy for an initial evaluation s/p a R TKA on 24. The patient demonstrates decreased and painful right knee ROM, decreased lower extremity strength, and limited tolerance to weightbearing activities. She is ambulatory with her RW and demonstrates an antalgic, step to with occasional step through gait pattern.These deficits have limited the patient's ability to complete ADLs, avocational responsibilities, and recreational activities. This patient would benefit from OP PT services to address their impairments and functional limitations to maximize functional mobility. The patient was educated on swelling management with ice, elevation, and compression sock usage. Her post operative HEP was reviewed with her and good understanding was demonstrated.  Impairments: abnormal gait, abnormal or restricted ROM, activity intolerance, impaired balance, impaired physical strength, lacks appropriate home exercise program, pain with function and weight-bearing intolerance    Symptom irritability: moderateUnderstanding of Dx/Px/POC: excellent   Prognosis: good    Goals  STG to be achieved in 4 weeks:   The patient will report a decrease in right knee \"at worst\" subjective pain rating score to at least 6/10 to allow for improved tolerance for weightbearing activities.   The patient will increase right knee flexion AROM to at least 90 degrees to allow for improved functional mobility.   The patient will increase right knee extension MMT score to at least 4/5 to allow for improved functional mobility.     LTG to be achieved by " "DC:   The patient will be independent in comprehensive HEP.   The patient will report a decrease in right knee \"at worst\" subjective pain rating score to at least 3/10 to allow for improved tolerance  for functional mobility.   The patient will improve right knee flexion and extension AROM to WFL to allow for improved functional mobility.   The patient will increase right knee flexion and extension MMT score to WFL to allow for improved functional mobility.   The patient will be able to complete one full flight of stairs to her basement for completion of laundry with minimal to no difficulty.   The patient will be able to complete household chores and care for her son with minimal to no difficulties due to knee pain.      Plan  Patient would benefit from: skilled physical therapy  Planned modality interventions: thermotherapy: hydrocollator packs, TENS and cryotherapy  Planned therapy interventions: manual therapy, joint mobilization, balance/weight bearing training, neuromuscular re-education, patient education, flexibility, strengthening, stretching, therapeutic activities, therapeutic exercise, gait training and home exercise program  Frequency: 2x week  Duration in weeks: 12  Plan of Care beginning date: 3/20/2024  Plan of Care expiration date: 6/12/2024  Treatment plan discussed with: patient        Subjective Evaluation    History of Present Illness  Mechanism of injury: Huyen presents to OP PT s/p R TKA on 4/4/24. In regards to pain she didn't have to take any prescription pain medications for the first day besides at night to help her sleep. Per the patient and her daughter, she was doing well very with the nerve block pain relief. She did take some medications today prior to her arrival. She has been walking with the RW and feels stable and secure with this. Per her daughter, she did have one mild fall backwards into a chair when trying to take off her shoes when standing. Her daughter says they did contact " the surgeon and they are aware of this. She has completed her HEP at home since her surgery. She has been elevating her leg and icing several times per day. She has the leg wrapped in an ACE Bandage, however can remove this and the dressing tomorrow, on post op day 5.   Patient Goals  Patient goals for therapy: decreased pain and increased strength  Patient goal: to get moving and walking better  Pain  Current pain ratin  At best pain ratin  At worst pain ratin  Location: bilateral knees  Quality: dull ache and throbbing  Relieving factors: medications, change in position and relaxation  Aggravating factors: standing, walking and stair climbing  Progression: improved    Social Support  Steps to enter house: yes  Stairs in house: yes (to basement)   Lives in: one-story house  Lives with: spouse and adult children    Employment status: not working    Diagnostic Tests  X-ray: abnormal  Treatments  Previous treatment: medication and physical therapy  Current treatment: physical therapy        Objective     Observations     Additional Observation Details  R knee wrapped in post operative ACE bandage    Active Range of Motion   Left Knee   Flexion: 115 degrees   Extensor la degrees     Right Knee   Flexion: 80 degrees with pain  Extensor lag: -14 degrees with pain    Passive Range of Motion   Left Knee   Normal passive range of motion    Right Knee   Flexion: 90 degrees with pain  Extension: -12 degrees with pain    Mobility   Patellar Mobility:   Left Knee   Hypomobile: left medial, left lateral, left superior and left inferior    Right Knee   Hypomobile: medial, lateral, superior and inferior     Strength/Myotome Testing     Left Hip   Planes of Motion   Flexion: 4-  Abduction: 4-    Right Hip   Planes of Motion   Flexion: 3+  Abduction: 3+    Left Knee   Flexion: 4  Extension: 4  Quadriceps contraction: good    Right Knee   Flexion: 3+  Extension: 3  Quadriceps contraction: fair    Ambulation    Weight-Bearing Status   Weight-Bearing Status (Right): full weight-bearing    Assistive device used: two-wheeled walker    Ambulation: Level Surfaces   Ambulation with assistive device: independent    Observational Gait   Gait: antalgic     Additional Observational Gait Details  Decreased knee flexion in swing phases of gait bilaterally, increased lateral weight shifting bilaterally, step to with occasional step through gait pattern     Functional Assessment        Comments  Re-evaluation 4/8/24  5 STS- 55.01 seconds B/L UE for push off and eccentric control to chair, good knee flexion to chair, however RLE placed slightly forward compared to LLE  TUG- 38.77 seconds B/L UE for push off and eccentric control to chair, step to with occasional step through gait pattern with use of RW             Precautions: s/p L TKA 11/9/23, R TKA 4/4/24  Access Code: [email or web address]    POC expires Unit limit Auth  expiration date PT/OT + Visit Limit?   6/12/24 N/A N/A BOMN                 Visit/Unit Tracking  AUTH Status:  Date 3/20 3/22 3/25 3/28 4/2 4/8         BOMN Used 1 2 3 4 5 6          Remaining                     Manuals 4/8            R knee PROM with OP             Patellar mobilizations             Re-evaluation                          Neuro Re-Ed             Quad set with towel under ankle HEP            Quad set with LAQ HEP            Quad set with SAQ             Quad set with SLR                                                    Ther Ex             Rec bike for knee ROM             Heel slides with strap HEP            Calf stretch seated with strap vs rockerboard HEP            Hamstring stretch seated vs standing at step HEP                         Pt education PT POC, HEP, ice, swelling management                         Ther Activity             TG squats             Fwd step ups             Lateral step downs             Gait Training                                       Modalities

## 2024-04-11 ENCOUNTER — OFFICE VISIT (OUTPATIENT)
Dept: PHYSICAL THERAPY | Facility: CLINIC | Age: 84
End: 2024-04-11
Payer: MEDICARE

## 2024-04-11 DIAGNOSIS — Z96.651 STATUS POST TOTAL RIGHT KNEE REPLACEMENT USING CEMENT: ICD-10-CM

## 2024-04-11 DIAGNOSIS — M17.11 PRIMARY OSTEOARTHRITIS OF RIGHT KNEE: Primary | ICD-10-CM

## 2024-04-11 DIAGNOSIS — Z96.651 STATUS POST TOTAL RIGHT KNEE REPLACEMENT: ICD-10-CM

## 2024-04-11 PROCEDURE — 97112 NEUROMUSCULAR REEDUCATION: CPT

## 2024-04-11 PROCEDURE — 97140 MANUAL THERAPY 1/> REGIONS: CPT

## 2024-04-11 PROCEDURE — 97110 THERAPEUTIC EXERCISES: CPT

## 2024-04-11 NOTE — PROGRESS NOTES
"Daily Note     Today's date: 2024  Patient name: Huyen Zhou  : 1940  MRN: [id number]  Referring provider: Rodger Altman*  Dx:   Encounter Diagnosis     ICD-10-CM    1. Primary osteoarthritis of right knee  M17.11       2. Status post total right knee replacement  Z96.651                      Subjective: Patient reports that today is not one of her better days as her knee is very painful and her muscles aren't working in the leg.       Objective: See treatment diary below      Assessment: Initiated patient's first treatment session following TKA as outlined below. Tolerated treatment well. Patient slow moving throughout session largely due to limitations in her functional mobility and pain in knee. Despite this, she demonstrates good knee flexion ROM with use of strap for assistance and PROM from therapist. Patient demonstrated fatigue post treatment, exhibited good technique with therapeutic exercises, and would benefit from continued PT      Plan: Progress treatment as tolerated.       Precautions: s/p L TKA 23, R TKA 24  Access Code: [email or web address]    POC expires Unit limit Auth  expiration date PT/OT + Visit Limit?   24 N/A N/A BOMN                 Visit/Unit Tracking  AUTH Status:  Date 3/20 3/22 3/25 3/28 4/2 4/8 4/11        BOMN Used 1 2 3 4 5 6 7         Remaining                     Manuals            R knee PROM with OP  BE           Patellar mobilizations             Re-evaluation                          Neuro Re-Ed             Quad set with towel under ankle HEP 5\"x5           Quad set with LAQ HEP 3\" 2x5           Quad set with SAQ             Quad set with SLR                                                    Ther Ex             Rec bike for knee ROM  Rocking 10'           Heel slides with strap HEP 5\"x10           Calf stretch seated with strap vs rockerboard HEP 3x30\" rockerboard           Hamstring stretch seated vs standing at step HEP 3x30\" step               "           Pt education PT POC, HEP, ice, swelling management                         Ther Activity             TG squats             Fwd step ups             Lateral step downs             Gait Training                                       Modalities

## 2024-04-12 ENCOUNTER — APPOINTMENT (OUTPATIENT)
Dept: PHYSICAL THERAPY | Facility: CLINIC | Age: 84
End: 2024-04-12
Payer: MEDICARE

## 2024-04-15 ENCOUNTER — OFFICE VISIT (OUTPATIENT)
Dept: PHYSICAL THERAPY | Facility: CLINIC | Age: 84
End: 2024-04-15
Payer: MEDICARE

## 2024-04-15 DIAGNOSIS — M17.11 PRIMARY OSTEOARTHRITIS OF RIGHT KNEE: Primary | ICD-10-CM

## 2024-04-15 DIAGNOSIS — Z96.651 STATUS POST TOTAL RIGHT KNEE REPLACEMENT: ICD-10-CM

## 2024-04-15 PROCEDURE — 97140 MANUAL THERAPY 1/> REGIONS: CPT

## 2024-04-15 PROCEDURE — 97110 THERAPEUTIC EXERCISES: CPT

## 2024-04-15 PROCEDURE — 97112 NEUROMUSCULAR REEDUCATION: CPT

## 2024-04-15 NOTE — PROGRESS NOTES
"Daily Note     Today's date: 4/15/2024  Patient name: Huyen Zhou  : 1940  MRN: [id number]  Referring provider: Rodger Altman*  Dx:   Encounter Diagnosis     ICD-10-CM    1. Primary osteoarthritis of right knee  M17.11       2. Status post total right knee replacement  Z96.651                      Subjective: Patient reports that her right knee is giving her much harder of a time than the left one. She says that she was walking much better after her left knee was replaced than after this surgery.       Objective: See treatment diary below      Assessment: Tolerated treatment well. Greatly challenged with TG squats and SLRs this session secondary to LE, specifically quadriceps, weakness. She requires continued education throughout the session that she is doing well in regards to both functional mobility, strength, and ROM despite experiencing pain in the knee. Patient demonstrated fatigue post treatment, exhibited good technique with therapeutic exercises, and would benefit from continued PT      Plan: Progress treatment as tolerated.       Precautions: s/p L TKA 23, R TKA 24  Access Code: [email or web address]    POC expires Unit limit Auth  expiration date PT/OT + Visit Limit?   24 N/A N/A BOMN                 Visit/Unit Tracking  AUTH Status:  Date 3/20 3/22 3/25 3/28 4/2 4/8 4/11 4/15       BOMN Used 1 2 3 4 5 6 7 8        Remaining                     Manuals 4/8 4/11 4/15          R knee PROM with OP  BE BE          Patellar mobilizations             Re-evaluation                          Neuro Re-Ed             Quad set with towel under ankle HEP 5\"x5           Quad set with LAQ HEP 3\" 2x5 3\" 3x5          Quad set with SAQ             Quad set with SLR   X5 min to mod A                                                 Ther Ex             Rec bike for knee ROM  Rocking 10' Rocking 10'          Heel slides with strap HEP 5\"x10           Calf stretch seated with strap vs rockerboard HEP 3x30\" " "rockerboard 3x30\" rockerboard          Hamstring stretch seated vs standing at step HEP 3x30\" step  3x30\" step                       Pt education PT POC, HEP, ice, swelling management                         Ther Activity             TG squats   L18 2x10          Fwd step ups             Lateral step downs             Gait Training                                       Modalities                                                "

## 2024-04-18 ENCOUNTER — OFFICE VISIT (OUTPATIENT)
Dept: PHYSICAL THERAPY | Facility: CLINIC | Age: 84
End: 2024-04-18
Payer: MEDICARE

## 2024-04-18 DIAGNOSIS — M17.11 PRIMARY OSTEOARTHRITIS OF RIGHT KNEE: Primary | ICD-10-CM

## 2024-04-18 DIAGNOSIS — Z96.651 STATUS POST TOTAL RIGHT KNEE REPLACEMENT: ICD-10-CM

## 2024-04-18 PROCEDURE — 97530 THERAPEUTIC ACTIVITIES: CPT

## 2024-04-18 PROCEDURE — 97110 THERAPEUTIC EXERCISES: CPT

## 2024-04-18 PROCEDURE — 97140 MANUAL THERAPY 1/> REGIONS: CPT

## 2024-04-18 NOTE — PROGRESS NOTES
"Daily Note     Today's date: 2024  Patient name: Huyen Zhou  : 1940  MRN: [id number]  Referring provider: Rodger Altman*  Dx:   Encounter Diagnosis     ICD-10-CM    1. Primary osteoarthritis of right knee  M17.11       2. Status post total right knee replacement  Z96.651                      Subjective: Patient reports that she doesn't think her knee is ever going to get better. She says it continues to be painful and stiff. She is very slow moving and says that she was moving better and quicker after her left knee replacement.       Objective: See treatment diary below      Assessment: Patient arrived late to session, completed exercises within time allowance. She requires encouragement throughout session for completion of exercises and that she is progressing well and as expected. Reviewed expectations for functional mobility, ROM, and strength returns following surgery. Tolerated treatment well. Patient demonstrated fatigue post treatment, exhibited good technique with therapeutic exercises, and would benefit from continued PT      Plan: Continue per plan of care.      Precautions: s/p L TKA 23, R TKA 24  Access Code: [email or web address]    POC expires Unit limit Auth  expiration date PT/OT + Visit Limit?   24 N/A N/A BOMN                 Visit/Unit Tracking  AUTH Status:  Date 3/20 3/22 3/25 3/28 4/2 4/8 4/11 4/15 4/18      BOMN Used 1 2 3 4 5 6 7 8 9       Remaining                     Manuals 4/8 4/11 4/15 4/18         R knee PROM with OP  BE BE BE         Patellar mobilizations    BE         Gentle calf stretch    BE         Re-evaluation                          Neuro Re-Ed             Quad set with towel under ankle HEP 5\"x5           Quad set with LAQ HEP 3\" 2x5 3\" 3x5 3\" 2x10         Quad set with SAQ             Quad set with SLR   X5 min to mod A                                                 Ther Ex             Rec bike for knee ROM  Rocking 10' Rocking 10' Rocking 10'       " "  Heel slides with strap HEP 5\"x10  5\"x20         Calf stretch seated with strap vs rockerboard HEP 3x30\" rockerboard 3x30\" rockerboard          Hamstring stretch seated vs standing at step HEP 3x30\" step  3x30\" step                       Pt education PT POC, HEP, ice, swelling management   HEP, ambulation, expectations                      Ther Activity             TG squats   L18 2x10 L20 2x10         Fwd step ups             Lateral step downs             Gait Training                                       Modalities                                                  "

## 2024-04-19 ENCOUNTER — APPOINTMENT (OUTPATIENT)
Dept: RADIOLOGY | Facility: AMBULARY SURGERY CENTER | Age: 84
End: 2024-04-19
Payer: MEDICARE

## 2024-04-19 ENCOUNTER — OFFICE VISIT (OUTPATIENT)
Dept: OBGYN CLINIC | Facility: CLINIC | Age: 84
End: 2024-04-19

## 2024-04-19 VITALS — WEIGHT: 172 LBS | HEIGHT: 60 IN | BODY MASS INDEX: 33.77 KG/M2

## 2024-04-19 DIAGNOSIS — Z96.651 S/P TOTAL KNEE REPLACEMENT USING CEMENT, RIGHT: ICD-10-CM

## 2024-04-19 DIAGNOSIS — Z96.651 S/P TOTAL KNEE REPLACEMENT USING CEMENT, RIGHT: Primary | ICD-10-CM

## 2024-04-19 PROCEDURE — 73562 X-RAY EXAM OF KNEE 3: CPT

## 2024-04-19 PROCEDURE — 99024 POSTOP FOLLOW-UP VISIT: CPT | Performed by: PHYSICIAN ASSISTANT

## 2024-04-19 NOTE — PROGRESS NOTES
83 y.o.female presents for 2 weeks postoperative visit status post right TKA. Patient denies any chest pain, shortness or breath or calf pain .  Patient is taking aspirin for DVT prophylaxis.  Pain is well controlled with medication.    Review of Systems  Review of systems negative unless otherwise specified in HPI    Past Medical History  Past Medical History:   Diagnosis Date    Chronic kidney disease     Disease of thyroid gland     GI bleed     High cholesterol     History of transfusion     Hypothyroidism     PONV (postoperative nausea and vomiting)        Past Surgical History  Past Surgical History:   Procedure Laterality Date    BREAST CYST EXCISION  1982    cyst removals and aspirations-benign    CATARACT EXTRACTION, BILATERAL      COLONOSCOPY      HYSTERECTOMY      at age 64    OOPHORECTOMY Bilateral     at age 64    PARATHYROID GLAND SURGERY      FL ARTHROPLASTY FEM CONDYLES/TIBIAL PLATEAU KNEE Left 11/09/2023    Procedure: ARTHROPLASTY KNEE TOTAL AND ALL ASSOCIATED PROCEDURES;  Surgeon: Rachel Momin MD;  Location:  MAIN OR;  Service: Orthopedics    FL ARTHRP KNE CONDYLE&PLATU MEDIAL&LAT COMPARTMENTS Right 4/4/2024    Procedure: ARTHROPLASTY KNEE TOTAL AND ALL ASSOCIATED PROCEDURES;  Surgeon: Rachel Momin MD;  Location:  MAIN OR;  Service: Orthopedics    STOMACH SURGERY      TOTAL KNEE ARTHROPLASTY Left        Current Medications  Current Outpatient Medications on File Prior to Visit   Medication Sig Dispense Refill    acetaminophen (TYLENOL) 650 mg CR tablet Take 1 tablet (650 mg total) by mouth every 8 (eight) hours as needed for mild pain 30 tablet 0    aspirin (ECOTRIN LOW STRENGTH) 81 mg EC tablet Take 1 tablet (81 mg total) by mouth 2 (two) times a day Take 1(one) 81 mg tablet twice daily x 35 days after total joint arthroplasty 70 tablet 0    levothyroxine 100 mcg tablet Take 1 tablet (100 mcg total) by mouth daily 90 tablet 3    oxyCODONE (ROXICODONE) 5 immediate release tablet  Take 1 tablets every 6 hrs as needed for pain control 30 tablet 0    pravastatin (PRAVACHOL) 20 mg tablet Take with 40 mg Pravastatin, total daily dose 60 mg 90 tablet 3    pravastatin (PRAVACHOL) 40 mg tablet take 1 tablet by mouth daily (60 MGS EVERY EVENING) 90 tablet 0    ascorbic acid (VITAMIN C) 500 MG tablet Take 1 tablet (500 mg total) by mouth 2 (two) times a day (Patient not taking: Reported on 4/19/2024) 60 tablet 1    docusate sodium (COLACE) 100 mg capsule Take 1 capsule (100 mg total) by mouth 2 (two) times a day (Patient not taking: Reported on 4/19/2024) 10 capsule 0    ferrous sulfate 324 (65 Fe) mg Take 1 tablet (324 mg total) by mouth 2 (two) times a day before meals (Patient not taking: Reported on 4/19/2024) 60 tablet 1    folic acid (FOLVITE) 1 mg tablet take 1 tablet by mouth once daily (Patient not taking: Reported on 4/19/2024) 30 tablet 5    methylPREDNISolone 4 MG tablet therapy pack Use as directed on package (Patient not taking: Reported on 4/19/2024) 1 each 0     No current facility-administered medications on file prior to visit.       Recent Labs (HCT,HGB,PT,INR,ESR,CRP,GLU,HgA1C)  0   Lab Value Date/Time    HCT 39.8 03/12/2024 0933    HCT 37.9 05/03/2015 0630    HGB 12.9 03/12/2024 0933    HGB 12.1 05/03/2015 0630    WBC 6.09 03/12/2024 0933    WBC 6.75 05/03/2015 0630    INR 0.97 03/12/2024 0933    GLUCOSE 96 05/04/2015 0529    HGBA1C 6.0 (H) 03/12/2024 0933           Body mass index is 33.59 kg/m².  Wt Readings from Last 3 Encounters:   04/19/24 78 kg (172 lb)   04/04/24 78 kg (172 lb)   03/25/24 78.9 kg (174 lb)       Physical exam   General: Awake, Alert, Oriented   Eyes: Pupils equal, round and reactive to light    Heart: regular rate and rhythm   Lungs: No audible wheezing   Abdomen: soft  right Lower extremity      Incision well approximated without erythema no tenderness to palpation    Full knee extension 110 of flexion   Active ankle dorsal and plantarflexion without pain,  calf nontender palpation   sensation mildly decreased kev-incisionally otherwise intact   Distal pulses present      Imaging  X rays of the right knee reviewed.  X rays reveal excellently aligned right total knee arthroplasty without evidence of loosening or osseous abnormality.    Assessment:  Status post 2 weeks right TKA    Plan:  Weight bearing as tolerated right Lower extremity  Physical therapy  [facility] antibiotic prophylaxis recommended  Pain medication as needed  Follow-up in 2 months and repeat x-rays right knee

## 2024-04-22 ENCOUNTER — OFFICE VISIT (OUTPATIENT)
Dept: PHYSICAL THERAPY | Facility: CLINIC | Age: 84
End: 2024-04-22
Payer: MEDICARE

## 2024-04-22 DIAGNOSIS — Z96.651 STATUS POST TOTAL RIGHT KNEE REPLACEMENT: ICD-10-CM

## 2024-04-22 DIAGNOSIS — M17.11 PRIMARY OSTEOARTHRITIS OF RIGHT KNEE: Primary | ICD-10-CM

## 2024-04-22 PROCEDURE — 97110 THERAPEUTIC EXERCISES: CPT

## 2024-04-22 PROCEDURE — 97530 THERAPEUTIC ACTIVITIES: CPT

## 2024-04-22 PROCEDURE — 97112 NEUROMUSCULAR REEDUCATION: CPT

## 2024-04-22 NOTE — PROGRESS NOTES
"Daily Note     Today's date: 2024  Patient name: Huyen Zhou  : 1940  MRN: [id number]  Referring provider: Rodger Altman*  Dx:   Encounter Diagnosis     ICD-10-CM    1. Primary osteoarthritis of right knee  M17.11       2. Status post total right knee replacement  Z96.651                      Subjective: She did have a follow up with the surgeon on 24 and they were pleased with her progress thus far. They did remove the staples from her knee and applied steri strips. She says that each day the knee gets a little better.       Objective: See treatment diary below      Assessment: Tolerated treatment well. Much improved tolerance to exercises this session with additional reps and exercises able to be completed compared to previous ones. Able to complete forward revolutions on recumbent bike demonstrating improving knee flexion active ROM. She demonstrates improved ease of functional mobility and gait speed with use of RW.  Patient demonstrated fatigue post treatment, exhibited good technique with therapeutic exercises, and would benefit from continued PT      Plan: Progress treatment as tolerated.       Precautions: s/p L TKA 23, R TKA 24  Access Code: [email or web address]    POC expires Unit limit Auth  expiration date PT/OT + Visit Limit?   24 N/A N/A BOMN                 Visit/Unit Tracking  AUTH Status:  Date 3/20 3/22 3/25 3/28 4/2 4/8 4/11 4/15 4/18 4/22     BOMN Used 1 2 3 4 5 6 7 8 9 10      Remaining                     Manuals 4/8 4/11 4/15 4/18 4/22        R knee PROM with OP  BE BE BE BE        Patellar mobilizations    BE BE        Gentle calf stretch    BE         Re-evaluation                          Neuro Re-Ed             Quad set with towel under ankle HEP 5\"x5           Quad set with LAQ HEP 3\" 2x5 3\" 3x5 3\" 2x10 3\" 3x10        Quad set with SAQ     3\" x10        Quad set with SLR   X5 min to mod A  8x min A                                               Ther Ex           " "  Rec bike for knee ROM  Rocking 10' Rocking 10' Rocking 10' Rocking to fwd rev 10'        Heel slides with strap HEP 5\"x10  5\"x20 5\"x20        Calf stretch seated with strap vs rockerboard HEP 3x30\" rockerboard 3x30\" rockerboard  3x30\" rockerboard        Hamstring stretch seated vs standing at step HEP 3x30\" step  3x30\" step  3x30\" step                     Pt education PT POC, HEP, ice, swelling management   HEP, ambulation, expectations                      Ther Activity             TG squats   L18 2x10 L20 2x10 L22 2x10         Fwd step ups     4\" x10        Lateral step downs             Gait Training                                       Modalities                                                    "

## 2024-04-23 DIAGNOSIS — E78.2 MIXED HYPERLIPIDEMIA: ICD-10-CM

## 2024-04-24 RX ORDER — PRAVASTATIN SODIUM 40 MG
40 TABLET ORAL DAILY
Qty: 90 TABLET | Refills: 3 | Status: SHIPPED | OUTPATIENT
Start: 2024-04-24

## 2024-04-25 ENCOUNTER — OFFICE VISIT (OUTPATIENT)
Dept: PHYSICAL THERAPY | Facility: CLINIC | Age: 84
End: 2024-04-25
Payer: MEDICARE

## 2024-04-25 DIAGNOSIS — Z96.651 STATUS POST TOTAL RIGHT KNEE REPLACEMENT: ICD-10-CM

## 2024-04-25 DIAGNOSIS — M17.11 PRIMARY OSTEOARTHRITIS OF RIGHT KNEE: Primary | ICD-10-CM

## 2024-04-25 PROCEDURE — 97110 THERAPEUTIC EXERCISES: CPT

## 2024-04-25 PROCEDURE — 97530 THERAPEUTIC ACTIVITIES: CPT

## 2024-04-25 PROCEDURE — 97112 NEUROMUSCULAR REEDUCATION: CPT

## 2024-04-25 NOTE — PROGRESS NOTES
"Daily Note     Today's date: 2024  Patient name: Huyen Zhou  : 1940  MRN: [id number]  Referring provider: Rodger Altman*  Dx:   Encounter Diagnosis     ICD-10-CM    1. Primary osteoarthritis of right knee  M17.11       2. Status post total right knee replacement  Z96.651                      Subjective: Patient reports that her pain today feels mostly in the posterior aspect of the knee.       Objective: See treatment diary below      Assessment: Tolerated treatment well. Progressed patient with additional reps on step ups and weight to LAQ/SAQ this session to improve lower extremity strength and quadriceps activation. She was able to complete without increased pain but did have muscular fatigue. She reported improved functional mobility with less stiffness at conclusion of session. Patient demonstrated fatigue post treatment, exhibited good technique with therapeutic exercises, and would benefit from continued PT      Plan: Progress treatment as tolerated.       Precautions: s/p L TKA 23, R TKA 24  Access Code: [email or web address]    POC expires Unit limit Auth  expiration date PT/OT + Visit Limit?   24 N/A N/A BOMN                 Visit/Unit Tracking  AUTH Status:  Date 3/20 3/22 3/25 3/28 4/2 4/8 4/11 4/15 4/18 4/22 4/25    BOMN Used 1 2 3 4 5 6 7 8 9 10 11     Remaining                     Manuals 4/8 4/11 4/15 4/18 4/22 4/25       R knee PROM with OP  BE BE BE BE BE       Patellar mobilizations    BE BE BE       Gentle calf stretch    BE  BE       Re-evaluation                          Neuro Re-Ed             Quad set with towel under ankle HEP 5\"x5           Quad set with LAQ HEP 3\" 2x5 3\" 3x5 3\" 2x10 3\" 3x10 1# 3\" 2x10       Quad set with SAQ     3\" x10 1# 3\" 2x10       Quad set with SLR   X5 min to mod A  8x min A 2x5                                              Ther Ex             Rec bike for knee ROM  Rocking 10' Rocking 10' Rocking 10' Rocking to fwd rev 10' Rocking to fwd rev " "10'       Heel slides with strap HEP 5\"x10  5\"x20 5\"x20 5\"x20       Calf stretch seated with strap vs rockerboard HEP 3x30\" rockerboard 3x30\" rockerboard  3x30\" rockerboard        Hamstring stretch seated vs standing at step HEP 3x30\" step  3x30\" step  3x30\" step                     Pt education PT POC, HEP, ice, swelling management   HEP, ambulation, expectations                      Ther Activity             TG squats   L18 2x10 L20 2x10 L22 2x10  L22 2x10       Fwd step ups     4\" x10 4\" 2x10       Lateral step downs             Gait Training                                       Modalities                                                      "

## 2024-04-29 ENCOUNTER — OFFICE VISIT (OUTPATIENT)
Dept: PHYSICAL THERAPY | Facility: CLINIC | Age: 84
End: 2024-04-29
Payer: MEDICARE

## 2024-04-29 DIAGNOSIS — Z96.651 STATUS POST TOTAL RIGHT KNEE REPLACEMENT: ICD-10-CM

## 2024-04-29 DIAGNOSIS — M17.11 PRIMARY OSTEOARTHRITIS OF RIGHT KNEE: Primary | ICD-10-CM

## 2024-04-29 PROCEDURE — 97530 THERAPEUTIC ACTIVITIES: CPT

## 2024-04-29 PROCEDURE — 97110 THERAPEUTIC EXERCISES: CPT

## 2024-04-29 PROCEDURE — 97112 NEUROMUSCULAR REEDUCATION: CPT

## 2024-04-29 NOTE — PROGRESS NOTES
"Daily Note     Today's date: 2024  Patient name: Huyen Zhou  : 1940  MRN: [id number]  Referring provider: Rodger Altman*  Dx:   Encounter Diagnosis     ICD-10-CM    1. Primary osteoarthritis of right knee  M17.11       2. Status post total right knee replacement  Z96.651                      Subjective: Patient reports today with use of a SPC. She says that she feels more comfortable with the cane in her right hand as she is a righty, however she does feel uneasy at times with the SPC.       Objective: See treatment diary below      Assessment: Tolerated treatment well. Advised patient on mechanical advantage for using the SPC in the left hand as well as continued practice with the SPC in her home until she is comfortable prior to use in the community. Good stability with use of SPC in right hand, no overt LOB. Patient demonstrated fatigue post treatment, exhibited good technique with therapeutic exercises, and would benefit from continued PT      Plan: Continue per plan of care.      Precautions: s/p L TKA 23, R TKA 24  Access Code: [email or web address]    POC expires Unit limit Auth  expiration date PT/OT + Visit Limit?   24 N/A N/A BOMN                 Visit/Unit Tracking  AUTH Status:  Date  3/22 3/25 3/28 4/2 4/8 4/11 4/15 4/18 4/22 4/25 4/29   BOMN Used  2 3 4 5 6 7 8 9 10 11 12    Remaining                     Manuals 4/8 4/11 4/15 4/18 4/22 4/25 4/29      R knee PROM with OP  BE BE BE BE BE BE      Patellar mobilizations    BE BE BE BE      Gentle calf stretch    BE  BE BE      Re-evaluation                          Neuro Re-Ed             Quad set with towel under ankle HEP 5\"x5           Quad set with LAQ HEP 3\" 2x5 3\" 3x5 3\" 2x10 3\" 3x10 1# 3\" 2x10 1# 3\" 2x10      Quad set with SAQ     3\" x10 1# 3\" 2x10 1# 3\" 3x10      Quad set with SLR   X5 min to mod A  8x min A 2x5 x10                                             Ther Ex             Rec bike for knee ROM  Rocking 10' Rocking 10' " "Rocking 10' Rocking to fwd rev 10' Rocking to fwd rev 10' Rocking to fwd rev 10'      Heel slides with strap HEP 5\"x10  5\"x20 5\"x20 5\"x20       Calf stretch seated with strap vs rockerboard HEP 3x30\" rockerboard 3x30\" rockerboard  3x30\" rockerboard        Hamstring stretch seated vs standing at step HEP 3x30\" step  3x30\" step  3x30\" step                     Pt education PT POC, HEP, ice, swelling management   HEP, ambulation, expectations   SPC usage                   Ther Activity             TG squats   L18 2x10 L20 2x10 L22 2x10  L22 2x10 L22 3x10      Fwd step ups     4\" x10 4\" 2x10 4\" 2x10      Lateral step downs             Gait Training                                       Modalities                                                        "

## 2024-04-30 NOTE — TELEPHONE ENCOUNTER
Called and spoke w/daughter Bree Yousif and informed oxy sent to requested pharmacy. She will let pt know. none

## 2024-05-02 NOTE — PROGRESS NOTES
"PT Re-Evaluation     Today's date: 2024  Patient name: Huyen Zhou  : 1940  MRN: [id number]  Referring provider: Rodger Altman*  Dx:   Encounter Diagnosis     ICD-10-CM    1. Primary osteoarthritis of right knee  M17.11       2. Status post total right knee replacement  Z96.651                      Assessment  Assessment details: Huyen Zhou is a 83 y.o. female presenting to physical therapy for a reevaluation s/p R TKA on 24. Since their initial evaluation, she reports 60% improvement in her knee. The patient has demonstrated improved right knee active and passive ROM, improved knee strength, and demonstrates significantly improved functional mobility as noted by decreased times on TUG and 5 STS. She is able to ambulate with RW, SPC, and without an AD, however does demonstrate mild instabilities without AD therefore advised for safety to continue to use SPC or RW. Despite her improvements, she does continue to have decreased knee ROM, decreased strength, impaired balance, and decreased endurance for community level ambulation leading to limitations in their ability to complete ADLs, avocational responsibilities, and recreational activities. This patient would benefit from continued PT services to address their impairments and functional limitations to maximize functional outcome. Extensive time spent educating her on her progress in PT thus far, given that she is only one month out from surgery, as well as recommendations for continued stretching and AD usage.   Impairments: abnormal gait, abnormal or restricted ROM, activity intolerance, impaired balance, impaired physical strength, lacks appropriate home exercise program, pain with function and weight-bearing intolerance    Symptom irritability: moderateUnderstanding of Dx/Px/POC: excellent   Prognosis: good    Goals  STG to be achieved in 4 weeks:   The patient will report a decrease in right knee \"at worst\" subjective pain rating score " "to at least 6/10 to allow for improved tolerance for weightbearing activities. MET  The patient will increase right knee flexion AROM to at least 90 degrees to allow for improved functional mobility. MET  The patient will increase right knee extension MMT score to at least 4/5 to allow for improved functional mobility. MET    LTG to be achieved by DC: ALL NOT MET PROGRESSING  The patient will be independent in comprehensive HEP.   The patient will report a decrease in right knee \"at worst\" subjective pain rating score to at least 3/10 to allow for improved tolerance  for functional mobility.   The patient will improve right knee flexion and extension AROM to WFL to allow for improved functional mobility.   The patient will increase right knee flexion and extension MMT score to WFL to allow for improved functional mobility.   The patient will be able to complete one full flight of stairs to her basement for completion of laundry with minimal to no difficulty.   The patient will be able to complete household chores and care for her son with minimal to no difficulties due to knee pain.      Plan  Patient would benefit from: skilled physical therapy  Planned modality interventions: thermotherapy: hydrocollator packs, TENS and cryotherapy  Planned therapy interventions: manual therapy, joint mobilization, balance/weight bearing training, neuromuscular re-education, patient education, flexibility, strengthening, stretching, therapeutic activities, therapeutic exercise, gait training and home exercise program  Frequency: 2x week  Duration in weeks: 12  Plan of Care beginning date: 3/20/2024  Plan of Care expiration date: 6/12/2024  Treatment plan discussed with: patient        Subjective Evaluation    History of Present Illness  Mechanism of injury: Huyen reports 50% improvement since beginning PT services. She notes improvement in her walking since initial evaluation. She says that when using her RW she is able to stand " "upright and move with greater ease and less pain. She is also trying to focus on bending/straightening her knee when she walks instead of keeping the knee locked and swinging it. She has been trying to walk with her SPC at home instead of the RW to decrease the amount of assistance that she needs. She does have difficulties with using the SPC in her left hand because she is right handed and it feels unnatural. She says that stairs are also improving for her, however she does still complete it non-reciprocally. She reports that her knee feels \"tight\", \"heavy\", and \"sore\" most of the time. She says that when sitting for extended periods of time it feels very stiff to get up. She reports continued endurance and strength deficits for walking.    Patient Goals  Patient goals for therapy: decreased pain and increased strength  Patient goal: to get moving and walking better  Pain  Current pain ratin  At best pain ratin  At worst pain ratin  Location: bilateral knees  Quality: dull ache, tight and throbbing  Relieving factors: medications, change in position and relaxation  Aggravating factors: standing, walking and stair climbing  Progression: improved    Social Support  Steps to enter house: yes  Stairs in house: yes (to basement)   Lives in: one-story house  Lives with: spouse and adult children    Employment status: not working    Diagnostic Tests  X-ray: abnormal  Treatments  Previous treatment: medication and physical therapy  Current treatment: physical therapy        Objective     Observations     Additional Observation Details  R TKA incision well approximated     Active Range of Motion   Left Knee   Flexion: 115 degrees   Extensor la degrees     Right Knee   Flexion: 97 degrees with pain  Extensor lag: -4 degrees with pain    Passive Range of Motion   Left Knee   Normal passive range of motion    Right Knee   Flexion: 102 degrees with pain  Extension: -2 degrees with pain    Mobility   Patellar " Mobility:   Left Knee   Hypomobile: left medial, left lateral, left superior and left inferior    Right Knee   Hypomobile: medial, lateral, superior and inferior     Strength/Myotome Testing     Left Hip   Planes of Motion   Flexion: 4-  Abduction: 4-    Right Hip   Planes of Motion   Flexion: 3+  Abduction: 3+    Left Knee   Flexion: 4  Extension: 4  Quadriceps contraction: good    Right Knee   Flexion: 4-  Extension: 4-  Quadriceps contraction: good    Ambulation   Weight-Bearing Status   Weight-Bearing Status (Right): full weight-bearing    Assistive device used: two-wheeled walker    Ambulation: Level Surfaces   Ambulation with assistive device: independent    Observational Gait   Gait: antalgic     Additional Observational Gait Details  Decreased knee flexion in swing phases of gait bilaterally, increased lateral weight shifting bilaterally, step to with occasional step through gait pattern     Functional Assessment        Comments  Re-evaluation 4/8/24  5 STS- 55.01 seconds B/L UE for push off and eccentric control to chair, good knee flexion to chair, however RLE placed slightly forward compared to LLE  TUG- 38.77 seconds B/L UE for push off and eccentric control to chair, step to with occasional step through gait pattern with use of RW      Re-evaluation 5/3/24  5 STS- 33.27 seconds B/L UE for push off and eccentric control to chair, good knee flexion to chair, however RLE placed slightly forward compared to LLE  TUG- 24.41 seconds B/L UE for push off and eccentric control to chair, No AD, mild instabilities with lateral weight shifting bilaterally  TUG- 20.36 seconds B/L UE for push off and eccentric control to chair, step through gait pattern with use of RW               Precautions: s/p L TKA 11/9/23, R TKA 4/4/24  Access Code: [email or web address]    POC expires Unit limit Auth  expiration date PT/OT + Visit Limit?   6/12/24 N/A N/A BOMN                 Visit/Unit Tracking  AUTH Status:  Date 5/3  3/25 3/28 4/2 4/8  "4/11 4/15 4/18 4/22 4/25 4/29   BOMN Used 13  3 4 5 6 7 8 9 10 11 12    Remaining                     Manuals 4/8 4/11 4/15 4/18 4/22 4/25 4/29 5/3     R knee PROM with OP  BE BE BE BE BE BE BE     Patellar mobilizations    BE BE BE BE BE     Gentle calf stretch    BE  BE BE      Re-evaluation        BE                  Neuro Re-Ed             Quad set with towel under ankle HEP 5\"x5           Quad set with LAQ HEP 3\" 2x5 3\" 3x5 3\" 2x10 3\" 3x10 1# 3\" 2x10 1# 3\" 2x10      Quad set with SAQ     3\" x10 1# 3\" 2x10 1# 3\" 3x10      Quad set with SLR   X5 min to mod A  8x min A 2x5 x10                                             Ther Ex             Rec bike for knee ROM  Rocking 10' Rocking 10' Rocking 10' Rocking to fwd rev 10' Rocking to fwd rev 10' Rocking to fwd rev 10'      Heel slides with strap HEP 5\"x10  5\"x20 5\"x20 5\"x20       Calf stretch seated with strap vs rockerboard HEP 3x30\" rockerboard 3x30\" rockerboard  3x30\" rockerboard        Hamstring stretch seated vs standing at step HEP 3x30\" step  3x30\" step  3x30\" step                     Pt education PT POC, HEP, ice, swelling management   HEP, ambulation, expectations   SPC usage PT POC, HEP, SPC usage                   Ther Activity             TG squats   L18 2x10 L20 2x10 L22 2x10  L22 2x10 L22 3x10      Fwd step ups     4\" x10 4\" 2x10 4\" 2x10      Lateral step downs             Gait Training                                       Modalities                                                "

## 2024-05-03 ENCOUNTER — EVALUATION (OUTPATIENT)
Dept: PHYSICAL THERAPY | Facility: CLINIC | Age: 84
End: 2024-05-03
Payer: MEDICARE

## 2024-05-03 DIAGNOSIS — M17.11 PRIMARY OSTEOARTHRITIS OF RIGHT KNEE: Primary | ICD-10-CM

## 2024-05-03 DIAGNOSIS — Z96.651 STATUS POST TOTAL RIGHT KNEE REPLACEMENT: ICD-10-CM

## 2024-05-03 PROCEDURE — 97110 THERAPEUTIC EXERCISES: CPT

## 2024-05-03 PROCEDURE — 97140 MANUAL THERAPY 1/> REGIONS: CPT

## 2024-05-03 NOTE — PROGRESS NOTES
"Daily Note     Today's date: 5/3/2024  Patient name: Huyen Zhou  : 1940  MRN: [id number]  Referring provider: Rodger Altman*  Dx:   Encounter Diagnosis     ICD-10-CM    1. Primary osteoarthritis of right knee  M17.11       2. Status post total right knee replacement  Z96.651                      Subjective: Patient presents today with SPC. She reports that her knee doesn't feel too bad when walking with the cane.       Objective: See treatment diary below      Assessment: Tolerated treatment well. Progressed with standing hip 3 ways to promote increased WBing on the R LE. She was challenged with this secondary to mild pain in the R LE when in SLS, despite using UEs to offload weight. Patient demonstrated fatigue post treatment, exhibited good technique with therapeutic exercises, and would benefit from continued PT      Plan: Continue per plan of care.      Precautions: s/p L TKA 23, R TKA 24  Access Code: [email or web address]    POC expires Unit limit Auth  expiration date PT/OT + Visit Limit?   24 N/A N/A BOMN                 Visit/Unit Tracking  AUTH Status:  Date 5/3 5/6  3/28 4/2 4/8 4/11 4/15 4/18 4/22 4/25 4/29   BOMN Used 13 14  4 5 6 7 8 9 10 11 12    Remaining                     Manuals 4/8 4/11 4/15 4/18 4/22 4/25 4/29 5/3 5/6    R knee PROM with OP  BE BE BE BE BE BE BE BE    Patellar mobilizations    BE BE BE BE BE BE    Gentle calf stretch    BE  BE BE      Re-evaluation        BE                  Neuro Re-Ed             Quad set with towel under ankle HEP 5\"x5           Quad set with LAQ HEP 3\" 2x5 3\" 3x5 3\" 2x10 3\" 3x10 1# 3\" 2x10 1# 3\" 2x10  1.5# 3\" 2x10    Quad set with SAQ     3\" x10 1# 3\" 2x10 1# 3\" 3x10  1.5# 3\" 2x10    Quad set with SLR   X5 min to mod A  8x min A 2x5 x10  x10                                           Ther Ex             Rec bike for knee ROM  Rocking 10' Rocking 10' Rocking 10' Rocking to fwd rev 10' Rocking to fwd rev 10' Rocking to fwd rev 10'  Rocking to " "fwd rev 10'    Heel slides with strap HEP 5\"x10  5\"x20 5\"x20 5\"x20       Calf stretch seated with strap vs rockerboard HEP 3x30\" rockerboard 3x30\" rockerboard  3x30\" rockerboard        Hamstring stretch seated vs standing at step HEP 3x30\" step  3x30\" step  3x30\" step        Standing hip 3 ways B/L         10x ea dir     Pt education PT POC, HEP, ice, swelling management   HEP, ambulation, expectations   SPC usage PT POC, HEP, SPC usage                   Ther Activity             TG squats   L18 2x10 L20 2x10 L22 2x10  L22 2x10 L22 3x10  L22 3x10    Fwd step ups     4\" x10 4\" 2x10 4\" 2x10  6\"2x10    Lateral step downs             Gait Training                                       Modalities                                                "

## 2024-05-06 ENCOUNTER — OFFICE VISIT (OUTPATIENT)
Dept: PHYSICAL THERAPY | Facility: CLINIC | Age: 84
End: 2024-05-06
Payer: MEDICARE

## 2024-05-06 DIAGNOSIS — Z96.651 STATUS POST TOTAL RIGHT KNEE REPLACEMENT: ICD-10-CM

## 2024-05-06 DIAGNOSIS — M17.11 PRIMARY OSTEOARTHRITIS OF RIGHT KNEE: Primary | ICD-10-CM

## 2024-05-06 PROCEDURE — 97530 THERAPEUTIC ACTIVITIES: CPT

## 2024-05-06 PROCEDURE — 97110 THERAPEUTIC EXERCISES: CPT

## 2024-05-06 PROCEDURE — 97112 NEUROMUSCULAR REEDUCATION: CPT

## 2024-05-08 ENCOUNTER — OFFICE VISIT (OUTPATIENT)
Dept: PHYSICAL THERAPY | Facility: CLINIC | Age: 84
End: 2024-05-08
Payer: MEDICARE

## 2024-05-08 DIAGNOSIS — Z96.651 STATUS POST TOTAL RIGHT KNEE REPLACEMENT USING CEMENT: ICD-10-CM

## 2024-05-08 DIAGNOSIS — M17.11 PRIMARY OSTEOARTHRITIS OF RIGHT KNEE: Primary | ICD-10-CM

## 2024-05-08 DIAGNOSIS — Z96.651 STATUS POST TOTAL RIGHT KNEE REPLACEMENT: ICD-10-CM

## 2024-05-08 PROCEDURE — 97530 THERAPEUTIC ACTIVITIES: CPT

## 2024-05-08 PROCEDURE — 97112 NEUROMUSCULAR REEDUCATION: CPT

## 2024-05-08 PROCEDURE — 97110 THERAPEUTIC EXERCISES: CPT

## 2024-05-08 NOTE — PROGRESS NOTES
"Daily Note     Today's date: 2024  Patient name: Huyen Zhou  : 1940  MRN: [id number]  Referring provider: Rodger Altman*  Dx:   Encounter Diagnosis     ICD-10-CM    1. Primary osteoarthritis of right knee  M17.11       2. Status post total right knee replacement  Z96.651       3. Status post total right knee replacement using cement  Z96.651           Start Time: 1135  Stop Time: 1213  Total time in clinic (min): 38 minutes    Subjective: pt noted that in the morning she feels good but noted more increase in R knee pain as she gets moving. She also noted still difficulty with getting used to SPC sequence of gait with using her LUE.       Objective: See treatment diary below      Assessment:  Continued with treatment session at this time was able to completed all exercises with increase in reps w/o p! Noted. Pt reported she felt like she could get around on the bike better than last visit. Tolerated treatment well with verbal cues for proper quad activation at this time.  Patient exhibited good technique with therapeutic exercises and would benefit from continued PT    Overall educated to continue with HEP at home to increase ROM in R knee as well as light strengthening.     Plan: Continue per plan of care.      Precautions: s/p L TKA 23, R TKA 24  Access Code: [email or web address]    POC expires Unit limit Auth  expiration date PT/OT + Visit Limit?   24 N/A N/A BOMN                 Visit/Unit Tracking  AUTH Status:  Date 5/3 5/6 5/8  4/2 4/8 4/11 4/15 4/18 4/22 4/25 4/29   BOMN Used 13 14 15  5 6 7 8 9 10 11 12    Remaining                     Manuals 4/8 4/11 4/15 4/18 4/22 4/25 4/29 5/3 5/6 5/8   R knee PROM with OP  BE BE BE BE BE BE BE BE SC   Patellar mobilizations    BE BE BE BE BE BE ROM    Gentle calf stretch    BE  BE BE      Re-evaluation        BE                  Neuro Re-Ed             Quad set with towel under ankle HEP 5\"x5           Quad set with LAQ HEP 3\" 2x5 3\" 3x5 3\" 2x10 " "3\" 3x10 1# 3\" 2x10 1# 3\" 2x10  1.5# 3\" 2x10 1.5# 3x 10    Quad set with SAQ     3\" x10 1# 3\" 2x10 1# 3\" 3x10  1.5# 3\" 2x10 1.5# 3x 10    Quad set with SLR   X5 min to mod A  8x min A 2x5 x10  x10 3x 5                                           Ther Ex             Rec bike for knee ROM  Rocking 10' Rocking 10' Rocking 10' Rocking to fwd rev 10' Rocking to fwd rev 10' Rocking to fwd rev 10'  Rocking to fwd rev 10' Fwd and bwd w/ No tension 10 min  (seat at 8 )   Heel slides with strap HEP 5\"x10  5\"x20 5\"x20 5\"x20       Calf stretch seated with strap vs rockerboard HEP 3x30\" rockerboard 3x30\" rockerboard  3x30\" rockerboard        Hamstring stretch seated vs standing at step HEP 3x30\" step  3x30\" step  3x30\" step        Standing hip 3 ways B/L         10x ea dir  10x ea.    Pt education PT POC, HEP, ice, swelling management   HEP, ambulation, expectations   SPC usage PT POC, HEP, SPC usage                   Ther Activity             TG squats   L18 2x10 L20 2x10 L22 2x10  L22 2x10 L22 3x10  L22 3x10 L22 3x10    Fwd step ups     4\" x10 4\" 2x10 4\" 2x10  6\"2x10 6\" 2x 10    Lateral step downs             Gait Training                                       Modalities                                                  "

## 2024-05-14 ENCOUNTER — OFFICE VISIT (OUTPATIENT)
Dept: PHYSICAL THERAPY | Facility: CLINIC | Age: 84
End: 2024-05-14
Payer: MEDICARE

## 2024-05-14 DIAGNOSIS — Z96.651 STATUS POST TOTAL RIGHT KNEE REPLACEMENT: ICD-10-CM

## 2024-05-14 DIAGNOSIS — Z96.651 STATUS POST TOTAL RIGHT KNEE REPLACEMENT USING CEMENT: ICD-10-CM

## 2024-05-14 DIAGNOSIS — M17.11 PRIMARY OSTEOARTHRITIS OF RIGHT KNEE: Primary | ICD-10-CM

## 2024-05-14 PROCEDURE — 97530 THERAPEUTIC ACTIVITIES: CPT

## 2024-05-14 PROCEDURE — 97110 THERAPEUTIC EXERCISES: CPT

## 2024-05-14 NOTE — PROGRESS NOTES
"Daily Note     Today's date: 2024  Patient name: Huyen Zhou  : 1940  MRN: [id number]  Referring provider: Rodger Altman*  Dx:   Encounter Diagnosis     ICD-10-CM    1. Primary osteoarthritis of right knee  M17.11       2. Status post total right knee replacement  Z96.651       3. Status post total right knee replacement using cement  Z96.651           Start Time: 1020  Stop Time: 1107  Total time in clinic (min): 47 minutes    Subjective: pt noted that when she sits for a long time she has more stiffness in her R knee. At this time no changes in pain noted since last visit.   She reported that today she is trying to not take any over the counter medication.     Objective: See treatment diary below      Assessment:  Continued with treatment session with focus on R knee. Increased reps noted at this time with exercises no exacerbations of pain.  Tolerated treatment well. Patient exhibited good technique with therapeutic exercises and would benefit from continued PT. S/p treatment session, reported feeling some increased fatigue.     Advised to continue to ice as she is able t/o her day. DOMS may be increased secondary to challenges.       Plan: Continue per plan of care.       Precautions: s/p L TKA 23, R TKA 24  Access Code: [email or web address]    POC expires Unit limit Auth  expiration date PT/OT + Visit Limit?   24 N/A N/A BOMN                 Visit/Unit Tracking  AUTH Status:  Date 5/3 5/6 5/8 5/14  4/8 4/11 4/15 4/18 4/22 4/25 4/29   BOMN Used 13 14 15 16  6 7 8 9 10 11 12    Remaining                     Manuals 5/14  4/15 4/18 4/22 4/25 4/29 5/3 5   R knee PROM with OP SC  BE BE BE BE BE BE BE SC   Patellar mobilizations    BE BE BE BE BE BE ROM    Gentle calf stretch    BE  BE BE      Re-evaluation        BE                  Neuro Re-Ed             Quad set with towel under ankle             Quad set with LAQ 2# 2x 10   3\" 3x5 3\" 2x10 3\" 3x10 1# 3\" 2x10 1# 3\" 2x10  1.5# 3\" 2x10 " "1.5# 3x 10    Quad set with SAQ 2# 3x 10     3\" x10 1# 3\" 2x10 1# 3\" 3x10  1.5# 3\" 2x10 1.5# 3x 10    Quad set with SLR 3x 5   X5 min to mod A  8x min A 2x5 x10  x10 3x 5                                           Ther Ex             Rec bike for knee ROM Fwd and bwd w/ No tension 10 min  (seat at 8 )  Rocking 10' Rocking 10' Rocking to fwd rev 10' Rocking to fwd rev 10' Rocking to fwd rev 10'  Rocking to fwd rev 10' Fwd and bwd w/ No tension 10 min  (seat at 8 )   Heel slides with strap    5\"x20 5\"x20 5\"x20       Calf stretch seated with strap vs rockerboard   3x30\" rockerboard  3x30\" rockerboard        Hamstring stretch seated vs standing at step   3x30\" step  3x30\" step        Standing hip 3 ways B/L 2x 10         10x ea dir  10x ea.    Pt education    HEP, ambulation, expectations   SPC usage PT POC, HEP, SPC usage                   Ther Activity             TG squats L22 3x 10   L18 2x10 L20 2x10 L22 2x10  L22 2x10 L22 3x10  L22 3x10 L22 3x10    Fwd step ups 6\" 2x 10     4\" x10 4\" 2x10 4\" 2x10  6\"2x10 6\" 2x 10    Lateral step downs             Gait Training                                       Modalities                                                    "

## 2024-05-15 NOTE — PROGRESS NOTES
"Daily Note     Today's date: 5/15/2024  Patient name: Huyen Zhou  : 1940  MRN: [id number]  Referring provider: Rodger Altman*  Dx:   Encounter Diagnosis     ICD-10-CM    1. Primary osteoarthritis of right knee  M17.11       2. Status post total right knee replacement  Z96.651                      Subjective: Patient reports that she didn't have any tylenol prior to her last session and says that \"this wasn't a good idea\" because she was \"hurting later\". She did take tylenol prior to today's session.       Objective: See treatment diary below      Assessment: Tolerated treatment well. Progressed balance activities this session with forest step overs and rockerboard taps. She was able to complete without instabilities, however did use 1 UE support to complete. Patient demonstrated fatigue post treatment, exhibited good technique with therapeutic exercises, and would benefit from continued PT      Plan: Progress treatment as tolerated.       Precautions: s/p L TKA 23, R TKA 24  Access Code: [email or web address]    POC expires Unit limit Auth  expiration date PT/OT + Visit Limit?   24 N/A N/A BOMN                 Visit/Unit Tracking  AUTH Status:  Date 5/3 5/6 5/8 5/14 5/16  4/11 4/15 4/18 4/22 4/25 4/29   BOMN Used 13 14 15 16 17  7 8 9 10 11 12    Remaining                     Manuals 5/14 5/16  4/18 4/22 4/25 4/29 5/3 5/6 5/8   R knee PROM with OP SC BE  BE BE BE BE BE BE SC   Patellar mobilizations  BE  BE BE BE BE BE BE ROM    Gentle calf stretch    BE  BE BE      Re-evaluation        BE                  Neuro Re-Ed             Quad set with towel under ankle             Quad set with LAQ 2# 2x 10    3\" 2x10 3\" 3x10 1# 3\" 2x10 1# 3\" 2x10  1.5# 3\" 2x10 1.5# 3x 10    Quad set with SAQ 2# 3x 10     3\" x10 1# 3\" 2x10 1# 3\" 3x10  1.5# 3\" 2x10 1.5# 3x 10    Quad set with SLR 3x 5     8x min A 2x5 x10  x10 3x 5    Rockerboard taps fwd/bwd, left/right   20x ea            Forest step overs fwd and " "laterally  3 laps fwd nonrecip,   2 laps lat                        Ther Ex             Rec bike for knee ROM Fwd and bwd w/ No tension 10 min  (seat at 8 ) Fwd and bwd w/ No tension 10 min  (seat at 8 )  Rocking 10' Rocking to fwd rev 10' Rocking to fwd rev 10' Rocking to fwd rev 10'  Rocking to fwd rev 10' Fwd and bwd w/ No tension 10 min  (seat at 8 )   Heel slides with strap    5\"x20 5\"x20 5\"x20       Calf stretch seated with strap vs rockerboard     3x30\" rockerboard        Hamstring stretch seated vs standing at step     3x30\" step        Standing hip 3 ways B/L 2x 10  2x10        10x ea dir  10x ea.    Pt education    HEP, ambulation, expectations   SPC usage PT POC, HEP, SPC usage                   Ther Activity             TG squats L22 3x 10  L22 3x10  L20 2x10 L22 2x10  L22 2x10 L22 3x10  L22 3x10 L22 3x10    Fwd step ups 6\" 2x 10     4\" x10 4\" 2x10 4\" 2x10  6\"2x10 6\" 2x 10    Lateral step downs             Gait Training                                       Modalities                                                      "

## 2024-05-16 ENCOUNTER — OFFICE VISIT (OUTPATIENT)
Dept: PHYSICAL THERAPY | Facility: CLINIC | Age: 84
End: 2024-05-16
Payer: MEDICARE

## 2024-05-16 DIAGNOSIS — Z96.651 STATUS POST TOTAL RIGHT KNEE REPLACEMENT: ICD-10-CM

## 2024-05-16 DIAGNOSIS — M17.11 PRIMARY OSTEOARTHRITIS OF RIGHT KNEE: Primary | ICD-10-CM

## 2024-05-16 PROCEDURE — 97110 THERAPEUTIC EXERCISES: CPT

## 2024-05-16 PROCEDURE — 97140 MANUAL THERAPY 1/> REGIONS: CPT

## 2024-05-16 PROCEDURE — 97112 NEUROMUSCULAR REEDUCATION: CPT

## 2024-05-20 ENCOUNTER — OFFICE VISIT (OUTPATIENT)
Dept: PHYSICAL THERAPY | Facility: CLINIC | Age: 84
End: 2024-05-20
Payer: MEDICARE

## 2024-05-20 DIAGNOSIS — Z96.651 STATUS POST TOTAL RIGHT KNEE REPLACEMENT USING CEMENT: ICD-10-CM

## 2024-05-20 DIAGNOSIS — M17.11 PRIMARY OSTEOARTHRITIS OF RIGHT KNEE: Primary | ICD-10-CM

## 2024-05-20 DIAGNOSIS — Z96.651 STATUS POST TOTAL RIGHT KNEE REPLACEMENT: ICD-10-CM

## 2024-05-20 PROCEDURE — 97140 MANUAL THERAPY 1/> REGIONS: CPT

## 2024-05-20 PROCEDURE — 97110 THERAPEUTIC EXERCISES: CPT

## 2024-05-20 PROCEDURE — 97530 THERAPEUTIC ACTIVITIES: CPT

## 2024-05-20 NOTE — PROGRESS NOTES
SUBJECTIVE:     Chief Complaint & History of Present Illness:  Estefani Fam is a 71 y.o. year old female with COPD who presents today with constant left shoulder pain that started 2 days ago.  She is right hand dominant.  She states that she fell out of a chair.  The pain is located in the anterior aspect of the shoulder and clavicle.  She reports bruising and swelling.  She denies numbness and tingling.  There is not a history of previous injury or surgery to the shoulder.      Review of patient's allergies indicates:  No Known Allergies      Current Outpatient Medications   Medication Sig Dispense Refill    acetylcysteine 600 mg Cap Take 1 capsule (600 mg total) by mouth 2 (two) times daily. 90 capsule 3    ascorbic acid, vitamin C, (VITAMIN C) 500 MG tablet Take 500 mg by mouth 2 (two) times daily.       azelastine (ASTELIN) 137 mcg (0.1 %) nasal spray 1 spray (137 mcg total) by Nasal route 2 (two) times daily. 30 mL 3    azithromycin (Z-JERSEY) 250 MG tablet Take 1 tablet (250 mg total) by mouth every Mon, Wed, Fri. 36 tablet 3    calcium carbonate (OS-STEPHANIE) 600 mg calcium (1,500 mg) Tab Take 1 tablet (600 mg total) by mouth once daily. Take Levofloxacin antibiotic at least 2 hours before calcium.  0    doxycycline (VIBRAMYCIN) 100 MG Cap Take 1 capsule (100 mg total) by mouth once daily. 30 capsule 2    fluticasone (FLONASE) 50 mcg/actuation nasal spray Spray twice (100 mcg total) by Each Nare route once daily. 16 g 11    fluticasone-vilanterol (BREO) 200-25 mcg/dose DsDv diskus inhaler Inhale 1 puff into the lungs once daily. Controller 60 each 11    guaiFENesin (MUCINEX) 600 mg 12 hr tablet Take 1 tablet (600 mg total) by mouth 2 (two) times daily. 60 tablet 5    levalbuterol (XOPENEX) 0.63 mg/3 mL nebulizer solution USE IN NEBULIZER EVERY 8 HOURS AS NEEDED FOR WHEEZING 750 mL 2    metronidazole 1% (METROGEL) 1 % Gel Apply topically once daily. 60 g 2    MULTIVIT-IRON-MIN-FOLIC ACID  "Daily Note     Today's date: 2024  Patient name: Huyen Zhou  : 1940  MRN: [id number]  Referring provider: Rodger Altman*  Dx:   Encounter Diagnosis     ICD-10-CM    1. Primary osteoarthritis of right knee  M17.11       2. Status post total right knee replacement  Z96.651       3. Status post total right knee replacement using cement  Z96.651           Start Time: 1212  Stop Time: 1255  Total time in clinic (min): 43 minutes    Subjective: pt noted that she feels like her R knee has been having troubles with ROM.       Objective: See treatment diary below      Assessment:  Continued with treatment session with focus on R knee strengthening in quad as well as ROM. Tolerated treatment well. Patient exhibited good technique with therapeutic exercises and would benefit from continued PT. Overall patient was able to make progressions today with number of reps.She was able to complete all reps with no increase in pain noted.     DOMS 24 to 48 hours of muscle soreness.       Plan: Continue per plan of care.      Precautions: s/p L TKA 23, R TKA 24  Access Code: [email or web address]    POC expires Unit limit Auth  expiration date PT/OT + Visit Limit?   24 N/A N/A BOMN                 Visit/Unit Tracking  AUTH Status:  Date 5/3 5/6 5/8 5/14 5/16 5/20  4/15 4/18 4/22 4/25 4/29   BOMN Used 13 14 15 16 17 18   8 9 10 11 12    Remaining                     Manuals 5/14 5/16 5/20  4/22 4/25 4/29 5/3 5/6 5/8   R knee PROM with OP SC BE SC  BE BE BE BE BE SC   Patellar mobilizations  BE SC  BE BE BE BE BE ROM    Gentle calf stretch      BE BE      Re-evaluation        BE                  Neuro Re-Ed             Quad set with towel under ankle             Quad set with LAQ 2# 2x 10   2# 2x 10   3\" 3x10 1# 3\" 2x10 1# 3\" 2x10  1.5# 3\" 2x10 1.5# 3x 10    Quad set with SAQ 2# 3x 10     3\" x10 1# 3\" 2x10 1# 3\" 3x10  1.5# 3\" 2x10 1.5# 3x 10    Quad set with SLR 3x 5   2x 10   8x min A 2x5 x10  x10 3x 5  " "  Rockerboard taps fwd/bwd, left/right   20x ea  30x ea.           Chuck step overs fwd and laterally  3 laps fwd nonrecip,   2 laps lat                        Ther Ex             Rec bike for knee ROM Fwd and bwd w/ No tension 10 min  (seat at 8 ) Fwd and bwd w/ No tension 10 min  (seat at 8 ) Fwd and bwd w/ No tension 10 min  (seat at 8 )  Rocking to fwd rev 10' Rocking to fwd rev 10' Rocking to fwd rev 10'  Rocking to fwd rev 10' Fwd and bwd w/ No tension 10 min  (seat at 8 )   Heel slides with strap     5\"x20 5\"x20       Calf stretch seated with strap vs rockerboard     3x30\" rockerboard        Hamstring stretch seated vs standing at step     3x30\" step        Standing hip 3 ways B/L 2x 10  2x10        10x ea dir  10x ea.    Pt education       SPC usage PT POC, HEP, SPC usage                   Ther Activity             TG squats L22 3x 10  L22 3x10 L22 3x 10   L22 2x10  L22 2x10 L22 3x10  L22 3x10 L22 3x10    Fwd step ups 6\" 2x 10   6\" 3x 10   4\" x10 4\" 2x10 4\" 2x10  6\"2x10 6\" 2x 10    Lateral step downs   4\" 10x                                     Gait Training                                       Modalities                                                        " 3,500-18-0.4 UNIT-MG-MG ORAL CHEW Take 1 tablet by mouth once daily. Take Levofloxacin antibiotic at least 2 hours before multivitamin.  0    predniSONE (DELTASONE) 10 MG tablet 4 tabs (40mg) daily for five days, then 2 tabs (20mg) daily for five days, then 1 tab (10mg) daily for five days. 35 tablet 0    sodium chloride (OCEAN) 0.65 % nasal spray 1 spray by Nasal route 2 (two) times daily. 88 mL 12    umeclidinium (INCRUSE ELLIPTA) 62.5 mcg/actuation DsDv INHALE 1 PUFF BY MOUTH INTO LUNGS ONCE DAILY 30 each 20    pantoprazole (PROTONIX) 40 MG tablet Take 1 tablet (40 mg total) by mouth once daily. 30 tablet 11     No current facility-administered medications for this visit.        Past Medical History:   Diagnosis Date    Cataract     COPD (chronic obstructive pulmonary disease)     History of retinal hemorrhage     Retinal histoplasmosis        Past Surgical History:   Procedure Laterality Date    BRONCHOSCOPY WITH FLUOROSCOPY N/A 10/28/2019    Procedure: BRONCHOSCOPY, WITH FLUOROSCOPY;  Surgeon: Brooklynn Ruiz MD;  Location: [facility] OR [address] [place] [address];  Service: Endoscopy;  Laterality: N/A;    HAND SURGERY         Vital Signs (Most Recent)  There were no vitals filed for this visit.    Review of Systems:  ROS:  Constitutional: no fever or chills  Eyes: no visual changes  ENT: no nasal congestion or sore throat  Respiratory: no cough or shortness of breath  Cardiovascular: no chest pain or palpitations  Gastrointestinal: no nausea or vomiting, tolerating diet  Genitourinary: no hematuria or dysuria  Integument/Breast: no rash or pruritis  Hematologic/Lymphatic: no easy bruising or lymphadenopathy  Musculoskeletal: no arthralgias or myalgias  Neurological: no seizures or tremors  Behavioral/Psych: no auditory or visual hallucinations  Endocrine: no heat or cold intolerance      OBJECTIVE:     PHYSICAL EXAM:  There were no vitals filed for this visit.        General: Weight: 63.6 kg (140 lb 1.6 oz) Body mass index is  21.3 kg/m².  Patient is alert, awake and oriented to time, place and person. Mood and affect are appropriate.  Patient does not appear to be in any distress, denies any constitutional symptoms and appears stated age.   HEENT: Pupils are equal and round, sclera are not injected. External examination of ears and nose reveals no abnormalities. Cranial nerves II-X are grossly intact  Neck: examination demonstrates painless active range of motion. Spurling's sign is negative  Skin: no rashes, abrasions or open wounds on the affected extremity   Resp: No respiratory distress or audible wheezing   Psych:  normal mood and behavior  CV: 2+ pulses, all extremities warm and well perfused   Left Shoulder   Diffuse bruising along distal clavicle, anterior shoulder  Skin intact, no abrasion   Mild swelling  No deformity  Tenderness: distal clavicle  ROM limited due to pain  NVI    IMAGING:  X-rays of the left shoulder, personally reviewed by me, demonstrate comminuted fracture of distal clavicle, satisfactory position.  No shoulder dislocation.    ASSESSMENT/PLAN:   71 y.o. year old female with closed fracture of the left distal clavicle    -  She was placed in sling.  She was instructed on gentle PROM and on elbow and finger ROM  - NWB, no lifting  - She will continue tylenol or advil prn pain  - Follow up two weeks for xrays

## 2024-05-23 ENCOUNTER — OFFICE VISIT (OUTPATIENT)
Dept: PHYSICAL THERAPY | Facility: CLINIC | Age: 84
End: 2024-05-23
Payer: MEDICARE

## 2024-05-23 DIAGNOSIS — M17.11 PRIMARY OSTEOARTHRITIS OF RIGHT KNEE: Primary | ICD-10-CM

## 2024-05-23 DIAGNOSIS — Z96.651 STATUS POST TOTAL RIGHT KNEE REPLACEMENT: ICD-10-CM

## 2024-05-23 PROCEDURE — 97530 THERAPEUTIC ACTIVITIES: CPT

## 2024-05-23 PROCEDURE — 97110 THERAPEUTIC EXERCISES: CPT

## 2024-05-23 PROCEDURE — 97112 NEUROMUSCULAR REEDUCATION: CPT

## 2024-05-23 NOTE — PROGRESS NOTES
"Daily Note     Today's date: 2024  Patient name: Huyen Zhou  : 1940  MRN: [id number]  Referring provider: Rodger Altman*  Dx:   Encounter Diagnosis     ICD-10-CM    1. Primary osteoarthritis of right knee  M17.11       2. Status post total right knee replacement  Z96.651                      Subjective: Patient reports that she does still get pain in her knee, but is doing better with walking. She wants to walk throughout her session without the SPC.       Objective: See treatment diary below      Assessment: Tolerated treatment well. Patient able to complete sit to stands today with hands resting on knees. She was also able to complete forest step overs reciprocally forward today. She does continue to report soreness and pain in knee requiring some seated rest breaks.  Patient demonstrated fatigue post treatment, exhibited good technique with therapeutic exercises, and would benefit from continued PT      Plan: Continue per plan of care.      Precautions: s/p L TKA 23, R TKA 24  Access Code: [email or web address]    POC expires Unit limit Auth  expiration date PT/OT + Visit Limit?   24 N/A N/A BOMN                 Visit/Unit Tracking  AUTH Status:  Date 5/3 5/6 5/8 5/14 5/16 5/20 5/23  4/18 4/22 4/25 4/29   BOMN Used 13 14 15 16 17 18  19  9 10 11 12    Remaining                     Manuals 5/14 5/16 5/20 5/23  4/25 4/29 5/3 5/6 5/8   R knee PROM with OP SC BE SC BE  BE BE BE BE SC   Patellar mobilizations  BE SC BE  BE BE BE BE ROM    Gentle calf stretch      BE BE      Re-evaluation        BE                  Neuro Re-Ed             Quad set with towel under ankle             Quad set with LAQ 2# 2x 10   2# 2x 10    1# 3\" 2x10 1# 3\" 2x10  1.5# 3\" 2x10 1.5# 3x 10    Quad set with SAQ 2# 3x 10      1# 3\" 2x10 1# 3\" 3x10  1.5# 3\" 2x10 1.5# 3x 10    Quad set with SLR 3x 5   2x 10    2x5 x10  x10 3x 5    Rockerboard taps fwd/bwd, left/right   20x ea  30x ea.  30x ea         Forest step overs fwd " "and laterally  3 laps fwd nonrecip,   2 laps lat  2 laps ea, reciprocally fwd                      Ther Ex             Rec bike for knee ROM    Seat 8  Fwd and bwd w/ No tension 10 min  (seat at 8 ) Fwd and bwd w/ No tension 10 min  (seat at 8 ) Fwd and bwd w/ No tension 10 min  (seat at 8 ) No tension 10'     Rocking to fwd rev 10' Rocking to fwd rev 10'  Rocking to fwd rev 10' Fwd and bwd w/ No tension 10 min  (seat at 8 )   Heel slides with strap      5\"x20       Calf stretch seated with strap vs rockerboard             Hamstring stretch seated vs standing at step             Standing hip 3 ways B/L 2x 10  2x10        10x ea dir  10x ea.    Pt education       SPC usage PT POC, HEP, SPC usage                   Ther Activity             TG squats L22 3x 10  L22 3x10 L22 3x 10    L22 2x10 L22 3x10  L22 3x10 L22 3x10    Fwd step ups 6\" 2x 10   6\" 3x 10  6\" 3x10  4\" 2x10 4\" 2x10  6\"2x10 6\" 2x 10    Lateral step downs   4\" 10x  4\"x10         Sit to stands    5x hands on knees                      Gait Training                                       Modalities                                                          "

## 2024-05-28 ENCOUNTER — OFFICE VISIT (OUTPATIENT)
Dept: PHYSICAL THERAPY | Facility: CLINIC | Age: 84
End: 2024-05-28
Payer: MEDICARE

## 2024-05-28 DIAGNOSIS — M17.11 PRIMARY OSTEOARTHRITIS OF RIGHT KNEE: Primary | ICD-10-CM

## 2024-05-28 DIAGNOSIS — Z96.651 STATUS POST TOTAL RIGHT KNEE REPLACEMENT: ICD-10-CM

## 2024-05-28 PROCEDURE — 97530 THERAPEUTIC ACTIVITIES: CPT

## 2024-05-28 PROCEDURE — 97110 THERAPEUTIC EXERCISES: CPT

## 2024-05-28 PROCEDURE — 97140 MANUAL THERAPY 1/> REGIONS: CPT

## 2024-05-28 NOTE — PROGRESS NOTES
"Daily Note     Today's date: 2024  Patient name: Huyen Zhou  : 1940  MRN: [id number]  Referring provider: Rodger Altman*  Dx:   Encounter Diagnosis     ICD-10-CM    1. Primary osteoarthritis of right knee  M17.11       2. Status post total right knee replacement  Z96.651                      Subjective: Patient reports that her right knee continues to feel very stiff which is bothersome to her.       Objective: See treatment diary below      Assessment: Tolerated treatment well. Progressed quadriceps and hamstring strengthening exercises to matrix weight machine from ankle weights. She was able to complete with good challenge and muscular fatigue noted following. She was challenged with step up and overs secondary to decreased quadriceps eccentric control. Patient demonstrated fatigue post treatment, exhibited good technique with therapeutic exercises, and would benefit from continued PT      Plan: Progress treatment as tolerated.       Precautions: s/p L TKA 23, R TKA 24  Access Code: [email or web address]    POC expires Unit limit Auth  expiration date PT/OT + Visit Limit?   24 N/A N/A BOMN                 Visit/Unit Tracking  AUTH Status:  Date 5/3 5/6 5/8 5/14 5/16 5/20 5/23 5/28  4/22 4/25 4/29   BOMN Used 13 14 15 16 17 18  19 20  10 11 12    Remaining                     Manuals 5/14 5/16 5/20 5/23 5/28 4/25 4/29 5/3 5/6 5/8   R knee PROM with OP SC BE SC BE BE BE BE BE BE SC   Patellar mobilizations  BE SC BE BE BE BE BE BE ROM    Re-evaluation        BE                  Neuro Re-Ed             Quad set with towel under ankle             Quad set with LAQ 2# 2x 10   2# 2x 10   DC 1# 3\" 2x10 1# 3\" 2x10  1.5# 3\" 2x10 1.5# 3x 10    Quad set with SAQ 2# 3x 10     DC 1# 3\" 2x10 1# 3\" 3x10  1.5# 3\" 2x10 1.5# 3x 10    Quad set with SLR 3x 5   2x 10    2x5 x10  x10 3x 5    Rockerboard taps fwd/bwd, left/right   20x ea  30x ea.  30x ea         Chuck step overs fwd and laterally  3 laps fwd " "nonrecip,   2 laps lat  2 laps ea, reciprocally fwd 2 laps ea, reciprocally fwd                     Ther Ex             Rec bike for knee ROM    Seat 8  Fwd and bwd w/ No tension 10 min  (seat at 8 ) Fwd and bwd w/ No tension 10 min  (seat at 8 ) Fwd and bwd w/ No tension 10 min  (seat at 8 ) No tension 10'    No tension 10'  Rocking to fwd rev 10' Rocking to fwd rev 10'  Rocking to fwd rev 10' Fwd and bwd w/ No tension 10 min  (seat at 8 )   Standing hip 3 ways B/L 2x 10  2x10        10x ea dir  10x ea.    Matrix knee ext     10# 2x10        Matrix hamstring curls      15# 2x10        Pt education       SPC usage PT POC, HEP, SPC usage                   Ther Activity             TG squats L22 3x 10  L22 3x10 L22 3x 10    L22 2x10 L22 3x10  L22 3x10 L22 3x10    Fwd step ups 6\" 2x 10   6\" 3x 10  6\" 3x10 6\"x10 up and over 4\" 2x10 4\" 2x10  6\"2x10 6\" 2x 10    Lateral step downs   4\" 10x  4\"x10 4\" x10        Sit to stands    5x hands on knees 10x hands on knees                     Gait Training                                       Modalities                                                            "

## 2024-05-31 ENCOUNTER — OFFICE VISIT (OUTPATIENT)
Dept: PHYSICAL THERAPY | Facility: CLINIC | Age: 84
End: 2024-05-31
Payer: MEDICARE

## 2024-05-31 DIAGNOSIS — Z96.651 STATUS POST TOTAL RIGHT KNEE REPLACEMENT USING CEMENT: ICD-10-CM

## 2024-05-31 DIAGNOSIS — Z96.651 STATUS POST TOTAL RIGHT KNEE REPLACEMENT: ICD-10-CM

## 2024-05-31 DIAGNOSIS — M17.11 PRIMARY OSTEOARTHRITIS OF RIGHT KNEE: Primary | ICD-10-CM

## 2024-05-31 PROCEDURE — 97140 MANUAL THERAPY 1/> REGIONS: CPT

## 2024-05-31 PROCEDURE — 97110 THERAPEUTIC EXERCISES: CPT

## 2024-05-31 PROCEDURE — 97530 THERAPEUTIC ACTIVITIES: CPT

## 2024-05-31 NOTE — PROGRESS NOTES
"PT Re-Evaluation     Today's date: 2024  Patient name: Huyen Zhou  : 1940  MRN: [id number]  Referring provider: Rodger Altman*  Dx:   Encounter Diagnosis     ICD-10-CM    1. Primary osteoarthritis of right knee  M17.11       2. Status post total right knee replacement  Z96.651                      Assessment  Impairments: abnormal gait, abnormal or restricted ROM, activity intolerance, impaired balance, impaired physical strength, pain with function and weight-bearing intolerance  Symptom irritability: moderate    Assessment details: Huyen Zhou is a 83 y.o. female presenting to physical therapy for a reevaluation s/p R TKA on 24. Since their initial evaluation, she reports 60% improvement in her knee. The patient has demonstrated continued improvements in her knee active and passive ROM to WFL, as well as improved knee strength. She is ambulatory without AD and demonstrates no instabilities when doing so. She has improved both the TUG and 5 STS assessments by 20 seconds demonstrating her improved strength and mobility functionally. Despite her improvements, she does continue with reports of knee stiffness and pain, decreased strength, impairments in balance, and decreased endurance for community level ambulation leading to limitations in their ability to complete ADLs, avocational responsibilities, and recreational activities. This patient would benefit from continued PT services to address their impairments and functional limitations to maximize functional outcome. Extensive time spent educating the patient and her daughter on her progress thus far and comparatively to her left knee as patient believes she is not doing as well with her right knee as she was with her left.   Understanding of Dx/Px/POC: excellent     Prognosis: good    Goals  STG to be achieved in 4 weeks:   The patient will report a decrease in right knee \"at worst\" subjective pain rating score to at least 6/10 to allow " "for improved tolerance for weightbearing activities. MET  The patient will increase right knee flexion AROM to at least 90 degrees to allow for improved functional mobility. MET  The patient will increase right knee extension MMT score to at least 4/5 to allow for improved functional mobility. MET    LTG to be achieved by DC: ALL NOT MET PROGRESSING   The patient will be independent in comprehensive HEP.   The patient will report a decrease in right knee \"at worst\" subjective pain rating score to at least 3/10 to allow for improved tolerance  for functional mobility.   The patient will improve right knee flexion and extension AROM to WFL to allow for improved functional mobility.   The patient will increase right knee flexion and extension MMT score to WFL to allow for improved functional mobility.   The patient will be able to complete one full flight of stairs to her basement for completion of laundry with minimal to no difficulty.   The patient will be able to complete household chores and care for her son with minimal to no difficulties due to knee pain.      Plan  Patient would benefit from: skilled physical therapy  Planned modality interventions: thermotherapy: hydrocollator packs, TENS and cryotherapy    Planned therapy interventions: manual therapy, joint mobilization, balance/weight bearing training, neuromuscular re-education, patient education, flexibility, strengthening, stretching, therapeutic activities, therapeutic exercise, gait training and home exercise program    Frequency: 2x week  Duration in weeks: 6  Plan of Care beginning date: 6/3/2024  Plan of Care expiration date: 7/15/2024  Treatment plan discussed with: patient        Subjective Evaluation    History of Present Illness  Mechanism of injury: Huyen reports 60% improvement since beginning PT services. She reports that her pain is much better and minimal on a daily basis. She says that she mostly notices the pain in the knee when she likely " "\"does too much\" in one day with walking, steps, etc. But when she is sitting around her house more, she doesn't have much pain or stiffness. She is not using any AD at home or outside of her home. She does take her SPC at times to \"be safe\" when walking around outside of her home on longer walks such as when grocery shopping. She says that when ascending stairs she still cannot complete them reciprocally due to strength difficulties to lift the leg as well as stiffness for the knee flexion required to do so. She also notices these difficulties when sitting on her shower bench as she has to \"boost the leg\" over her tub side with her hands because she can't lift it high enough by itself.     Patient Goals  Patient goals for therapy: decreased pain and increased strength  Patient goal: to get moving and walking better  Pain  Current pain ratin  At best pain ratin  At worst pain ratin  Location: bilateral knees  Quality: dull ache, tight and throbbing  Relieving factors: medications, change in position and relaxation  Aggravating factors: standing, walking and stair climbing  Progression: improved    Social Support  Steps to enter house: yes  Stairs in house: yes (to basement)   Lives in: one-story house  Lives with: spouse and adult children    Employment status: not working    Diagnostic Tests  X-ray: abnormal  Treatments  Previous treatment: medication and physical therapy  Current treatment: physical therapy        Objective     Observations     Additional Observation Details  R TKA incision well approximated     Active Range of Motion   Left Knee   Flexion: 115 degrees   Extensor la degrees     Right Knee   Flexion: 105 degrees with pain  Extensor la degrees with pain    Passive Range of Motion   Left Knee   Normal passive range of motion    Right Knee   Flexion: 113 degrees with pain  Extension: 0 degrees with pain    Mobility   Patellar Mobility:   Left Knee   Hypomobile: left medial, left " lateral, left superior and left inferior    Right Knee   Hypomobile: medial, lateral, superior and inferior     Strength/Myotome Testing     Left Hip   Planes of Motion   Flexion: 4-  Abduction: 4-    Right Hip   Planes of Motion   Flexion: 4-  Abduction: 4-    Left Knee   Flexion: 4  Extension: 4  Quadriceps contraction: good    Right Knee   Flexion: 4  Extension: 4  Quadriceps contraction: good    Ambulation   Weight-Bearing Status   Assistive device used: none    Ambulation: Level Surfaces   Ambulation without assistive device: independent    Observational Gait   Gait: antalgic     Additional Observational Gait Details  Decreased knee flexion in swing phases of gait bilaterally, increased lateral weight shifting bilaterally    Functional Assessment        Comments  Re-evaluation 6/3/24  5 STS-31.86 seconds No UE for push off from chair, UE resting on knees, even foot placement  TUG- 16.46 seconds no UEs for push off from chair, lateral weight shifting bilaterally to stance foot, no AD usage      Re-evaluation 4/8/24  5 STS- 55.01 seconds B/L UE for push off and eccentric control to chair, good knee flexion to chair, however RLE placed slightly forward compared to LLE  TUG- 38.77 seconds B/L UE for push off and eccentric control to chair, step to with occasional step through gait pattern with use of RW      Re-evaluation 5/3/24  5 STS- 33.27 seconds B/L UE for push off and eccentric control to chair, good knee flexion to chair, however RLE placed slightly forward compared to LLE  TUG- 24.41 seconds B/L UE for push off and eccentric control to chair, No AD, mild instabilities with lateral weight shifting bilaterally  TUG- 20.36 seconds B/L UE for push off and eccentric control to chair, step through gait pattern with use of RW               Precautions: s/p L TKA 11/9/23, R TKA 4/4/24  Access Code: [email or web address]    POC expires Unit limit Auth  expiration date PT/OT + Visit Limit?   7/15/24 N/A N/A BOMN              "    Visit/Unit Tracking  AUTH Status:  Date 5/3 5/6 5/8 5/14 5/16 5/20 5/23 5/28 5/31 6/3  4/29   BOMN Used 13 14 15 16 17 18  19 20 21 22  12    Remaining                     Manuals 5/14 5/16 5/20 5/23 5/28 5/31 6/3  5/6 5/8   R knee PROM with OP SC BE SC BE BE SC BE  BE SC   Patellar mobilizations  BE SC BE BE    BE ROM    Re-evaluation       BE                   Neuro Re-Ed             Quad set with towel under ankle             Quad set with LAQ 2# 2x 10   2# 2x 10   DC    1.5# 3\" 2x10 1.5# 3x 10    Quad set with SAQ 2# 3x 10     DC    1.5# 3\" 2x10 1.5# 3x 10    Quad set with SLR 3x 5   2x 10       x10 3x 5    Rockerboard taps fwd/bwd, left/right   20x ea  30x ea.  30x ea         Chuck step overs fwd and laterally  3 laps fwd nonrecip,   2 laps lat  2 laps ea, reciprocally fwd 2 laps ea, reciprocally fwd                     Ther Ex             Rec bike for knee ROM    Seat 8  Fwd and bwd w/ No tension 10 min  (seat at 8 ) Fwd and bwd w/ No tension 10 min  (seat at 8 ) Fwd and bwd w/ No tension 10 min  (seat at 8 ) No tension 10'    No tension 10'  No tension 10 min  No tension 10 min   Rocking to fwd rev 10' Fwd and bwd w/ No tension 10 min  (seat at 8 )   Standing hip 3 ways B/L 2x 10  2x10        10x ea dir  10x ea.    Matrix knee ext     10# 2x10 10# 2x 10        Matrix hamstring curls      15# 2x10 20# 2x 10        Pt education       PT progress, HEP review                   Ther Activity             TG squats L22 3x 10  L22 3x10 L22 3x 10       L22 3x10 L22 3x10    Fwd step ups 6\" 2x 10   6\" 3x 10  6\" 3x10 6\"x10 up and over 6\" 10x up and over.    6\"2x10 6\" 2x 10    Lateral step downs   4\" 10x  4\"x10 4\" x10 4\" 2x 10        Sit to stands    5x hands on knees 10x hands on knees 10x hands on knees.                     Gait Training                                       Modalities                                              "

## 2024-05-31 NOTE — PROGRESS NOTES
"Daily Note     Today's date: 2024  Patient name: Huyen Zhou  : 1940  MRN: [id number]  Referring provider: Rodger Altman*  Dx:   Encounter Diagnosis     ICD-10-CM    1. Primary osteoarthritis of right knee  M17.11       2. Status post total right knee replacement  Z96.651       3. Status post total right knee replacement using cement  Z96.651           Start Time: 1145  Stop Time: 1226  Total time in clinic (min): 41 minutes    Subjective: pt noted that she has been feeling better but still has some stiffness in b/l knees.       Objective: See treatment diary below      Assessment:  Continued with treatment session with focus on R knee. Tolerated treatment well. Was able to progress with reps and increased resistance with HS curl on matrix. Noted that she did have some increase in soreness. Patient exhibited good technique with therapeutic exercises and would benefit from continued PT   DOMS noted s/p treatment session.     Plan: Continue per plan of care.      Precautions: s/p L TKA 23, R TKA 24  Access Code: [email or web address]    POC expires Unit limit Auth  expiration date PT/OT + Visit Limit?   24 N/A N/A BOMN                 Visit/Unit Tracking  AUTH Status:  Date 5/3 5/6 5/8 5/14 5/16 5/20 5/23 5/28   4/25 4/29   BOMN Used 13 14 15 16 17 18  19 20   11 12    Remaining                     Manuals 5/14 5/16 5/20 5/23 5/28 5/31  5/3 5/6 5/8   R knee PROM with OP SC BE SC BE BE SC  BE BE SC   Patellar mobilizations  BE SC BE BE   BE BE ROM    Re-evaluation        BE                  Neuro Re-Ed             Quad set with towel under ankle             Quad set with LAQ 2# 2x 10   2# 2x 10   DC    1.5# 3\" 2x10 1.5# 3x 10    Quad set with SAQ 2# 3x 10     DC    1.5# 3\" 2x10 1.5# 3x 10    Quad set with SLR 3x 5   2x 10       x10 3x 5    Rockerboard taps fwd/bwd, left/right   20x ea  30x ea.  30x ea         Chuck step overs fwd and laterally  3 laps fwd nonrecip,   2 laps lat  2 laps ea, " "reciprocally fwd 2 laps ea, reciprocally fwd                     Ther Ex             Rec bike for knee ROM    Seat 8  Fwd and bwd w/ No tension 10 min  (seat at 8 ) Fwd and bwd w/ No tension 10 min  (seat at 8 ) Fwd and bwd w/ No tension 10 min  (seat at 8 ) No tension 10'    No tension 10'  No tension 10 min    Rocking to fwd rev 10' Fwd and bwd w/ No tension 10 min  (seat at 8 )   Standing hip 3 ways B/L 2x 10  2x10        10x ea dir  10x ea.    Matrix knee ext     10# 2x10 10# 2x 10        Matrix hamstring curls      15# 2x10 20# 2x 10        Pt education        PT POC, HEP, SPC usage                   Ther Activity             TG squats L22 3x 10  L22 3x10 L22 3x 10       L22 3x10 L22 3x10    Fwd step ups 6\" 2x 10   6\" 3x 10  6\" 3x10 6\"x10 up and over 6\" 10x up and over.    6\"2x10 6\" 2x 10    Lateral step downs   4\" 10x  4\"x10 4\" x10 4\" 2x 10        Sit to stands    5x hands on knees 10x hands on knees 10x hands on knees.                     Gait Training                                       Modalities                                                              "

## 2024-06-03 ENCOUNTER — EVALUATION (OUTPATIENT)
Dept: PHYSICAL THERAPY | Facility: CLINIC | Age: 84
End: 2024-06-03
Payer: MEDICARE

## 2024-06-03 DIAGNOSIS — M17.11 PRIMARY OSTEOARTHRITIS OF RIGHT KNEE: Primary | ICD-10-CM

## 2024-06-03 DIAGNOSIS — Z96.651 STATUS POST TOTAL RIGHT KNEE REPLACEMENT: ICD-10-CM

## 2024-06-03 PROCEDURE — 97140 MANUAL THERAPY 1/> REGIONS: CPT

## 2024-06-03 PROCEDURE — 97110 THERAPEUTIC EXERCISES: CPT

## 2024-06-07 ENCOUNTER — OFFICE VISIT (OUTPATIENT)
Dept: PHYSICAL THERAPY | Facility: CLINIC | Age: 84
End: 2024-06-07
Payer: MEDICARE

## 2024-06-07 DIAGNOSIS — Z96.651 STATUS POST TOTAL RIGHT KNEE REPLACEMENT: ICD-10-CM

## 2024-06-07 DIAGNOSIS — M17.11 PRIMARY OSTEOARTHRITIS OF RIGHT KNEE: Primary | ICD-10-CM

## 2024-06-07 PROCEDURE — 97110 THERAPEUTIC EXERCISES: CPT

## 2024-06-07 PROCEDURE — 97530 THERAPEUTIC ACTIVITIES: CPT

## 2024-06-07 PROCEDURE — 97140 MANUAL THERAPY 1/> REGIONS: CPT

## 2024-06-07 NOTE — PROGRESS NOTES
Daily Note     Today's date: 2024  Patient name: Huyen Zhou  : 1940  MRN: [id number]  Referring provider: Rodger Altman*  Dx:   Encounter Diagnosis     ICD-10-CM    1. Primary osteoarthritis of right knee  M17.11       2. Status post total right knee replacement  Z96.651                      Subjective: Patient reports that both of her knees are sore today.       Objective: See treatment diary below      Assessment: Tolerated treatment well. Occasional hip compensatory movements noted during forward forest step overs; was able to correct with VCs. Does continue to report pain and soreness throughout sessions. Patient demonstrated fatigue post treatment, exhibited good technique with therapeutic exercises, and would benefit from continued PT      Plan: Continue per plan of care.      Precautions: s/p L TKA 23, R TKA 24  Access Code: [email or web address]    POC expires Unit limit Auth  expiration date PT/OT + Visit Limit?   7/15/24 N/A N/A BOMN                 Visit/Unit Tracking  AUTH Status:  Date 5/3 5/6 5/8 5/14 5/16 5/20 5/23 5/28 5/31 6/3 6/7    BOMN Used 13 14 15 16 17 18  19 20 21 22 23     Remaining                     Manuals 5/14 5/16 5/20 5/23 5/28 5/31 6/3 6/7  5/8   R knee PROM with OP SC BE SC BE BE SC BE BE  SC   Patellar mobilizations  BE SC BE BE     ROM    Re-evaluation       BE                   Neuro Re-Ed             Quad set with towel under ankle             Quad set with SLR 3x 5   2x 10        3x 5    Rockerboard taps fwd/bwd, left/right   20x ea  30x ea.  30x ea         Forest step overs fwd and laterally  3 laps fwd nonrecip,   2 laps lat  2 laps ea, reciprocally fwd 2 laps ea, reciprocally fwd   3 laps ea, reciprocally fwd                  Ther Ex             Rec bike for knee ROM    Seat 8  Fwd and bwd w/ No tension 10 min  (seat at 8 ) Fwd and bwd w/ No tension 10 min  (seat at 8 ) Fwd and bwd w/ No tension 10 min  (seat at 8 ) No tension 10'    No tension 10'  No  "tension 10 min  No tension 10 min  No tension 10 min   Fwd and bwd w/ No tension 10 min  (seat at 8 )   Standing hip 3 ways B/L 2x 10  2x10         10x ea.    Matrix knee ext     10# 2x10 10# 2x 10   10# 3x10     Matrix hamstring curls      15# 2x10 20# 2x 10   20# 3x10     Pt education       PT progress, HEP review                   Ther Activity             TG squats L22 3x 10  L22 3x10 L22 3x 10        L22 3x10    Fwd step ups 6\" 2x 10   6\" 3x 10  6\" 3x10 6\"x10 up and over 6\" 10x up and over.   6\" x15 up and over   6\" 2x 10    Lateral step downs   4\" 10x  4\"x10 4\" x10 4\" 2x 10   4\" 2x 10     Sit to stands    5x hands on knees 10x hands on knees 10x hands on knees.   10x hands on knees                   Gait Training                                       Modalities                                              "

## 2024-06-10 ENCOUNTER — OFFICE VISIT (OUTPATIENT)
Dept: PHYSICAL THERAPY | Facility: CLINIC | Age: 84
End: 2024-06-10
Payer: MEDICARE

## 2024-06-10 DIAGNOSIS — M17.11 PRIMARY OSTEOARTHRITIS OF RIGHT KNEE: Primary | ICD-10-CM

## 2024-06-10 DIAGNOSIS — Z96.651 STATUS POST TOTAL RIGHT KNEE REPLACEMENT: ICD-10-CM

## 2024-06-10 DIAGNOSIS — Z96.651 STATUS POST TOTAL RIGHT KNEE REPLACEMENT USING CEMENT: ICD-10-CM

## 2024-06-10 PROCEDURE — 97112 NEUROMUSCULAR REEDUCATION: CPT

## 2024-06-10 PROCEDURE — 97530 THERAPEUTIC ACTIVITIES: CPT

## 2024-06-10 PROCEDURE — 97110 THERAPEUTIC EXERCISES: CPT

## 2024-06-10 NOTE — PROGRESS NOTES
Daily Note     Today's date: 6/10/2024  Patient name: Huyen Zhou  : 1940  MRN: [id number]  Referring provider: Rodger Altman*  Dx:   Encounter Diagnosis     ICD-10-CM    1. Primary osteoarthritis of right knee  M17.11       2. Status post total right knee replacement  Z96.651       3. Status post total right knee replacement using cement  Z96.651           Start Time: 1015  Stop Time: 1056  Total time in clinic (min): 41 minutes    Subjective: Pt noted no changes of her R knee.       Objective: See treatment diary below      Assessment:  Continued with treatment session no progressions with additional exercises. She noted challenged with increase reps. Tolerated treatment well. Patient exhibited good technique with therapeutic exercises and would benefit from continued PT.      Education reviewed with HEP as well as DOMS may be noted secondary to increase reps.       Plan: Continue per plan of care.      Precautions: s/p L TKA 23, R TKA 24  Access Code: [email or web address]    POC expires Unit limit Auth  expiration date PT/OT + Visit Limit?   7/15/24 N/A N/A BOMN                 Visit/Unit Tracking  AUTH Status:  Date 5/3 5/6 5/8 5/14 5/16 5/20 5/23 5/28 5/31 6/3 6/7 6/10   BOMN Used 13 14 15 16 17 18  19 20 21 22 23 24    Remaining                     Manuals 5/14 5/16 5/20 5/23 5/28 5/31 6/3 6 6/10    R knee PROM with OP SC BE SC BE BE SC BE BE NV    Patellar mobilizations  BE SC BE BE        Re-evaluation       BE                   Neuro Re-Ed             Quad set with towel under ankle             Quad set with SLR 3x 5   2x 10           Rockerboard taps fwd/bwd, left/right   20x ea  30x ea.  30x ea         Chuck step overs fwd and laterally  3 laps fwd nonrecip,   2 laps lat  2 laps ea, reciprocally fwd 2 laps ea, reciprocally fwd   3 laps ea, reciprocally fwd 4 laps latera     5 laps front recip.                  Ther Ex             Rec bike for knee ROM    Seat 8  Fwd and bwd w/ No tension  "10 min  (seat at 8 ) Fwd and bwd w/ No tension 10 min  (seat at 8 ) Fwd and bwd w/ No tension 10 min  (seat at 8 ) No tension 10'    No tension 10'  No tension 10 min  No tension 10 min  No tension 10 min  No tension 10 min     Standing hip 3 ways B/L 2x 10  2x10            Matrix knee ext     10# 2x10 10# 2x 10   10# 3x10 10# 3x 10     Matrix hamstring curls      15# 2x10 20# 2x 10   20# 3x10 20# 3x 10     Pt education       PT progress, HEP review                   Ther Activity             TG squats L22 3x 10  L22 3x10 L22 3x 10           Fwd step ups 6\" 2x 10   6\" 3x 10  6\" 3x10 6\"x10 up and over 6\" 10x up and over.   6\" x15 up and over  6\" 2x 10 up and over.     Lateral step downs   4\" 10x  4\"x10 4\" x10 4\" 2x 10   4\" 2x 10 4\" 2x 10 VC's    Sit to stands    5x hands on knees 10x hands on knees 10x hands on knees.   10x hands on knees  2x 10                  Gait Training                                       Modalities                                                "

## 2024-06-13 ENCOUNTER — OFFICE VISIT (OUTPATIENT)
Dept: PHYSICAL THERAPY | Facility: CLINIC | Age: 84
End: 2024-06-13
Payer: MEDICARE

## 2024-06-13 DIAGNOSIS — Z96.651 STATUS POST TOTAL RIGHT KNEE REPLACEMENT: ICD-10-CM

## 2024-06-13 DIAGNOSIS — M17.11 PRIMARY OSTEOARTHRITIS OF RIGHT KNEE: Primary | ICD-10-CM

## 2024-06-13 PROCEDURE — 97110 THERAPEUTIC EXERCISES: CPT

## 2024-06-13 PROCEDURE — 97140 MANUAL THERAPY 1/> REGIONS: CPT

## 2024-06-13 PROCEDURE — 97530 THERAPEUTIC ACTIVITIES: CPT

## 2024-06-13 NOTE — PROGRESS NOTES
"Daily Note     Today's date: 2024  Patient name: Huyen Zhou  : 1940  MRN: [id number]  Referring provider: Rodger Altman*  Dx:   Encounter Diagnosis     ICD-10-CM    1. Primary osteoarthritis of right knee  M17.11       2. Status post total right knee replacement  Z96.651                      Subjective: Patient reports that when she woke up the next day after her last session, she felt as if she had \"tree trunks for legs\" as her legs felt so heavy to  and walk.       Objective: See treatment diary below      Assessment: Tolerated treatment well. Regressed step ups and matrix machine reps to facilitate improved tolerance to sessions with reduced pain/soreness. Demonstrates much improved knee flexion and extension PROM. Patient demonstrated fatigue post treatment, exhibited good technique with therapeutic exercises, and would benefit from continued PT      Plan: Continue per plan of care.      Precautions: s/p L TKA 23, R TKA 24  Access Code: [email or web address]    POC expires Unit limit Auth  expiration date PT/OT + Visit Limit?   7/15/24 N/A N/A BOMN                 Visit/Unit Tracking  AUTH Status:  Date 6/13  5/8 5/14 5/16 5/20 5/23 5/28 5/31 6/3 6/7 6/10   BOMN Used 25  15 16 17 18  19 20 21 22 23 24    Remaining                     Manuals 5/14 5/16 5/20 5/23 5/28 5/31 6/3 6/7 6/10 6/13   R knee PROM with OP SC BE SC BE BE SC BE BE NV BE   Patellar mobilizations  BE SC BE BE        Re-evaluation       BE                   Neuro Re-Ed             Quad set with towel under ankle             Quad set with SLR 3x 5   2x 10           Rockerboard taps fwd/bwd, left/right   20x ea  30x ea.  30x ea         Chuck step overs fwd and laterally  3 laps fwd nonrecip,   2 laps lat  2 laps ea, reciprocally fwd 2 laps ea, reciprocally fwd   3 laps ea, reciprocally fwd 4 laps latera     5 laps front recip.                  Ther Ex             Rec bike for knee ROM    Seat 8  Fwd and bwd w/ No tension " "10 min  (seat at 8 ) Fwd and bwd w/ No tension 10 min  (seat at 8 ) Fwd and bwd w/ No tension 10 min  (seat at 8 ) No tension 10'    No tension 10'  No tension 10 min  No tension 10 min  No tension 10 min  No tension 10 min  No tension 10 min    Standing hip 3 ways B/L 2x 10  2x10            Matrix knee ext     10# 2x10 10# 2x 10   10# 3x10 10# 3x 10  10# 2x10   Matrix hamstring curls      15# 2x10 20# 2x 10   20# 3x10 20# 3x 10  15# 2x10   Pt education       PT progress, HEP review                   Ther Activity             TG squats L22 3x 10  L22 3x10 L22 3x 10           Fwd step ups 6\" 2x 10   6\" 3x 10  6\" 3x10 6\"x10 up and over 6\" 10x up and over.   6\" x15 up and over  6\" 2x 10 up and over.  6\" x15 up and over    8\"NV   Lateral step downs   4\" 10x  4\"x10 4\" x10 4\" 2x 10   4\" 2x 10 4\" 2x 10 VC's    Sit to stands    5x hands on knees 10x hands on knees 10x hands on knees.   10x hands on knees  2x 10  2x10                Gait Training                                       Modalities                                                  "

## 2024-06-17 ENCOUNTER — OFFICE VISIT (OUTPATIENT)
Dept: PHYSICAL THERAPY | Facility: CLINIC | Age: 84
End: 2024-06-17
Payer: MEDICARE

## 2024-06-17 DIAGNOSIS — M17.11 PRIMARY OSTEOARTHRITIS OF RIGHT KNEE: ICD-10-CM

## 2024-06-17 DIAGNOSIS — Z96.651 STATUS POST TOTAL RIGHT KNEE REPLACEMENT USING CEMENT: Primary | ICD-10-CM

## 2024-06-17 DIAGNOSIS — Z96.651 STATUS POST TOTAL RIGHT KNEE REPLACEMENT: ICD-10-CM

## 2024-06-17 PROCEDURE — 97140 MANUAL THERAPY 1/> REGIONS: CPT

## 2024-06-17 PROCEDURE — 97530 THERAPEUTIC ACTIVITIES: CPT

## 2024-06-17 PROCEDURE — 97110 THERAPEUTIC EXERCISES: CPT

## 2024-06-17 NOTE — PROGRESS NOTES
"Daily Note     Today's date: 2024  Patient name: Huyen Zhou  : 1940  MRN: [id number]  Referring provider: Rodger Altman*  Dx:   Encounter Diagnosis     ICD-10-CM    1. Status post total right knee replacement using cement  Z96.651       2. Status post total right knee replacement  Z96.651       3. Primary osteoarthritis of right knee  M17.11           Start Time: 1215  Stop Time: 1255  Total time in clinic (min): 40 minutes    Subjective: pt noted that she felt like she was working too hard last time and noted her legs felt really heavy the day after \"like tree stumps\".       Objective: See treatment diary below      Assessment:  Continued with treatment session with focus on R knee. Was able to increase step height today to 8\" with no increase in pain noted but did demonstrate increased fatigue. Tolerated treatment well. Patient demonstrated fatigue post treatment, exhibited good technique with therapeutic exercises, and would benefit from continued PT.     Advised to continue with HEP as well as may have some increase in DOMS.      Plan: Continue per plan of care.      Precautions: s/p L TKA 23, R TKA 24  Access Code: [email or web address]    POC expires Unit limit Auth  expiration date PT/OT + Visit Limit?   7/15/24 N/A N/A BOMN                 Visit/Unit Tracking  AUTH Status:  Date 6/13 6/17  5/8 5/14 5/16 5/20 5/23 5/28 5/31 6/3 6/7 6/10   BOMN Used 25 26 15 16 17 18  19 20 21 22 23 24    Remaining                     Manuals 6/17 5/16 5/20 5/23 5/28 5/31 6/3 6/7 6/10 6/13   R knee PROM with OP SC BE SC BE BE SC BE BE NV BE   Patellar mobilizations  BE SC BE BE        Re-evaluation       BE                   Neuro Re-Ed             Quad set with towel under ankle             Quad set with SLR   2x 10           Rockerboard taps fwd/bwd, left/right   20x ea  30x ea.  30x ea         Chuck step overs fwd and laterally NV  3 laps fwd nonrecip,   2 laps lat  2 laps ea, reciprocally fwd 2 laps ea, " "reciprocally fwd   3 laps ea, reciprocally fwd 4 laps latera     5 laps front recip.                  Ther Ex             Rec bike for knee ROM    Seat 8  No tension 10 min  Fwd and bwd w/ No tension 10 min  (seat at 8 ) Fwd and bwd w/ No tension 10 min  (seat at 8 ) No tension 10'    No tension 10'  No tension 10 min  No tension 10 min  No tension 10 min  No tension 10 min  No tension 10 min    Standing hip 3 ways B/L  2x10            Matrix knee ext 10# 2x 10     10# 2x10 10# 2x 10   10# 3x10 10# 3x 10  10# 2x10   Matrix hamstring curls  15# 2x 10     15# 2x10 20# 2x 10   20# 3x10 20# 3x 10  15# 2x10   Pt education       PT progress, HEP review                   Ther Activity             TG squats  L22 3x10 L22 3x 10           Fwd step ups 8 \" 10x up and over.   6\" 3x 10  6\" 3x10 6\"x10 up and over 6\" 10x up and over.   6\" x15 up and over  6\" 2x 10 up and over.  6\" x15 up and over    8\"NV   Lateral step downs 4\" 2x 10   4\" 10x  4\"x10 4\" x10 4\" 2x 10   4\" 2x 10 4\" 2x 10 VC's    Sit to stands    5x hands on knees 10x hands on knees 10x hands on knees.   10x hands on knees  2x 10  2x10                Gait Training                                       Modalities                                                    "

## 2024-06-20 ENCOUNTER — OFFICE VISIT (OUTPATIENT)
Dept: PHYSICAL THERAPY | Facility: CLINIC | Age: 84
End: 2024-06-20
Payer: MEDICARE

## 2024-06-20 DIAGNOSIS — M17.11 PRIMARY OSTEOARTHRITIS OF RIGHT KNEE: ICD-10-CM

## 2024-06-20 DIAGNOSIS — Z96.651 STATUS POST TOTAL RIGHT KNEE REPLACEMENT USING CEMENT: Primary | ICD-10-CM

## 2024-06-20 DIAGNOSIS — Z96.651 STATUS POST TOTAL RIGHT KNEE REPLACEMENT: ICD-10-CM

## 2024-06-20 PROCEDURE — 97140 MANUAL THERAPY 1/> REGIONS: CPT

## 2024-06-20 PROCEDURE — 97110 THERAPEUTIC EXERCISES: CPT

## 2024-06-20 PROCEDURE — 97530 THERAPEUTIC ACTIVITIES: CPT

## 2024-06-20 NOTE — PROGRESS NOTES
Daily Note     Today's date: 2024  Patient name: Huyen Zhou  : 1940  MRN: [id number]  Referring provider: Rodger Altman*  Dx:   Encounter Diagnosis     ICD-10-CM    1. Status post total right knee replacement using cement  Z96.651       2. Status post total right knee replacement  Z96.651       3. Primary osteoarthritis of right knee  M17.11           Start Time: 1141  Stop Time: 1221  Total time in clinic (min): 40 minutes    Subjective: pt noted no changes at this time upon arrival.       Objective: See treatment diary below      Assessment: Continued with treatment session at this time she noted an increase challenge with stairs as wella s slightly tighter than last time into R knee extension.  Tolerated treatment well. Patient exhibited good technique with therapeutic exercises and would benefit from continued PT  S/p treatment session noted no immediate changes.     Plan: Continue per plan of care.      Precautions: s/p L TKA 23, R TKA 24  Access Code: [email or web address]    POC expires Unit limit Auth  expiration date PT/OT + Visit Limit?   7/15/24 N/A N/A BOMN                 Visit/Unit Tracking  AUTH Status:  Date 6/13 6/17  6/20  5/16 5/20 5/23 5/28 5/31 6/3 6/7 6/10   BOMN Used 25 26 27  17 18  19 20 21 22 23 24    Remaining                     Manuals 6/17 6/20 5/20 5/23 5/28 5/31 6/3 6/7 6/10 6/13   R knee PROM with OP SC  SC BE BE SC BE BE NV BE   Patellar mobilizations   SC BE BE        Re-evaluation       BE                   Neuro Re-Ed             Quad set with towel under ankle             Quad set with SLR   2x 10           Rockerboard taps fwd/bwd, left/right    30x ea.  30x ea         Chuck step overs fwd and laterally NV    2 laps ea, reciprocally fwd 2 laps ea, reciprocally fwd   3 laps ea, reciprocally fwd 4 laps latera     5 laps front recip.                  Ther Ex             Rec bike for knee ROM    Seat 8  No tension 10 min  10 min fwd no tension  Fwd and bwd w/ No  "tension 10 min  (seat at 8 ) No tension 10'    No tension 10'  No tension 10 min  No tension 10 min  No tension 10 min  No tension 10 min  No tension 10 min    Standing hip 3 ways B/L             Matrix knee ext 10# 2x 10  10# 2x 10    10# 2x10 10# 2x 10   10# 3x10 10# 3x 10  10# 2x10   Matrix hamstring curls  15# 2x 10  25# 2x 10    15# 2x10 20# 2x 10   20# 3x10 20# 3x 10  15# 2x10   Pt education       PT progress, HEP review                   Ther Activity             TG squats   L22 3x 10           Fwd step ups 8 \" 10x up and over.  8 \" 10x up and over 6\" 3x 10  6\" 3x10 6\"x10 up and over 6\" 10x up and over.   6\" x15 up and over  6\" 2x 10 up and over.  6\" x15 up and over    8\"NV   Lateral step downs 4\" 2x 10  4\" 2x 10  4\" 10x  4\"x10 4\" x10 4\" 2x 10   4\" 2x 10 4\" 2x 10 VC's    Sit to stands    5x hands on knees 10x hands on knees 10x hands on knees.   10x hands on knees  2x 10  2x10                Gait Training                                       Modalities                                                      "

## 2024-06-21 ENCOUNTER — OFFICE VISIT (OUTPATIENT)
Dept: OBGYN CLINIC | Facility: CLINIC | Age: 84
End: 2024-06-21

## 2024-06-21 ENCOUNTER — APPOINTMENT (OUTPATIENT)
Dept: RADIOLOGY | Facility: AMBULARY SURGERY CENTER | Age: 84
End: 2024-06-21
Attending: ORTHOPAEDIC SURGERY
Payer: MEDICARE

## 2024-06-21 VITALS
BODY MASS INDEX: 32.79 KG/M2 | HEART RATE: 67 BPM | SYSTOLIC BLOOD PRESSURE: 131 MMHG | HEIGHT: 60 IN | DIASTOLIC BLOOD PRESSURE: 72 MMHG | WEIGHT: 167 LBS

## 2024-06-21 DIAGNOSIS — Z96.651 S/P TOTAL KNEE REPLACEMENT USING CEMENT, RIGHT: Primary | ICD-10-CM

## 2024-06-21 DIAGNOSIS — Z96.651 S/P TOTAL KNEE REPLACEMENT USING CEMENT, RIGHT: ICD-10-CM

## 2024-06-21 PROCEDURE — 99024 POSTOP FOLLOW-UP VISIT: CPT | Performed by: ORTHOPAEDIC SURGERY

## 2024-06-21 PROCEDURE — 73562 X-RAY EXAM OF KNEE 3: CPT

## 2024-06-21 NOTE — PROGRESS NOTES
83 y.o.female presents for 3 months postoperative visit status post right TKA. Patient denies any chest pain, shortness or breath or calf pain.  Patient presents with her daughter states she is doing well she does have some issues with strength going up and down stairs otherwise she is doing well ambulate without the assistance of a cane or walker at this time.  Pain is well controlled with medication.    Review of Systems  Review of systems negative unless otherwise specified in HPI    Past Medical History  Past Medical History:   Diagnosis Date    Chronic kidney disease     Disease of thyroid gland     GI bleed     High cholesterol     History of transfusion     Hypothyroidism     PONV (postoperative nausea and vomiting)        Past Surgical History  Past Surgical History:   Procedure Laterality Date    BREAST CYST EXCISION  1982    cyst removals and aspirations-benign    CATARACT EXTRACTION, BILATERAL      COLONOSCOPY      HYSTERECTOMY      at age 64    OOPHORECTOMY Bilateral     at age 64    PARATHYROID GLAND SURGERY      WA ARTHROPLASTY FEM CONDYLES/TIBIAL PLATEAU KNEE Left 11/09/2023    Procedure: ARTHROPLASTY KNEE TOTAL AND ALL ASSOCIATED PROCEDURES;  Surgeon: Rachel Momin MD;  Location:  MAIN OR;  Service: Orthopedics    WA ARTHRP KNE CONDYLE&PLATU MEDIAL&LAT COMPARTMENTS Right 4/4/2024    Procedure: ARTHROPLASTY KNEE TOTAL AND ALL ASSOCIATED PROCEDURES;  Surgeon: Rachel Momin MD;  Location:  MAIN OR;  Service: Orthopedics    STOMACH SURGERY      TOTAL KNEE ARTHROPLASTY Left        Current Medications  Current Outpatient Medications on File Prior to Visit   Medication Sig Dispense Refill    levothyroxine 100 mcg tablet Take 1 tablet (100 mcg total) by mouth daily 90 tablet 3    pravastatin (PRAVACHOL) 20 mg tablet Take with 40 mg Pravastatin, total daily dose 60 mg 90 tablet 3    pravastatin (PRAVACHOL) 40 mg tablet Take 1 tablet (40 mg total) by mouth daily 90 tablet 3    ascorbic acid  (VITAMIN C) 500 MG tablet Take 1 tablet (500 mg total) by mouth 2 (two) times a day (Patient not taking: Reported on 4/19/2024) 60 tablet 1    aspirin (ECOTRIN LOW STRENGTH) 81 mg EC tablet Take 1 tablet (81 mg total) by mouth 2 (two) times a day Take 1(one) 81 mg tablet twice daily x 35 days after total joint arthroplasty (Patient not taking: Reported on 6/21/2024) 70 tablet 0    docusate sodium (COLACE) 100 mg capsule Take 1 capsule (100 mg total) by mouth 2 (two) times a day (Patient not taking: Reported on 4/19/2024) 10 capsule 0    ferrous sulfate 324 (65 Fe) mg Take 1 tablet (324 mg total) by mouth 2 (two) times a day before meals (Patient not taking: Reported on 4/19/2024) 60 tablet 1    folic acid (FOLVITE) 1 mg tablet take 1 tablet by mouth once daily (Patient not taking: Reported on 4/19/2024) 30 tablet 5    methylPREDNISolone 4 MG tablet therapy pack Use as directed on package (Patient not taking: Reported on 4/19/2024) 1 each 0    oxyCODONE (ROXICODONE) 5 immediate release tablet Take 1 tablets every 6 hrs as needed for pain control (Patient not taking: Reported on 6/21/2024) 30 tablet 0     No current facility-administered medications on file prior to visit.       Recent Labs (HCT,HGB,PT,INR,ESR,CRP,GLU,HgA1C)  0   Lab Value Date/Time    HCT 39.8 03/12/2024 0933    HCT 37.9 05/03/2015 0630    HGB 12.9 03/12/2024 0933    HGB 12.1 05/03/2015 0630    WBC 6.09 03/12/2024 0933    WBC 6.75 05/03/2015 0630    INR 0.97 03/12/2024 0933    GLUCOSE 96 05/04/2015 0529    HGBA1C 6.0 (H) 03/12/2024 0933           Body mass index is 32.61 kg/m².  Wt Readings from Last 3 Encounters:   06/21/24 75.8 kg (167 lb)   04/19/24 78 kg (172 lb)   04/04/24 78 kg (172 lb)       Physical exam   General: Awake, Alert, Oriented   Eyes: Pupils equal, round and reactive to light    Heart: regular rate and rhythm   Lungs: No audible wheezing   Abdomen: soft  right Lower extremity      Incision well approximated without erythema no  tenderness to palpation    Full knee extension 110 of flexion   Active ankle dorsal and plantarflexion without pain, calf nontender palpation   sensation mildly decreased kev-incisionally otherwise intact   Distal pulses present      Imaging  X rays of the right knee reviewed.  X rays reveal excellently aligned right total knee arthroplasty without evidence of loosening or osseous abnormality.    Assessment:  Status post 12 weeks right TKA    Plan:  Weight bearing as tolerated right Lower extremity  Physical therapy  [facility] antibiotic prophylaxis recommended  Pain medication as needed  Follow-up in 9 months and repeat x-rays right knee

## 2024-06-25 ENCOUNTER — OFFICE VISIT (OUTPATIENT)
Dept: PHYSICAL THERAPY | Facility: CLINIC | Age: 84
End: 2024-06-25
Payer: MEDICARE

## 2024-06-25 DIAGNOSIS — Z96.651 STATUS POST TOTAL RIGHT KNEE REPLACEMENT: ICD-10-CM

## 2024-06-25 DIAGNOSIS — M17.11 PRIMARY OSTEOARTHRITIS OF RIGHT KNEE: Primary | ICD-10-CM

## 2024-06-25 PROCEDURE — 97110 THERAPEUTIC EXERCISES: CPT

## 2024-06-25 PROCEDURE — 97530 THERAPEUTIC ACTIVITIES: CPT

## 2024-06-25 PROCEDURE — 97112 NEUROMUSCULAR REEDUCATION: CPT

## 2024-06-25 NOTE — PROGRESS NOTES
Daily Note     Today's date: 2024  Patient name: Huyen Zhou  : 1940  MRN: [id number]  Referring provider: Rodger Altman*  Dx:   Encounter Diagnosis     ICD-10-CM    1. Primary osteoarthritis of right knee  M17.11       2. Status post total right knee replacement  Z96.651                      Subjective: Patient reports that she was finally able to start doing stairs reciprocally over the weekend leading with her right knee. She expresses continued frustrations despite this due to the stiffness that she continues to feel in her right knee following a day of activity.       Objective: See treatment diary below      Assessment: Tolerated treatment well. Educated patient again on course of recovery following TKA and that stiffness is a very normal process for up to a year following the procedure. Was able to complete forest step overs reciprocally without LOB, however did need cues for right knee flexion and to avoid hip compensatory movements. Patient demonstrated fatigue post treatment, exhibited good technique with therapeutic exercises, and would benefit from continued PT      Plan: Progress treatment as tolerated.       Precautions: s/p L TKA 23, R TKA 24  Access Code: [email or web address]    POC expires Unit limit Auth  expiration date PT/OT + Visit Limit?   7/15/24 N/A N/A BOMN                 Visit/Unit Tracking  AUTH Status:  Date 6/13 6/17  6/20 6/25  5/20 5/23 5/28 5/31 6/3 6/7 6/10   BOMN Used 25 26 27 28  18  19 20 21 22 23 24    Remaining                     Manuals 6/17 6/20 6/25  5/28 5/31 6/3 6/7 6/10 6/13   R knee PROM with OP SC  BE  Flex only  BE SC BE BE NV BE   Patellar mobilizations     BE        Re-evaluation       BE                   Neuro Re-Ed             Rockerboard taps fwd/bwd, left/right    30x ea          Forest step overs fwd and laterally NV   3 laps ea   Reciprocally fwd  2 laps ea, reciprocally fwd   3 laps ea, reciprocally fwd 4 laps latera     5 laps front recip.  "                              Ther Ex             Rec bike for knee ROM    Seat 8  No tension 10 min  10 min fwd no tension  10 min fwd no tension   No tension 10'  No tension 10 min  No tension 10 min  No tension 10 min  No tension 10 min  No tension 10 min    Standing hip 3 ways B/L             Matrix knee ext 10# 2x 10  10# 2x 10    10# 2x10 10# 2x 10   10# 3x10 10# 3x 10  10# 2x10   Matrix hamstring curls  15# 2x 10  25# 2x 10    15# 2x10 20# 2x 10   20# 3x10 20# 3x 10  15# 2x10   Pt education       PT progress, HEP review                   Ther Activity             TG squats             Fwd step ups 8 \" 10x up and over.  8 \" 10x up and over 8 \" 10x up and over  6\"x10 up and over 6\" 10x up and over.   6\" x15 up and over  6\" 2x 10 up and over.  6\" x15 up and over    8\"NV   Lateral step downs 4\" 2x 10  4\" 2x 10    4\" x10 4\" 2x 10   4\" 2x 10 4\" 2x 10 VC's    Sit to stands   2x10 hands on knees  10x hands on knees 10x hands on knees.   10x hands on knees  2x 10  2x10                Gait Training                                       Modalities                                                        "

## 2024-06-28 ENCOUNTER — APPOINTMENT (OUTPATIENT)
Dept: PHYSICAL THERAPY | Facility: CLINIC | Age: 84
End: 2024-06-28
Payer: MEDICARE

## 2024-07-01 ENCOUNTER — OFFICE VISIT (OUTPATIENT)
Dept: PHYSICAL THERAPY | Facility: CLINIC | Age: 84
End: 2024-07-01
Payer: MEDICARE

## 2024-07-01 DIAGNOSIS — M17.11 PRIMARY OSTEOARTHRITIS OF RIGHT KNEE: Primary | ICD-10-CM

## 2024-07-01 DIAGNOSIS — Z96.651 STATUS POST TOTAL RIGHT KNEE REPLACEMENT: ICD-10-CM

## 2024-07-01 PROCEDURE — 97530 THERAPEUTIC ACTIVITIES: CPT

## 2024-07-01 PROCEDURE — 97110 THERAPEUTIC EXERCISES: CPT

## 2024-07-01 NOTE — PROGRESS NOTES
Daily Note     Today's date: 2024  Patient name: Huyen Zhou  : 1940  MRN: [id number]  Referring provider: Rodger Altman*  Dx:   Encounter Diagnosis     ICD-10-CM    1. Primary osteoarthritis of right knee  M17.11       2. Status post total right knee replacement  Z96.651                      Subjective: Patient reports that her knees feel quite stiff today. She is still doing better on the stairs and able to ascend them reciprocally, but does feel that she uses her arms to pull herself up them.       Objective: See treatment diary below      Assessment: Tolerated treatment well. Patient able to complete additional reps of step up and overs, however she does continue to have difficulties with step downs due to tightness with knee flexion ROM and decreased eccentric control. She was able to complete tandem and sidestepping on foam pads to challenge her dynamic balance. Patient demonstrated fatigue post treatment, exhibited good technique with therapeutic exercises, and would benefit from continued PT      Plan: Progress note during next visit.      Precautions: s/p L TKA 23, R TKA 24  Access Code: [email or web address]    POC expires Unit limit Auth  expiration date PT/OT + Visit Limit?   7/15/24 N/A N/A BOMN                 Visit/Unit Tracking  AUTH Status:  Date 6/13 6/17  6/20 6/25 7/1  5/23 5/28 5/31 6/3 6/7 6/10   BOMN Used 25 26 27 28 29  19 20 21 22 23 24    Remaining                     Manuals 6/17 6/20 6/25 7/1  5/31 6/3 6/7 6/10 6/13   R knee PROM with OP SC  BE  Flex only   SC BE BE NV BE   Patellar mobilizations             Re-evaluation       BE                   Neuro Re-Ed             Rockerboard taps fwd/bwd, left/right    30x ea          Chuck step overs fwd and laterally NV   3 laps ea   Reciprocally fwd 3 laps ea   Reciprocally fwd    3 laps ea, reciprocally fwd 4 laps latera     5 laps front recip.     Tandem and lateral walking on foam    3 laps ea                      Ther Ex    "          Rec bike for knee ROM    Seat 8  No tension 10 min  10 min fwd no tension  10 min fwd no tension  10 min fwd no tension   No tension 10 min  No tension 10 min  No tension 10 min  No tension 10 min  No tension 10 min    Standing hip 3 ways B/L             Matrix knee ext 10# 2x 10  10# 2x 10   10# 2x10  10# 2x 10   10# 3x10 10# 3x 10  10# 2x10   Matrix hamstring curls  15# 2x 10  25# 2x 10   25# 2x10   20# 2x 10   20# 3x10 20# 3x 10  15# 2x10   Pt education       PT progress, HEP review                   Ther Activity             TG squats             Fwd step ups 8 \" 10x up and over.  8 \" 10x up and over 8 \" 10x up and over 8\" 2x10 up and over   6\" 10x up and over.   6\" x15 up and over  6\" 2x 10 up and over.  6\" x15 up and over    8\"NV   Lateral step downs 4\" 2x 10  4\" 2x 10     4\" 2x 10   4\" 2x 10 4\" 2x 10 VC's    Sit to stands   2x10 hands on knees 2x10 hands on knees  10x hands on knees.   10x hands on knees  2x 10  2x10                Gait Training                                       Modalities                                                          "

## 2024-07-05 ENCOUNTER — EVALUATION (OUTPATIENT)
Dept: PHYSICAL THERAPY | Facility: CLINIC | Age: 84
End: 2024-07-05
Payer: MEDICARE

## 2024-07-05 DIAGNOSIS — Z96.651 STATUS POST TOTAL RIGHT KNEE REPLACEMENT: ICD-10-CM

## 2024-07-05 DIAGNOSIS — M17.11 PRIMARY OSTEOARTHRITIS OF RIGHT KNEE: Primary | ICD-10-CM

## 2024-07-05 PROCEDURE — 97140 MANUAL THERAPY 1/> REGIONS: CPT

## 2024-07-05 PROCEDURE — 97110 THERAPEUTIC EXERCISES: CPT

## 2024-07-05 NOTE — PROGRESS NOTES
"PT Re-Evaluation     Today's date: 2024  Patient name: Huyen Zhou  : 1940  MRN: [id number]  Referring provider: Rodger Altman*  Dx:   Encounter Diagnosis     ICD-10-CM    1. Primary osteoarthritis of right knee  M17.11       2. Status post total right knee replacement  Z96.651                      Assessment  Impairments: abnormal gait, abnormal or restricted ROM, activity intolerance, impaired balance, impaired physical strength, pain with function and weight-bearing intolerance  Symptom irritability: moderate    Assessment details: Huyen Zhou is a 83 y.o. female presenting to physical therapy for a reevaluation s/p R TKA on 24. Since their initial evaluation, she reports 70% improvement in her knee. The patient has demonstrated continued improvements in her knee active and passive ROM to WFL, as well as improved knee strength. She is ambulatory without AD on all surfaces and demonstrates no difficulties with this. She reports continued improvements in her functional mobility as seen in her significant improvements in her time for the TUG and 5 STS. She is able to ascend stairs now reciprocally with minimal use of a HR, however has difficulties descending stairs reciprocally due to decreased functional eccentric control. She also reports increased stiffness and balance deficits in R SLS limiting her ability to complete tub transfers. She also displays decreased endurance for community level ambulation leading to limitations in their ability to complete ADLs, avocational responsibilities, and recreational activities. This patient would benefit from continued PT services to address their impairments and functional limitations to maximize functional outcome.   Understanding of Dx/Px/POC: excellent     Prognosis: good    Goals  STG to be achieved in 4 weeks:   The patient will report a decrease in right knee \"at worst\" subjective pain rating score to at least 6/10 to allow for improved " "tolerance for weightbearing activities. MET  The patient will increase right knee flexion AROM to at least 90 degrees to allow for improved functional mobility. MET  The patient will increase right knee extension MMT score to at least 4/5 to allow for improved functional mobility. MET    LTG to be achieved by DC:  The patient will be independent in comprehensive HEP. MET  The patient will report a decrease in right knee \"at worst\" subjective pain rating score to at least 3/10 to allow for improved tolerance  for functional mobility. NOT MET  The patient will improve right knee flexion and extension AROM to WFL to allow for improved functional mobility. MET  The patient will increase right knee flexion and extension MMT score to WFL to allow for improved functional mobility. NOT MET  The patient will be able to complete one full flight of stairs to her basement for completion of laundry with minimal to no difficulty. NOT MET  The patient will be able to complete household chores and care for her son with minimal to no difficulties due to knee pain. NOT MET      Plan  Patient would benefit from: skilled physical therapy  Planned modality interventions: thermotherapy: hydrocollator packs, TENS and cryotherapy    Planned therapy interventions: manual therapy, joint mobilization, balance/weight bearing training, neuromuscular re-education, patient education, flexibility, strengthening, stretching, therapeutic activities, therapeutic exercise, gait training and home exercise program    Frequency: 2x week  Duration in weeks: 6  Plan of Care beginning date: 7/5/2024  Plan of Care expiration date: 8/16/2024  Treatment plan discussed with: patient        Subjective Evaluation    History of Present Illness  Mechanism of injury: Huyen reports 70% improvement since beginning PT services. She says that her walking is doing much better and is ambulatory without any AD, regardless of the surface she is walking on. She says that her " knee flexion is improving which is giving her an easier time getting into her tub. She says that when getting out of the tub however she has difficulties standing on her right leg in order to lift the left leg to get out of the tub. She is able to ascend stairs reciprocally and with less UE usage on the HR. When descending the stairs, she doesn't feel that her right leg has the strength to support her to complete them reciprocally, instead she completes them non reciprocally leading with the left knee. She feels that her right knee feels stiff every morning when she wakes up and does increase in stiffness at the end of a day where she did a lot of activities.   Patient Goals  Patient goals for therapy: decreased pain and increased strength  Patient goal: to get moving and walking better  Pain  Current pain ratin  At best pain ratin  At worst pain ratin  Location: bilateral knees  Quality: dull ache, tight and throbbing  Relieving factors: medications, change in position and relaxation  Aggravating factors: standing, walking and stair climbing  Progression: improved    Social Support  Steps to enter house: yes  Stairs in house: yes (to basement)   Lives in: one-story house  Lives with: spouse and adult children    Employment status: not working    Diagnostic Tests  X-ray: abnormal  Treatments  Previous treatment: medication and physical therapy  Current treatment: physical therapy      Objective     Observations     Additional Observation Details  R TKA incision well approximated     Active Range of Motion   Left Knee   Flexion: 115 degrees   Extensor la degrees     Right Knee   Flexion: 109 degrees   Extensor la degrees     Passive Range of Motion   Left Knee   Normal passive range of motion    Right Knee   Flexion: 113 degrees   Extension: 0 degrees     Mobility   Patellar Mobility:   Left Knee   Hypomobile: left medial, left lateral, left superior and left inferior    Right Knee   Hypomobile:  medial, lateral, superior and inferior     Strength/Myotome Testing     Left Hip   Planes of Motion   Flexion: 4  Abduction: 4    Right Hip   Planes of Motion   Flexion: 4  Abduction: 4    Left Knee   Flexion: 4+  Extension: 4+  Quadriceps contraction: good    Right Knee   Flexion: 4+  Extension: 4+  Quadriceps contraction: good    Ambulation   Weight-Bearing Status   Assistive device used: none    Ambulation: Level Surfaces   Ambulation without assistive device: independent    Observational Gait   Gait: within functional limits     Additional Observational Gait Details  Increased lateral weight shifting bilaterally    Functional Assessment        Comments  Re-evaluation 7/5/24  5 STS-22.37 seconds No UE for push off from chair, UE resting on knees, even foot placement  TUG- 10.88 seconds no UEs for push off from chair, lateral weight shifting bilaterally to stance foot, no AD usage    Re-evaluation 6/3/24  5 STS-31.86 seconds No UE for push off from chair, UE resting on knees, even foot placement  TUG- 16.46 seconds no UEs for push off from chair, lateral weight shifting bilaterally to stance foot, no AD usage      Re-evaluation 5/3/24  5 STS- 33.27 seconds B/L UE for push off and eccentric control to chair, good knee flexion to chair, however RLE placed slightly forward compared to LLE  TUG- 24.41 seconds B/L UE for push off and eccentric control to chair, No AD, mild instabilities with lateral weight shifting bilaterally  TUG- 20.36 seconds B/L UE for push off and eccentric control to chair, step through gait pattern with use of RW      Re-evaluation 4/8/24  5 STS- 55.01 seconds B/L UE for push off and eccentric control to chair, good knee flexion to chair, however RLE placed slightly forward compared to LLE  TUG- 38.77 seconds B/L UE for push off and eccentric control to chair, step to with occasional step through gait pattern with use of RW                 Precautions: s/p L TKA 11/9/23, R TKA 4/4/24  Access  "Code: [email or web address]    POC expires Unit limit Auth  expiration date PT/OT + Visit Limit?   8/16/24 N/A N/A BOMN                 Visit/Unit Tracking  AUTH Status:  Date 6/13 6/17  6/20 6/25 7/1 7/5   5/31 6/3 6/7 6/10   BOMN Used 25 26 27 28 29 30   21 22 23 24    Remaining                     Manuals 6/17 6/20 6/25 7/1 7/5 5/31 6/3 6/7 6/10 6/13   R knee PROM with OP SC  BE  Flex only  BE Flex only SC BE BE NV BE   Patellar mobilizations             Re-evaluation     BE  BE                   Neuro Re-Ed             Rockerboard taps fwd/bwd, left/right    30x ea          Chuck step overs fwd and laterally NV   3 laps ea   Reciprocally fwd 3 laps ea   Reciprocally fwd    3 laps ea, reciprocally fwd 4 laps latera     5 laps front recip.     Tandem and lateral walking on foam    3 laps ea         R SLS             Dynamic stepping onto foam pads off [employer] [facility] foam pad                          Ther Ex             Rec bike for knee ROM    Seat 8  No tension 10 min  10 min fwd no tension  10 min fwd no tension  10 min fwd no tension  10 min fwd no tension  No tension 10 min  No tension 10 min  No tension 10 min  No tension 10 min  No tension 10 min    Standing hip 3 ways B/L             Matrix knee ext 10# 2x 10  10# 2x 10   10# 2x10  10# 2x 10   10# 3x10 10# 3x 10  10# 2x10   Matrix hamstring curls  15# 2x 10  25# 2x 10   25# 2x10   20# 2x 10   20# 3x10 20# 3x 10  15# 2x10   Pt education       PT progress, HEP review                   Ther Activity             TG squats             Fwd step ups 8 \" 10x up and over.  8 \" 10x up and over 8 \" 10x up and over 8\" 2x10 up and over   6\" 10x up and over.   6\" x15 up and over  6\" 2x 10 up and over.  6\" x15 up and over    8\"NV   Lateral step downs 4\" 2x 10  4\" 2x 10     4\" 2x 10   4\" 2x 10 4\" 2x 10 VC's    Sit to stands   2x10 hands on knees 2x10 hands on knees  10x hands on knees.   10x hands on knees  2x 10  2x10   Static lunges             Gait Training                                  "      Modalities

## 2024-07-09 ENCOUNTER — OFFICE VISIT (OUTPATIENT)
Dept: PHYSICAL THERAPY | Facility: CLINIC | Age: 84
End: 2024-07-09
Payer: MEDICARE

## 2024-07-09 DIAGNOSIS — Z96.651 STATUS POST TOTAL RIGHT KNEE REPLACEMENT: ICD-10-CM

## 2024-07-09 DIAGNOSIS — M17.11 PRIMARY OSTEOARTHRITIS OF RIGHT KNEE: Primary | ICD-10-CM

## 2024-07-09 PROCEDURE — 97140 MANUAL THERAPY 1/> REGIONS: CPT

## 2024-07-09 PROCEDURE — 97110 THERAPEUTIC EXERCISES: CPT

## 2024-07-09 PROCEDURE — 97530 THERAPEUTIC ACTIVITIES: CPT

## 2024-07-09 NOTE — PROGRESS NOTES
Daily Note     Today's date: 2024  Patient name: Huyen Zohu  : 1940  MRN: [id number]  Referring provider: Rodger Altman*  Dx:   Encounter Diagnosis     ICD-10-CM    1. Primary osteoarthritis of right knee  M17.11       2. Status post total right knee replacement  Z96.651                      Subjective: No changes from her re-evaluation at last visit.       Objective: See treatment diary below      Assessment: Tolerated treatment well. Greatly fatigued with increased reps on matrix machine to promote improved quadriceps and hamstring muscle strength. No LOB during dynamic stepping off foam pads, however CGA needed for safety. Patient demonstrated fatigue post treatment, exhibited good technique with therapeutic exercises, and would benefit from continued PT      Plan: Continue per plan of care.      Precautions: s/p L TKA 23, R TKA 24  Access Code: [email or web address]    POC expires Unit limit Auth  expiration date PT/OT + Visit Limit?   24 N/A N/A BOMN                 Visit/Unit Tracking  AUTH Status:  Date 6/13 6/17  6/20 6/25 7/1 7/5 7/9  5/31 6/3 6/7 6/10   BOMN Used 25 26 27 28 29 30 31  21 22 23 24    Remaining                     Manuals 6/17 6/20 6/25 7/1 7/5 7/9  6/7 6/10 6/13   R knee PROM with OP SC  BE  Flex only  BE Flex only BE Flex only  BE NV BE   Patellar mobilizations             Re-evaluation     BE                     Neuro Re-Ed             Rockerboard taps fwd/bwd, left/right    30x ea          Chuck step overs fwd and laterally NV   3 laps ea   Reciprocally fwd 3 laps ea   Reciprocally fwd    3 laps ea, reciprocally fwd 4 laps latera     5 laps front recip.     Tandem and lateral walking on foam    3 laps ea  3 laps ea        R SLS             Dynamic stepping onto foam pads off biodex foam pad      3 laps B/L                    Ther Ex             Rec bike for knee ROM    Seat 8  No tension 10 min  10 min fwd no tension  10 min fwd no tension  10 min fwd no tension   "10 min fwd no tension  10 min fwd no tension   No tension 10 min  No tension 10 min  No tension 10 min    Standing hip 3 ways B/L             Matrix knee ext 10# 2x 10  10# 2x 10   10# 2x10  10# 3x10  10# 3x10 10# 3x 10  10# 2x10   Matrix hamstring curls  15# 2x 10  25# 2x 10   25# 2x10   25# 3x10  20# 3x10 20# 3x 10  15# 2x10   Pt education                          Ther Activity             Fwd step ups 8 \" 10x up and over.  8 \" 10x up and over 8 \" 10x up and over 8\" 2x10 up and over   8\" 2x10 up and over   6\" x15 up and over  6\" 2x 10 up and over.  6\" x15 up and over    8\"NV   Lateral step downs 4\" 2x 10  4\" 2x 10       4\" 2x 10 4\" 2x 10 VC's    Sit to stands   2x10 hands on knees 2x10 hands on knees  2x10 hands on knees  10x hands on knees  2x 10  2x10   Static lunges             Gait Training                                       Modalities                                                "

## 2024-07-11 ENCOUNTER — OFFICE VISIT (OUTPATIENT)
Dept: PHYSICAL THERAPY | Facility: CLINIC | Age: 84
End: 2024-07-11
Payer: MEDICARE

## 2024-07-11 DIAGNOSIS — Z96.651 STATUS POST TOTAL RIGHT KNEE REPLACEMENT: ICD-10-CM

## 2024-07-11 DIAGNOSIS — M17.11 PRIMARY OSTEOARTHRITIS OF RIGHT KNEE: Primary | ICD-10-CM

## 2024-07-11 PROCEDURE — 97110 THERAPEUTIC EXERCISES: CPT

## 2024-07-11 PROCEDURE — 97530 THERAPEUTIC ACTIVITIES: CPT

## 2024-07-11 PROCEDURE — 97112 NEUROMUSCULAR REEDUCATION: CPT

## 2024-07-15 ENCOUNTER — OFFICE VISIT (OUTPATIENT)
Dept: PHYSICAL THERAPY | Facility: CLINIC | Age: 84
End: 2024-07-15
Payer: MEDICARE

## 2024-07-15 DIAGNOSIS — Z96.651 STATUS POST TOTAL RIGHT KNEE REPLACEMENT: ICD-10-CM

## 2024-07-15 DIAGNOSIS — Z96.651 STATUS POST TOTAL RIGHT KNEE REPLACEMENT USING CEMENT: ICD-10-CM

## 2024-07-15 DIAGNOSIS — M17.11 PRIMARY OSTEOARTHRITIS OF RIGHT KNEE: Primary | ICD-10-CM

## 2024-07-15 PROCEDURE — 97110 THERAPEUTIC EXERCISES: CPT

## 2024-07-15 PROCEDURE — 97530 THERAPEUTIC ACTIVITIES: CPT

## 2024-07-15 PROCEDURE — 97112 NEUROMUSCULAR REEDUCATION: CPT

## 2024-07-15 NOTE — PROGRESS NOTES
"Daily Note     Today's date: 7/15/2024  Patient name: Huyen Zhou  : 1940  MRN: [id number]  Referring provider: Rodger Altman*  Dx:   Encounter Diagnosis     ICD-10-CM    1. Primary osteoarthritis of right knee  M17.11       2. Status post total right knee replacement  Z96.651       3. Status post total right knee replacement using cement  Z96.651           Start Time: 1400  Stop Time: 1440  Total time in clinic (min): 40 minutes    Subjective: Pt noted that she has some increase in stiffness today.     3/21/25 is her next follow up for R knee with ortho.     Objective: See treatment diary below      Assessment:  Continued with treatment session s/p L TKA 23. Tolerated treatment well. Patient exhibited good technique with therapeutic exercises and would benefit from continued PT  S/P treatments session, noted some increase in fatigue but no changes in pain status.     Plan: Continue per plan of care.  NV scheduled for 24.      Precautions: s/p L TKA 23, R TKA 24  Access Code: [email or web address]    POC expires Unit limit Auth  expiration date PT/OT + Visit Limit?   24 N/A N/A BOMN                 Visit/Unit Tracking  AUTH Status:  Date 6/13 6/17  6/20 6/25 7/1 7/5 7/9 7/11 7/15  6/7 6/10   BOMN Used 25 26 27 28 29 30 31 32 33  23 24    Remaining                     Manuals 6/17 6/20 6/25 7/1 7/5 7/9 7/11 7/15     R knee PROM with OP SC  BE  Flex only  BE Flex only BE Flex only BE Flex only NV  held today      Patellar mobilizations             Re-evaluation     BE                     Neuro Re-Ed             Rockerboard taps fwd/bwd, left/right    30x ea          Chuck step overs fwd and laterally NV   3 laps ea   Reciprocally fwd 3 laps ea   Reciprocally fwd   4 laps ea 5 laps      Tandem and lateral walking on foam    3 laps ea  3 laps ea  4 laps ea 5 laps      R SLS       3x15\"   Finger touch support 1x 30\" finger support.      Dynamic stepping onto foam pads off biodex foam " "pad      3 laps B/L                    Ther Ex             Rec bike for knee ROM    Seat 8  No tension 10 min  10 min fwd no tension  10 min fwd no tension  10 min fwd no tension  10 min fwd no tension  10 min fwd no tension  10 min fwd no tension  10 min no tension fwd.      Standing hip 3 ways B/L             Matrix knee ext 10# 2x 10  10# 2x 10   10# 2x10  10# 3x10 DC D/C     Matrix hamstring curls  15# 2x 10  25# 2x 10   25# 2x10   25# 3x10 DC D/C     Pt education                          Ther Activity             Fwd step ups 8 \" 10x up and over.  8 \" 10x up and over 8 \" 10x up and over 8\" 2x10 up and over   8\" 2x10 up and over  8\" 2x10 up and over  8\" 2x 10 up and over.      Lateral step downs 4\" 2x 10  4\" 2x 10            Sit to stands   2x10 hands on knees 2x10 hands on knees  2x10 hands on knees 2x10 hands on knees 2x 10 hands on knees.      Static lunges             Gait Training                                       Modalities                                                    "

## 2024-07-18 ENCOUNTER — APPOINTMENT (OUTPATIENT)
Dept: PHYSICAL THERAPY | Facility: CLINIC | Age: 84
End: 2024-07-18
Payer: MEDICARE

## 2024-07-19 ENCOUNTER — OFFICE VISIT (OUTPATIENT)
Dept: PHYSICAL THERAPY | Facility: CLINIC | Age: 84
End: 2024-07-19
Payer: MEDICARE

## 2024-07-19 DIAGNOSIS — M17.11 PRIMARY OSTEOARTHRITIS OF RIGHT KNEE: Primary | ICD-10-CM

## 2024-07-19 DIAGNOSIS — Z96.651 STATUS POST TOTAL RIGHT KNEE REPLACEMENT: ICD-10-CM

## 2024-07-19 PROCEDURE — 97530 THERAPEUTIC ACTIVITIES: CPT

## 2024-07-19 PROCEDURE — 97112 NEUROMUSCULAR REEDUCATION: CPT

## 2024-07-19 NOTE — PROGRESS NOTES
"Daily Note     Today's date: 2024  Patient name: Huyen Zhou  : 1940  MRN: [id number]  Referring provider: Rodger Altman*  Dx:   Encounter Diagnosis     ICD-10-CM    1. Primary osteoarthritis of right knee  M17.11       2. Status post total right knee replacement  Z96.651                      Subjective: Patient reports knee stiffness upon arrival, otherwise no changes.       Objective: See treatment diary below      Assessment: Tolerated treatment well. No instabilities or LOB noted during forest step overs and tandem walking on foam this session. Demonstrates improving eccentric control during forward step downs. Patient demonstrated fatigue post treatment, exhibited good technique with therapeutic exercises, and would benefit from continued PT      Plan: Progress treatment as tolerated.       Precautions: s/p L TKA 23, R TKA 24  Access Code: [email or web address]    POC expires Unit limit Auth  expiration date PT/OT + Visit Limit?   24 N/A N/A BOMN                 Visit/Unit Tracking  AUTH Status:  Date 6/13 6/17  6/20 6/25 7/1 7/5 7/9 7/11 7/15 7/19  6/10   BOMN Used 25 26 27 28 29 30 31 32 33 34  24    Remaining                     Manuals 6/17 6/20 6/25 7/1 7/5 7/9 7/11 7/15 7/19    R knee PROM with OP SC  BE  Flex only  BE Flex only BE Flex only BE Flex only NV  held today      Patellar mobilizations             Re-evaluation     BE                     Neuro Re-Ed             Rockerboard taps fwd/bwd, left/right    30x ea          Forest step overs fwd and laterally NV   3 laps ea   Reciprocally fwd 3 laps ea   Reciprocally fwd   4 laps ea 5 laps  5 laps ea    Tandem and lateral walking on foam    3 laps ea  3 laps ea  4 laps ea 5 laps  5 laps    R SLS       3x15\"   Finger touch support 1x 30\" finger support.      Dynamic stepping onto foam pads off biodex foam pad      3 laps B/L                    Ther Ex             Rec bike for knee ROM    Seat 8  No tension 10 min  10 min fwd no " "tension  10 min fwd no tension  10 min fwd no tension  10 min fwd no tension  10 min fwd no tension  10 min fwd no tension  10 min no tension fwd.  10 min fwd no tension     Standing hip 3 ways B/L             Pt education                          Ther Activity             Fwd step ups 8 \" 10x up and over.  8 \" 10x up and over 8 \" 10x up and over 8\" 2x10 up and over   8\" 2x10 up and over  8\" 2x10 up and over  8\" 2x 10 up and over.  8\" 2x10 up and over     Lateral step downs 4\" 2x 10  4\" 2x 10            Sit to stands   2x10 hands on knees 2x10 hands on knees  2x10 hands on knees 2x10 hands on knees 2x 10 hands on knees.  2x10 hands on knees    Static lunges             Gait Training                                       Modalities                                                      "

## 2024-07-22 ENCOUNTER — OFFICE VISIT (OUTPATIENT)
Dept: PHYSICAL THERAPY | Facility: CLINIC | Age: 84
End: 2024-07-22
Payer: MEDICARE

## 2024-07-22 DIAGNOSIS — M17.11 PRIMARY OSTEOARTHRITIS OF RIGHT KNEE: Primary | ICD-10-CM

## 2024-07-22 DIAGNOSIS — Z96.651 STATUS POST TOTAL RIGHT KNEE REPLACEMENT: ICD-10-CM

## 2024-07-22 DIAGNOSIS — Z96.651 STATUS POST TOTAL RIGHT KNEE REPLACEMENT USING CEMENT: ICD-10-CM

## 2024-07-22 PROCEDURE — 97530 THERAPEUTIC ACTIVITIES: CPT

## 2024-07-22 PROCEDURE — 97112 NEUROMUSCULAR REEDUCATION: CPT

## 2024-07-22 PROCEDURE — 97110 THERAPEUTIC EXERCISES: CPT

## 2024-07-22 NOTE — PROGRESS NOTES
"Daily Note     Today's date: 2024  Patient name: Huyen Zhou  : 1940  MRN: [id number]  Referring provider: Rodger Altman*  Dx:   Encounter Diagnosis     ICD-10-CM    1. Primary osteoarthritis of right knee  M17.11       2. Status post total right knee replacement  Z96.651       3. Status post total right knee replacement using cement  Z96.651           Start Time: 1130  Stop Time: 1211  Total time in clinic (min): 41 minutes    Subjective: pt noted that she has been having some increase in tightness this morning.       Objective: See treatment diary below      Assessment:  Continued with treatment session with focus on R knee s/p TKA completed on 24. Tolerated treatment well. Patient demonstrated fatigue post treatment, exhibited good technique with therapeutic exercises, and would benefit from continued PT  Pt noted that she was more fatigued after hurdles than last visit.     Plan: Continue per plan of care.      Precautions: s/p L TKA 23, R TKA 24  Access Code: [email or web address]    POC expires Unit limit Auth  expiration date PT/OT + Visit Limit?   24 N/A N/A BOMN                 Visit/Unit Tracking  AUTH Status:  Date 6/13 6/17  6/20 6/25 7/1 7/5 7/9 7/11 7/15 7/19 7/22    BOMN Used 25 26 27 28 29 30 31 32 33 34 35     Remaining                     Manuals 6/17 6/20 6/25 7/1 7/5 7/9 7/11 7/15 7/19 7/22   R knee PROM with OP SC  BE  Flex only  BE Flex only BE Flex only BE Flex only NV  held today      Patellar mobilizations             Re-evaluation     BE                     Neuro Re-Ed             Rockerboard taps fwd/bwd, left/right    30x ea          Chuck step overs fwd and laterally NV   3 laps ea   Reciprocally fwd 3 laps ea   Reciprocally fwd   4 laps ea 5 laps  5 laps ea 5 laps ea.    Tandem and lateral walking on foam    3 laps ea  3 laps ea  4 laps ea 5 laps  5 laps 5 laps    R SLS       3x15\"   Finger touch support 1x 30\" finger support.      Dynamic stepping onto foam " "pads off biodex foam pad      3 laps B/L                    Ther Ex             Rec bike for knee ROM    Seat 8  No tension 10 min  10 min fwd no tension  10 min fwd no tension  10 min fwd no tension  10 min fwd no tension  10 min fwd no tension  10 min fwd no tension  10 min no tension fwd.  10 min fwd no tension  10 min fwd no tenstion    Standing hip 3 ways B/L             Pt education                          Ther Activity             Fwd step ups 8 \" 10x up and over.  8 \" 10x up and over 8 \" 10x up and over 8\" 2x10 up and over   8\" 2x10 up and over  8\" 2x10 up and over  8\" 2x 10 up and over.  8\" 2x10 up and over  8\" 3x 10 up and over.    Lateral step downs 4\" 2x 10  4\" 2x 10            Sit to stands   2x10 hands on knees 2x10 hands on knees  2x10 hands on knees 2x10 hands on knees 2x 10 hands on knees.  2x10 hands on knees 2x 10 hands on knees.    Static lunges             Gait Training                                       Modalities                                                        "

## 2024-07-25 ENCOUNTER — OFFICE VISIT (OUTPATIENT)
Dept: PHYSICAL THERAPY | Facility: CLINIC | Age: 84
End: 2024-07-25
Payer: MEDICARE

## 2024-07-25 DIAGNOSIS — M17.11 PRIMARY OSTEOARTHRITIS OF RIGHT KNEE: Primary | ICD-10-CM

## 2024-07-25 DIAGNOSIS — Z96.651 STATUS POST TOTAL RIGHT KNEE REPLACEMENT: ICD-10-CM

## 2024-07-25 PROCEDURE — 97112 NEUROMUSCULAR REEDUCATION: CPT

## 2024-07-25 PROCEDURE — 97530 THERAPEUTIC ACTIVITIES: CPT

## 2024-07-25 PROCEDURE — 97110 THERAPEUTIC EXERCISES: CPT

## 2024-07-25 NOTE — PROGRESS NOTES
"Daily Note     Today's date: 2024  Patient name: Huyen Zhou  : 1940  MRN: [id number]  Referring provider: Rodger Altman*  Dx:   Encounter Diagnosis     ICD-10-CM    1. Primary osteoarthritis of right knee  M17.11       2. Status post total right knee replacement  Z96.651                      Subjective: Patient reports that her knees are really stiff and achy today because she has been doing a deep clean of her home over the past 3 days or so.       Objective: See treatment diary below      Assessment: Tolerated treatment well. Greatly challenged with mini static lunges and due to reports of increased knee pain, held completion of opposite side. She demonstrated excellent stability during forest step overs with use of 1 UE for support. No LOB noted. Patient demonstrated fatigue post treatment, exhibited good technique with therapeutic exercises, and would benefit from continued PT      Plan: Continue per plan of care.      Precautions: s/p L TKA 23, R TKA 24  Access Code: [email or web address]    POC expires Unit limit Auth  expiration date PT/OT + Visit Limit?   24 N/A N/A BOMN                 Visit/Unit Tracking  AUTH Status:  Date 6/13 6/17  6/20 6/25 7/1 7/5 7/9 7/11 7/15 7/19 7/22 7/25   BOMN Used 25 26 27 28 29 30 31 32 33 34 35 36    Remaining                     Manuals  7 7/9 7/11 7/15 7/19 7/22   R knee PROM with OP   BE  Flex only  BE Flex only BE Flex only BE Flex only NV  held today      Patellar mobilizations             Re-evaluation     BE                     Neuro Re-Ed             Rockerboard taps fwd/bwd, left/right  30x ea   30x ea          Forest step overs fwd and laterally 5 laps ea   3 laps ea   Reciprocally fwd 3 laps ea   Reciprocally fwd   4 laps ea 5 laps  5 laps ea 5 laps ea.    Tandem and lateral walking on foam    3 laps ea  3 laps ea  4 laps ea 5 laps  5 laps 5 laps    R SLS       3x15\"   Finger touch support 1x 30\" finger support.      Dynamic " "stepping onto foam pads off biodex foam pad 3 laps B/L     3 laps B/L                    Ther Ex             Rec bike for knee ROM    Seat 8  10 min fwd no tenstion   10 min fwd no tension  10 min fwd no tension  10 min fwd no tension  10 min fwd no tension  10 min fwd no tension  10 min no tension fwd.  10 min fwd no tension  10 min fwd no tenstion    Standing hip 3 ways B/L             Pt education                          Ther Activity             Fwd step ups   8 \" 10x up and over 8\" 2x10 up and over   8\" 2x10 up and over  8\" 2x10 up and over  8\" 2x 10 up and over.  8\" 2x10 up and over  8\" 3x 10 up and over.    Sit to stands 2x15 hands on knees   2x10 hands on knees 2x10 hands on knees  2x10 hands on knees 2x10 hands on knees 2x 10 hands on knees.  2x10 hands on knees 2x 10 hands on knees.    Static mini   lunges L knee fwd   x10            Gait Training                                       Modalities                                                          "

## 2024-07-26 NOTE — PROGRESS NOTES
"Daily Note     Today's date: 2024  Patient name: Huyen Zhou  : 1940  MRN: [id number]  Referring provider: Rodger Altman*  Dx:   Encounter Diagnosis     ICD-10-CM    1. Primary osteoarthritis of right knee  M17.11       2. Status post total right knee replacement  Z96.651                      Subjective: Patient reports that today is a terrible day for her as \"everything hurts\" including both knees and her back.        Objective: See treatment diary below      Assessment: Tolerated treatment well. Increased rest breaks needed this session due to patient's level of fatigue and soreness upon arrival. Was able to complete balance activities with finger touch support and minimal instabilities present. Patient demonstrated fatigue post treatment, exhibited good technique with therapeutic exercises, and would benefit from continued PT      Plan: Continue per plan of care.      Precautions: s/p L TKA 23, R TKA 24  Access Code: [email or web address]    POC expires Unit limit Auth  expiration date PT/OT + Visit Limit?   24 N/A N/A BOMN                 Visit/Unit Tracking  AUTH Status:  Date 7/29  6/20 6/25 7/1 7/5 7/9 7/11 7/15 7/19 7/22 7/25   BOMN Used 37  27 28 29 30 31 32 33 34 35 36    Remaining                     Manuals 7/25 7/29 6/25 7/1 7/5 7/9 7/11 7/15 7/19 7/22   R knee PROM with OP   BE  Flex only  BE Flex only BE Flex only BE Flex only NV  held today      Patellar mobilizations             Re-evaluation     BE                     Neuro Re-Ed             Rockerboard taps fwd/bwd, left/right  30x ea   30x ea          Chuck step overs fwd and laterally 5 laps ea  5 laps ea  3 laps ea   Reciprocally fwd 3 laps ea   Reciprocally fwd   4 laps ea 5 laps  5 laps ea 5 laps ea.    Tandem and lateral walking on foam  5 laps   3 laps ea  3 laps ea  4 laps ea 5 laps  5 laps 5 laps    R SLS  3x20\" finger touch B/L     3x15\"   Finger touch support 1x 30\" finger support.      Dynamic stepping onto foam " "pads off biodex foam pad 3 laps B/L 3 laps B/L    3 laps B/L                    Ther Ex             Rec bike for knee ROM    Seat 8  10 min fwd no tension  10 min fwd no tension  10 min fwd no tension  10 min fwd no tension  10 min fwd no tension  10 min fwd no tension  10 min fwd no tension  10 min no tension fwd.  10 min fwd no tension  10 min fwd no tension    Standing hip 3 ways B/L             Pt education                          Ther Activity             Fwd step ups   8 \" 10x up and over 8\" 2x10 up and over   8\" 2x10 up and over  8\" 2x10 up and over  8\" 2x 10 up and over.  8\" 2x10 up and over  8\" 3x 10 up and over.    Sit to stands 2x15 hands on knees  2x15 hands on knees  2x10 hands on knees 2x10 hands on knees  2x10 hands on knees 2x10 hands on knees 2x 10 hands on knees.  2x10 hands on knees 2x 10 hands on knees.    Static mini   lunges L knee fwd   x10            Gait Training                                       Modalities                                                            "

## 2024-07-29 ENCOUNTER — OFFICE VISIT (OUTPATIENT)
Dept: PHYSICAL THERAPY | Facility: CLINIC | Age: 84
End: 2024-07-29
Payer: MEDICARE

## 2024-07-29 DIAGNOSIS — M17.11 PRIMARY OSTEOARTHRITIS OF RIGHT KNEE: Primary | ICD-10-CM

## 2024-07-29 DIAGNOSIS — Z96.651 STATUS POST TOTAL RIGHT KNEE REPLACEMENT: ICD-10-CM

## 2024-07-29 PROCEDURE — 97110 THERAPEUTIC EXERCISES: CPT

## 2024-07-29 PROCEDURE — 97530 THERAPEUTIC ACTIVITIES: CPT

## 2024-07-29 PROCEDURE — 97112 NEUROMUSCULAR REEDUCATION: CPT

## 2024-08-01 ENCOUNTER — OFFICE VISIT (OUTPATIENT)
Dept: PHYSICAL THERAPY | Facility: CLINIC | Age: 84
End: 2024-08-01
Payer: MEDICARE

## 2024-08-01 DIAGNOSIS — Z96.651 STATUS POST TOTAL RIGHT KNEE REPLACEMENT: ICD-10-CM

## 2024-08-01 DIAGNOSIS — M17.11 PRIMARY OSTEOARTHRITIS OF RIGHT KNEE: Primary | ICD-10-CM

## 2024-08-01 DIAGNOSIS — Z96.651 STATUS POST TOTAL RIGHT KNEE REPLACEMENT USING CEMENT: ICD-10-CM

## 2024-08-01 PROCEDURE — 97530 THERAPEUTIC ACTIVITIES: CPT

## 2024-08-01 PROCEDURE — 97110 THERAPEUTIC EXERCISES: CPT

## 2024-08-01 PROCEDURE — 97112 NEUROMUSCULAR REEDUCATION: CPT

## 2024-08-01 NOTE — PROGRESS NOTES
"Daily Note     Today's date: 2024  Patient name: Huyen Zhou  : 1940  MRN: [id number]  Referring provider: Rodger Altman*  Dx:   Encounter Diagnosis     ICD-10-CM    1. Primary osteoarthritis of right knee  M17.11       2. Status post total right knee replacement using cement  Z96.651       3. Status post total right knee replacement  Z96.651           Start Time: 1114  Stop Time: 1152  Total time in clinic (min): 38 minutes    Subjective: pt noted no changes with her R knee upon arrival       Objective: See treatment diary below      Assessment:  Continued with treatment session with focus on R knee s/p TKA completed on 24. Tolerated treatment well. Patient demonstrated fatigue post treatment, exhibited good technique with therapeutic exercises, and would benefit from continued PT. Challenged with SLS with airex foam today. Noted LE fatigue.       Plan: Continue per plan of care.      Precautions: s/p L TKA 23, R TKA 24  Access Code: [email or web address]    POC expires Unit limit Auth  expiration date PT/OT + Visit Limit?   24 N/A N/A BOMN                 Visit/Unit Tracking  AUTH Status:  Date 7/29 8/1  6/25 7/1 7/5 7/9 7/11 7/15 7/19 7/22 7/25   BOMN Used 37 38  28 29 30 31 32 33 34 35 36    Remaining                     Manuals 7/25 7/29 8/1  7/5 7/9 7/11 7/15 7/19 7/22   R knee PROM with OP     BE Flex only BE Flex only BE Flex only NV  held today      Patellar mobilizations             Re-evaluation     BE                     Neuro Re-Ed             Rockerboard taps fwd/bwd, left/right  30x ea             Chuck step overs fwd and laterally 5 laps ea  5 laps ea  5laps     4 laps ea 5 laps  5 laps ea 5 laps ea.    Tandem and lateral walking on foam  5 laps  5 laps        3 laps ea  4 laps ea 5 laps  5 laps 5 laps    R SLS  3x20\" finger touch B/L  3x 30\"  finger touch with airex foam     3x15\"   Finger touch support 1x 30\" finger support.      Dynamic stepping onto foam pads off biodex " "foam pad 3 laps B/L 3 laps B/L NV    3 laps B/L                    Ther Ex             Rec bike for knee ROM    Seat 8  10 min fwd no tension  10 min fwd no tension  10 min fwd no tension.   10 min fwd no tension  10 min fwd no tension  10 min fwd no tension  10 min no tension fwd.  10 min fwd no tension  10 min fwd no tension    Standing hip 3 ways B/L             Pt education                          Ther Activity             Fwd step ups      8\" 2x10 up and over  8\" 2x10 up and over  8\" 2x 10 up and over.  8\" 2x10 up and over  8\" 3x 10 up and over.    Sit to stands 2x15 hands on knees  2x15 hands on knees  2x15 hands on knees    2x10 hands on knees 2x10 hands on knees 2x 10 hands on knees.  2x10 hands on knees 2x 10 hands on knees.    Static mini   lunges L knee fwd   x10            Gait Training                                       Modalities                                                              "

## 2024-08-02 NOTE — PROGRESS NOTES
"PT Discharge    Today's date: 2024  Patient name: Huyen Zhou  : 1940  MRN: [id number]  Referring provider: Rodger Altman*  Dx:   Encounter Diagnosis     ICD-10-CM    1. Primary osteoarthritis of right knee  M17.11       2. Status post total right knee replacement using cement  Z96.651                      Assessment  Symptom irritability: low    Assessment details: Huyen Zhou is a 83 y.o. female presenting to physical therapy for a reevaluation s/p R TKA on 24. Since their initial evaluation, she reports 80% improvement in her knee. The patient has demonstrated minimal to no improvements in strength and active and passive knee ROM from previous re-evaluation. She continues to be ambulatory without AD on all surfaces and reports improved functional mobility when completing tub transfers and stairs. 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 She does continue to report stiffness in her knees during extensive standing, walking and sitting for long periods of time. Discussed normal post operative recovery following TKA and stiffness which is normal following this. At this time, she is independent with completing her HEP, and is to be DC to a comprehensive HEP at this time.   Understanding of Dx/Px/POC: excellent     Prognosis: good    Goals  STG to be achieved in 4 weeks:   The patient will report a decrease in right knee \"at worst\" subjective pain rating score to at least 6/10 to allow for improved tolerance for weightbearing activities. MET  The patient will increase right knee flexion AROM to at least 90 degrees to allow for improved functional mobility. MET  The patient will increase right knee extension MMT score to at least 4/5 to allow for improved functional mobility. MET    LTG to be achieved by DC:  The patient will be independent in comprehensive HEP. MET  The patient will report a decrease in right knee \"at worst\" " subjective pain rating score to at least 3/10 to allow for improved tolerance  for functional mobility. NOT MET  The patient will improve right knee flexion and extension AROM to WFL to allow for improved functional mobility. MET  The patient will increase right knee flexion and extension MMT score to WFL to allow for improved functional mobility. MET  The patient will be able to complete one full flight of stairs to her basement for completion of laundry with minimal to no difficulty. MET  The patient will be able to complete household chores and care for her son with minimal to no difficulties due to knee pain.  MET      Plan    Plan of Care beginning date: 2024  Plan of Care expiration date: 2024  Treatment plan discussed with: patient        Subjective Evaluation    History of Present Illness  Mechanism of injury: Huyen reports 80% improvement since beginning PT services. She says that her walking is doing much better and is ambulatory without any AD, regardless of the surface she is walking on. She is able to ascend reciprocally with use of a HR and when descending she feels more comfortable if she does them non-reciprocally. She does still use the shower chair when getting in/out of the shower because of not wanting to slip on the wet shower floor. She does think that bending her to knees to get in/out when sitting on the shower chair is better. She notes that she continues to experience stiffness/tightness in the right > left knee especially after sitting for a period of time.   Patient Goals  Patient goals for therapy: decreased pain and increased strength  Patient goal: to get moving and walking better  Pain  Current pain ratin  At best pain ratin  At worst pain ratin  Location: bilateral knees  Quality: tight  Relieving factors: medications, change in position and relaxation  Aggravating factors: standing, walking and stair climbing  Progression: improved    Social Support  Steps to  enter house: yes  Stairs in house: yes (to basement)   Lives in: one-story house  Lives with: spouse and adult children    Employment status: not working    Diagnostic Tests  X-ray: abnormal  Treatments  Previous treatment: medication and physical therapy  Current treatment: physical therapy        Objective     Observations     Additional Observation Details  R TKA incision well approximated     Active Range of Motion   Left Knee   Flexion: 115 degrees   Extensor la degrees     Right Knee   Flexion: 109 degrees   Extensor la degrees     Passive Range of Motion   Left Knee   Normal passive range of motion    Right Knee   Flexion: 113 degrees   Extension: 0 degrees     Mobility   Patellar Mobility:   Left Knee   Hypomobile: left medial, left lateral, left superior and left inferior    Right Knee   Hypomobile: medial, lateral, superior and inferior     Strength/Myotome Testing     Left Hip   Planes of Motion   Flexion: 4+  Abduction: 4+    Right Hip   Planes of Motion   Flexion: 4+  Abduction: 4+    Left Knee   Flexion: 4+  Extension: 4+  Quadriceps contraction: good    Right Knee   Flexion: 4+  Extension: 4+  Quadriceps contraction: good    Ambulation   Weight-Bearing Status   Assistive device used: none    Ambulation: Level Surfaces   Ambulation without assistive device: independent    Observational Gait   Gait: within functional limits     Additional Observational Gait Details  Increased lateral weight shifting bilaterally    Functional Assessment        Comments  Re-evaluation 24  5 STS-22.37 seconds No UE for push off from chair, UE resting on knees, even foot placement  TUG- 10.88 seconds no UEs for push off from chair, lateral weight shifting bilaterally to stance foot, no AD usage    Re-evaluation 6/3/24  5 STS-31.86 seconds No UE for push off from chair, UE resting on knees, even foot placement  TUG- 16.46 seconds no UEs for push off from chair, lateral weight shifting bilaterally to stance foot,  "no AD usage      Re-evaluation 5/3/24  5 STS- 33.27 seconds B/L UE for push off and eccentric control to chair, good knee flexion to chair, however RLE placed slightly forward compared to LLE  TUG- 24.41 seconds B/L UE for push off and eccentric control to chair, No AD, mild instabilities with lateral weight shifting bilaterally  TUG- 20.36 seconds B/L UE for push off and eccentric control to chair, step through gait pattern with use of RW      Re-evaluation 4/8/24  5 STS- 55.01 seconds B/L UE for push off and eccentric control to chair, good knee flexion to chair, however RLE placed slightly forward compared to LLE  TUG- 38.77 seconds B/L UE for push off and eccentric control to chair, step to with occasional step through gait pattern with use of RW                   Precautions: s/p L TKA 11/9/23, R TKA 4/4/24  Access Code: [email or web address]    POC expires Unit limit Auth  expiration date PT/OT + Visit Limit?   8/16/24 N/A N/A BOMN                 Visit/Unit Tracking  AUTH Status:  Date 7/29 8/1 8/5   7/5 7/9 7/11 7/15 7/19 7/22 7/25   BOMN Used 37 38 39   30 31 32 33 34 35 36    Remaining                     Manuals 7/25 7/29 8/1 8/5  7/9 7/11 7/15 7/19 7/22   R knee PROM with OP      BE Flex only BE Flex only NV  held today      Patellar mobilizations             Re-evaluation    BE                      Neuro Re-Ed             Rockerboard taps fwd/bwd, left/right  30x ea             Chuck step overs fwd and laterally 5 laps ea  5 laps ea  5laps     4 laps ea 5 laps  5 laps ea 5 laps ea.    Tandem and lateral walking on foam  5 laps  5 laps        3 laps ea  4 laps ea 5 laps  5 laps 5 laps    R SLS  3x20\" finger touch B/L  3x 30\"  finger touch with airex foam     3x15\"   Finger touch support 1x 30\" finger support.      Dynamic stepping onto foam pads off biodex foam pad 3 laps B/L 3 laps B/L NV    3 laps B/L                    Ther Ex             Rec bike for knee ROM    Seat 8  10 min fwd no tension  10 min fwd no " "tension  10 min fwd no tension.  10 min fwd no tension  10 min fwd no tension  10 min fwd no tension  10 min no tension fwd.  10 min fwd no tension  10 min fwd no tension    Standing hip 3 ways B/L             Pt education    HEP review, post op status                       Ther Activity             Fwd step ups      8\" 2x10 up and over  8\" 2x10 up and over  8\" 2x 10 up and over.  8\" 2x10 up and over  8\" 3x 10 up and over.    Sit to stands 2x15 hands on knees  2x15 hands on knees  2x15 hands on knees    2x10 hands on knees 2x10 hands on knees 2x 10 hands on knees.  2x10 hands on knees 2x 10 hands on knees.    Static mini   lunges L knee fwd   x10            Gait Training                                       Modalities                                                  "

## 2024-08-05 ENCOUNTER — EVALUATION (OUTPATIENT)
Dept: PHYSICAL THERAPY | Facility: CLINIC | Age: 84
End: 2024-08-05
Payer: MEDICARE

## 2024-08-05 DIAGNOSIS — M17.11 PRIMARY OSTEOARTHRITIS OF RIGHT KNEE: Primary | ICD-10-CM

## 2024-08-05 DIAGNOSIS — Z96.651 STATUS POST TOTAL RIGHT KNEE REPLACEMENT USING CEMENT: ICD-10-CM

## 2024-08-05 PROCEDURE — 97140 MANUAL THERAPY 1/> REGIONS: CPT

## 2024-08-05 PROCEDURE — 97110 THERAPEUTIC EXERCISES: CPT

## 2024-08-08 ENCOUNTER — APPOINTMENT (OUTPATIENT)
Dept: PHYSICAL THERAPY | Facility: CLINIC | Age: 84
End: 2024-08-08
Payer: MEDICARE

## 2024-08-21 DIAGNOSIS — E03.9 HYPOTHYROIDISM, UNSPECIFIED TYPE: ICD-10-CM

## 2024-08-21 RX ORDER — LEVOTHYROXINE SODIUM 100 UG/1
100 TABLET ORAL DAILY
Qty: 90 TABLET | Refills: 1 | Status: SHIPPED | OUTPATIENT
Start: 2024-08-21

## 2024-09-17 DIAGNOSIS — Z79.2 PROPHYLACTIC ANTIBIOTIC: Primary | ICD-10-CM

## 2024-09-17 RX ORDER — AMOXICILLIN 500 MG/1
2000 TABLET, FILM COATED ORAL ONCE
Qty: 4 TABLET | Refills: 0 | Status: SHIPPED | OUTPATIENT
Start: 2024-09-17 | End: 2024-09-17

## 2024-09-19 ENCOUNTER — RA CDI HCC (OUTPATIENT)
Dept: OTHER | Facility: HOSPITAL | Age: 84
End: 2024-09-19

## 2024-09-24 ENCOUNTER — APPOINTMENT (OUTPATIENT)
Dept: LAB | Facility: CLINIC | Age: 84
End: 2024-09-24
Payer: MEDICARE

## 2024-09-24 DIAGNOSIS — E03.9 HYPOTHYROIDISM, UNSPECIFIED TYPE: ICD-10-CM

## 2024-09-24 DIAGNOSIS — R73.03 PREDIABETES: ICD-10-CM

## 2024-09-24 DIAGNOSIS — R53.83 OTHER FATIGUE: ICD-10-CM

## 2024-09-24 DIAGNOSIS — N18.31 STAGE 3A CHRONIC KIDNEY DISEASE (HCC): ICD-10-CM

## 2024-09-24 DIAGNOSIS — E78.2 MIXED HYPERLIPIDEMIA: ICD-10-CM

## 2024-09-24 DIAGNOSIS — E55.9 VITAMIN D DEFICIENCY: ICD-10-CM

## 2024-09-24 LAB
25(OH)D3 SERPL-MCNC: 24.5 NG/ML (ref 30–100)
ALBUMIN SERPL BCG-MCNC: 4 G/DL (ref 3.5–5)
ALP SERPL-CCNC: 72 U/L (ref 34–104)
ALT SERPL W P-5'-P-CCNC: 8 U/L (ref 7–52)
ANION GAP SERPL CALCULATED.3IONS-SCNC: 10 MMOL/L (ref 4–13)
AST SERPL W P-5'-P-CCNC: 14 U/L (ref 13–39)
BASOPHILS # BLD AUTO: 0.05 THOUSANDS/ΜL (ref 0–0.1)
BASOPHILS NFR BLD AUTO: 1 % (ref 0–1)
BILIRUB SERPL-MCNC: 0.55 MG/DL (ref 0.2–1)
BUN SERPL-MCNC: 22 MG/DL (ref 5–25)
CALCIUM SERPL-MCNC: 9.2 MG/DL (ref 8.4–10.2)
CHLORIDE SERPL-SCNC: 102 MMOL/L (ref 96–108)
CHOLEST SERPL-MCNC: 261 MG/DL
CO2 SERPL-SCNC: 25 MMOL/L (ref 21–32)
CREAT SERPL-MCNC: 0.97 MG/DL (ref 0.6–1.3)
EOSINOPHIL # BLD AUTO: 0.14 THOUSAND/ΜL (ref 0–0.61)
EOSINOPHIL NFR BLD AUTO: 2 % (ref 0–6)
ERYTHROCYTE [DISTWIDTH] IN BLOOD BY AUTOMATED COUNT: 14 % (ref 11.6–15.1)
EST. AVERAGE GLUCOSE BLD GHB EST-MCNC: 123 MG/DL
GFR SERPL CREATININE-BSD FRML MDRD: 54 ML/MIN/1.73SQ M
GLUCOSE P FAST SERPL-MCNC: 94 MG/DL (ref 65–99)
HBA1C MFR BLD: 5.9 %
HCT VFR BLD AUTO: 39.8 % (ref 34.8–46.1)
HDLC SERPL-MCNC: 51 MG/DL
HGB BLD-MCNC: 12.5 G/DL (ref 11.5–15.4)
IMM GRANULOCYTES # BLD AUTO: 0.04 THOUSAND/UL (ref 0–0.2)
IMM GRANULOCYTES NFR BLD AUTO: 1 % (ref 0–2)
LDLC SERPL CALC-MCNC: 182 MG/DL (ref 0–100)
LYMPHOCYTES # BLD AUTO: 1.9 THOUSANDS/ΜL (ref 0.6–4.47)
LYMPHOCYTES NFR BLD AUTO: 25 % (ref 14–44)
MCH RBC QN AUTO: 31.1 PG (ref 26.8–34.3)
MCHC RBC AUTO-ENTMCNC: 31.4 G/DL (ref 31.4–37.4)
MCV RBC AUTO: 99 FL (ref 82–98)
MONOCYTES # BLD AUTO: 0.56 THOUSAND/ΜL (ref 0.17–1.22)
MONOCYTES NFR BLD AUTO: 7 % (ref 4–12)
NEUTROPHILS # BLD AUTO: 4.86 THOUSANDS/ΜL (ref 1.85–7.62)
NEUTS SEG NFR BLD AUTO: 64 % (ref 43–75)
NRBC BLD AUTO-RTO: 0 /100 WBCS
PLATELET # BLD AUTO: 268 THOUSANDS/UL (ref 149–390)
PMV BLD AUTO: 12.3 FL (ref 8.9–12.7)
POTASSIUM SERPL-SCNC: 4 MMOL/L (ref 3.5–5.3)
PROT SERPL-MCNC: 7.3 G/DL (ref 6.4–8.4)
RBC # BLD AUTO: 4.02 MILLION/UL (ref 3.81–5.12)
SODIUM SERPL-SCNC: 137 MMOL/L (ref 135–147)
TRIGL SERPL-MCNC: 141 MG/DL
TSH SERPL DL<=0.05 MIU/L-ACNC: 1.55 UIU/ML (ref 0.45–4.5)
WBC # BLD AUTO: 7.55 THOUSAND/UL (ref 4.31–10.16)

## 2024-09-24 PROCEDURE — 85025 COMPLETE CBC W/AUTO DIFF WBC: CPT

## 2024-09-24 PROCEDURE — 80061 LIPID PANEL: CPT

## 2024-09-24 PROCEDURE — 83036 HEMOGLOBIN GLYCOSYLATED A1C: CPT

## 2024-09-24 PROCEDURE — 84443 ASSAY THYROID STIM HORMONE: CPT

## 2024-09-24 PROCEDURE — 80053 COMPREHEN METABOLIC PANEL: CPT

## 2024-09-24 PROCEDURE — 36415 COLL VENOUS BLD VENIPUNCTURE: CPT

## 2024-09-24 PROCEDURE — 82306 VITAMIN D 25 HYDROXY: CPT

## 2024-09-26 ENCOUNTER — OFFICE VISIT (OUTPATIENT)
Dept: FAMILY MEDICINE CLINIC | Facility: CLINIC | Age: 84
End: 2024-09-26
Payer: MEDICARE

## 2024-09-26 VITALS
HEIGHT: 60 IN | HEART RATE: 62 BPM | BODY MASS INDEX: 33.34 KG/M2 | DIASTOLIC BLOOD PRESSURE: 72 MMHG | OXYGEN SATURATION: 99 % | SYSTOLIC BLOOD PRESSURE: 128 MMHG | WEIGHT: 169.8 LBS | TEMPERATURE: 98.5 F

## 2024-09-26 DIAGNOSIS — E03.9 HYPOTHYROIDISM, UNSPECIFIED TYPE: ICD-10-CM

## 2024-09-26 DIAGNOSIS — Z78.0 POSTMENOPAUSAL ESTROGEN DEFICIENCY: ICD-10-CM

## 2024-09-26 DIAGNOSIS — Z00.00 MEDICARE ANNUAL WELLNESS VISIT, SUBSEQUENT: ICD-10-CM

## 2024-09-26 DIAGNOSIS — N18.31 STAGE 3A CHRONIC KIDNEY DISEASE (HCC): ICD-10-CM

## 2024-09-26 DIAGNOSIS — Z23 ENCOUNTER FOR IMMUNIZATION: ICD-10-CM

## 2024-09-26 DIAGNOSIS — I87.2 VENOUS INSUFFICIENCY: ICD-10-CM

## 2024-09-26 DIAGNOSIS — R73.03 PREDIABETES: Primary | ICD-10-CM

## 2024-09-26 DIAGNOSIS — E55.9 VITAMIN D DEFICIENCY: ICD-10-CM

## 2024-09-26 DIAGNOSIS — E78.2 MIXED HYPERLIPIDEMIA: ICD-10-CM

## 2024-09-26 PROBLEM — M17.11 ARTHRITIS OF RIGHT KNEE: Status: RESOLVED | Noted: 2019-07-01 | Resolved: 2024-09-26

## 2024-09-26 PROBLEM — M17.10 ARTHRITIS OF KNEE: Status: RESOLVED | Noted: 2018-11-08 | Resolved: 2024-09-26

## 2024-09-26 PROCEDURE — 99214 OFFICE O/P EST MOD 30 MIN: CPT | Performed by: FAMILY MEDICINE

## 2024-09-26 PROCEDURE — G0008 ADMIN INFLUENZA VIRUS VAC: HCPCS

## 2024-09-26 PROCEDURE — 90662 IIV NO PRSV INCREASED AG IM: CPT

## 2024-09-26 PROCEDURE — G0439 PPPS, SUBSEQ VISIT: HCPCS | Performed by: FAMILY MEDICINE

## 2024-09-26 NOTE — ASSESSMENT & PLAN NOTE
Lab Results   Component Value Date    EGFR 54 09/24/2024    EGFR 57 03/12/2024    EGFR 57 03/12/2024    CREATININE 0.97 09/24/2024    CREATININE 0.94 03/12/2024    CREATININE 0.93 03/12/2024   Stable CKD - monitor labs    Orders:    Comprehensive metabolic panel; Future    CBC and differential; Future

## 2024-09-26 NOTE — ASSESSMENT & PLAN NOTE
On statin, continue   Repeat labs 6 months    Orders:    Lipid Panel with Direct LDL reflex; Future

## 2024-09-26 NOTE — ASSESSMENT & PLAN NOTE
Vit D is low - start 2000 IU daily- will repeat labs 6 months   Orders:    Vitamin D 25 hydroxy; Future

## 2024-09-26 NOTE — PATIENT INSTRUCTIONS
Medicare Preventive Visit Patient Instructions  Thank you for completing your Welcome to Medicare Visit or Medicare Annual Wellness Visit today. Your next wellness visit will be due in one year (9/27/2025).  The screening/preventive services that you may require over the next 5-10 years are detailed below. Some tests may not apply to you based off risk factors and/or age. Screening tests ordered at today's visit but not completed yet may show as past due. Also, please note that scanned in results may not display below.  Preventive Screenings:  Service Recommendations Previous Testing/Comments   Colorectal Cancer Screening  * Colonoscopy    * Fecal Occult Blood Test (FOBT)/Fecal Immunochemical Test (FIT)  * Fecal DNA/Cologuard Test  * Flexible Sigmoidoscopy Age: 45-75 years old   Colonoscopy: every 10 years (may be performed more frequently if at higher risk)  OR  FOBT/FIT: every 1 year  OR  Cologuard: every 3 years  OR  Sigmoidoscopy: every 5 years  Screening may be recommended earlier than age 45 if at higher risk for colorectal cancer. Also, an individualized decision between you and your healthcare provider will decide whether screening between the ages of 76-85 would be appropriate. Colonoscopy: Not on file  FOBT/FIT: Not on file  Cologuard: Not on file  Sigmoidoscopy: Not on file          Breast Cancer Screening Age: 40+ years old  Frequency: every 1-2 years  Not required if history of left and right mastectomy Mammogram: 09/07/2023    Screening Current   Cervical Cancer Screening Between the ages of 21-29, pap smear recommended once every 3 years.   Between the ages of 30-65, can perform pap smear with HPV co-testing every 5 years.   Recommendations may differ for women with a history of total hysterectomy, cervical cancer, or abnormal pap smears in past. Pap Smear: Not on file    Screening Not Indicated   Hepatitis C Screening Once for adults born between 1945 and 1965  More frequently in patients at high  risk for Hepatitis C Hep C Antibody: Not on file        Diabetes Screening 1-2 times per year if you're at risk for diabetes or have pre-diabetes Fasting glucose: 94 mg/dL (9/24/2024)  A1C: 5.9 % (9/24/2024)  Screening Current   Cholesterol Screening Once every 5 years if you don't have a lipid disorder. May order more often based on risk factors. Lipid panel: 09/24/2024    Screening Not Indicated  History Lipid Disorder     Other Preventive Screenings Covered by Medicare:  Abdominal Aortic Aneurysm (AAA) Screening: covered once if your at risk. You're considered to be at risk if you have a family history of AAA.  Lung Cancer Screening: covers low dose CT scan once per year if you meet all of the following conditions: (1) Age 55-77; (2) No signs or symptoms of lung cancer; (3) Current smoker or have quit smoking within the last 15 years; (4) You have a tobacco smoking history of at least 20 pack years (packs per day multiplied by number of years you smoked); (5) You get a written order from a healthcare provider.  Glaucoma Screening: covered annually if you're considered high risk: (1) You have diabetes OR (2) Family history of glaucoma OR (3)  aged 50 and older OR (4)  American aged 65 and older  Osteoporosis Screening: covered every 2 years if you meet one of the following conditions: (1) You're estrogen deficient and at risk for osteoporosis based off medical history and other findings; (2) Have a vertebral abnormality; (3) On glucocorticoid therapy for more than 3 months; (4) Have primary hyperparathyroidism; (5) On osteoporosis medications and need to assess response to drug therapy.   Last bone density test (DXA Scan): 10/21/2022.  HIV Screening: covered annually if you're between the age of 15-65. Also covered annually if you are younger than 15 and older than 65 with risk factors for HIV infection. For pregnant patients, it is covered up to 3 times per  pregnancy.    Immunizations:  Immunization Recommendations   Influenza Vaccine Annual influenza vaccination during flu season is recommended for all persons aged >= 6 months who do not have contraindications   Pneumococcal Vaccine   * Pneumococcal conjugate vaccine = PCV13 (Prevnar 13), PCV15 (Vaxneuvance), PCV20 (Prevnar 20)  * Pneumococcal polysaccharide vaccine = PPSV23 (Pneumovax) Adults 19-63 yo with certain risk factors or if 65+ yo  If never received any pneumonia vaccine: recommend Prevnar 20 (PCV20)  Give PCV20 if previously received 1 dose of PCV13 or PPSV23   Hepatitis B Vaccine 3 dose series if at intermediate or high risk (ex: diabetes, end stage renal disease, liver disease)   Respiratory syncytial virus (RSV) Vaccine - COVERED BY MEDICARE PART D  * RSVPreF3 (Arexvy) CDC recommends that adults 60 years of age and older may receive a single dose of RSV vaccine using shared clinical decision-making (SCDM)   Tetanus (Td) Vaccine - COST NOT COVERED BY MEDICARE PART B Following completion of primary series, a booster dose should be given every 10 years to maintain immunity against tetanus. Td may also be given as tetanus wound prophylaxis.   Tdap Vaccine - COST NOT COVERED BY MEDICARE PART B Recommended at least once for all adults. For pregnant patients, recommended with each pregnancy.   Shingles Vaccine (Shingrix) - COST NOT COVERED BY MEDICARE PART B  2 shot series recommended in those 19 years and older who have or will have weakened immune systems or those 50 years and older     Health Maintenance Due:  There are no preventive care reminders to display for this patient.  Immunizations Due:      Topic Date Due   • COVID-19 Vaccine (6 - 2023-24 season) 09/01/2024   • Influenza Vaccine (1) 09/01/2024     Advance Directives   What are advance directives?  Advance directives are legal documents that state your wishes and plans for medical care. These plans are made ahead of time in case you lose your  ability to make decisions for yourself. Advance directives can apply to any medical decision, such as the treatments you want, and if you want to donate organs.   What are the types of advance directives?  There are many types of advance directives, and each state has rules about how to use them. You may choose a combination of any of the following:  Living will:  This is a written record of the treatment you want. You can also choose which treatments you do not want, which to limit, and which to stop at a certain time. This includes surgery, medicine, IV fluid, and tube feedings.   Durable power of  for healthcare (DPAHC):  This is a written record that states who you want to make healthcare choices for you when you are unable to make them for yourself. This person, called a proxy, is usually a family member or a friend. You may choose more than 1 proxy.  Do not resuscitate (DNR) order:  A DNR order is used in case your heart stops beating or you stop breathing. It is a request not to have certain forms of treatment, such as CPR. A DNR order may be included in other types of advance directives.  Medical directive:  This covers the care that you want if you are in a coma, near death, or unable to make decisions for yourself. You can list the treatments you want for each condition. Treatment may include pain medicine, surgery, blood transfusions, dialysis, IV or tube feedings, and a ventilator (breathing machine).  Values history:  This document has questions about your views, beliefs, and how you feel and think about life. This information can help others choose the care that you would choose.  Why are advance directives important?  An advance directive helps you control your care. Although spoken wishes may be used, it is better to have your wishes written down. Spoken wishes can be misunderstood, or not followed. Treatments may be given even if you do not want them. An advance directive may make it easier  for your family to make difficult choices about your care.   Urinary Incontinence   Urinary incontinence (UI)  is when you lose control of your bladder. UI develops because your bladder cannot store or empty urine properly. The 3 most common types of UI are stress incontinence, urge incontinence, or both.  Medicines:   May be given to help strengthen your bladder control. Report any side effects of medication to your healthcare provider.  Do pelvic muscle exercises often:  Your pelvic muscles help you stop urinating. Squeeze these muscles tight for 5 seconds, then relax for 5 seconds. Gradually work up to squeezing for 10 seconds. Do 3 sets of 15 repetitions a day, or as directed. This will help strengthen your pelvic muscles and improve bladder control.  Train your bladder:  Go to the bathroom at set times, such as every 2 hours, even if you do not feel the urge to go. You can also try to hold your urine when you feel the urge to go. For example, hold your urine for 5 minutes when you feel the urge to go. As that becomes easier, hold your urine for 10 minutes.   Self-care:   Keep a UI record.  Write down how often you leak urine and how much you leak. Make a note of what you were doing when you leaked urine.  Drink liquids as directed. You may need to limit the amount of liquid you drink to help control your urine leakage. Do not drink any liquid right before you go to bed. Limit or do not have drinks that contain caffeine or alcohol.   Prevent constipation.  Eat a variety of high-fiber foods. Good examples are high-fiber cereals, beans, vegetables, and whole-grain breads. Walking is the best way to trigger your intestines to have a bowel movement.  Exercise regularly and maintain a healthy weight.  Weight loss and exercise will decrease pressure on your bladder and help you control your leakage.   Use a catheter as directed  to help empty your bladder. A catheter is a tiny, plastic tube that is put into your  bladder to drain your urine.   Go to behavior therapy as directed.  Behavior therapy may be used to help you learn to control your urge to urinate.    Weight Management   Why it is important to manage your weight:  Being overweight increases your risk of health conditions such as heart disease, high blood pressure, type 2 diabetes, and certain types of cancer. It can also increase your risk for osteoarthritis, sleep apnea, and other respiratory problems. Aim for a slow, steady weight loss. Even a small amount of weight loss can lower your risk of health problems.  How to lose weight safely:  A safe and healthy way to lose weight is to eat fewer calories and get regular exercise. You can lose up about 1 pound a week by decreasing the number of calories you eat by 500 calories each day.   Healthy meal plan for weight management:  A healthy meal plan includes a variety of foods, contains fewer calories, and helps you stay healthy. A healthy meal plan includes the following:  Eat whole-grain foods more often.  A healthy meal plan should contain fiber. Fiber is the part of grains, fruits, and vegetables that is not broken down by your body. Whole-grain foods are healthy and provide extra fiber in your diet. Some examples of whole-grain foods are whole-wheat breads and pastas, oatmeal, brown rice, and bulgur.  Eat a variety of vegetables every day.  Include dark, leafy greens such as spinach, kale, delmi greens, and mustard greens. Eat yellow and orange vegetables such as carrots, sweet potatoes, and winter squash.   Eat a variety of fruits every day.  Choose fresh or canned fruit (canned in its own juice or light syrup) instead of juice. Fruit juice has very little or no fiber.  Eat low-fat dairy foods.  Drink fat-free (skim) milk or 1% milk. Eat fat-free yogurt and low-fat cottage cheese. Try low-fat cheeses such as mozzarella and other reduced-fat cheeses.  Choose meat and other protein foods that are low in fat.   Choose beans or other legumes such as split peas or lentils. Choose fish, skinless poultry (chicken or turkey), or lean cuts of red meat (beef or pork). Before you cook meat or poultry, cut off any visible fat.   Use less fat and oil.  Try baking foods instead of frying them. Add less fat, such as margarine, sour cream, regular salad dressing and mayonnaise to foods. Eat fewer high-fat foods. Some examples of high-fat foods include french fries, doughnuts, ice cream, and cakes.  Eat fewer sweets.  Limit foods and drinks that are high in sugar. This includes candy, cookies, regular soda, and sweetened drinks.  Exercise:  Exercise at least 30 minutes per day on most days of the week. Some examples of exercise include walking, biking, dancing, and swimming. You can also fit in more physical activity by taking the stairs instead of the elevator or parking farther away from stores. Ask your healthcare provider about the best exercise plan for you.   Narcotic (Opioid) Safety    Use narcotics safely:  Take prescribed narcotics exactly as directed  Do not give narcotics to others or take narcotics that belong to someone else  Do not mix narcotics without medicines or alcohol  Do not drive or operate heavy machinery after you take the narcotic  Monitor for side effects and notify your healthcare provider if you experienced side effects such as nausea, sleepiness, itching, or trouble thinking clearly.    Manage constipation:    Constipation is the most common side effect of narcotic medicine. Constipation is when you have hard, dry bowel movements, or you go longer than usual between bowel movements. Tell your healthcare provider about all changes in your bowel movements while you are taking narcotics. He or she may recommend laxative medicine to help you have a bowel movement. He or she may also change the kind of narcotic you are taking, or change when you take it. The following are more ways you can prevent or relieve  constipation:    Drink liquids as directed.  You may need to drink extra liquids to help soften and move your bowels. Ask how much liquid to drink each day and which liquids are best for you.  Eat high-fiber foods.  This may help decrease constipation by adding bulk to your bowel movements. High-fiber foods include fruits, vegetables, whole-grain breads and cereals, and beans. Your healthcare provider or dietitian can help you create a high-fiber meal plan. Your provider may also recommend a fiber supplement if you cannot get enough fiber from food.  Exercise regularly.  Regular physical activity can help stimulate your intestines. Walking is a good exercise to prevent or relieve constipation. Ask which exercises are best for you.  Schedule a time each day to have a bowel movement.  This may help train your body to have regular bowel movements. Bend forward while you are on the toilet to help move the bowel movement out. Sit on the toilet for at least 10 minutes, even if you do not have a bowel movement.    Store narcotics safely:   Store narcotics where others cannot easily get them.  Keep them in a locked cabinet or secure area. Do not  keep them in a purse or other bag you carry with you. A person may be looking for something else and find the narcotics.  Make sure narcotics are stored out of the reach of children.  A child can easily overdose on narcotics. Narcotics may look like candy to a small child.    The best way to dispose of narcotics:      The laws vary by country and area. In the [place], the best way is to return the narcotics through a take-back program. This program is offered by the [place] Drug Enforcement Agency (MILE). The following are options for using the program:  Take the narcotics to a MILE collection site.  The site is often a law enforcement center. Call your local law enforcement center for scheduled take-back days in your area. You will be given information on where to go if the  collection site is in a different location.  Take the narcotics to an approved pharmacy or hospital.  A pharmacy or hospital may be set up as a collection site. You will need to ask if it is a MILE collection site if you were not directed there. A pharmacy or doctor's office may not be able to take back narcotics unless it is a MILE site.  Use a mail-back system.  This means you are given containers to put the narcotics into. You will then mail them in the containers.  Use a take-back drop box.  This is a place to leave the narcotics at any time. People and animals will not be able to get into the box. Your local law enforcement agency can tell you where to find a drop box in your area.    Other ways to manage pain:   Ask your healthcare provider about non-narcotic medicines to control pain.  Nonprescription medicines include NSAIDs (such as ibuprofen) and acetaminophen. Prescription medicines include muscle relaxers, antidepressants, and steroids.  Pain may be managed without any medicines.  Some ways to relieve pain include massage, aromatherapy, or meditation. Physical or occupational therapy may also help.    For more information:   Drug Enforcement Administration  [address] [place] [address]  Phone: 1- 420 - 863-2336  Web Address: https://www.deadiversion.[facility].gov/drug_disposal/    [place] Food and Drug Administration  [address] [place] [address]  Phone: 3- 949 - 110-1541  Web Address: http://www.fda.gov     © Copyright [employer] 2018 Information is for End User's use only and may not be sold, redistributed or otherwise used for commercial purposes. All illustrations and images included in CareNotes® are the copyrighted property of [employer] or [employer]

## 2024-09-26 NOTE — PROGRESS NOTES
Ambulatory Visit  Name: Huyen Zhou      : 1940      MRN: [id number]  Encounter Provider: Kika Sheridan MD  Encounter Date: 2024   Encounter department: [facility]    Assessment & Plan  Prediabetes  Stable for years. Will monitor A1c  Low carb diet.   Orders:    Hemoglobin A1C; Future    Stage 3a chronic kidney disease (HCC)  Lab Results   Component Value Date    EGFR 54 2024    EGFR 57 2024    EGFR 57 2024    CREATININE 0.97 2024    CREATININE 0.94 2024    CREATININE 0.93 2024   Stable CKD - monitor labs    Orders:    Comprehensive metabolic panel; Future    CBC and differential; Future    Medicare annual wellness visit, subsequent         Vitamin D deficiency  Vit D is low - start 2000 IU daily- will repeat labs 6 months   Orders:    Vitamin D 25 hydroxy; Future    Mixed hyperlipidemia  On statin, continue   Repeat labs 6 months    Orders:    Lipid Panel with Direct LDL reflex; Future    Postmenopausal estrogen deficiency    Orders:    DXA bone density spine hip and pelvis; Future    Venous insufficiency  Advised compression & elevation        Hypothyroidism, unspecified type  TSH in range, continue same dose 100 mcg   Orders:    TSH, 3rd generation; Future    Encounter for immunization    Orders:    influenza vaccine, high-dose, PF 0.5 mL (Fluzone High Dose)      Depression Screening and Follow-up Plan: Patient was screened for depression during today's encounter. They screened negative with a PHQ-2 score of 0.    Urinary Incontinence Plan of Care: counseling topics discussed: limiting fluid intake 3-4 hours before bed.       Preventive health issues were discussed with patient, and age appropriate screening tests were ordered as noted in patient's After Visit Summary. Personalized health advice and appropriate referrals for health education or preventive services given if needed, as noted in patient's After Visit Summary.    History of  Present Illness     She is here w/ her daughter.  Since our last office visit she had a knee replacement. She has noticed swelling in her ankles. This started years ago.     She had labs done:  Vit D is low-was off Vitamin D - just restarted 2000 IU daily   Lipids- elevated - on Pravastatin 60 mg. Unable to tolerate other statins.  A1c 5.9% stable prediabetes.        Patient Care Team:  Kika Sheridan MD as PCP - General    Review of Systems   Constitutional:  Negative for activity change.   HENT:  Positive for hearing loss.    Respiratory:  Negative for chest tightness and shortness of breath.    Cardiovascular:  Positive for leg swelling. Negative for chest pain.   Neurological:  Negative for headaches.   Psychiatric/Behavioral:  Negative for dysphoric mood. The patient is not nervous/anxious.      Medical History Reviewed by provider this encounter:       Annual Wellness Visit Questionnaire   Huyen is here for her Subsequent Wellness visit. Last Medicare Wellness visit information reviewed, patient interviewed and updates made to the record today.      Health Risk Assessment:   Patient rates overall health as good. Patient feels that their physical health rating is slightly worse. Patient is satisfied with their life. Eyesight was rated as slightly worse. Hearing was rated as much worse. Patient feels that their emotional and mental health rating is same. Patients states they are never, rarely angry. Patient states they are sometimes unusually tired/fatigued. Pain experienced in the last 7 days has been none. Patient states that she has experienced no weight loss or gain in last 6 months.     Depression Screening:   PHQ-2 Score: 0      Fall Risk Screening:   In the past year, patient has experienced: no history of falling in past year      Urinary Incontinence Screening:   Patient has leaked urine accidently in the last six months. Patient wear pads for this     Home Safety:  Patient does not have trouble  with stairs inside or outside of their home. Patient has working smoke alarms and has working carbon monoxide detector. Home safety hazards include: none.     Nutrition:   Current diet is Low Saturated Fat, Low Cholesterol and No Added Salt.     Medications:   Patient is not currently taking any over-the-counter supplements. Patient is able to manage medications.     Activities of Daily Living (ADLs)/Instrumental Activities of Daily Living (IADLs):   Walk and transfer into and out of bed and chair?: Yes  Dress and groom yourself?: Yes    Bathe or shower yourself?: Yes    Feed yourself? Yes  Do your laundry/housekeeping?: Yes  Manage your money, pay your bills and track your expenses?: Yes  Make your own meals?: Yes    Do your own shopping?: Yes    Previous Hospitalizations:   Any hospitalizations or ED visits within the last 12 months?: No      Advance Care Planning:     Advanced directive: Yes      Cognitive Screening:   Provider or family/friend/caregiver concerned regarding cognition?: Yes    Cognition Comments: Some days are better than others. Depends on adequate sleep or not     PREVENTIVE SCREENINGS      Cardiovascular Screening:    General: Screening Not Indicated, History Lipid Disorder, Risks and Benefits Discussed and Screening Current      Diabetes Screening:     General: Screening Current and Risks and Benefits Discussed      Colorectal Cancer Screening:     General: Screening Not Indicated      Breast Cancer Screening:     General: Screening Current and Screening Not Indicated      Cervical Cancer Screening:    General: Screening Not Indicated      Osteoporosis Screening:    General: Risks and Benefits Discussed      Abdominal Aortic Aneurysm (AAA) Screening:        General: Screening Not Indicated      Lung Cancer Screening:     General: Screening Not Indicated      Hepatitis C Screening:    General: Screening Not Indicated    Screening, Brief Intervention, and Referral to Treatment  (SBIRT)    Screening  Typical number of drinks in a day: 0  Typical number of drinks in a week: 0  Interpretation: Low risk drinking behavior.    Single Item Drug Screening:  How often have you used an illegal drug (including marijuana) or a prescription medication for non-medical reasons in the past year? never    Single Item Drug Screen Score: 0  Interpretation: Negative screen for possible drug use disorder    Review of Current Opioid Use    Opioid Risk Tool (ORT) Interpretation: Complete Opioid Risk Tool (ORT)    Social Determinants of Health     Financial Resource Strain: Low Risk  (9/18/2023)    Overall Financial Resource Strain (CARDIA)     Difficulty of Paying Living Expenses: Not hard at all   Food Insecurity: No Food Insecurity (9/26/2024)    Hunger Vital Sign     Worried About Running Out of Food in the Last Year: Never true     Ran Out of Food in the Last Year: Never true   Transportation Needs: No Transportation Needs (9/26/2024)    PRAPARE - Transportation     Lack of Transportation (Medical): No     Lack of Transportation (Non-Medical): No   Housing Stability: Low Risk  (9/26/2024)    Housing Stability Vital Sign     Unable to Pay for Housing in the Last Year: No     Number of Times Moved in the Last Year: 1     Homeless in the Last Year: No   Utilities: Not At Risk (9/26/2024)    [facility] Utilities     Threatened with loss of utilities: No     No results found.    Objective     /72   Pulse 62   Temp 98.5 °F (36.9 °C)   Ht 5' (1.524 m)   Wt 77 kg (169 lb 12.8 oz)   SpO2 99%   BMI 33.16 kg/m²     Physical Exam  Vitals and nursing note reviewed.   Constitutional:       General: She is not in acute distress.     Appearance: She is well-developed. She is not diaphoretic.   HENT:      Head: Normocephalic and atraumatic.      Right Ear: Tympanic membrane, ear canal and external ear normal.      Left Ear: Tympanic membrane, ear canal and external ear normal.      Ears:      Comments: Hearing aids  removed      Mouth/Throat:      Mouth: Mucous membranes are moist.      Dentition: Has dentures.      Pharynx: Oropharynx is clear.   Eyes:      Conjunctiva/sclera: Conjunctivae normal.   Neck:      Vascular: No carotid bruit.   Cardiovascular:      Rate and Rhythm: Normal rate and regular rhythm.      Heart sounds: Normal heart sounds. No murmur heard.     No friction rub. No gallop.   Pulmonary:      Effort: Pulmonary effort is normal. No respiratory distress.      Breath sounds: Normal breath sounds. No stridor. No wheezing or rales.   Chest:      Chest wall: No tenderness.   Musculoskeletal:         General: Swelling present.   Neurological:      General: No focal deficit present.      Mental Status: She is alert. Mental status is at baseline.      Cranial Nerves: No cranial nerve deficit.   Psychiatric:         Mood and Affect: Mood normal.         Behavior: Behavior normal.         Thought Content: Thought content normal.         Judgment: Judgment normal.

## 2024-09-30 ENCOUNTER — HOSPITAL ENCOUNTER (OUTPATIENT)
Age: 84
Discharge: HOME | End: 2024-09-30
Payer: MEDICARE

## 2024-09-30 DIAGNOSIS — Z12.31 ENCOUNTER FOR SCREENING MAMMOGRAM FOR MALIGNANT NEOPLASM OF BREAST: ICD-10-CM

## 2024-09-30 PROCEDURE — 77067 SCR MAMMO BI INCL CAD: CPT

## 2024-09-30 PROCEDURE — 77063 BREAST TOMOSYNTHESIS BI: CPT

## 2024-10-22 ENCOUNTER — HOSPITAL ENCOUNTER (OUTPATIENT)
Age: 84
Discharge: HOME | End: 2024-10-22
Payer: MEDICARE

## 2024-10-22 VITALS — BODY MASS INDEX: 33.18 KG/M2 | WEIGHT: 169 LBS | HEIGHT: 60 IN

## 2024-10-22 DIAGNOSIS — E78.2 MIXED HYPERLIPIDEMIA: ICD-10-CM

## 2024-10-22 DIAGNOSIS — Z78.0 POSTMENOPAUSAL ESTROGEN DEFICIENCY: ICD-10-CM

## 2024-10-22 PROCEDURE — 77080 DXA BONE DENSITY AXIAL: CPT

## 2024-10-23 RX ORDER — PRAVASTATIN SODIUM 20 MG
TABLET ORAL
Qty: 90 TABLET | Refills: 1 | Status: SHIPPED | OUTPATIENT
Start: 2024-10-23

## 2024-11-15 NOTE — PROGRESS NOTES
Name: Huyen Zhou      : 1940      MRN: [id number]  Encounter Provider: Jyoti Mcneal DO  Encounter Date: 2024   Encounter department: [facility]  :  Assessment & Plan  Chronic bilateral thoracic back pain  Likely arthritic/muscular in nature. Given persistence of symptoms, discussed XR to evaluate for bony changes -- pt agreeable. Also offered referral to PT, which pt defers at this time. If XR benign, can continue supportive care as below, given this does help   Orders:    XR spine thoracic 3 vw; Future    Chronic bilateral low back pain without sciatica  See thoracic back pain   Orders:    XR spine lumbar minimum 4 views non injury; Future    Chest pain, unspecified type  In office EKG stable from previous. Discussed consideration of ECHO/stress test to further evaluate -- pt defers. Encouraged to monitor symptoms for frequency/timing to see if there is a set pattern/etiology. Can always order studies in the future/refer to Cardio if pt would like.   Orders:    POCT ECG           History of Present Illness     HPI    Pt presents with her daughter due to multiple concerns as below     Back pain, has been present since 2024 after house cleaning (moving mattresses, etc)   Intermittent -- morning is usually good, gets worse at the day goes on/with activity   Usually pain around her shoulder blades, sometimes in her low back with more activity   Heating pad, rest, Tylenol help   Of note, pt iss s/p knee replacements 2023 and 2024     Chest pain -- intermittent, not with activity more so at rest.   Lasts for a few minutes   No shortness of breath, nausea, dizziness, vision changes  Thinks it is due to gas     Review of Systems   Eyes:  Negative for visual disturbance.   Respiratory:  Negative for shortness of breath.    Cardiovascular:  Positive for chest pain.   Musculoskeletal:  Positive for back pain.   Neurological:  Negative for dizziness.        Medical History  "Reviewed by provider this encounter:     .  Objective   /82 (BP Location: Left arm, Patient Position: Sitting, Cuff Size: Large)   Pulse 86   Temp 99.6 °F (37.6 °C)   Ht 5' 0.25\" (1.53 m)   Wt 77.1 kg (170 lb)   SpO2 96%   BMI 32.93 kg/m²      Physical Exam  Vitals and nursing note reviewed.   Constitutional:       General: She is not in acute distress.     Appearance: Normal appearance.   Cardiovascular:      Rate and Rhythm: Normal rate and regular rhythm.   Pulmonary:      Effort: Pulmonary effort is normal. No respiratory distress.      Breath sounds: Normal breath sounds.   Musculoskeletal:        Arms:       Comments: Areas of concern as above, non-tender to palpation    Neurological:      Mental Status: She is alert.      Comments: Grossly intact   Psychiatric:         Mood and Affect: Mood normal.         "

## 2024-11-18 ENCOUNTER — APPOINTMENT (OUTPATIENT)
Dept: RADIOLOGY | Facility: CLINIC | Age: 84
End: 2024-11-18
Payer: MEDICARE

## 2024-11-18 ENCOUNTER — OFFICE VISIT (OUTPATIENT)
Dept: FAMILY MEDICINE CLINIC | Facility: CLINIC | Age: 84
End: 2024-11-18
Payer: MEDICARE

## 2024-11-18 VITALS
HEIGHT: 60 IN | SYSTOLIC BLOOD PRESSURE: 142 MMHG | OXYGEN SATURATION: 96 % | HEART RATE: 86 BPM | TEMPERATURE: 99.6 F | DIASTOLIC BLOOD PRESSURE: 82 MMHG | BODY MASS INDEX: 33.38 KG/M2 | WEIGHT: 170 LBS

## 2024-11-18 DIAGNOSIS — M54.50 CHRONIC BILATERAL LOW BACK PAIN WITHOUT SCIATICA: ICD-10-CM

## 2024-11-18 DIAGNOSIS — G89.29 CHRONIC BILATERAL THORACIC BACK PAIN: ICD-10-CM

## 2024-11-18 DIAGNOSIS — M54.6 CHRONIC BILATERAL THORACIC BACK PAIN: ICD-10-CM

## 2024-11-18 DIAGNOSIS — M54.6 CHRONIC BILATERAL THORACIC BACK PAIN: Primary | ICD-10-CM

## 2024-11-18 DIAGNOSIS — G89.29 CHRONIC BILATERAL LOW BACK PAIN WITHOUT SCIATICA: ICD-10-CM

## 2024-11-18 DIAGNOSIS — R07.9 CHEST PAIN, UNSPECIFIED TYPE: ICD-10-CM

## 2024-11-18 DIAGNOSIS — G89.29 CHRONIC BILATERAL THORACIC BACK PAIN: Primary | ICD-10-CM

## 2024-11-18 PROBLEM — M25.561 CHRONIC PAIN OF BOTH KNEES: Status: RESOLVED | Noted: 2020-07-17 | Resolved: 2024-11-18

## 2024-11-18 PROBLEM — Z96.653 HISTORY OF BILATERAL KNEE REPLACEMENT: Status: ACTIVE | Noted: 2024-04-04

## 2024-11-18 PROBLEM — Z01.89 ENCOUNTER FOR GERIATRIC ASSESSMENT: Status: RESOLVED | Noted: 2023-10-23 | Resolved: 2024-11-18

## 2024-11-18 PROBLEM — Z96.652 S/P TOTAL KNEE ARTHROPLASTY, LEFT: Status: RESOLVED | Noted: 2023-11-09 | Resolved: 2024-11-18

## 2024-11-18 PROBLEM — Z96.651 STATUS POST TOTAL KNEE REPLACEMENT USING CEMENT, RIGHT: Status: RESOLVED | Noted: 2024-04-04 | Resolved: 2024-11-18

## 2024-11-18 PROBLEM — M25.562 CHRONIC PAIN OF BOTH KNEES: Status: RESOLVED | Noted: 2020-07-17 | Resolved: 2024-11-18

## 2024-11-18 PROCEDURE — 72072 X-RAY EXAM THORAC SPINE 3VWS: CPT

## 2024-11-18 PROCEDURE — 99213 OFFICE O/P EST LOW 20 MIN: CPT | Performed by: FAMILY MEDICINE

## 2024-11-18 PROCEDURE — 72110 X-RAY EXAM L-2 SPINE 4/>VWS: CPT

## 2024-11-18 PROCEDURE — 93000 ELECTROCARDIOGRAM COMPLETE: CPT | Performed by: FAMILY MEDICINE

## 2024-11-24 ENCOUNTER — OFFICE VISIT (OUTPATIENT)
Dept: URGENT CARE | Facility: CLINIC | Age: 84
End: 2024-11-24
Payer: MEDICARE

## 2024-11-24 ENCOUNTER — RESULTS FOLLOW-UP (OUTPATIENT)
Dept: FAMILY MEDICINE CLINIC | Facility: CLINIC | Age: 84
End: 2024-11-24

## 2024-11-24 VITALS
SYSTOLIC BLOOD PRESSURE: 176 MMHG | OXYGEN SATURATION: 96 % | BODY MASS INDEX: 32.93 KG/M2 | HEART RATE: 64 BPM | WEIGHT: 170 LBS | DIASTOLIC BLOOD PRESSURE: 80 MMHG

## 2024-11-24 DIAGNOSIS — B02.9 HERPES ZOSTER WITHOUT COMPLICATION: Primary | ICD-10-CM

## 2024-11-24 PROCEDURE — G0463 HOSPITAL OUTPT CLINIC VISIT: HCPCS | Performed by: PHYSICAL MEDICINE & REHABILITATION

## 2024-11-24 PROCEDURE — 99213 OFFICE O/P EST LOW 20 MIN: CPT | Performed by: PHYSICAL MEDICINE & REHABILITATION

## 2024-11-24 RX ORDER — VALACYCLOVIR HYDROCHLORIDE 1 G/1
1000 TABLET, FILM COATED ORAL 3 TIMES DAILY
Qty: 21 TABLET | Refills: 0 | Status: SHIPPED | OUTPATIENT
Start: 2024-11-24 | End: 2024-12-01

## 2024-11-24 NOTE — PROGRESS NOTES
[facility]        NAME: Huyen Zhou is a 84 y.o. female  : 1940    MRN: [id number]  DATE: 2024  TIME: 2:11 PM    Assessment and Plan   Herpes zoster without complication [B02.9]  1. Herpes zoster without complication  valACYclovir (VALTREX) 1,000 mg tablet            Patient Instructions       Follow up with PCP in 3-5 days.  Proceed to  ER if symptoms worsen.    If tests are performed, our office will contact you with results only if changes need to made to the care plan discussed with you at the visit. [facility]    Chief Complaint     Chief Complaint   Patient presents with    Rash     Pt states that she has a rash on her neck, along with neck pain. The neck pain started on Friday and then the rash started last night after she put a heating pack on it.          History of Present Illness       Pt is an 84 year old female presenting with neck pain that started 24. A rash developed along the side of neck pain last night 24. She has been applying heat to the affected area.     Rash        Review of Systems   Review of Systems   Constitutional: Negative.    Respiratory: Negative.     Cardiovascular: Negative.    Skin:  Positive for rash.         Current Medications       Current Outpatient Medications:     valACYclovir (VALTREX) 1,000 mg tablet, Take 1 tablet (1,000 mg total) by mouth 3 (three) times a day for 7 days, Disp: 21 tablet, Rfl: 0    levothyroxine 100 mcg tablet, take 1 tablet by mouth once daily, Disp: 90 tablet, Rfl: 1    pravastatin (PRAVACHOL) 20 mg tablet, take 1 tablet by mouth once daily take with 40 milligram for apply TOTAL DAILY DOSE OF 60 MG, Disp: 90 tablet, Rfl: 1    pravastatin (PRAVACHOL) 40 mg tablet, Take 1 tablet (40 mg total) by mouth daily, Disp: 90 tablet, Rfl: 3    Current Allergies     Allergies as of 2024 - Reviewed 2024   Allergen Reaction Noted    Percocet [oxycodone-acetaminophen]  GI Intolerance 07/11/2012    Statins  12/18/2017    Vicodin [hydrocodone-acetaminophen] GI Intolerance 07/11/2012            The following portions of the patient's history were reviewed and updated as appropriate: allergies, current medications, past family history, past medical history, past social history, past surgical history and problem list.     Past Medical History:   Diagnosis Date    Chronic kidney disease     Disease of thyroid gland     GI bleed     High cholesterol     History of transfusion     Hypothyroidism     PONV (postoperative nausea and vomiting)        Past Surgical History:   Procedure Laterality Date    BREAST CYST EXCISION Left 1982    cyst removals and aspirations-benign    CATARACT EXTRACTION, BILATERAL      COLONOSCOPY      HYSTERECTOMY      at age 64    OOPHORECTOMY Bilateral     at age 64    PARATHYROID GLAND SURGERY      IL ARTHROPLASTY FEM CONDYLES/TIBIAL PLATEAU KNEE Left 11/09/2023    Procedure: ARTHROPLASTY KNEE TOTAL AND ALL ASSOCIATED PROCEDURES;  Surgeon: Rachel Momin MD;  Location:  MAIN OR;  Service: Orthopedics    IL ARTHRP KNE CONDYLE&PLATU MEDIAL&LAT COMPARTMENTS Right 04/04/2024    Procedure: ARTHROPLASTY KNEE TOTAL AND ALL ASSOCIATED PROCEDURES;  Surgeon: Rachel Momin MD;  Location:  MAIN OR;  Service: Orthopedics    STOMACH SURGERY      TOTAL KNEE ARTHROPLASTY Left        Family History   Problem Relation Age of Onset    Heart disease Sister     Breast cancer Sister 77    Ovarian cancer Sister 66    Breast cancer Sister 87    No Known Problems Maternal Grandmother     No Known Problems Paternal Grandmother     Cervical cancer Paternal Aunt     Breast cancer Cousin     Breast cancer Cousin     Hyperlipidemia Family          Medications have been verified.        Objective   BP (!) 176/80 (Patient Position: Sitting, Cuff Size: Large)   Pulse 64   Wt 77.1 kg (170 lb)   SpO2 96%   BMI 32.93 kg/m²        Physical Exam     Physical Exam  Vitals reviewed.    Cardiovascular:      Rate and Rhythm: Normal rate and regular rhythm.      Pulses: Normal pulses.      Heart sounds: Normal heart sounds.   Pulmonary:      Effort: Pulmonary effort is normal.      Breath sounds: Normal breath sounds.   Skin:     Findings: Rash present.      Comments: Shingles along right side of neck, right shoulder, right chest wall   Neurological:      Mental Status: She is alert.

## 2024-11-26 ENCOUNTER — TELEPHONE (OUTPATIENT)
Age: 84
End: 2024-11-26

## 2024-11-26 ENCOUNTER — TELEPHONE (OUTPATIENT)
Dept: FAMILY MEDICINE CLINIC | Facility: CLINIC | Age: 84
End: 2024-11-26

## 2024-11-26 DIAGNOSIS — B02.9 HERPES ZOSTER WITHOUT COMPLICATION: Primary | ICD-10-CM

## 2024-11-26 RX ORDER — LIDOCAINE 50 MG/G
1 PATCH TOPICAL DAILY
Qty: 30 PATCH | Refills: 0 | Status: SHIPPED | OUTPATIENT
Start: 2024-11-26

## 2024-11-26 NOTE — TELEPHONE ENCOUNTER
WHO - daughter     WHAT - shingles    WHEN -  started been painful on Friday pimples started on Saturday    How Often/Duration -    Pain - very painful and more pimples today.    Alleviating Factors (anything they tried to use to help so far) - seen in urgent care on Sunday given valtrex. Patient is taking Tylenol arthritis to help with the pain.    Next Steps - requesting Lidoderm patches this had help in the pass when she had shingles 9 years ago. Or recommendation.    Callback- daughter Britney. [phone]

## 2024-11-26 NOTE — TELEPHONE ENCOUNTER
PA for lidocaine (Lidoderm) 5 %   SUBMITTED to [employer]    via    []CMM-KEY:   [x][employer]s-Case ID # [id number]   []Availity-Auth ID # NDC #   []Faxed to plan   []Other website   []Phone call Case ID #     [x]PA sent as URGENT    All office notes, labs and other pertaining documents and studies sent. Clinical questions answered. Awaiting determination from insurance company.     Turnaround time for your insurance to make a decision on your Prior Authorization can take 7-21 business days.

## 2024-11-26 NOTE — TELEPHONE ENCOUNTER
PA for      lidocaine (Lidoderm) 5 %     APPROVED     Date(s) approved 10/27/2024 - 11/26/2025    Case #  [id number]      Patient advised by          [x][employer] Message  []Phone call   []LMOM  []L/M to call office as no active Communication consent on file  []Unable to leave detailed message as VM not approved on Communication consent       Pharmacy advised by    [x]Fax  []Phone call    Approval letter scanned into [employer] No no letter available at time of approval.

## 2025-02-14 DIAGNOSIS — E03.9 HYPOTHYROIDISM, UNSPECIFIED TYPE: ICD-10-CM

## 2025-02-14 RX ORDER — LEVOTHYROXINE SODIUM 100 UG/1
100 TABLET ORAL DAILY
Qty: 90 TABLET | Refills: 1 | Status: SHIPPED | OUTPATIENT
Start: 2025-02-14

## 2025-03-28 ENCOUNTER — OFFICE VISIT (OUTPATIENT)
Dept: OBGYN CLINIC | Facility: CLINIC | Age: 85
End: 2025-03-28
Payer: MEDICARE

## 2025-03-28 ENCOUNTER — APPOINTMENT (OUTPATIENT)
Dept: RADIOLOGY | Facility: AMBULARY SURGERY CENTER | Age: 85
End: 2025-03-28
Attending: ORTHOPAEDIC SURGERY
Payer: MEDICARE

## 2025-03-28 DIAGNOSIS — Z96.652 S/P TOTAL KNEE ARTHROPLASTY, LEFT: ICD-10-CM

## 2025-03-28 DIAGNOSIS — Z96.651 S/P TOTAL KNEE REPLACEMENT USING CEMENT, RIGHT: ICD-10-CM

## 2025-03-28 DIAGNOSIS — Z96.651 S/P TOTAL KNEE REPLACEMENT USING CEMENT, RIGHT: Primary | ICD-10-CM

## 2025-03-28 PROCEDURE — 99213 OFFICE O/P EST LOW 20 MIN: CPT | Performed by: ORTHOPAEDIC SURGERY

## 2025-03-28 PROCEDURE — 73562 X-RAY EXAM OF KNEE 3: CPT

## 2025-03-28 NOTE — PROGRESS NOTES
Assessment:  Assessment & Plan  S/P total knee replacement using cement, right  New xrays of the bilateral knees were obtained today and reviewed at today's visit  On exam, she has full ROM of the knees but has quadriceps/VMO weakness.   Exercises for the quadriceps/VMO were demonstrated in the office and provided in her AVS  New xrays of the right knee (2 years s/p)  Orders:    XR knee 3 vw right non injury; Future    S/P total knee arthroplasty, left  New xrays were obtained and reviewed with the patient and her daughter.   On exam, she has full ROM and better quadriceps strength compared to her right side.   Follow up as needed for left total knee  Orders:    XR knee 3 vw left non injury; Future        To do next visit:  Return in about 1 year (around 3/28/2026) for Bilateral knees.    The above stated was discussed in layman's terms and the patient expressed understanding.  All questions were answered to the patient's satisfaction.       Scribe Attestation      I,:  Amanda Conrad am acting as a scribe while in the presence of the attending physician.:       I,:  Rachel Momin MD personally performed the services described in this documentation    as scribed in my presence.:               Subjective:   Huyen Zhou is a 84 y.o. female who presents today for a 1 year follow-up for her right total knee arthroplasty performed on 4/4/2024.  Patient has a history of a left total knee arthroplasty performed on 11/9/2023. She notes that she has some aches and pains that were once helped with daily Tylenol and topical Voltaren gel.    Review of systems negative unless otherwise specified in HPI  Review of Systems   Constitutional:  Negative for chills, fever and unexpected weight change.   HENT:  Negative for hearing loss, nosebleeds and sore throat.    Eyes:  Negative for pain, redness and visual disturbance.   Respiratory:  Negative for cough, shortness of breath and wheezing.    Cardiovascular:  Negative for  chest pain, palpitations and leg swelling.   Gastrointestinal:  Negative for abdominal pain and nausea.   Genitourinary:  Negative for dyspareunia, dysuria and frequency.   Skin:  Negative for rash and wound.   Neurological:  Negative for dizziness, numbness and headaches.   Psychiatric/Behavioral:  Negative for decreased concentration and suicidal ideas. The patient is not nervous/anxious.        Past Medical History:   Diagnosis Date    Chronic kidney disease     Disease of thyroid gland     GI bleed     High cholesterol     History of transfusion     Hypothyroidism     PONV (postoperative nausea and vomiting)        Past Surgical History:   Procedure Laterality Date    BREAST CYST EXCISION Left 1982    cyst removals and aspirations-benign    CATARACT EXTRACTION, BILATERAL      COLONOSCOPY      HYSTERECTOMY      at age 64    OOPHORECTOMY Bilateral     at age 64    PARATHYROID GLAND SURGERY      CA ARTHROPLASTY FEM CONDYLES/TIBIAL PLATEAU KNEE Left 11/09/2023    Procedure: ARTHROPLASTY KNEE TOTAL AND ALL ASSOCIATED PROCEDURES;  Surgeon: Rachel Momin MD;  Location:  MAIN OR;  Service: Orthopedics    CA ARTHRP KNE CONDYLE&PLATU MEDIAL&LAT COMPARTMENTS Right 04/04/2024    Procedure: ARTHROPLASTY KNEE TOTAL AND ALL ASSOCIATED PROCEDURES;  Surgeon: Rachel Momin MD;  Location:  MAIN OR;  Service: Orthopedics    STOMACH SURGERY      TOTAL KNEE ARTHROPLASTY Left        Family History   Problem Relation Age of Onset    Heart disease Sister     Breast cancer Sister 77    Ovarian cancer Sister 66    Breast cancer Sister 87    No Known Problems Maternal Grandmother     No Known Problems Paternal Grandmother     Cervical cancer Paternal Aunt     Breast cancer Cousin     Breast cancer Cousin     Hyperlipidemia Family        Social History     Occupational History    Not on file   Tobacco Use    Smoking status: Never    Smokeless tobacco: Never   Vaping Use    Vaping status: Never Used   Substance and Sexual  Activity    Alcohol use: Not Currently    Drug use: Not Currently    Sexual activity: Not on file         Current Outpatient Medications:     levothyroxine 100 mcg tablet, take 1 tablet by mouth once daily, Disp: 90 tablet, Rfl: 1    lidocaine (Lidoderm) 5 %, Apply 1 patch topically over 12 hours daily Remove & Discard patch within 12 hours or as directed by MD, Disp: 30 patch, Rfl: 0    pravastatin (PRAVACHOL) 20 mg tablet, take 1 tablet by mouth once daily take with 40 milligram for apply TOTAL DAILY DOSE OF 60 MG, Disp: 90 tablet, Rfl: 1    pravastatin (PRAVACHOL) 40 mg tablet, Take 1 tablet (40 mg total) by mouth daily, Disp: 90 tablet, Rfl: 3    valACYclovir (VALTREX) 1,000 mg tablet, Take 1 tablet (1,000 mg total) by mouth 3 (three) times a day for 7 days, Disp: 21 tablet, Rfl: 0    Allergies   Allergen Reactions    Percocet [Oxycodone-Acetaminophen] GI Intolerance    Statins      Other reaction(s): ELEVATED LFTS  Category: Adverse Reaction;     Vicodin [Hydrocodone-Acetaminophen] GI Intolerance          There were no vitals filed for this visit.    There is no height or weight on file to calculate BMI.  Wt Readings from Last 3 Encounters:   11/24/24 77.1 kg (170 lb)   11/18/24 77.1 kg (170 lb)   10/22/24 76.7 kg (169 lb)       Objective:                    Right Knee Exam     Tenderness   The patient is experiencing tenderness in the patella (quadriceps).    Range of Motion   Extension:  0   Flexion:  120     Tests   Varus: negative Valgus: negative    Other   Erythema: absent  Sensation: normal  Pulse: present  Swelling: none  Effusion: no effusion present    Comments:  Calf is soft and non tender      Left Knee Exam     Tenderness   The patient is experiencing tenderness in the patella and patellar tendon (quadriceps).    Range of Motion   Extension:  0   Flexion:  120     Tests   Varus: negative Valgus: negative    Other   Erythema: absent  Sensation: normal  Pulse: present  Swelling: none  Effusion: no  "effusion present    Comments:  Calf is soft and non tender            Diagnostics, reviewed and taken today if performed as documented:    The attending physician has personally reviewed the pertinent films in PACS and interpretation is as follows:  Xrays of bilateral knees 3 views: Prosthesis is in good anatomic position with no signs of loosening, fracture.    Procedures, if performed today:    Procedures    None performed      Portions of the record may have been created with voice recognition software.  Occasional wrong word or \"sound a like\" substitutions may have occurred due to the inherent limitations of voice recognition software.  Read the chart carefully and recognize, using context, where substitutions have occurred.  "

## 2025-03-28 NOTE — PATIENT INSTRUCTIONS
Can start using the topical Voltaren gel 1% over the knees. Oral Tylenol as needed.       STRENGTHENING:   Quadriceps:   Isometric Quad sets                        Huyen Zhou  1940    [facility]                                                                                               Dr. Rachel Momin                                                                                                            ANTIBIOTIC USE FOR JOINT REPLACEMENT PATIENTS  You have had surgery to replace one of your joints with a metal prosthesis. Going forward, you should take oral antibiotics before any dental work, including routine cleanings. These procedures are a potential source of injection. If you develop an infection, it could spread to your operative joint and cause complications.  Please show the following guidelines to your medical doctor and dentist, so he/she can prescribe the appropriate medications.  The following information is recommended by the American [employer], Dental, and Orthopedic Associations:  STANDARD GENERAL PROPHYLAXIS  antibiotic prophylaxis recommended lifetime  Recommend refraining from dental procedures until 3 months post operatively  Amoxicillin for Adults:    500mg - Take 4 capsules (2 grams) orally one hour before the procedure  If allergic to Penicillin  Clindamycin for Adults:   300mg - Take 2 capsules (600 mg) orally one hour before the procedure  Azithromycin for Adults:  250mg - Take 2 capsules (500 mg) orally one hour before the procedure  Cephalexin for Adults:     500mg - Take 4 capsules (2 grams) orally one hour before the procedure  Note: Cephalosporins should not be used if you have ever developed an immediate hypersensitivity reaction (i.e., hives, swelling, severe itching, difficulty breathing) to Penicillin  Please feel free to contact our office at [phone] with questions or concerns.

## 2025-04-04 ENCOUNTER — RA CDI HCC (OUTPATIENT)
Dept: OTHER | Facility: HOSPITAL | Age: 85
End: 2025-04-04

## 2025-04-10 ENCOUNTER — APPOINTMENT (OUTPATIENT)
Dept: LAB | Facility: CLINIC | Age: 85
End: 2025-04-10
Payer: MEDICARE

## 2025-04-10 DIAGNOSIS — E78.2 MIXED HYPERLIPIDEMIA: ICD-10-CM

## 2025-04-10 DIAGNOSIS — E03.9 HYPOTHYROIDISM, UNSPECIFIED TYPE: ICD-10-CM

## 2025-04-10 DIAGNOSIS — E55.9 VITAMIN D DEFICIENCY: ICD-10-CM

## 2025-04-10 DIAGNOSIS — N18.31 STAGE 3A CHRONIC KIDNEY DISEASE (HCC): ICD-10-CM

## 2025-04-10 DIAGNOSIS — R73.03 PREDIABETES: ICD-10-CM

## 2025-04-10 LAB
25(OH)D3 SERPL-MCNC: 28.9 NG/ML (ref 30–100)
ALBUMIN SERPL BCG-MCNC: 4.3 G/DL (ref 3.5–5)
ALP SERPL-CCNC: 74 U/L (ref 34–104)
ALT SERPL W P-5'-P-CCNC: 10 U/L (ref 7–52)
ANION GAP SERPL CALCULATED.3IONS-SCNC: 5 MMOL/L (ref 4–13)
AST SERPL W P-5'-P-CCNC: 15 U/L (ref 13–39)
BASOPHILS # BLD AUTO: 0.05 THOUSANDS/ÂΜL (ref 0–0.1)
BASOPHILS NFR BLD AUTO: 1 % (ref 0–1)
BILIRUB SERPL-MCNC: 0.56 MG/DL (ref 0.2–1)
BUN SERPL-MCNC: 23 MG/DL (ref 5–25)
CALCIUM SERPL-MCNC: 9.3 MG/DL (ref 8.4–10.2)
CHLORIDE SERPL-SCNC: 104 MMOL/L (ref 96–108)
CHOLEST SERPL-MCNC: 262 MG/DL (ref ?–200)
CO2 SERPL-SCNC: 29 MMOL/L (ref 21–32)
CREAT SERPL-MCNC: 0.98 MG/DL (ref 0.6–1.3)
EOSINOPHIL # BLD AUTO: 0.16 THOUSAND/ÂΜL (ref 0–0.61)
EOSINOPHIL NFR BLD AUTO: 2 % (ref 0–6)
ERYTHROCYTE [DISTWIDTH] IN BLOOD BY AUTOMATED COUNT: 13.5 % (ref 11.6–15.1)
EST. AVERAGE GLUCOSE BLD GHB EST-MCNC: 123 MG/DL
GFR SERPL CREATININE-BSD FRML MDRD: 53 ML/MIN/1.73SQ M
GLUCOSE P FAST SERPL-MCNC: 97 MG/DL (ref 65–99)
HBA1C MFR BLD: 5.9 %
HCT VFR BLD AUTO: 41.2 % (ref 34.8–46.1)
HDLC SERPL-MCNC: 59 MG/DL
HGB BLD-MCNC: 13.4 G/DL (ref 11.5–15.4)
IMM GRANULOCYTES # BLD AUTO: 0.02 THOUSAND/UL (ref 0–0.2)
IMM GRANULOCYTES NFR BLD AUTO: 0 % (ref 0–2)
LDLC SERPL CALC-MCNC: 183 MG/DL (ref 0–100)
LYMPHOCYTES # BLD AUTO: 2.09 THOUSANDS/ÂΜL (ref 0.6–4.47)
LYMPHOCYTES NFR BLD AUTO: 26 % (ref 14–44)
MCH RBC QN AUTO: 32.7 PG (ref 26.8–34.3)
MCHC RBC AUTO-ENTMCNC: 32.5 G/DL (ref 31.4–37.4)
MCV RBC AUTO: 101 FL (ref 82–98)
MONOCYTES # BLD AUTO: 0.52 THOUSAND/ÂΜL (ref 0.17–1.22)
MONOCYTES NFR BLD AUTO: 6 % (ref 4–12)
NEUTROPHILS # BLD AUTO: 5.34 THOUSANDS/ÂΜL (ref 1.85–7.62)
NEUTS SEG NFR BLD AUTO: 65 % (ref 43–75)
NRBC BLD AUTO-RTO: 0 /100 WBCS
PLATELET # BLD AUTO: 273 THOUSANDS/UL (ref 149–390)
PMV BLD AUTO: 12 FL (ref 8.9–12.7)
POTASSIUM SERPL-SCNC: 4.1 MMOL/L (ref 3.5–5.3)
PROT SERPL-MCNC: 7.7 G/DL (ref 6.4–8.4)
RBC # BLD AUTO: 4.1 MILLION/UL (ref 3.81–5.12)
SODIUM SERPL-SCNC: 138 MMOL/L (ref 135–147)
TRIGL SERPL-MCNC: 99 MG/DL (ref ?–150)
TSH SERPL DL<=0.05 MIU/L-ACNC: 2.24 UIU/ML (ref 0.45–4.5)
WBC # BLD AUTO: 8.18 THOUSAND/UL (ref 4.31–10.16)

## 2025-04-10 PROCEDURE — 82306 VITAMIN D 25 HYDROXY: CPT

## 2025-04-10 PROCEDURE — 80061 LIPID PANEL: CPT

## 2025-04-10 PROCEDURE — 80053 COMPREHEN METABOLIC PANEL: CPT

## 2025-04-10 PROCEDURE — 85025 COMPLETE CBC W/AUTO DIFF WBC: CPT

## 2025-04-10 PROCEDURE — 84443 ASSAY THYROID STIM HORMONE: CPT

## 2025-04-10 PROCEDURE — 36415 COLL VENOUS BLD VENIPUNCTURE: CPT

## 2025-04-10 PROCEDURE — 83036 HEMOGLOBIN GLYCOSYLATED A1C: CPT

## 2025-04-11 ENCOUNTER — OFFICE VISIT (OUTPATIENT)
Dept: FAMILY MEDICINE CLINIC | Facility: CLINIC | Age: 85
End: 2025-04-11
Payer: MEDICARE

## 2025-04-11 ENCOUNTER — RESULTS FOLLOW-UP (OUTPATIENT)
Dept: FAMILY MEDICINE CLINIC | Facility: CLINIC | Age: 85
End: 2025-04-11

## 2025-04-11 VITALS
HEIGHT: 60 IN | BODY MASS INDEX: 33.89 KG/M2 | OXYGEN SATURATION: 98 % | HEART RATE: 69 BPM | TEMPERATURE: 97.6 F | SYSTOLIC BLOOD PRESSURE: 130 MMHG | WEIGHT: 172.6 LBS | DIASTOLIC BLOOD PRESSURE: 82 MMHG

## 2025-04-11 DIAGNOSIS — N18.31 STAGE 3A CHRONIC KIDNEY DISEASE (HCC): ICD-10-CM

## 2025-04-11 DIAGNOSIS — R73.03 PREDIABETES: Primary | ICD-10-CM

## 2025-04-11 DIAGNOSIS — E03.9 HYPOTHYROIDISM, UNSPECIFIED TYPE: ICD-10-CM

## 2025-04-11 DIAGNOSIS — E55.9 VITAMIN D DEFICIENCY: ICD-10-CM

## 2025-04-11 DIAGNOSIS — I87.2 VENOUS INSUFFICIENCY: ICD-10-CM

## 2025-04-11 DIAGNOSIS — E78.2 MIXED HYPERLIPIDEMIA: ICD-10-CM

## 2025-04-11 PROBLEM — M17.12 PRIMARY OSTEOARTHRITIS OF LEFT KNEE: Status: RESOLVED | Noted: 2023-10-18 | Resolved: 2025-04-11

## 2025-04-11 PROCEDURE — 99214 OFFICE O/P EST MOD 30 MIN: CPT | Performed by: FAMILY MEDICINE

## 2025-04-11 PROCEDURE — G2211 COMPLEX E/M VISIT ADD ON: HCPCS | Performed by: FAMILY MEDICINE

## 2025-04-11 RX ORDER — PRAVASTATIN SODIUM 40 MG
40 TABLET ORAL DAILY
Qty: 90 TABLET | Refills: 1 | Status: SHIPPED | OUTPATIENT
Start: 2025-04-11

## 2025-04-11 NOTE — ASSESSMENT & PLAN NOTE
Chronically high cholesterol  Continue Pravastatin 60 mg  Unable to tolerate other statins or higher dose  Orders:  •  Lipid Panel with Direct LDL reflex; Future

## 2025-04-11 NOTE — PROGRESS NOTES
Name: Huyen Zhou      : 1940      MRN: [id number]  Encounter Provider: Kika Sheridan MD  Encounter Date: 2025   Encounter department: [facility]  :  Assessment & Plan  Stage 3a chronic kidney disease (HCC)  Lab Results   Component Value Date    EGFR 53 04/10/2025    EGFR 54 2024    EGFR 57 2024    EGFR 57 2024    CREATININE 0.98 04/10/2025    CREATININE 0.97 2024    CREATININE 0.94 2024    CREATININE 0.93 2024     Stable CKD.  Will monitor labs  Orders:  •  Comprehensive metabolic panel; Future  •  CBC and differential; Future    Prediabetes  Low carb diet, A1c is stable in prediabetic range  Orders:  •  Hemoglobin A1C; Future    Mixed hyperlipidemia  Chronically high cholesterol  Continue Pravastatin 60 mg  Unable to tolerate other statins or higher dose  Orders:  •  Lipid Panel with Direct LDL reflex; Future    Vitamin D deficiency  Add additional 1000 IU daily to current calcium and vitamin D supplement.  Orders:  •  Vitamin D 25 hydroxy; Future    Hypothyroidism, unspecified type  TSH in range.  Continue levothyroxine 100 mcg daily.  Orders:  •  TSH, 3rd generation; Future    Venous insufficiency  Compression & elevation advised              History of Present Illness   Patient is here for a check up -  had labs done which we reviewed in detail.  She is here with her daughter.  Cholesterol - chronically high.  She is on pravastatin 60 mg total.  A1c prediabetes  Vit D low --taking ca + d supplement   Sometimes notices her ankles swell especially when she is not wearing her compression stockings.      Review of Systems   Constitutional:  Negative for activity change.   HENT:  Positive for hearing loss (Chronic).    Respiratory:  Negative for chest tightness and shortness of breath.    Cardiovascular:  Positive for leg swelling. Negative for chest pain.   Musculoskeletal:  Positive for arthralgias and back pain.   Neurological:  Negative  for headaches.   Psychiatric/Behavioral:  Negative for dysphoric mood. The patient is not nervous/anxious.        Objective   /82   Pulse 69   Temp 97.6 °F (36.4 °C)   Ht 5' (1.524 m)   Wt 78.3 kg (172 lb 9.6 oz)   SpO2 98%   BMI 33.71 kg/m²      Physical Exam  Vitals and nursing note reviewed.   Constitutional:       General: She is not in acute distress.     Appearance: She is well-developed. She is not diaphoretic.   HENT:      Head: Normocephalic and atraumatic.      Right Ear: External ear normal.      Left Ear: External ear normal.      Ears:      Comments: Hearing aids in place  Eyes:      Conjunctiva/sclera: Conjunctivae normal.   Cardiovascular:      Rate and Rhythm: Normal rate and regular rhythm.      Heart sounds: Normal heart sounds. No murmur heard.     No friction rub. No gallop.   Pulmonary:      Effort: Pulmonary effort is normal. No respiratory distress.      Breath sounds: Normal breath sounds. No stridor. No wheezing or rales.   Musculoskeletal:         General: Swelling (Mild swelling at the ankles) and deformity (arthritis changes in hands) present.      Right lower leg: No edema.      Left lower leg: No edema.   Neurological:      General: No focal deficit present.      Mental Status: She is alert.   Psychiatric:         Mood and Affect: Mood normal.         Behavior: Behavior normal.         Thought Content: Thought content normal.         Judgment: Judgment normal.

## 2025-04-11 NOTE — ASSESSMENT & PLAN NOTE
Lab Results   Component Value Date    EGFR 53 04/10/2025    EGFR 54 09/24/2024    EGFR 57 03/12/2024    EGFR 57 03/12/2024    CREATININE 0.98 04/10/2025    CREATININE 0.97 09/24/2024    CREATININE 0.94 03/12/2024    CREATININE 0.93 03/12/2024     Stable CKD.  Will monitor labs  Orders:  •  Comprehensive metabolic panel; Future  •  CBC and differential; Future

## 2025-04-11 NOTE — ASSESSMENT & PLAN NOTE
Add additional 1000 IU daily to current calcium and vitamin D supplement.  Orders:  •  Vitamin D 25 hydroxy; Future

## 2025-04-17 DIAGNOSIS — E78.2 MIXED HYPERLIPIDEMIA: ICD-10-CM

## 2025-04-18 RX ORDER — PRAVASTATIN SODIUM 20 MG
TABLET ORAL
Qty: 90 TABLET | Refills: 1 | Status: SHIPPED | OUTPATIENT
Start: 2025-04-18

## (undated) DEVICE — LIGHT HANDLE COVER SLEEVE DISP BLUE STELLAR

## (undated) DEVICE — HANDPIECE SET WITH RETRACTABLE COAXIAL FAN SPRAY TIP AND SUCTION TUBE: Brand: INTERPULSE

## (undated) DEVICE — SUT VICRYL 1 CT-1 27 IN J261H

## (undated) DEVICE — SILVER-COATED ANTIMICROBIAL BARRIER DRESSING: Brand: ACTICOAT   4" X 8"

## (undated) DEVICE — ABDOMINAL PAD: Brand: DERMACEA

## (undated) DEVICE — BETHLEHEM UNIV TOTAL KNEE, KIT: Brand: CARDINAL HEALTH

## (undated) DEVICE — TRAY FOLEY 16FR URIMETER SURESTEP

## (undated) DEVICE — CAPIT KNEE ATTUNE RP W/DOM

## (undated) DEVICE — NEEDLE HYPO 22G X 1-1/2 IN

## (undated) DEVICE — SUT VICRYL 2-0 CT-1 27 IN J259H

## (undated) DEVICE — GLOVE SRG BIOGEL 8

## (undated) DEVICE — IMPERVIOUS STOCKINETTE: Brand: DEROYAL

## (undated) DEVICE — PROXIMATE SKIN STAPLERS (35 WIDE) CONTAINS 35 STAINLESS STEEL STAPLES (FIXED HEAD): Brand: PROXIMATE

## (undated) DEVICE — BASIC DOUBLE BASIN 2-LF: Brand: MEDLINE INDUSTRIES, INC.

## (undated) DEVICE — GLOVE INDICATOR PI UNDERGLOVE SZ 8.5 BLUE

## (undated) DEVICE — U-DRAPE: Brand: CONVERTORS

## (undated) DEVICE — PADDING CAST 4 IN  COTTON STRL

## (undated) DEVICE — RECIPROCATING BLADE, DOUBLE SIDED, OFFSET  (70.0 X 0.8 X 12.5MM)

## (undated) DEVICE — GLOVE SRG BIOGEL 8.5

## (undated) DEVICE — COBAN 4 IN STERILE

## (undated) DEVICE — TOWEL SET X-RAY

## (undated) DEVICE — STIRRUP STRAP ADULT DISP

## (undated) DEVICE — PENCIL ELECTROSURG E-Z CLEAN -0035H

## (undated) DEVICE — SKN PREP SPNG STKS PVP PNT STR: Brand: MEDLINE INDUSTRIES, INC.

## (undated) DEVICE — HOOD: Brand: FLYTE, SURGICOOL

## (undated) DEVICE — DUAL CUT SAGITTAL BLADE

## (undated) DEVICE — NEEDLE 18 G X 1 1/2 SAFETY

## (undated) DEVICE — 3 BONE CEMENT MIXER: Brand: MIXEVAC

## (undated) DEVICE — 3M™ IOBAN™ 2 ANTIMICROBIAL INCISE DRAPE 6650EZ: Brand: IOBAN™ 2

## (undated) DEVICE — SPONGE SCRUB 4 PCT CHLORHEXIDINE

## (undated) DEVICE — SKN PRP WNG SPNGE PVP SCRB STR: Brand: MEDLINE INDUSTRIES, INC.

## (undated) DEVICE — SPINNING CEMENT MIXING BOWL